# Patient Record
Sex: MALE | Race: WHITE | NOT HISPANIC OR LATINO | Employment: OTHER | URBAN - METROPOLITAN AREA
[De-identification: names, ages, dates, MRNs, and addresses within clinical notes are randomized per-mention and may not be internally consistent; named-entity substitution may affect disease eponyms.]

---

## 2017-01-25 ENCOUNTER — ALLSCRIPTS OFFICE VISIT (OUTPATIENT)
Dept: OTHER | Facility: OTHER | Age: 67
End: 2017-01-25

## 2017-01-25 DIAGNOSIS — E78.5 HYPERLIPIDEMIA: ICD-10-CM

## 2017-01-25 DIAGNOSIS — I10 ESSENTIAL (PRIMARY) HYPERTENSION: ICD-10-CM

## 2017-01-25 DIAGNOSIS — E87.6 HYPOKALEMIA: ICD-10-CM

## 2017-01-30 ENCOUNTER — ALLSCRIPTS OFFICE VISIT (OUTPATIENT)
Dept: OTHER | Facility: OTHER | Age: 67
End: 2017-01-30

## 2017-02-22 ENCOUNTER — ANESTHESIA EVENT (OUTPATIENT)
Dept: GASTROENTEROLOGY | Facility: HOSPITAL | Age: 67
End: 2017-02-22
Payer: MEDICARE

## 2017-02-23 ENCOUNTER — ANESTHESIA (OUTPATIENT)
Dept: GASTROENTEROLOGY | Facility: HOSPITAL | Age: 67
End: 2017-02-23
Payer: MEDICARE

## 2017-02-23 ENCOUNTER — HOSPITAL ENCOUNTER (OUTPATIENT)
Facility: HOSPITAL | Age: 67
Setting detail: OUTPATIENT SURGERY
Discharge: HOME | End: 2017-02-23
Attending: INTERNAL MEDICINE | Admitting: INTERNAL MEDICINE
Payer: MEDICARE

## 2017-02-23 ENCOUNTER — GENERIC CONVERSION - ENCOUNTER (OUTPATIENT)
Dept: OTHER | Facility: OTHER | Age: 67
End: 2017-02-23

## 2017-02-23 VITALS
RESPIRATION RATE: 20 BRPM | TEMPERATURE: 98.6 F | WEIGHT: 212 LBS | SYSTOLIC BLOOD PRESSURE: 110 MMHG | OXYGEN SATURATION: 95 % | HEART RATE: 69 BPM | DIASTOLIC BLOOD PRESSURE: 66 MMHG | HEIGHT: 72 IN | BODY MASS INDEX: 28.71 KG/M2

## 2017-02-23 DIAGNOSIS — R11.2 NAUSEA WITH VOMITING: ICD-10-CM

## 2017-02-23 PROCEDURE — 88305 TISSUE EXAM BY PATHOLOGIST: CPT | Performed by: INTERNAL MEDICINE

## 2017-02-23 PROCEDURE — 88312 SPECIAL STAINS GROUP 1: CPT | Performed by: INTERNAL MEDICINE

## 2017-02-23 PROCEDURE — 88342 IMHCHEM/IMCYTCHM 1ST ANTB: CPT | Performed by: INTERNAL MEDICINE

## 2017-02-23 RX ORDER — LISINOPRIL 40 MG/1
40 TABLET ORAL DAILY
COMMUNITY
End: 2018-03-30 | Stop reason: SDUPTHER

## 2017-02-23 RX ORDER — LIDOCAINE HYDROCHLORIDE 10 MG/ML
INJECTION, SOLUTION EPIDURAL; INFILTRATION; INTRACAUDAL; PERINEURAL AS NEEDED
Status: DISCONTINUED | OUTPATIENT
Start: 2017-02-23 | End: 2017-02-23 | Stop reason: SURG

## 2017-02-23 RX ORDER — AMLODIPINE BESYLATE 10 MG/1
10 TABLET ORAL DAILY
COMMUNITY
End: 2018-03-30 | Stop reason: SDUPTHER

## 2017-02-23 RX ORDER — SODIUM CHLORIDE 9 MG/ML
125 INJECTION, SOLUTION INTRAVENOUS CONTINUOUS
Status: DISCONTINUED | OUTPATIENT
Start: 2017-02-23 | End: 2017-02-23 | Stop reason: HOSPADM

## 2017-02-23 RX ORDER — PROPOFOL 10 MG/ML
INJECTION, EMULSION INTRAVENOUS CONTINUOUS PRN
Status: DISCONTINUED | OUTPATIENT
Start: 2017-02-23 | End: 2017-02-23 | Stop reason: SURG

## 2017-02-23 RX ORDER — PROPOFOL 10 MG/ML
INJECTION, EMULSION INTRAVENOUS AS NEEDED
Status: DISCONTINUED | OUTPATIENT
Start: 2017-02-23 | End: 2017-02-23 | Stop reason: SURG

## 2017-02-23 RX ORDER — PANTOPRAZOLE SODIUM 40 MG/1
40 TABLET, DELAYED RELEASE ORAL DAILY
COMMUNITY
End: 2018-03-30 | Stop reason: SDUPTHER

## 2017-02-23 RX ADMIN — TOPICAL ANESTHETIC 1 SPRAY: 200 SPRAY DENTAL; PERIODONTAL at 10:22

## 2017-02-23 RX ADMIN — PROPOFOL 80 MG: 10 INJECTION, EMULSION INTRAVENOUS at 10:23

## 2017-02-23 RX ADMIN — PROPOFOL 130 MCG/KG/MIN: 10 INJECTION, EMULSION INTRAVENOUS at 10:23

## 2017-02-23 RX ADMIN — SODIUM CHLORIDE 125 ML/HR: 0.9 INJECTION, SOLUTION INTRAVENOUS at 09:29

## 2017-02-23 RX ADMIN — LIDOCAINE HYDROCHLORIDE 50 MG: 10 INJECTION, SOLUTION EPIDURAL; INFILTRATION; INTRACAUDAL; PERINEURAL at 10:23

## 2017-02-27 ENCOUNTER — GENERIC CONVERSION - ENCOUNTER (OUTPATIENT)
Dept: OTHER | Facility: OTHER | Age: 67
End: 2017-02-27

## 2017-03-03 ENCOUNTER — GENERIC CONVERSION - ENCOUNTER (OUTPATIENT)
Dept: OTHER | Facility: OTHER | Age: 67
End: 2017-03-03

## 2017-03-13 ENCOUNTER — APPOINTMENT (OUTPATIENT)
Dept: LAB | Facility: HOSPITAL | Age: 67
End: 2017-03-13
Payer: MEDICARE

## 2017-03-13 ENCOUNTER — ALLSCRIPTS OFFICE VISIT (OUTPATIENT)
Dept: OTHER | Facility: OTHER | Age: 67
End: 2017-03-13

## 2017-03-13 DIAGNOSIS — E78.5 HYPERLIPIDEMIA: ICD-10-CM

## 2017-03-13 DIAGNOSIS — I10 ESSENTIAL (PRIMARY) HYPERTENSION: ICD-10-CM

## 2017-03-13 DIAGNOSIS — E87.6 HYPOKALEMIA: ICD-10-CM

## 2017-03-13 LAB
ALBUMIN SERPL BCP-MCNC: 3.8 G/DL (ref 3.5–5)
ALP SERPL-CCNC: 79 U/L (ref 46–116)
ALT SERPL W P-5'-P-CCNC: 28 U/L (ref 12–78)
ANION GAP SERPL CALCULATED.3IONS-SCNC: 8 MMOL/L (ref 4–13)
AST SERPL W P-5'-P-CCNC: 16 U/L (ref 5–45)
BASOPHILS # BLD AUTO: 0.04 THOUSANDS/ΜL (ref 0–0.1)
BASOPHILS NFR BLD AUTO: 1 % (ref 0–1)
BILIRUB SERPL-MCNC: 0.61 MG/DL (ref 0.2–1)
BUN SERPL-MCNC: 16 MG/DL (ref 5–25)
CALCIUM SERPL-MCNC: 9 MG/DL (ref 8.3–10.1)
CHLORIDE SERPL-SCNC: 105 MMOL/L (ref 100–108)
CHOLEST SERPL-MCNC: 182 MG/DL (ref 50–200)
CO2 SERPL-SCNC: 30 MMOL/L (ref 21–32)
CREAT SERPL-MCNC: 1.1 MG/DL (ref 0.6–1.3)
EOSINOPHIL # BLD AUTO: 0.23 THOUSAND/ΜL (ref 0–0.61)
EOSINOPHIL NFR BLD AUTO: 4 % (ref 0–6)
ERYTHROCYTE [DISTWIDTH] IN BLOOD BY AUTOMATED COUNT: 14.1 % (ref 11.6–15.1)
GFR SERPL CREATININE-BSD FRML MDRD: >60 ML/MIN/1.73SQ M
GLUCOSE SERPL-MCNC: 115 MG/DL (ref 65–140)
HCT VFR BLD AUTO: 47.3 % (ref 36.5–49.3)
HDLC SERPL-MCNC: 37 MG/DL (ref 40–60)
HGB BLD-MCNC: 15.8 G/DL (ref 12–17)
LDLC SERPL CALC-MCNC: 128 MG/DL (ref 0–100)
LYMPHOCYTES # BLD AUTO: 1.54 THOUSANDS/ΜL (ref 0.6–4.47)
LYMPHOCYTES NFR BLD AUTO: 24 % (ref 14–44)
MCH RBC QN AUTO: 29.8 PG (ref 26.8–34.3)
MCHC RBC AUTO-ENTMCNC: 33.4 G/DL (ref 31.4–37.4)
MCV RBC AUTO: 89 FL (ref 82–98)
MONOCYTES # BLD AUTO: 0.51 THOUSAND/ΜL (ref 0.17–1.22)
MONOCYTES NFR BLD AUTO: 8 % (ref 4–12)
NEUTROPHILS # BLD AUTO: 3.99 THOUSANDS/ΜL (ref 1.85–7.62)
NEUTS SEG NFR BLD AUTO: 63 % (ref 43–75)
NRBC BLD AUTO-RTO: 0 /100 WBCS
PLATELET # BLD AUTO: 165 THOUSANDS/UL (ref 149–390)
PMV BLD AUTO: 10.7 FL (ref 8.9–12.7)
POTASSIUM SERPL-SCNC: 3.7 MMOL/L (ref 3.5–5.3)
PROT SERPL-MCNC: 7.1 G/DL (ref 6.4–8.2)
RBC # BLD AUTO: 5.3 MILLION/UL (ref 3.88–5.62)
SODIUM SERPL-SCNC: 143 MMOL/L (ref 136–145)
TRIGL SERPL-MCNC: 87 MG/DL
TSH SERPL DL<=0.05 MIU/L-ACNC: 1.06 UIU/ML (ref 0.36–3.74)
WBC # BLD AUTO: 6.31 THOUSAND/UL (ref 4.31–10.16)

## 2017-03-13 PROCEDURE — 80061 LIPID PANEL: CPT

## 2017-03-13 PROCEDURE — 36415 COLL VENOUS BLD VENIPUNCTURE: CPT

## 2017-03-13 PROCEDURE — 80053 COMPREHEN METABOLIC PANEL: CPT

## 2017-03-13 PROCEDURE — 85025 COMPLETE CBC W/AUTO DIFF WBC: CPT

## 2017-03-13 PROCEDURE — 84443 ASSAY THYROID STIM HORMONE: CPT

## 2017-04-14 ENCOUNTER — ALLSCRIPTS OFFICE VISIT (OUTPATIENT)
Dept: OTHER | Facility: OTHER | Age: 67
End: 2017-04-14

## 2017-05-24 ENCOUNTER — ALLSCRIPTS OFFICE VISIT (OUTPATIENT)
Dept: OTHER | Facility: OTHER | Age: 67
End: 2017-05-24

## 2017-05-24 DIAGNOSIS — Z00.00 ENCOUNTER FOR GENERAL ADULT MEDICAL EXAMINATION WITHOUT ABNORMAL FINDINGS: ICD-10-CM

## 2017-06-27 ENCOUNTER — GENERIC CONVERSION - ENCOUNTER (OUTPATIENT)
Dept: OTHER | Facility: OTHER | Age: 67
End: 2017-06-27

## 2017-06-27 ENCOUNTER — APPOINTMENT (OUTPATIENT)
Dept: LAB | Facility: HOSPITAL | Age: 67
End: 2017-06-27
Payer: MEDICARE

## 2017-06-27 DIAGNOSIS — Z00.00 ENCOUNTER FOR GENERAL ADULT MEDICAL EXAMINATION WITHOUT ABNORMAL FINDINGS: ICD-10-CM

## 2017-06-27 LAB
ALBUMIN SERPL BCP-MCNC: 4.1 G/DL (ref 3.5–5)
ALP SERPL-CCNC: 82 U/L (ref 46–116)
ALT SERPL W P-5'-P-CCNC: 26 U/L (ref 12–78)
ANION GAP SERPL CALCULATED.3IONS-SCNC: 7 MMOL/L (ref 4–13)
AST SERPL W P-5'-P-CCNC: 18 U/L (ref 5–45)
BILIRUB SERPL-MCNC: 0.76 MG/DL (ref 0.2–1)
BUN SERPL-MCNC: 13 MG/DL (ref 5–25)
CALCIUM SERPL-MCNC: 8.9 MG/DL (ref 8.3–10.1)
CHLORIDE SERPL-SCNC: 107 MMOL/L (ref 100–108)
CO2 SERPL-SCNC: 29 MMOL/L (ref 21–32)
CREAT SERPL-MCNC: 1.2 MG/DL (ref 0.6–1.3)
EST. AVERAGE GLUCOSE BLD GHB EST-MCNC: 137 MG/DL
GFR SERPL CREATININE-BSD FRML MDRD: >60 ML/MIN/1.73SQ M
GLUCOSE P FAST SERPL-MCNC: 117 MG/DL (ref 65–99)
HBA1C MFR BLD: 6.4 % (ref 4.2–6.3)
POTASSIUM SERPL-SCNC: 3.6 MMOL/L (ref 3.5–5.3)
PROT SERPL-MCNC: 7.6 G/DL (ref 6.4–8.2)
PSA SERPL-MCNC: 1.7 NG/ML (ref 0–4)
SODIUM SERPL-SCNC: 143 MMOL/L (ref 136–145)

## 2017-06-27 PROCEDURE — 36415 COLL VENOUS BLD VENIPUNCTURE: CPT

## 2017-06-27 PROCEDURE — 80053 COMPREHEN METABOLIC PANEL: CPT

## 2017-06-27 PROCEDURE — 83036 HEMOGLOBIN GLYCOSYLATED A1C: CPT

## 2017-06-27 PROCEDURE — G0103 PSA SCREENING: HCPCS

## 2017-07-12 ENCOUNTER — ALLSCRIPTS OFFICE VISIT (OUTPATIENT)
Dept: OTHER | Facility: OTHER | Age: 67
End: 2017-07-12

## 2017-07-18 ENCOUNTER — ALLSCRIPTS OFFICE VISIT (OUTPATIENT)
Dept: OTHER | Facility: OTHER | Age: 67
End: 2017-07-18

## 2017-09-26 ENCOUNTER — ALLSCRIPTS OFFICE VISIT (OUTPATIENT)
Dept: OTHER | Facility: OTHER | Age: 67
End: 2017-09-26

## 2017-10-04 ENCOUNTER — ALLSCRIPTS OFFICE VISIT (OUTPATIENT)
Dept: OTHER | Facility: OTHER | Age: 67
End: 2017-10-04

## 2017-10-04 DIAGNOSIS — R19.8 OTHER SPECIFIED SYMPTOMS AND SIGNS INVOLVING THE DIGESTIVE SYSTEM AND ABDOMEN: ICD-10-CM

## 2017-10-05 ENCOUNTER — APPOINTMENT (OUTPATIENT)
Dept: LAB | Facility: HOSPITAL | Age: 67
End: 2017-10-05
Attending: INTERNAL MEDICINE
Payer: MEDICARE

## 2017-10-05 DIAGNOSIS — R19.8 OTHER SPECIFIED SYMPTOMS AND SIGNS INVOLVING THE DIGESTIVE SYSTEM AND ABDOMEN: ICD-10-CM

## 2017-10-05 LAB
ALBUMIN SERPL BCP-MCNC: 4.1 G/DL (ref 3.5–5)
ALP SERPL-CCNC: 83 U/L (ref 46–116)
ALT SERPL W P-5'-P-CCNC: 37 U/L (ref 12–78)
ANION GAP SERPL CALCULATED.3IONS-SCNC: 9 MMOL/L (ref 4–13)
AST SERPL W P-5'-P-CCNC: 22 U/L (ref 5–45)
BASOPHILS # BLD AUTO: 0.05 THOUSANDS/ΜL (ref 0–0.1)
BASOPHILS NFR BLD AUTO: 1 % (ref 0–1)
BILIRUB DIRECT SERPL-MCNC: 0.18 MG/DL (ref 0–0.2)
BILIRUB SERPL-MCNC: 1.05 MG/DL (ref 0.2–1)
BUN SERPL-MCNC: 12 MG/DL (ref 5–25)
CALCIUM SERPL-MCNC: 9.3 MG/DL (ref 8.3–10.1)
CHLORIDE SERPL-SCNC: 103 MMOL/L (ref 100–108)
CO2 SERPL-SCNC: 28 MMOL/L (ref 21–32)
CREAT SERPL-MCNC: 1.04 MG/DL (ref 0.6–1.3)
EOSINOPHIL # BLD AUTO: 0.17 THOUSAND/ΜL (ref 0–0.61)
EOSINOPHIL NFR BLD AUTO: 3 % (ref 0–6)
ERYTHROCYTE [DISTWIDTH] IN BLOOD BY AUTOMATED COUNT: 16.1 % (ref 11.6–15.1)
GFR SERPL CREATININE-BSD FRML MDRD: 74 ML/MIN/1.73SQ M
GLUCOSE P FAST SERPL-MCNC: 120 MG/DL (ref 65–99)
HCT VFR BLD AUTO: 44.2 % (ref 36.5–49.3)
HGB BLD-MCNC: 15.9 G/DL (ref 12–17)
LYMPHOCYTES # BLD AUTO: 1.32 THOUSANDS/ΜL (ref 0.6–4.47)
LYMPHOCYTES NFR BLD AUTO: 19 % (ref 14–44)
MCH RBC QN AUTO: 33.2 PG (ref 26.8–34.3)
MCHC RBC AUTO-ENTMCNC: 36 G/DL (ref 31.4–37.4)
MCV RBC AUTO: 92 FL (ref 82–98)
MONOCYTES # BLD AUTO: 0.46 THOUSAND/ΜL (ref 0.17–1.22)
MONOCYTES NFR BLD AUTO: 7 % (ref 4–12)
NEUTROPHILS # BLD AUTO: 4.79 THOUSANDS/ΜL (ref 1.85–7.62)
NEUTS SEG NFR BLD AUTO: 70 % (ref 43–75)
NRBC BLD AUTO-RTO: 0 /100 WBCS
PLATELET # BLD AUTO: 170 THOUSANDS/UL (ref 149–390)
PMV BLD AUTO: 10.5 FL (ref 8.9–12.7)
POTASSIUM SERPL-SCNC: 3.7 MMOL/L (ref 3.5–5.3)
PROT SERPL-MCNC: 7.8 G/DL (ref 6.4–8.2)
RBC # BLD AUTO: 4.79 MILLION/UL (ref 3.88–5.62)
SODIUM SERPL-SCNC: 140 MMOL/L (ref 136–145)
WBC # BLD AUTO: 6.79 THOUSAND/UL (ref 4.31–10.16)

## 2017-10-05 PROCEDURE — 85025 COMPLETE CBC W/AUTO DIFF WBC: CPT

## 2017-10-05 PROCEDURE — 36415 COLL VENOUS BLD VENIPUNCTURE: CPT

## 2017-10-05 PROCEDURE — 80048 BASIC METABOLIC PNL TOTAL CA: CPT

## 2017-10-05 PROCEDURE — 80076 HEPATIC FUNCTION PANEL: CPT

## 2017-10-10 ENCOUNTER — HOSPITAL ENCOUNTER (OUTPATIENT)
Dept: CT IMAGING | Facility: HOSPITAL | Age: 67
Discharge: HOME | End: 2017-10-10
Attending: INTERNAL MEDICINE
Payer: MEDICARE

## 2017-10-10 DIAGNOSIS — R19.8 OTHER SPECIFIED SYMPTOMS AND SIGNS INVOLVING THE DIGESTIVE SYSTEM AND ABDOMEN: ICD-10-CM

## 2017-10-10 PROCEDURE — 74177 CT ABD & PELVIS W/CONTRAST: CPT

## 2017-10-10 RX ADMIN — IOHEXOL 100 ML: 350 INJECTION, SOLUTION INTRAVENOUS at 18:42

## 2017-10-17 ENCOUNTER — GENERIC CONVERSION - ENCOUNTER (OUTPATIENT)
Dept: OTHER | Facility: OTHER | Age: 67
End: 2017-10-17

## 2017-10-18 ENCOUNTER — TRANSCRIBE ORDERS (OUTPATIENT)
Dept: SLEEP CENTER | Facility: CLINIC | Age: 67
End: 2017-10-18

## 2017-10-18 ENCOUNTER — HOSPITAL ENCOUNTER (OUTPATIENT)
Dept: SLEEP CENTER | Facility: CLINIC | Age: 67
Discharge: HOME | End: 2017-10-18
Payer: MEDICARE

## 2017-10-18 DIAGNOSIS — G47.33 OBSTRUCTIVE SLEEP APNEA: ICD-10-CM

## 2017-10-18 DIAGNOSIS — G47.33 OBSTRUCTIVE SLEEP APNEA SYNDROME: Primary | ICD-10-CM

## 2017-10-18 NOTE — PROGRESS NOTES
Progress Note - [facility]   Suzette Mason 78 y o  male MRN: [id number]  Unit/Bed#:  Encounter: [id number]  Dear Dr Seamus Tejada  Mr Landen Hyde is here for follow-up of his obstructive sleep apnea, a 1 year follow-up  52-year-old gentleman with past medical history significant for history of hypertension diagnosed with severe obstructive sleep apnea and has been on the CPAP for more than 2 years now compliant with his CPAP machine  His daily sleep schedule has not changed  He states he has decreased nocturnal awakenings and feels much rested when he wakes up in the morning  Past medical history list of current medications unchanged from prior visit  Today on exam  Weight 211 4 pounds  Height 6 feet 0 inches  BMI 28 7  Blood pressure 142/80  Heart rate 86  Neck 40 centimeters  Starbuck sleepiness score 3  CPAP settings 9  DME company and medical  Today on exam  ENT nares is patent, no evidence of any discharge, presence of redundant mucosa, crowded oropharyngeal airways, Mallampati score of 3  Neck short and wide supple no lymphadenopathy normal thyroid  Heart first and second heart sounds are heard no murmur or gallop is heard  Lungs clear to auscultation  Extremities no pedal edema  CNS awake alert oriented x3  Assessment  1  Obstructive sleep apnea  2  Overweight  Plan  Discussed with the patient to continue with his same CPAP settings  Also discussed regarding change of CPAP supplies every 6 months  Understands and verbalizes  Discussed with the patient to use saline nasal spray 1 spray into each nostril prior to putting on the mask  He states his humidifier is not working as well, and he wants a replacement I [facility]   Recommend weight loss  Follow-up in 1 year or p r n  earlier as needed    Thank you very much for allowing me to participate in the care of this patient  Yours sincerely  Ricki Dang

## 2017-10-27 NOTE — PROGRESS NOTES
Assessment  1  Abdominal bloating (787 3) (R14 0)   2  Increased abdominal girth (789 30) (R19 8)   3  Constipation, chronic (564 00) (K59 09)    Plan  Constipation, chronic    · MiraLax Oral Powder (Polyethylene Glycol 3350); MIX 17 GM IN 8 OUNCES OF  LIQUID AND DRINK 1 TO 2 TIMES DAILY FOR CONSTIPATION   Rx By: Meek Barahona; Dispense: 30 Days ; #:1 X 510 GM Bottle (2 Bottles); Refill: 2;For: Constipation, chronic; SWETHA = N; Verified Transmission to [address]; Last Updated By: [facility]; 10/4/2017 11:14:02 AM  Increased abdominal girth    · (1) BASIC METABOLIC PROFILE; Status:Active; Requested GRR:[id number];    Perform:[facility]; GRT:27PPG0601; Ordered; For:Increased abdominal girth; Ordered By:Harrison Turcios;   · (1) CBC/PLT/DIFF; Status:Active; Requested JESSICA:[id number];    Perform:[facility]; JYT:[id number]; Ordered; For:Increased abdominal girth; Ordered By:Harrison Turcios;   · (1) HEPATIC FUNCTION PANEL; Status:Active; Requested INJ:[id number];    Perform:[facility]; XCR:[id number]; Ordered; For:Increased abdominal girth; Ordered By:Harrison Turcios;   · * CT ABDOMEN PELVIS W CONTRAST; Status:Active; Requested VWQ:[id number];    Perform:[facility]; GIK:[id number]; Last Updated Jen Red; 10/4/2017 11:31:51 AM;Ordered; For:Increased abdominal girth; Ordered By:Wu Turcios;    Discussion/Summary  Discussion Summary:   Abdominal bloating:Likely secondary to chronic constipation  Patient and his wife will consult on importance of increased water intake and increased fiber intake  I would recommend patient to start MiraLax daily to see whether it can improve his symptoms  Low FODMAP diet instruction was given to the patient  Increased abdominal girth:CT with IV contrast to evaluate any abnormalities including occult malignancy    follow-up in 2 months     Counseling Documentation With Imm: The patient, patient's family was counseled regarding diagnostic results,-- instructions for management,-- patient and family education,-- impressions,-- risks and benefits of treatment options  Chief Complaint  Chief Complaint Free Text Note Form: Pt is here for follow up; complains of loss of appetite, constipation, bloating and hard abdomen  History of Present Illness  HPI: 44-year-old man presented for follow-up after endoscopy and colonoscopy  Patient reported he noticed increased abdominal girth in the past few months and severe bloating  He has bowel movement once a day or once every other day but he has sense of incomplete evacuation  Patient reported he feels uncomfortable  His wife reported patient does not drink enough water And he has constipation intermittently  His symptom improves after he drinks vegetable milkshake  She denies vomiting  Patient has been gaining weight  His most recent TSH was 1 0  His EGD showed duodenitis and biopsy was negative for celiac disease  His colonoscopy showed good prep with multiple small polyps removed with 2 of them being tubular adenoma  it was planned to repeat his colonoscopy in 3 years  Review of Systems  Complete-Male GI Adult:   Constitutional: No fever or chills, feels well, no tiredness, no recent weight gain or weight loss  Eyes: No complaints of eye pain, no red eyes, no discharge from eyes, no itchy eyes  ENT: no complaints of earache, no hearing loss, no nosebleeds, no nasal discharge, no sore throat, no hoarseness  Cardiovascular: No complaints of slow heart rate, no fast heart rate, no chest pain, no palpitations, no leg claudication, no lower extremity  Respiratory: No complaints of shortness of breath, no wheezing, no cough, no SOB on exertion, no orthopnea or PND  Gastrointestinal: constipation-- and-- loss of appetite, but-- as noted in HPI  Genitourinary: No complaints of dysuria, no incontinence, no hesitancy, no nocturia, no genital lesion, no testicular pain     Musculoskeletal: No complaints of arthralgia, no myalgias, no joint swelling or stiffness, no limb pain or swelling  Integumentary: No complaints of skin rash or skin lesions, no itching, no skin wound, no dry skin  Neurological: No compliants of headache, no confusion, no convulsions, no numbness or tingling, no dizziness or fainting, no limb weakness, no difficulty walking  Psychiatric: Is not suicidal, no sleep disturbances, no anxiety or depression, no change in personality, no emotional problems  Endocrine: No complaints of proptosis, no hot flashes, no muscle weakness, no erectile dysfunction, no deepening of the voice, no feelings of weakness  Hematologic/Lymphatic: No complaints of swollen glands, no swollen glands in the neck, does not bleed easily, no easy bruising  ROS Reviewed:   ROS reviewed  Active Problems  1  Abnormal CT of the abdomen (793 6) (R93 5)   2  Alternating constipation and diarrhea (787 99) (R19 8)   3  Bilateral inguinal hernia (550 92) (K40 20)   4  Bulging Disc (L1 - L2) (722 10)   5  Carotid artery stenosis (433 10) (I65 29)   6  Colitis (558 9) (K52 9)   7  Duodenitis (535 60) (K29 80)   8  Edema (782 3) (R60 9)   9  Gastritis (535 50) (K29 70)   10  Gastrointestinal complications due to surgery (997 49) (K91 89)   11  Hyperlipidemia (272 4) (E78 5)   12  Hypertension (401 9) (I10)   13  Hypokalemia (276 8) (E87 6)   14  Impacted cerumen of both ears (380 4) (H61 23)   15  Inguinal hernia (550 90) (K40 90)   16  Left ear pain (388 70) (H92 02)   17  Left knee pain (719 46) (M25 562)   18  Leg pain, bilateral (729 5) (M79 604,M79 605)   19  Lumbar pain (724 2) (M54 5)   20  Nausea and vomiting (787 01) (R11 2)   21  Olecranon bursitis of left elbow (726 33) (M70 22)   22  Skin abrasion (919 0) (T14 8XXA)   23  Sleep apnea (780 57) (G47 30)   24  Tobacco abuse (305 1) (Z72 0)   25  URTI (acute upper respiratory infection) (465 9) (J06 9)    Past Medical History  1   Bilateral inguinal hernia (550 92) (K40 20)   2  History of abdominal pain (V13 89) (Z87 898)   3  History of Recurrent bilateral inguinal hernia (550 93) (K40 21)  Active Problems And Past Medical History Reviewed: The active problems and past medical history were reviewed and updated today  Surgical History  1  History of Cholecystectomy   2  History of Complete Colonoscopy   3  History of Exploratory Laparoscopy   4  History of Hernia Repair Inguinal Bilateral  Surgical History Reviewed: The surgical history was reviewed and updated today  Family History  Mother    1  No pertinent family history    Social History   · Being A Social Drinker   · Current Every Day Smoker (305 1)    Current Meds   1  Adult Aspirin Low Strength 81 MG TBDP; Therapy: 37ATF0895 to Recorded   2  AmLODIPine Besylate 10 MG Oral Tablet; TAKE 1 TABLET DAILY AS DIRECTED; Therapy: 96CWG1530 to (Evaluate:20Nov2017)  Requested for: 64DED3118; Last   Rx:18Ouw5476 Ordered   3  Lisinopril 40 MG Oral Tablet; TAKE 1 TABLET DAILY; Therapy: 04VDX9234 to (Evaluate:20Nov2017)  Requested for: 09SKM6525; Last   Rx:33Ifo2045 Ordered   4  Pantoprazole Sodium 40 MG Oral Tablet Delayed Release; take one tablet by mouth   daily; Therapy: 12ZKD2659 to (Evaluate:11Mar2018)  Requested for: 33Rch3211; Last   Rx:14Jle3909 Ordered   5  Tylenol TABS; Therapy: (Recorded:37Cjo2439) to Recorded    Allergies  1  No Known Drug Allergies    Vitals  Vital Signs    Recorded: 57JEL1518 10:51AM   Temperature 97 6 F, Tympanic   Heart Rate 89   Systolic 865, LUE, Sitting   Diastolic 81, LUE, Sitting   Height 5 ft 9 5 in   Weight 211 lb 8 oz   BMI Calculated 30 79   BSA Calculated 2 13   O2 Saturation 98, RA     Physical Exam    Constitutional   General appearance: No acute distress, well appearing and well nourished  Eyes   Conjunctiva and lids: No swelling, erythema, or discharge  Ears, Nose, Mouth, and Throat   Oropharynx: Normal with no erythema, edema, exudate or lesions  Pulmonary   Respiratory effort: No increased work of breathing or signs of respiratory distress  Cardiovascular   Examination of extremities for edema and/or varicosities: Normal     Abdomen distended, Non tender          Future Appointments    Date/Time Provider Specialty Site   11/21/2017 03:00 PM Ishmael Yost [facility] Adult [facility] 9     Signatures   Electronically signed by : Katarzyna Nazario MD; Oct  4 2017 12:13PM EST                       (Author)

## 2017-11-21 ENCOUNTER — GENERIC CONVERSION - ENCOUNTER (OUTPATIENT)
Dept: OTHER | Facility: OTHER | Age: 67
End: 2017-11-21

## 2017-11-21 DIAGNOSIS — M79.661 PAIN OF RIGHT LOWER LEG: ICD-10-CM

## 2017-11-21 DIAGNOSIS — K76.0 FATTY (CHANGE OF) LIVER, NOT ELSEWHERE CLASSIFIED: ICD-10-CM

## 2017-11-21 DIAGNOSIS — K59.09 OTHER CONSTIPATION: ICD-10-CM

## 2017-11-21 DIAGNOSIS — R14.0 ABDOMINAL DISTENSION (GASEOUS): ICD-10-CM

## 2017-11-21 DIAGNOSIS — S83.206A TEAR OF RIGHT MENISCUS AS CURRENT INJURY: ICD-10-CM

## 2017-11-21 DIAGNOSIS — R60.0 LOCALIZED EDEMA: ICD-10-CM

## 2017-11-21 DIAGNOSIS — M25.561 PAIN IN RIGHT KNEE: ICD-10-CM

## 2017-12-13 ENCOUNTER — ALLSCRIPTS OFFICE VISIT (OUTPATIENT)
Dept: OTHER | Facility: OTHER | Age: 67
End: 2017-12-13

## 2017-12-13 ENCOUNTER — GENERIC CONVERSION - ENCOUNTER (OUTPATIENT)
Dept: OTHER | Facility: OTHER | Age: 67
End: 2017-12-13

## 2017-12-13 ENCOUNTER — HOSPITAL ENCOUNTER (OUTPATIENT)
Dept: NON INVASIVE DIAGNOSTICS | Facility: HOSPITAL | Age: 67
Discharge: HOME | End: 2017-12-13
Payer: MEDICARE

## 2017-12-13 ENCOUNTER — APPOINTMENT (OUTPATIENT)
Dept: LAB | Facility: HOSPITAL | Age: 67
End: 2017-12-13
Payer: MEDICARE

## 2017-12-13 ENCOUNTER — HOSPITAL ENCOUNTER (OUTPATIENT)
Dept: RADIOLOGY | Facility: HOSPITAL | Age: 67
Discharge: HOME | End: 2017-12-13
Payer: MEDICARE

## 2017-12-13 DIAGNOSIS — R60.0 LOCALIZED EDEMA: ICD-10-CM

## 2017-12-13 DIAGNOSIS — K59.09 OTHER CONSTIPATION: ICD-10-CM

## 2017-12-13 DIAGNOSIS — M79.661 PAIN OF RIGHT LOWER LEG: ICD-10-CM

## 2017-12-13 DIAGNOSIS — R14.0 ABDOMINAL DISTENSION (GASEOUS): ICD-10-CM

## 2017-12-13 DIAGNOSIS — M25.561 PAIN IN RIGHT KNEE: ICD-10-CM

## 2017-12-13 DIAGNOSIS — K76.0 FATTY (CHANGE OF) LIVER, NOT ELSEWHERE CLASSIFIED: ICD-10-CM

## 2017-12-13 LAB
ALBUMIN SERPL BCP-MCNC: 3.8 G/DL (ref 3.5–5)
ALP SERPL-CCNC: 77 U/L (ref 46–116)
ALT SERPL W P-5'-P-CCNC: 28 U/L (ref 12–78)
ANION GAP SERPL CALCULATED.3IONS-SCNC: 7 MMOL/L (ref 4–13)
AST SERPL W P-5'-P-CCNC: 20 U/L (ref 5–45)
BILIRUB SERPL-MCNC: 0.49 MG/DL (ref 0.2–1)
BUN SERPL-MCNC: 14 MG/DL (ref 5–25)
CALCIUM SERPL-MCNC: 9 MG/DL (ref 8.3–10.1)
CHLORIDE SERPL-SCNC: 108 MMOL/L (ref 100–108)
CO2 SERPL-SCNC: 30 MMOL/L (ref 21–32)
CREAT SERPL-MCNC: 1.02 MG/DL (ref 0.6–1.3)
GFR SERPL CREATININE-BSD FRML MDRD: 76 ML/MIN/1.73SQ M
GLUCOSE SERPL-MCNC: 104 MG/DL (ref 65–140)
POTASSIUM SERPL-SCNC: 4 MMOL/L (ref 3.5–5.3)
PROT SERPL-MCNC: 7.1 G/DL (ref 6.4–8.2)
SODIUM SERPL-SCNC: 145 MMOL/L (ref 136–145)

## 2017-12-13 PROCEDURE — 80053 COMPREHEN METABOLIC PANEL: CPT

## 2017-12-13 PROCEDURE — 93971 EXTREMITY STUDY: CPT

## 2017-12-13 PROCEDURE — 36415 COLL VENOUS BLD VENIPUNCTURE: CPT

## 2017-12-13 PROCEDURE — 73564 X-RAY EXAM KNEE 4 OR MORE: CPT

## 2017-12-14 ENCOUNTER — GENERIC CONVERSION - ENCOUNTER (OUTPATIENT)
Dept: OTHER | Facility: OTHER | Age: 67
End: 2017-12-14

## 2017-12-14 NOTE — PROGRESS NOTES
Assessment    1  Right knee pain (719 46) (M25 561)   2  Tenderness of right calf (729 5) (M79 661)   3  Edema of right lower extremity (782 3) (R60 0)    Plan  Edema of right lower extremity, Right knee pain, Tenderness of right calf    · Meloxicam 15 MG Oral Tablet; TAKE 1 TABLET DAILY WITH FOOD   · * XR KNEE 4+ VW RIGHT INJURY; Status:Active; Requested for:99Dou4026;    · VAS LOWER LIMB VENOUS DUPLEX STUDY, UNILATERAL/LIMITED; UnilateralSide:Right; Status:Hold For - Scheduling; Requested for:35Oxw4593;    · Follow-up visit in 1 week Evaluation and Treatment  Follow-up  Status: Hold For -Scheduling  Requested for: 48Bng9918    Discussion/Summary    Patient go for Stat Doppler study as well as x-ray of the right leg  Patient will try to elevate right leg in use ice 10 minutes at a time 4 times a day  patient will use meloxicam daily  Patient use Tylenol as needed  Follow-up in 1 week  Patient may need MRI due to potential meniscal tear  Chief Complaint  Pt c/o RT leg pain since Dimitry 12/10/17, which felt like a pulled muscle at that time  The patient has since noted the pain got a lot worse  He stated his leg gave out this morning while walking to his vehicle and feels like there is a lump behind RT knee  He said the skin of the lower part of RT leg feels tight and radiation of pain into thigh  Pt with visible discomfort and unable to bear much weight  and failed use of heat, ice, motrin  requested for Lisinopril, Amlodipine and Pantoprazole to [facility]  History of Present Illness  HPI: Patient is here with difficulty ambulating  Patient's right knee gave out today  Patient was trying to go up a step 1 buckling occurred  Patient with pain behind his right knee  Patient also with tightness in his calf  No redness or bruising  patient with some back pain  Patient's right leg pain has worsened over the past 3 days  No fever  Patient did use ice heat and Motrin        Review of Systems   Constitutional: as noted in HPI   ENT: no complaints of earache, no loss of hearing, no nosebleeds or nasal discharge, no sore throat or hoarseness  Cardiovascular: no complaints of slow or fast heart rate, no chest pain, no palpitations, no leg claudication or lower extremity edema-- and-- no lower extremity edema  Respiratory: no complaints of shortness of breath, no wheezing or cough, no dyspnea on exertion, no orthopnea or PND  Gastrointestinal: no complaints of abdominal pain, no constipation, no nausea or vomiting, no diarrhea or bloody stools  Genitourinary: no complaints of dysuria or incontinence, no hesitancy, no nocturia, no genital lesion, no inadequacy of penile erection  Musculoskeletal: as noted in HPI  Integumentary: no complaints of skin rash or lesion, no itching or dry skin, no skin wounds  Neurological: no complaints of headache, no confusion, no numbness or tingling, no dizziness or fainting  Active Problems  1  Abdominal bloating (787 3) (R14 0)   2  Abnormal CT of the abdomen (793 6) (R93 5)   3  Alternating constipation and diarrhea (787 99) (R19 8)   4  Bilateral inguinal hernia (550 92) (K40 20)   5  Bulging Disc (L1 - L2) (722 10)   6  Carotid artery stenosis (433 10) (I65 29)   7  Colitis (558 9) (K52 9)   8  Constipation, chronic (564 00) (K59 09)   9  Duodenitis (535 60) (K29 80)   10  Edema (782 3) (R60 9)   11  Fatty liver (571 8) (K76 0)   12  Gastritis (535 50) (K29 70)   13  Gastrointestinal complications due to surgery (997 49) (K91 89)   14  Hyperlipidemia (272 4) (E78 5)   15  Hypertension (401 9) (I10)   16  Hypokalemia (276 8) (E87 6)   17  Impacted cerumen of both ears (380 4) (H61 23)   18  Increased abdominal girth (789 30) (R19 8)   19  Inguinal hernia (550 90) (K40 90)   20  Left ear pain (388 70) (H92 02)   21  Left knee pain (719 46) (M25 562)   22  Leg pain, bilateral (729 5) (M79 604,M79 605)   23  Lumbar pain (724 2) (M54 5)   24  Nausea and vomiting (787 01) (R11 2)   25  Olecranon bursitis of left elbow (726 33) (M70 22)   26  Skin abrasion (919 0) (T14 8XXA)   27  Sleep apnea (780 57) (G47 30)   28  Tobacco abuse (305 1) (Z72 0)   29  URTI (acute upper respiratory infection) (465 9) (J06 9)    Past Medical History  1  Bilateral inguinal hernia (550 92) (K40 20)   2  History of abdominal pain (V13 89) (Z87 898)   3  History of Recurrent bilateral inguinal hernia (550 93) (K40 21)  Active Problems And Past Medical History Reviewed: The active problems and past medical history were reviewed and updated today  Family History  Mother    1  No pertinent family history    Social History   · Being A Social Drinker   · Current Every Day Smoker (305 1)    Surgical History    1  History of Cholecystectomy   2  History of Complete Colonoscopy   3  History of Exploratory Laparoscopy   4  History of Hernia Repair Inguinal Bilateral    Current Meds   1  Adult Aspirin Low Strength 81 MG TBDP; Therapy: 50OFI7483 to Recorded   2  AmLODIPine Besylate 10 MG Oral Tablet; TAKE 1 TABLET DAILY AS DIRECTED; Therapy: 85LIT2722 to (Evaluate:20Nov2017)  Requested for: 24WCJ8205; Last Rx:92Dzb1538 Ordered   3  Lisinopril 40 MG Oral Tablet; TAKE 1 TABLET DAILY; Therapy: 71HLH9732 to (Evaluate:20Nov2017)  Requested for: 25GHY1107; Last Rx:04Fde5256 Ordered   4  Pantoprazole Sodium 40 MG Oral Tablet Delayed Release; take one tablet by mouth daily; Therapy: 00SKE6935 to (Evaluate:11Mar2018)  Requested for: 30Adu3215; Last Rx:24Msc6205 Ordered   5  Tylenol TABS; Therapy: (Recorded:91Ixa9562) to Recorded    The medication list was reviewed and updated today  Allergies  1  No Known Drug Allergies    Vitals   Recorded: 69DGT6279 85:53YW   Systolic 519   Diastolic 88   Height 5 ft 9 5 in   Weight Unobtainable Yes       Physical Exam   Constitutional  General appearance: No acute distress, well appearing and well nourished  Eyes  Conjunctiva and lids: No swelling, erythema, or discharge     Pupils and irises: Equal, round and reactive to light  Pulmonary  Respiratory effort: No increased work of breathing or signs of respiratory distress  Auscultation of lungs: Clear to auscultation, equal breath sounds bilaterally, no wheezes, no rales, no rhonci  Cardiovascular  Palpation of heart: Normal PMI, no thrills  Auscultation of heart: Normal rate and rhythm, normal S1 and S2, without murmurs  Examination of extremities for edema and/or varicosities: Normal    Carotid pulses: Normal    Abdomen  Abdomen: Non-tender, no masses  Liver and spleen: No hepatomegaly or splenomegaly  Lymphatic  Palpation of lymph nodes in neck: No lymphadenopathy  Musculoskeletal  Gait and station: Abnormal    Digits and nails: Normal without clubbing or cyanosis  Inspection/palpation of joints, bones, and muscles: Abnormal  -- Swelling/edema right lower extremity greater than left  Some redness noted right lower extremity  Patient with some discomfort in the superior calf on the right along with popliteal region  Small Baker cyst noted  Patient has pain over right anterior knee medially  Positive Apley test  Negative Lachman test   Skin  Skin and subcutaneous tissue: Abnormal    Neurologic  Cranial nerves: Cranial nerves 2-12 intact  Reflexes: 2+ and symmetric  Sensation: No sensory loss     Psychiatric  Orientation to person, place and time: Normal    Mood and affect: Normal          Signatures   Electronically signed by : Libby Caraballo DO; Dec 13 2017 11:03AM EST                       (Author)

## 2017-12-20 ENCOUNTER — TRANSCRIBE ORDERS (OUTPATIENT)
Dept: ADMINISTRATIVE | Facility: HOSPITAL | Age: 67
End: 2017-12-20

## 2017-12-20 ENCOUNTER — GENERIC CONVERSION - ENCOUNTER (OUTPATIENT)
Dept: OTHER | Facility: OTHER | Age: 67
End: 2017-12-20

## 2017-12-20 DIAGNOSIS — S83.203A OTHER TEAR OF MENISCUS OF RIGHT KNEE, UNSPECIFIED MENISCUS, UNSPECIFIED WHETHER OLD OR CURRENT TEAR, INITIAL ENCOUNTER: Primary | ICD-10-CM

## 2017-12-26 ENCOUNTER — GENERIC CONVERSION - ENCOUNTER (OUTPATIENT)
Dept: OTHER | Facility: OTHER | Age: 67
End: 2017-12-26

## 2017-12-27 ENCOUNTER — GENERIC CONVERSION - ENCOUNTER (OUTPATIENT)
Dept: OTHER | Facility: OTHER | Age: 67
End: 2017-12-27

## 2017-12-27 ENCOUNTER — HOSPITAL ENCOUNTER (OUTPATIENT)
Dept: MRI IMAGING | Facility: HOSPITAL | Age: 67
Discharge: HOME | End: 2017-12-27
Payer: MEDICARE

## 2017-12-27 DIAGNOSIS — S83.203A OTHER TEAR OF MENISCUS OF RIGHT KNEE, UNSPECIFIED MENISCUS, UNSPECIFIED WHETHER OLD OR CURRENT TEAR, INITIAL ENCOUNTER: ICD-10-CM

## 2017-12-27 PROCEDURE — 73721 MRI JNT OF LWR EXTRE W/O DYE: CPT

## 2018-01-02 ENCOUNTER — GENERIC CONVERSION - ENCOUNTER (OUTPATIENT)
Dept: OTHER | Facility: OTHER | Age: 68
End: 2018-01-02

## 2018-01-10 NOTE — RESULT NOTES
Message   two small polyps removed were precancerous  no colitis on random biopsy  gastritis on biopsy  Verified Results  (1) TISSUE EXAM [id number] 10:30AM Leela Villaseñor     Test Name Result Flag Reference   LAB AP CASE REPORT (Report)     Surgical Pathology Report             Case: [id number]                   Authorizing Provider: Cathie Barrett MD       Collected:      02/23/2017 1030        Ordering Location:   [address] [place] [address]   Received:      02/23/2017 73[facility] Endoscopy                               Pathologist:      Alin Sandhu MD                                 Specimens:  A) - Duodenum, Duodenum Biopsy - mild duodenitis                            B) - Stomach, Antrum Biopsy - nodular mucosa                              C) - Esophagogastric junction, GE Junction Biopsy - esophagitis                    D) - Polyp, Colorectal, Ileocecal Valve Polyp                             E) - Large Intestine, Right/Ascending Colon, Ascending Colon Polyp                   F) - Colon, Random Colon Biopsy                                    G) - Large Intestine, Transverse Colon, Transverse Colon Polyp                     H) - Large Intestine, Sigmoid Colon, Sigmoid Colon Polyp   LAB AP FINAL DIAGNOSIS (Report)     A  Duodenum, mild duodenitis (biopsy):    - Small bowel mucosa with mild chronic, nonspecific duodenitis  - No villous atrophy, marked increase in intraepithelial lymphocytes or   crypt hyperplasia to diagnose     malabsorptive enteropathy     - No active inflammation, granulomas, organisms, dysplasia or neoplasia   identified  B  Gastric antrum, nodular mucosa (biopsy):    - Mild chronic inactive gastritis involving antral and oxyntic mucosa  - Immunostain for H  pylori (with appropriate positive control) is   negative  - No intestinal metaplasia, dysplasia or neoplasia identified      C  Gastroesophageal junction, esophagitis (biopsy):    - Mildly inflamed and reactive squamous mucosa  Fungal stains pending     - Eosinophils number less than 2 per high-power field  - No glandular mucosa present for evaluation     - No dysplasia or neoplasia identified  D  Ileocecal valve polyp (biopsy):    - Polypoid colonic mucosa with no significant pathologic abnormality      - No high-grade dysplasia or malignancy identified  E  Ascending colon polyp (biopsy):    - Polypoid colonic mucosa with focal adenomatous epithelium     - No high-grade dysplasia or malignancy identified  F  Colon (random biopsy):    - Colonic mucosa with no significant pathologic abnormalities  Mild   surface hyperplasia noted  - No active inflammation or histologic evidence of a microscopic   (lymphocytic)     or collagenous colitis noted  - No granulomas, dysplasia or neoplasia identified  G  Transverse colon polyp (biopsy):    - Tubular adenoma  - No high-grade dysplasia or malignancy identified  H  Sigmoid colon polyp (biopsy):    - Insufficient material for evaluation  Electronically signed by Nu Sinclair MD on 2/25/2017 at 10:11 AM  Preliminary result electronically signed by Nu Sinclair MD on 2/25/2017 at 8:37 AM   LAB AP NOTE      Interpretation performed at [facility], [address] [facility] [address]  LAB AP SURGICAL ADDITIONAL INFORMATION (Report)     These tests were developed and their performance characteristics   determined by [facility] or [facility]  They may not be cleared or approved by the [place]  Food and   Drug Administration  The FDA has determined that such clearance or   approval is not necessary  These tests are used for clinical purposes  They should not be regarded as investigational or for research  This   laboratory has been approved by CLIA 88, designated as a high-complexity   laboratory and is qualified to perform these tests  LAB AP GROSS DESCRIPTION (Report)     A   The specimen is received in formalin, labeled with the patient's name   and hospital number, and is designated duodenum biopsy-mild duodenitis,   are two irregularly shaped fragments of tan soft tissue measuring 0 3 and   0 2 cm in greatest dimension  Entirely submitted  One cassette  B  The specimen is received in formalin, labeled with the patient's name   and hospital number, and is designated antrum biopsy-nodular mucosa,   are three irregularly shaped fragments of tan soft tissue measuring 0 5 x   0 3 x 0 1 cm in aggregate  Entirely submitted  One cassette  C  The specimen is received in formalin, labeled with the patient's name   and hospital number, and is designated GE junction biopsy-esophagitis,   are two irregularly shaped fragments of tan soft tissue measuring 0 5 and   0 1 cm in greatest dimension  Entirely submitted  One cassette  D  The specimen is received in formalin, labeled with the patient's name   and hospital number, and is designated ileocecal valve polyp, are two   irregularly shaped fragments of tan soft tissue measuring 0 2 and 0 1 cm   in greatest dimension  Entirely submitted  One cassette  E  The specimen is received in formalin, labeled with the patient's name   and hospital number, and is designated ascending colon polyp, is a   single irregularly shaped fragment of tan soft tissue measuring 0 4 cm in   greatest dimension  Entirely submitted  One cassette  F  The specimen is received in formalin, labeled with the patient's name   and hospital number, and is designated random colon biopsy, are two   irregularly shaped fragments of tan soft tissue each measuring 0 4 cm in   greatest dimension  Entirely submitted  One cassette  G  The specimen is received in formalin, labeled with the patient's name   and hospital number, and is designated transverse colon polyp, is a   single irregularly shaped fragment of tan soft tissue measuring 0 2 cm in   greatest dimension  Entirely submitted  One cassette  H   The specimen is received in formalin, labeled with the patient's name   and hospital number, and is designated sigmoid colon polyp, are   multiple minute fragments of tan soft tissue measuring less than 0 1 cm in   greatest dimension each  Due to the size of the specimen, please note that   the specimen not survive processing  Entirely submitted  One cassette  Note: The estimated total formalin fixation time based upon information   provided by the submitting clinician and the standard processing schedule   is 17 5 hours      RLR   LAB AP CLINICAL INFORMATION Nausea with vomiting     Cold biopsy

## 2018-01-11 NOTE — RESULT NOTES
Message   duodenitis and gastritis  two small polyps removed were precancerous  recommend to repeat colonosocpy in 3 years  enter reminder  Verified Results  (1) TISSUE EXAM [id number] 10:30AM Antelmo Haas     Test Name Result Flag Reference   LAB AP CASE REPORT (Report)     Surgical Pathology Report             Case: [id number]                   Authorizing Provider: Shailesh Newell MD       Collected:      02/23/2017 1030        Ordering Location:   [address] [place] [address]   Received:      02/23/2017 [address] [facility] Endoscopy                               Pathologist:      Dahlia Hammond MD                                 Specimens:  A) - Duodenum, Duodenum Biopsy - mild duodenitis                            B) - Stomach, Antrum Biopsy - nodular mucosa                              C) - Esophagogastric junction, GE Junction Biopsy - esophagitis                    D) - Polyp, Colorectal, Ileocecal Valve Polyp                             E) - Large Intestine, Right/Ascending Colon, Ascending Colon Polyp                   F) - Colon, Random Colon Biopsy                                    G) - Large Intestine, Transverse Colon, Transverse Colon Polyp                     H) - Large Intestine, Sigmoid Colon, Sigmoid Colon Polyp   LAB AP FINAL DIAGNOSIS (Report)     A  Duodenum, mild duodenitis (biopsy):    - Small bowel mucosa with mild chronic, nonspecific duodenitis  - No villous atrophy, marked increase in intraepithelial lymphocytes or   crypt hyperplasia to diagnose     malabsorptive enteropathy     - No active inflammation, granulomas, organisms, dysplasia or neoplasia   identified  B  Gastric antrum, nodular mucosa (biopsy):    - Mild chronic inactive gastritis involving antral and oxyntic mucosa  - Immunostain for H  pylori (with appropriate positive control) is   negative  - No intestinal metaplasia, dysplasia or neoplasia identified      C  Gastroesophageal junction, esophagitis (biopsy): - Mildly inflamed and reactive squamous mucosa  Fungal stains pending     - Eosinophils number less than 2 per high-power field  - No glandular mucosa present for evaluation     - No dysplasia or neoplasia identified  D  Ileocecal valve polyp (biopsy):    - Polypoid colonic mucosa with no significant pathologic abnormality      - No high-grade dysplasia or malignancy identified  E  Ascending colon polyp (biopsy):    - Polypoid colonic mucosa with focal adenomatous epithelium     - No high-grade dysplasia or malignancy identified  F  Colon (random biopsy):    - Colonic mucosa with no significant pathologic abnormalities  Mild   surface hyperplasia noted  - No active inflammation or histologic evidence of a microscopic   (lymphocytic)     or collagenous colitis noted  - No granulomas, dysplasia or neoplasia identified  G  Transverse colon polyp (biopsy):    - Tubular adenoma  - No high-grade dysplasia or malignancy identified  H  Sigmoid colon polyp (biopsy):    - Insufficient material for evaluation  Electronically signed by Colletta Briar, MD on 2/25/2017 at 10:11 AM  Preliminary result electronically signed by Colletta Briar, MD on 2/25/2017 at 8:37 AM   LAB AP NOTE      Interpretation performed at [facility], [address] [facility] [address]  LAB AP SURGICAL ADDITIONAL INFORMATION (Report)     These tests were developed and their performance characteristics   determined by [facility] or [employer]  They may not be cleared or approved by the [place]  Food and   Drug Administration  The FDA has determined that such clearance or   approval is not necessary  These tests are used for clinical purposes  They should not be regarded as investigational or for research  This   laboratory has been approved by IA 88, designated as a high-complexity   laboratory and is qualified to perform these tests  LAB AP GROSS DESCRIPTION (Report)     A   The specimen is received in formalin, labeled with the patient's name   and hospital number, and is designated duodenum biopsy-mild duodenitis,   are two irregularly shaped fragments of tan soft tissue measuring 0 3 and   0 2 cm in greatest dimension  Entirely submitted  One cassette  B  The specimen is received in formalin, labeled with the patient's name   and hospital number, and is designated antrum biopsy-nodular mucosa,   are three irregularly shaped fragments of tan soft tissue measuring 0 5 x   0 3 x 0 1 cm in aggregate  Entirely submitted  One cassette  C  The specimen is received in formalin, labeled with the patient's name   and hospital number, and is designated GE junction biopsy-esophagitis,   are two irregularly shaped fragments of tan soft tissue measuring 0 5 and   0 1 cm in greatest dimension  Entirely submitted  One cassette  D  The specimen is received in formalin, labeled with the patient's name   and hospital number, and is designated ileocecal valve polyp, are two   irregularly shaped fragments of tan soft tissue measuring 0 2 and 0 1 cm   in greatest dimension  Entirely submitted  One cassette  E  The specimen is received in formalin, labeled with the patient's name   and hospital number, and is designated ascending colon polyp, is a   single irregularly shaped fragment of tan soft tissue measuring 0 4 cm in   greatest dimension  Entirely submitted  One cassette  F  The specimen is received in formalin, labeled with the patient's name   and hospital number, and is designated random colon biopsy, are two   irregularly shaped fragments of tan soft tissue each measuring 0 4 cm in   greatest dimension  Entirely submitted  One cassette  G  The specimen is received in formalin, labeled with the patient's name   and hospital number, and is designated transverse colon polyp, is a   single irregularly shaped fragment of tan soft tissue measuring 0 2 cm in   greatest dimension   Entirely submitted  One cassette  H  The specimen is received in formalin, labeled with the patient's name   and hospital number, and is designated sigmoid colon polyp, are   multiple minute fragments of tan soft tissue measuring less than 0 1 cm in   greatest dimension each  Due to the size of the specimen, please note that   the specimen not survive processing  Entirely submitted  One cassette  Note: The estimated total formalin fixation time based upon information   provided by the submitting clinician and the standard processing schedule   is 17 5 hours  RLR   LAB AP CLINICAL INFORMATION Nausea with vomiting     Cold biopsy   LAB AP ADDENDUM 1      Fungal stains performed on the GE junction biopsy (part C, GMS and PAS   with appropriate controls) are negative    Addendum electronically signed by Colletta Briar, MD on 2/27/2017 at 12:45 PM

## 2018-01-12 ENCOUNTER — APPOINTMENT (OUTPATIENT)
Dept: PHYSICAL THERAPY | Facility: CLINIC | Age: 68
End: 2018-01-12
Payer: MEDICARE

## 2018-01-12 VITALS
TEMPERATURE: 97.6 F | HEART RATE: 89 BPM | BODY MASS INDEX: 30.28 KG/M2 | OXYGEN SATURATION: 98 % | SYSTOLIC BLOOD PRESSURE: 154 MMHG | WEIGHT: 211.5 LBS | DIASTOLIC BLOOD PRESSURE: 81 MMHG | HEIGHT: 70 IN

## 2018-01-12 VITALS
SYSTOLIC BLOOD PRESSURE: 138 MMHG | HEIGHT: 70 IN | BODY MASS INDEX: 29.83 KG/M2 | WEIGHT: 208.38 LBS | DIASTOLIC BLOOD PRESSURE: 78 MMHG | OXYGEN SATURATION: 98 % | TEMPERATURE: 94.6 F | HEART RATE: 81 BPM

## 2018-01-12 VITALS
DIASTOLIC BLOOD PRESSURE: 80 MMHG | HEIGHT: 70 IN | BODY MASS INDEX: 29.97 KG/M2 | WEIGHT: 209.38 LBS | HEART RATE: 97 BPM | OXYGEN SATURATION: 97 % | TEMPERATURE: 95.5 F | SYSTOLIC BLOOD PRESSURE: 126 MMHG

## 2018-01-12 DIAGNOSIS — S83.206A TEAR OF RIGHT MENISCUS AS CURRENT INJURY: ICD-10-CM

## 2018-01-12 PROCEDURE — G8978 MOBILITY CURRENT STATUS: HCPCS

## 2018-01-12 PROCEDURE — G8979 MOBILITY GOAL STATUS: HCPCS

## 2018-01-12 PROCEDURE — 97161 PT EVAL LOW COMPLEX 20 MIN: CPT

## 2018-01-12 NOTE — RESULT NOTES
Verified Results  (1) PSA (SCREEN) (Dx V76 44 Screen for Prostate Cancer) 27Jun2017 07:54AM Sylvia Kehr Order Number: [id number]     Test Name Result Flag Reference   PROSTATE SPECIFIC ANTIGEN 1 7 ng/mL  0 0-4 0   American Urological Association Guidelines define biochemical recurrence of prostate cancer as a detectable or rising PSA value post-radical prostatectomy that is greater than or equal to 0 2 ng/mL with a second confirmatory level of greater than or equal to 0 2 ng/mL  (1) HEMOGLOBIN A1C [id number] 07:54AM Soila Hager    Order Number: [id number]     Test Name Result Flag Reference   HEMOGLOBIN A1C 6 4 % H 4 2-6 3   EST  AVG  GLUCOSE 137 mg/dl       (1) COMPREHENSIVE METABOLIC PANEL [id number] 73:94CP Soila Hager   TW Order Number: [id number]     Test Name Result Flag Reference   SODIUM 143 mmol/L  136-145   POTASSIUM 3 6 mmol/L  3 5-5 3   CHLORIDE 107 mmol/L  100-108   CARBON DIOXIDE 29 mmol/L  21-32   ANION GAP (CALC) 7 mmol/L  4-13   BLOOD UREA NITROGEN 13 mg/dL  5-25   CREATININE 1 20 mg/dL  0 60-1 30   Standardized to IDMS reference method   CALCIUM 8 9 mg/dL  8 3-10 1   BILI, TOTAL 0 76 mg/dL  0 20-1 00   ALK PHOSPHATAS 82 U/L     ALT (SGPT) 26 U/L  12-78   AST(SGOT) 18 U/L  5-45   ALBUMIN 4 1 g/dL  3 5-5 0   TOTAL PROTEIN 7 6 g/dL  6 4-8 2   eGFR Non-African American      >60 0 ml/min/1 73sq m   [facility] [employer] Disease Education Program recommendations are as follows:  GFR calculation is accurate only with a steady state creatinine  Chronic Kidney disease less than 60 ml/min/1 73 sq  meters  Kidney failure less than 15 ml/min/1 73 sq  meters     GLUCOSE FASTING 117 mg/dL H 65-99

## 2018-01-13 VITALS
BODY MASS INDEX: 30.35 KG/M2 | SYSTOLIC BLOOD PRESSURE: 140 MMHG | DIASTOLIC BLOOD PRESSURE: 82 MMHG | HEIGHT: 70 IN | WEIGHT: 212 LBS

## 2018-01-14 VITALS
TEMPERATURE: 95.1 F | SYSTOLIC BLOOD PRESSURE: 140 MMHG | BODY MASS INDEX: 30.42 KG/M2 | WEIGHT: 212.5 LBS | DIASTOLIC BLOOD PRESSURE: 84 MMHG | HEIGHT: 70 IN

## 2018-01-14 VITALS
HEIGHT: 70 IN | DIASTOLIC BLOOD PRESSURE: 80 MMHG | TEMPERATURE: 96.7 F | BODY MASS INDEX: 29.56 KG/M2 | WEIGHT: 206.5 LBS | SYSTOLIC BLOOD PRESSURE: 130 MMHG

## 2018-01-14 VITALS
TEMPERATURE: 97 F | DIASTOLIC BLOOD PRESSURE: 86 MMHG | HEIGHT: 70 IN | BODY MASS INDEX: 29.71 KG/M2 | WEIGHT: 207.5 LBS | SYSTOLIC BLOOD PRESSURE: 130 MMHG

## 2018-01-14 VITALS
WEIGHT: 209.25 LBS | TEMPERATURE: 95.2 F | HEIGHT: 70 IN | BODY MASS INDEX: 29.96 KG/M2 | DIASTOLIC BLOOD PRESSURE: 72 MMHG | SYSTOLIC BLOOD PRESSURE: 124 MMHG

## 2018-01-14 VITALS
SYSTOLIC BLOOD PRESSURE: 130 MMHG | HEIGHT: 70 IN | DIASTOLIC BLOOD PRESSURE: 82 MMHG | TEMPERATURE: 97.5 F | WEIGHT: 207.13 LBS | BODY MASS INDEX: 29.65 KG/M2

## 2018-01-15 ENCOUNTER — GENERIC CONVERSION - ENCOUNTER (OUTPATIENT)
Dept: FAMILY MEDICINE CLINIC | Facility: CLINIC | Age: 68
End: 2018-01-15

## 2018-01-15 ENCOUNTER — APPOINTMENT (OUTPATIENT)
Dept: PHYSICAL THERAPY | Facility: CLINIC | Age: 68
End: 2018-01-15
Payer: MEDICARE

## 2018-01-16 ENCOUNTER — APPOINTMENT (OUTPATIENT)
Dept: PHYSICAL THERAPY | Facility: CLINIC | Age: 68
End: 2018-01-16
Payer: MEDICARE

## 2018-01-16 PROCEDURE — 97140 MANUAL THERAPY 1/> REGIONS: CPT

## 2018-01-16 PROCEDURE — 97110 THERAPEUTIC EXERCISES: CPT

## 2018-01-18 ENCOUNTER — APPOINTMENT (OUTPATIENT)
Dept: PHYSICAL THERAPY | Facility: CLINIC | Age: 68
End: 2018-01-18
Payer: MEDICARE

## 2018-01-18 PROCEDURE — 97110 THERAPEUTIC EXERCISES: CPT

## 2018-01-22 VITALS
OXYGEN SATURATION: 98 % | WEIGHT: 207 LBS | HEIGHT: 70 IN | SYSTOLIC BLOOD PRESSURE: 134 MMHG | HEART RATE: 84 BPM | BODY MASS INDEX: 29.63 KG/M2 | TEMPERATURE: 95.4 F | DIASTOLIC BLOOD PRESSURE: 82 MMHG

## 2018-01-23 ENCOUNTER — APPOINTMENT (OUTPATIENT)
Dept: PHYSICAL THERAPY | Facility: CLINIC | Age: 68
End: 2018-01-23
Payer: MEDICARE

## 2018-01-23 VITALS — SYSTOLIC BLOOD PRESSURE: 150 MMHG | DIASTOLIC BLOOD PRESSURE: 88 MMHG | HEIGHT: 70 IN

## 2018-01-23 PROCEDURE — 97110 THERAPEUTIC EXERCISES: CPT

## 2018-01-23 PROCEDURE — 97112 NEUROMUSCULAR REEDUCATION: CPT

## 2018-01-23 NOTE — MISCELLANEOUS
Message  GI Reminder Recall Samia Silverio:   Date: 12/26/2017   Dear Arnulfo Dutton:     Review of our records shows you are due for the following: please call for lab results at [phone]  Please call the following office to schedule your appointment:     We look forward to hearing from you! Sincerely,     [address] [facility]      Signatures   Electronically signed by :  Mathew Bal, ; Dec 26 2017  4:27PM EST                       (Author)

## 2018-01-23 NOTE — RESULT NOTES
Verified Results  * MRI KNEE RIGHT  WO CONTRAST [id number] 10:22PM Beula Jeffries     Test Name Result Flag Reference   MRI KNEE RIGHT  WO CONTRAST (Report)     MRI RIGHT KNEE     INDICATION: [id number]: Other tear of unspecified meniscus, current injury, right knee, initial encounter  History taken directly from the electronic ordering system  Patient has right knee swelling and instability for 2 weeks  COMPARISON: Right knee plain films from 12/13/2017  TECHNIQUE:  MR sequences were obtained of the right knee including: Localizer, axial T2 fat sat, coronal T1/T2 fat sat, sagittal PD/T2 fat sat  Images were acquired on a 1 5 Inna unit  Gadolinium was not used  FINDINGS:     SUBCUTANEOUS TISSUES: There is diffuse subcutaneous edema  JOINT EFFUSION: There is a small joint effusion  BAKER'S CYST: None  MENISCI: There is a near complete tear through the medial meniscus posterior meniscal root (series 4 images 8 through 9 and series 6 images 7 through 10 ) The lateral meniscus is normal      CRUCIATE LIGAMENTS: Intact  EXTENSOR APPARATUS: Intact  COLLATERAL LIGAMENTS: Intact  ARTICULAR SURFACES: Mild patellofemoral compartment osteoarthritis with subchondral marrow edema over the trochlea  BONE MARROW: Normal      MUSCULATURE: Intact  IMPRESSION:     There is a near complete tear through the medial meniscus posterior meniscal root (series 4 images 8 through 9 and series 6 images 7 through 10 )      Mild patellofemoral compartment osteoarthritis         Workstation performed: [id number]     Signed by:   Chantal Justice MD   12/28/17       Signatures   Electronically signed by : PATRICIA Henriquez ; Dec 28 2017 12:59PM EST                       (Author)

## 2018-01-23 NOTE — RESULT NOTES
Verified Results  (1) COMPREHENSIVE METABOLIC PANEL [id number] 04:16SZ Maurice Starr Order Number: [id number]     Test Name Result Flag Reference   GLUCOSE,RANDM 104 mg/dL     If the patient is fasting, the ADA then defines impaired fasting glucose as > 100 mg/dL and diabetes as > or equal to 123 mg/dL  Specimen collection should occur prior to Sulfasalazine administration due to the potential for falsely depressed results  Specimen collection should occur prior to Sulfapyridine administration due to the potential for falsely elevated results  SODIUM 145 mmol/L  136-145   POTASSIUM 4 0 mmol/L  3 5-5 3   CHLORIDE 108 mmol/L  100-108   CARBON DIOXIDE 30 mmol/L  21-32   ANION GAP (CALC) 7 mmol/L  4-13   BLOOD UREA NITROGEN 14 mg/dL  5-25   CREATININE 1 02 mg/dL  0 60-1 30   Standardized to IDMS reference method   CALCIUM 9 0 mg/dL  8 3-10 1   BILI, TOTAL 0 49 mg/dL  0 20-1 00   ALK PHOSPHATAS 77 U/L     ALT (SGPT) 28 U/L  12-78   Specimen collection should occur prior to Sulfasalazine administration due to the potential for falsely depressed results  AST(SGOT) 20 U/L  5-45   Specimen collection should occur prior to Sulfasalazine administration due to the potential for falsely depressed results  ALBUMIN 3 8 g/dL  3 5-5 0   TOTAL PROTEIN 7 1 g/dL  6 4-8 2   eGFR 76 ml/min/1 73sq m     National Kidney Disease Education Program recommendations are as follows:  GFR calculation is accurate only with a steady state creatinine  Chronic Kidney disease less than 60 ml/min/1 73 sq  meters  Kidney failure less than 15 ml/min/1 73 sq  meters

## 2018-01-24 VITALS
DIASTOLIC BLOOD PRESSURE: 80 MMHG | SYSTOLIC BLOOD PRESSURE: 138 MMHG | HEIGHT: 70 IN | BODY MASS INDEX: 29.51 KG/M2 | WEIGHT: 206.13 LBS | TEMPERATURE: 95.3 F

## 2018-01-24 VITALS
WEIGHT: 208.5 LBS | DIASTOLIC BLOOD PRESSURE: 80 MMHG | HEIGHT: 70 IN | SYSTOLIC BLOOD PRESSURE: 140 MMHG | BODY MASS INDEX: 29.85 KG/M2

## 2018-01-25 ENCOUNTER — OFFICE VISIT (OUTPATIENT)
Dept: PHYSICAL THERAPY | Facility: CLINIC | Age: 68
End: 2018-01-25
Payer: MEDICARE

## 2018-01-25 DIAGNOSIS — S83.206A TEAR OF RIGHT MENISCUS AS CURRENT INJURY, INITIAL ENCOUNTER: Primary | ICD-10-CM

## 2018-01-25 PROCEDURE — 97010 HOT OR COLD PACKS THERAPY: CPT

## 2018-01-25 PROCEDURE — 97110 THERAPEUTIC EXERCISES: CPT

## 2018-01-25 PROCEDURE — 97150 GROUP THERAPEUTIC PROCEDURES: CPT

## 2018-01-25 NOTE — PROGRESS NOTES
Daily Note     Today's date: 2018  Patient name: Domi Beckham  : 1950  MRN: [id number]  Referring provider: Manny Oates DO  Dx:   Encounter Diagnosis   Name Primary?  Tear of right meniscus as current injury, initial encounter Yes              Subjective: Pt reports that he "feels better overall"  Objective: See treatment diary below    Precautions: None    Daily Treatment Diary     Manual              PROM R knee                              Exercise Diary  18           Bike 10 mins           Standing hip 3-way 20x ea           LAQ 3#, 30x           Tandem stance foam 30"x3           HR/TR 30x ea           Rockerboard AP/ML 30x ea           Rockerboard balance 30"x3           Standing knee flexion 20x ea                           Modalities  18            CP 10 mins                           Assessment: Tolerated there ex addition of standing knee flexion and progressions well  Patient demonstrated fatigue post treatment and could benefit from continued PT due to continued deficits  He req cuing and correction for proper form on standing hip 3-way  Plan: Continue per plan of care        Krysten Suero, PTA

## 2018-01-30 ENCOUNTER — OFFICE VISIT (OUTPATIENT)
Dept: PHYSICAL THERAPY | Facility: CLINIC | Age: 68
End: 2018-01-30
Payer: MEDICARE

## 2018-01-30 DIAGNOSIS — S83.206A TEAR OF RIGHT MENISCUS AS CURRENT INJURY, INITIAL ENCOUNTER: Primary | ICD-10-CM

## 2018-01-30 PROCEDURE — 97140 MANUAL THERAPY 1/> REGIONS: CPT

## 2018-01-30 PROCEDURE — 97110 THERAPEUTIC EXERCISES: CPT

## 2018-01-30 PROCEDURE — 97112 NEUROMUSCULAR REEDUCATION: CPT

## 2018-01-30 NOTE — PROGRESS NOTES
Daily Note     Today's date: 2018  Patient name: Geronimo Golden  : 1950  MRN: [id number]  Referring provider: Cordelia Manzanares, DO  Encounter Diagnosis   Name Primary?  Tear of right meniscus as current injury, initial encounter Yes          Subjective: Pt reports posterior R knee pain as "6/10" upon arrival and notes worsening since last visit, which he attributes to overactivity and changes in weather  He reports reduced pain post-session  Objective: See treatment diary below  Manuals 18            Hamstring TPR and stretch NC, PT  10 mins                           Exercise Diary  18          Bike 10 mins 5 mins          Standing hip 3-way 20x ea 20x ea`          LAQ 3#, 30x 3#, 5"x20          Tandem stance foam 30"x3 30"x3          HR/TR 30x ea 30x ea          Rockerboard AP/ML 30x ea 30x ea          Rockerboard balance 30"x3 ea 30"x3 ea          Standing knee flexion 20x ea 20x ea                          Modalities 18            CP 10 min                             Assessment: Tolerated treatment well  Patient would benefit from continued PT  Tight hamstrings noted with manual stretching  Plan: Progress treatment as tolerated        Nico Rdz PTA

## 2018-02-01 ENCOUNTER — OFFICE VISIT (OUTPATIENT)
Dept: PHYSICAL THERAPY | Facility: CLINIC | Age: 68
End: 2018-02-01
Payer: MEDICARE

## 2018-02-01 DIAGNOSIS — S83.206A TEAR OF RIGHT MENISCUS AS CURRENT INJURY, INITIAL ENCOUNTER: Primary | ICD-10-CM

## 2018-02-01 PROCEDURE — 97112 NEUROMUSCULAR REEDUCATION: CPT | Performed by: PHYSICAL MEDICINE & REHABILITATION

## 2018-02-01 PROCEDURE — 97140 MANUAL THERAPY 1/> REGIONS: CPT | Performed by: PHYSICAL MEDICINE & REHABILITATION

## 2018-02-01 PROCEDURE — 97110 THERAPEUTIC EXERCISES: CPT | Performed by: PHYSICAL MEDICINE & REHABILITATION

## 2018-02-01 NOTE — PROGRESS NOTES
Daily Note     Today's date: 2018  Patient name: Karmen Edwards  : 1950  MRN: [id number]  Referring provider: Ely Segal DO  Dx:   Encounter Diagnosis   Name Primary?  Tear of right meniscus as current injury, initial encounter Yes                  Subjective: Pt reports that his knee is feeling better today, he reports that the manual hamstring stretches and STM improved his condition  Objective: See treatment diary below  Manuals 18                   Hamstring TPR, STM, and stretch NC, PT  10 mins  15 min                                              Exercise Diary  18               Bike 10 mins 5 mins  10 min               Standing hip 3-way 20x ea 20x ea`                 LAQ 3#, 30x 3#, 5"x20                 Tandem stance foam 30"x3 30"x3                 HR/TR 30x ea 30x ea                 Rockerboard AP/ML 30x ea 30x ea  30x ea               Rockerboard balance 30"x3 ea 30"x3 ea  30" x 3 ea               Standing knee flexion 20x ea 20x ea                 Gastroc/soleus stretch   3x30"         Hamstring stretch  strap    5 x 20"                     Modalities 18                     CP 10 min                                                      Assessment: Tolerated treatment well  Patient exhibited good technique with therapeutic exercises and would benefit from continued PT      Plan: Continue per plan of care  and Progress treatment as tolerated

## 2018-02-06 ENCOUNTER — OFFICE VISIT (OUTPATIENT)
Dept: PHYSICAL THERAPY | Facility: CLINIC | Age: 68
End: 2018-02-06
Payer: MEDICARE

## 2018-02-06 DIAGNOSIS — S83.206A TEAR OF RIGHT MENISCUS AS CURRENT INJURY, INITIAL ENCOUNTER: Primary | ICD-10-CM

## 2018-02-06 PROCEDURE — 97140 MANUAL THERAPY 1/> REGIONS: CPT

## 2018-02-06 PROCEDURE — 97110 THERAPEUTIC EXERCISES: CPT

## 2018-02-06 NOTE — PROGRESS NOTES
Daily Note     Today's date: 2018  Patient name: Brooke Randhawa  : 1950  MRN: [id number]  Referring provider: Daron Barrett DO  Dx:   Encounter Diagnosis   Name Primary?  Tear of right meniscus as current injury, initial encounter Yes                  Subjective: Pt  Reports no new complaints  Objective: See treatment diary below    Objective: See treatment diary below  Manuals 18  2/6                 Hamstring TPR, STM, and stretch NC, PT  10 mins  15 min  10min                                            Exercise Diary  186             Bike 10 mins 5 mins  10 min   10 min             Standing hip 3-way 20x ea 20x ea`                 LAQ 3#, 30x 3#, 5"x20                 Tandem stance foam 30"x3 30"x3                 HR/TR 30x ea 30x ea    30x ea             Rockerboard AP/ML 30x ea 30x ea  30x ea  30x ea             Rockerboard balance 30"x3 ea 30"x3 ea  30" x 3 ea  30" x 3 ea             Standing knee flexion 20x ea 20x ea                 Gastroc/soleus stretch     3x30"  30" x 3 ea             Hamstring stretch  strap     5 x 20"  5 x 20"                   Modalities 18                     CP 10 min                                                    Assessment: Tolerated treatment well  Decreased tightness presented in R hamstring post MT   CP deferred by patient  Patient would benefit from continued PT      Plan: Continue per plan of care  and Progress treatment as tolerated         8 minutes not billed self directed

## 2018-02-08 ENCOUNTER — OFFICE VISIT (OUTPATIENT)
Dept: PHYSICAL THERAPY | Facility: CLINIC | Age: 68
End: 2018-02-08
Payer: MEDICARE

## 2018-02-08 DIAGNOSIS — S83.206A TEAR OF RIGHT MENISCUS AS CURRENT INJURY, INITIAL ENCOUNTER: Primary | ICD-10-CM

## 2018-02-08 PROCEDURE — 97112 NEUROMUSCULAR REEDUCATION: CPT

## 2018-02-08 PROCEDURE — 97110 THERAPEUTIC EXERCISES: CPT

## 2018-02-08 PROCEDURE — 97140 MANUAL THERAPY 1/> REGIONS: CPT

## 2018-02-08 NOTE — PROGRESS NOTES
Daily Note     Today's date: 2018  Patient name: Alana Briones  : 1950  MRN: [id number]  Referring provider: Gaviota Montes, DO  Encounter Diagnosis   Name Primary?  Tear of right meniscus as current injury, initial encounter Yes     Start Time: 0900  Stop Time: 1000  Total time in clinic (min): 60 minutes     Subjective: Pt denies pain but reports "stiffness" in R knee upon arrival     Objective: See treatment diary below    Manuals 18               HS TPR, STM & stretch NC, PT  10 mins  15 min  10 min  NC, PT 10 mins                                          Exercise Diary  18       Bike 10 mins 5 mins  10 min   10 min  10 min       Standing hip 3-way 20x ea 20x ea`      20x ea       LAQ 3#, 30x 3#, 5"x20             Mod  tandem stance foam 30"x3 30"x3      30"x3 ea       HR/TR 30x ea 30x ea    30x ea  30x ea       Rockerboard AP/ML 30x ea 30x ea  30x ea  30x ea  30x ea       Rockerboard balance 30"x3 ea 30"x3 ea  30"x3 ea 30"x3 ea  30"x3 ea       Standing knee flexion 20x ea 20x ea      20x       Gastroc/soleus stretch     3x30" 30"x3 ea  30"x3 ea       Hamstring stretch  strap     5 x 20"  5 x 20"         SLS     20"x5           Modalities 18                    CP 10 min                       Assessment: Tolerated treatment well  Patient exhibited good technique with therapeutic exercises  Req cuing for reps  Plan: Continue per plan of care       Serjio Doran PTA

## 2018-02-21 ENCOUNTER — OFFICE VISIT (OUTPATIENT)
Dept: PHYSICAL THERAPY | Facility: CLINIC | Age: 68
End: 2018-02-21
Payer: MEDICARE

## 2018-02-21 DIAGNOSIS — S83.206A TEAR OF RIGHT MENISCUS AS CURRENT INJURY, INITIAL ENCOUNTER: Primary | ICD-10-CM

## 2018-02-21 PROCEDURE — 97140 MANUAL THERAPY 1/> REGIONS: CPT

## 2018-02-21 PROCEDURE — 97112 NEUROMUSCULAR REEDUCATION: CPT

## 2018-02-21 PROCEDURE — 97110 THERAPEUTIC EXERCISES: CPT

## 2018-02-21 NOTE — PROGRESS NOTES
Daily Note     Today's date: 2018  Patient name: Susan Zambrano  : 1950  MRN: [id number]  Referring provider: Tete Stanton, DO  Encounter Diagnosis   Name Primary?  Tear of right meniscus as current injury, initial encounter Yes     Start Time: 0900  Stop Time: 1000  Total time in clinic (min): 60 minutes     Subjective: Pt reports to PT with reports of inconsistent pain, states one day is great and the next is very painful  Pt states he is also having symptoms in contralateral LE  Objective: See treatment diary below    Manuals 18             HS TPR, STM & stretch NC, PT  10 mins  15 min  10 min  NC, PT 10 mins  15'                                        Exercise Diary  18     Bike 10 mins 5 mins  10 min   10 min  10 min  10'     Standing hip 3-way 20x ea 20x ea`      20x ea  20x ea     LAQ 3#, 30x 3#, 5"x20             Mod  tandem stance foam 30"x3 30"x3      30"x3 ea  3x30"     HR/TR 30x ea 30x ea    30x ea  30x ea  30x ea       Rockerboard AP/ML 30x ea 30x ea  30x ea  30x ea  30x ea  30x ea      Rockerboard balance 30"x3 ea 30"x3 ea  30"x3 ea 30"x3 ea  30"x3 ea  3x30" ea     Standing knee flexion 20x ea 20x ea      20x  20x     Gastroc/soleus stretch     3x30" 30"x3 ea  30"x3 ea  3x30"     Hamstring stretch  strap     5 x 20"  5 x 20"         SLS     20"x5 5x20"          Modalities 18                    CP 10 min                       Assessment: Tolerated treatment well  Patient exhibited good technique with therapeutic exercises  Pt demonstrates good form and technique with current exercise program  Pt shows reduction in symptoms post manual therapy  Pt shows increased ROM and decreased Soft tissue restrictions in R HS  Pt will benefit from further skilled PT to increase strength, flexibility and function  Continue to progress as able  Plan: Continue per plan of care       Valdo Kelley

## 2018-02-22 ENCOUNTER — EVALUATION (OUTPATIENT)
Dept: PHYSICAL THERAPY | Facility: CLINIC | Age: 68
End: 2018-02-22
Payer: MEDICARE

## 2018-02-22 DIAGNOSIS — S83.206A TEAR OF RIGHT MENISCUS AS CURRENT INJURY, INITIAL ENCOUNTER: Primary | ICD-10-CM

## 2018-02-22 PROCEDURE — G8978 MOBILITY CURRENT STATUS: HCPCS | Performed by: PHYSICAL MEDICINE & REHABILITATION

## 2018-02-22 PROCEDURE — G8979 MOBILITY GOAL STATUS: HCPCS | Performed by: PHYSICAL MEDICINE & REHABILITATION

## 2018-02-22 PROCEDURE — 97140 MANUAL THERAPY 1/> REGIONS: CPT | Performed by: PHYSICAL MEDICINE & REHABILITATION

## 2018-02-22 PROCEDURE — 97110 THERAPEUTIC EXERCISES: CPT | Performed by: PHYSICAL MEDICINE & REHABILITATION

## 2018-02-22 NOTE — PROGRESS NOTES
PT Re-Evaluation     Today's date: 2018  Patient name: Argelia Holley  : 1950  MRN: [id number]  Referring provider: Michael Dunaway DO  Dx:   Encounter Diagnosis     ICD-10-CM    1  Tear of right meniscus as current injury, initial encounter S83 206A        Start Time: 1000  Stop Time: 1045  Total time in clinic (min): 45 minutes    Assessment  Impairments: abnormal gait, abnormal or restricted ROM, impaired balance, impaired physical strength, lacks appropriate home exercise program and pain with function    Assessment details: Alpesh Hickman has been attending outpatient physical therapy with Tear of right meniscus as current injury, initial encounter  (primary encounter diagnosis)   Since the last assessment, patient demonstrates decreased pain levels, improved range of motion, improved strength, and increased tolerance to activity  Pt continues to present with pain, demonstrate decreased range of motion, decreased strength, and decreased tolerance to activity  Understanding of Dx/Px/POC: good   Prognosis: good    Goals  ST  Patient will report 25% decrease in pain in 4 weeks  2  Patient will demonstrate 25% improvement in ROM in 4 weeks  3  Patient will demonstrate 1/2 grade improvement in strength in 4 weeks  LT  Patient will be able to perform IADLS without restriction or pain by discharge  2  Patient will be independent in HEP by discharge  3  Patient will be able to return to recreational/work duties without restriction or pain by discharge        Plan  Patient would benefit from: skilled PT  Planned modality interventions: biofeedback, TENS, thermotherapy: hydrocollator packs and cryotherapy  Planned therapy interventions: joint mobilization, manual therapy, neuromuscular re-education, patient education, postural training, stretching, therapeutic activities, therapeutic exercise, home exercise program, functional ROM exercises and balance  Frequency: 2x week  Duration in visits: 8  Duration in weeks: 4  Treatment plan discussed with: patient  Plan details: Pt would benefit from continued skilled physical therapy to address limitations and to achieve goals  Thank you for this referral          Subjective Evaluation    History of Present Illness  Mechanism of injury: Pt reports pain is worst in the morning with his first steps, Sx improve with movement and stretching  Pt states that he is experiencing worse pain in his L knee at this point  Quality of life: good    Pain  Current pain ratin  At best pain ratin  At worst pain rating: 3  Quality: pulling, cramping and discomfort  Relieving factors: rest  Aggravating factors: nothing  Progression: improved    Treatments  Current treatment: physical therapy  Patient Goals  Patient goals for therapy: decreased pain, increased motion and independence with ADLs/IADLs          Objective     Active Range of Motion   Left Knee   Flexion: 120 degrees   Extension: 3 degrees     Right Knee   Flexion: 120 degrees   Extension: 3 degrees     Additional Active Range of Motion Details  Pain with AROM has resolved    Strength/Myotome Testing     Left Hip   Planes of Motion   Flexion: 4  Extension: 4  Abduction: 4-    Right Hip   Planes of Motion   Flexion: 4  Extension: 4  Abduction: 4-    Left Knee   Flexion: 4  Extension: 4+    Right Knee   Flexion: 4  Extension: 4+    Tests     Left Knee   Negative anterior drawer, valgus stress test at 0 degrees and valgus stress test at 30 degrees  Right Knee   Negative anterior drawer, valgus stress test at 0 degrees and valgus stress test at 30 degrees  Additional Tests Details  SLR: 45 deg B  () R 70 deg, L 65 deg  Balance: Tandem R/L 23 sec mod sway, L/R 24 sec mod sway    Ambulation     Ambulation: Level Surfaces     Additional Level Surfaces Ambulation Details  No longer utilizing axillary crutches for ambulation     R lateral trunk lean    Observational Gait   Gait: antalgic   Decreased walking speed and stride length  Additional Observational Gait Details  Decreased push off B  Decreased hip extension B    Quality of Movement During Gait   Trunk    Trunk (Right): Positive lateral lean over stance limb  Knee    Knee (Left): Positive increased flexion during stance  Knee (Right): Positive increased flexion during stance              Flowsheet Rows    Flowsheet Row Most Recent Value   PT/OT G-Codes   Current Score  60   Projected Score  51   FOTO information reviewed  Yes   Assessment Type  Re-evaluation   G code set  Mobility: Walking & Moving Around   Mobility: Walking and Moving Around Current Status ()  CK   Mobility: Walking and Moving Around Goal Status ()  CK          Precautions: none    Daily Treatment Diary   Manuals 1/30/18 2/1/18  2/6 2/8/18 2/21/18             HS TPR, STM & stretch NC, PT  10 mins  15 min  10 min  NC, PT 10 mins  15'             Manual HS resistance prone                           Exercise Diary  1/25/18 1/30/18 2/1/18 2/6 2/8/18 2/21 2/22   Bike 10 mins 5 mins  10 min   10 min  10 min  10'  10'   Standing hip 3-way 20x ea 20x ea`      20x ea  20x ea     LAQ 3#, 30x 3#, 5"x20             Mod  tandem stance foam 30"x3 30"x3      30"x3 ea  3x30"     HR/TR 30x ea 30x ea    30x ea  30x ea  30x ea       Rockerboard AP/ML 30x ea 30x ea  30x ea  30x ea  30x ea  30x ea      Rockerboard balance 30"x3 ea 30"x3 ea  30"x3 ea 30"x3 ea  30"x3 ea  3x30" ea     Standing knee flexion 20x ea 20x ea      20x  20x     Gastroc/soleus stretch B     3x30" 30"x3 ea  30"x3 ea  3x30"  3 x 30"   Hamstring stretch  Strap B     5 x 20"  5 x 20"      3 x 30"   Seated hip flexion          Standing HS curls          Bridges c ADD, ABD          SL hip ABD          SLS         20"x5 5x20"           Modalities 1/25/18                     CP 10 min

## 2018-02-27 ENCOUNTER — OFFICE VISIT (OUTPATIENT)
Dept: PHYSICAL THERAPY | Facility: CLINIC | Age: 68
End: 2018-02-27
Payer: MEDICARE

## 2018-02-27 DIAGNOSIS — S83.206A TEAR OF RIGHT MENISCUS AS CURRENT INJURY, INITIAL ENCOUNTER: Primary | ICD-10-CM

## 2018-02-27 PROCEDURE — 97110 THERAPEUTIC EXERCISES: CPT | Performed by: PHYSICAL MEDICINE & REHABILITATION

## 2018-02-27 PROCEDURE — 97140 MANUAL THERAPY 1/> REGIONS: CPT | Performed by: PHYSICAL MEDICINE & REHABILITATION

## 2018-02-27 NOTE — PROGRESS NOTES
Daily Note     Today's date: 2018  Patient name: Vinayak Orellana  : 1950  MRN: [id number]  Referring provider: Chaitanya Wilkes DO  Dx: No diagnosis found  Subjective: pt reports 0/10 pain  Pt states that stretching significantly reduces sx  Objective: See treatment diary below  Manuals 18  218           HS TPR, STM & stretch NC, PT  10 mins  15 min  10 min  NC, PT 10 mins  15'  15'           Manual resisted HS curls prone            8'              Exercise Diary  18   Bike 10 mins 5 mins  10 min   10 min  10 min  10'  10'   Standing hip 3-way 20x ea 20x ea`      20x ea  20x ea  1# x 20   LAQ 3#, 30x 3#, 5"x20             Mod  tandem stance foam 30"x3 30"x3      30"x3 ea  3x30"     HR/TR 30x ea 30x ea    30x ea  30x ea  30x ea       Rockerboard AP/ML 30x ea 30x ea  30x ea  30x ea  30x ea  30x ea   D/C   Rockerboard balance 30"x3 ea 30"x3 ea  30"x3 ea 30"x3 ea  30"x3 ea  3x30" ea  D/C   Standing knee flexion 20x ea 20x ea      20x  20x     Gastroc/soleus stretch     3x30" 30"x3 ea  30"x3 ea  3x30"     Hamstring stretch  strap     5 x 20"  5 x 20"         Wobble board (A/P, M/L, hold steady)       20 ea, 30" hold x 3   SLS         20"x5 5x20"           Modalities 18                     CP 10 min                              Assessment: Tolerated treatment well  Patient demonstrated fatigue post treatment and would benefit from continued PT      Plan: Continue per plan of care  Progress treatment as tolerated

## 2018-03-01 ENCOUNTER — APPOINTMENT (OUTPATIENT)
Dept: PHYSICAL THERAPY | Facility: CLINIC | Age: 68
End: 2018-03-01
Payer: MEDICARE

## 2018-03-05 ENCOUNTER — OFFICE VISIT (OUTPATIENT)
Dept: PHYSICAL THERAPY | Facility: CLINIC | Age: 68
End: 2018-03-05
Payer: MEDICARE

## 2018-03-05 DIAGNOSIS — S83.206A TEAR OF RIGHT MENISCUS AS CURRENT INJURY, INITIAL ENCOUNTER: Primary | ICD-10-CM

## 2018-03-05 PROCEDURE — 97110 THERAPEUTIC EXERCISES: CPT | Performed by: PHYSICAL MEDICINE & REHABILITATION

## 2018-03-05 PROCEDURE — 97140 MANUAL THERAPY 1/> REGIONS: CPT | Performed by: PHYSICAL MEDICINE & REHABILITATION

## 2018-03-05 NOTE — PROGRESS NOTES
Daily Note     Today's date: 3/5/2018  Patient name: Johnny Bryant  : 1950  MRN: [id number]  Referring provider: Ketan Vasquez DO  Dx:   Encounter Diagnosis     ICD-10-CM    1  Tear of right meniscus as current injury, initial encounter S83 206A                   Subjective: Pt reports increased tightness in the L knee  Objective: See treatment diary below  Manuals 18  3         HS TPR, STM & stretch NC, PT  10 mins  15 min  10 min  NC, PT 10 mins  15'  15'  15'         Manual resisted HS curls prone            8'  NP            Exercise Diary  3/5 1/30/18  2/1/18  2/6  2/8/18  2/21  2/27   Bike 10 mins 5 mins  10 min   10 min  10 min  10'  10'   Standing hip 3-way 20x ea 20x ea`      20x ea  20x ea  1# x 20   LAQ NP 3#, 5"x20             Mod  tandem stance foam 30"x3 30"x3      30"x3 ea  3x30"     HR/TR 30x ea 30x ea    30x ea  30x ea  30x ea       Rockerboard AP/ML D/C 30x ea  30x ea  30x ea  30x ea  30x ea   D/C   Rockerboard balance D/C 30"x3 ea  30"x3 ea 30"x3 ea  30"x3 ea  3x30" ea  D/C   Standing knee flexion 20x ea 20x ea      20x  20x     Gastroc/soleus stretch     3x30" 30"x3 ea  30"x3 ea  3x30"     Hamstring stretch    seated, 3 x 30"   5 x 20"  5 x 20"      seated 3x30"   Wobble board (A/P, M/L, hold steady)  20 ea, 30" x 3           20 ea, 30" hold x 3   SLS  NP       20"x5 5x20"           Modalities 18                     CP 10 min                                 Assessment: Tolerated treatment well  Patient demonstrated fatigue post treatment and would benefit from continued PT      Plan: Progress treatment as tolerated

## 2018-03-06 ENCOUNTER — OFFICE VISIT (OUTPATIENT)
Dept: PHYSICAL THERAPY | Facility: CLINIC | Age: 68
End: 2018-03-06
Payer: MEDICARE

## 2018-03-06 DIAGNOSIS — S83.206A TEAR OF RIGHT MENISCUS AS CURRENT INJURY, INITIAL ENCOUNTER: Primary | ICD-10-CM

## 2018-03-06 PROCEDURE — 97110 THERAPEUTIC EXERCISES: CPT | Performed by: PHYSICAL THERAPY ASSISTANT

## 2018-03-06 PROCEDURE — 97112 NEUROMUSCULAR REEDUCATION: CPT | Performed by: PHYSICAL THERAPY ASSISTANT

## 2018-03-06 NOTE — PROGRESS NOTES
Daily Note     Today's date: 3/6/2018  Patient name: Patrick Nurse  : 1950  MRN: [id number]  Referring provider: Mark White DO  Dx:   Encounter Diagnosis     ICD-10-CM    1  Tear of right meniscus as current injury, initial encounter S83 206A                  Subjective: Pt reports minimal LE and LB pain and stiffness this morning  Objective: See treatment diary below  Objective: See treatment diary below  Manuals 18  2/6  2/8/18  2/21/18  2/26  3/5  36       HS TPR, STM & stretch NC, PT  10 mins  15 min  10 min  NC, PT 10 mins  15'  15'  15'  missed  This visit       Manual resisted HS curls prone            8'  NP            Exercise Diary  18  2/6  2/8/18  2/21  2/26 3/5 3/6     Bike 5 mins  10 min   10 min  10 min  10'  10' 10' 10'     Standing hip 3-way 20x ea`      20x ea  20x ea  1# x 20 20x ea 1#  x30     LAQ 3#, 5"x20           NP 3#  'x30     Mod  tandem stance foam 30"x3      30"x3 ea  3x30"   30"x3 30"x3     HR/TR 30x ea    30x ea  30x ea  30x ea     30x x30 ea     Rockerboard AP/ML 30x ea  30x ea  30x ea  30x ea  30x ea   D/C       Rockerboard balance 30"x3 ea  30"x3 ea 30"x3 ea  30"x3 ea  3x30" ea  D/C       Standing knee flexion 20x ea      20x  20x   20x 30x     Gastroc/soleus stretch   3x30" 30"x3 ea  30"x3 ea  3x30"         Hamstring stretch     5 x 20"  5 x 20"      seated 3x30" Seated   3x30" Seated  3x30"     Wobble board (A/P, M/L, hold steady)           20 ea, 30" hold x 3 20 ea  30"x3      SLS       20"x5 5x20"   NP            Modalities 18                     CP 10 min                                   Assessment: Tolerated treatment well  Patient demonstrated fatigue post treatment, exhibited good technique with therapeutic exercises and would benefit from continued PT      Plan: Continue per plan of care  Progress treatment as tolerated

## 2018-03-08 ENCOUNTER — OFFICE VISIT (OUTPATIENT)
Dept: PHYSICAL THERAPY | Facility: CLINIC | Age: 68
End: 2018-03-08
Payer: MEDICARE

## 2018-03-08 DIAGNOSIS — S83.206A TEAR OF RIGHT MENISCUS AS CURRENT INJURY, INITIAL ENCOUNTER: Primary | ICD-10-CM

## 2018-03-08 PROCEDURE — 97140 MANUAL THERAPY 1/> REGIONS: CPT

## 2018-03-08 PROCEDURE — 97110 THERAPEUTIC EXERCISES: CPT

## 2018-03-08 NOTE — PROGRESS NOTES
Daily Note     Today's date: 3/8/2018  Patient name: Demetrio Gray  : 1950  MRN: [id number]  Referring provider: Josette Gonzalez DO  Dx:   Encounter Diagnosis     ICD-10-CM    1  Tear of right meniscus as current injury, initial encounter S83 206A                  Subjective: Pt denies pain in his left knee this a m and reports he still has difficulty going up and down the steps due to occasional buckling  Objective: See treatment diary below  Manuals 1/30/18  2/1/18  2/6  2/8/18  2/21/18  2/26  3/5  3/6 3/8     HS TPR, STM & stretch NC, PT  10 mins  15 min  10 min  NC, PT 10 mins  15'  15'  15'  missed  This visit  15'     Manual resisted HS curls prone            8'  NP            Exercise Diary  18  218 3/5 3/6 3/8    Bike 5 mins  10 min   10 min  10 min  10'  10' 10' 10' 5'    Standing hip 3-way 20x ea`      20x ea  20x ea  1# x 20 20x ea 1#  x30 1#x30    LAQ 3#, 5"x20           NP 3#  'x30 3#x30    Mod  tandem stance foam 30"x3      30"x3 ea  3x30"   30"x3 30"x3 30"x3    HR/TR 30x ea    30x ea  30x ea  30x ea     30x x30 ea 30x    Rockerboard AP/ML 30x ea  30x ea  30x ea  30x ea  30x ea   D/C       Rockerboard balance 30"x3 ea  30"x3 ea 30"x3 ea  30"x3 ea  3x30" ea  D/C       Standing knee flexion 20x ea      20x  20x   20x 30x 30x    Gastroc/soleus stretch   3x30" 30"x3 ea  30"x3 ea  3x30"         Hamstring stretch     5 x 20"  5 x 20"      seated 3x30" Seated   3x30" Seated  3x30" [id number]    Wobble board (A/P, M/L, hold steady)           20 ea, 30" hold x 3 20 ea  30"x3  20ea  30"x3    SLS       20"x5 5x20"   NP            Modalities 1/25/18                3/8/18     CP 10 min                Declined                   Assessment: Patient tolerated his exercise program well with very minimal increased pain  Plan:  Continue therapy as per plan of care

## 2018-03-09 ENCOUNTER — TELEPHONE (OUTPATIENT)
Dept: FAMILY MEDICINE CLINIC | Facility: CLINIC | Age: 68
End: 2018-03-09

## 2018-03-13 ENCOUNTER — OFFICE VISIT (OUTPATIENT)
Dept: PHYSICAL THERAPY | Facility: CLINIC | Age: 68
End: 2018-03-13
Payer: MEDICARE

## 2018-03-13 DIAGNOSIS — S83.206A TEAR OF RIGHT MENISCUS AS CURRENT INJURY, INITIAL ENCOUNTER: Primary | ICD-10-CM

## 2018-03-13 PROCEDURE — 97112 NEUROMUSCULAR REEDUCATION: CPT

## 2018-03-13 PROCEDURE — 97140 MANUAL THERAPY 1/> REGIONS: CPT

## 2018-03-13 PROCEDURE — 97110 THERAPEUTIC EXERCISES: CPT

## 2018-03-13 NOTE — PROGRESS NOTES
Daily Note     Today's date: 3/13/2018  Patient name: Argelia Holley  : 1950  MRN: [id number]  Referring provider: Michael Dunaway DO  Dx:   Encounter Diagnosis     ICD-10-CM    1  Tear of right meniscus as current injury, initial encounter S83 206A                  Subjective: Pt reports his knee is painful a m  at a level 7/10 from the weather  Objective: See treatment diary below  Manuals 18  3/5  3/6 3/8 3/13   HS TPR, STM & stretch NC, PT  10 mins  15 min  10 min  NC, PT 10 mins  15'  15'  15'  missed  This visit  15'  10'  [place]   Manual resisted HS curls prone            8'  NP     NP      Exercise Diary  18  218 3/5 3/6 3/8 3/13   Bike 5 mins  10 min   10 min  10 min  10'  10' 10' 10' 5' 5'   Standing hip 3-way 20x ea`      20x ea  20x ea  1# x 20 20x ea 1#  x30 1#x30 1#x30   LAQ 3#, 5"x20           NP 3#  'x30 3#x30 3#x30   Mod  tandem stance foam 30"x3      30"x3 ea  3x30"   30"x3 30"x3 30"x3 30"x3   HR/TR 30x ea    30x ea  30x ea  30x ea     30x x30 ea 30x 30x   Rockerboard AP/ML 30x ea  30x ea  30x ea  30x ea  30x ea   D/C    D/C   Rockerboard balance 30"x3 ea  30"x3 ea 30"x3 ea  30"x3 ea  3x30" ea  D/C    D/C   Standing knee flexion 20x ea      20x  20x   20x 30x 30x 30x   Gastroc/soleus stretch   3x30" 30"x3 ea  30"x3 ea  3x30"      3x30"   Hamstring stretch     5 x 20"  5 x 20"      seated 3x30" Seated   3x30" Seated  3x30" [id number] 3x30   Wobble board (A/P, M/L, hold steady)           20 ea, 30" hold x 3 20 ea  30"x3  20ea  30"x3 20ea  30"x3   SLS       20"x5 5x20"   NP   5x20"         Modalities 1/25/18                3/8/18  3/13   CP 10 min                Declined Declined                  Assessment: Patient tolerated his exercise program well  Reports he feels better after the stretches are performed with a decreased pain level to a 5/10  Plan: Continue therapy as per plan of care

## 2018-03-15 ENCOUNTER — OFFICE VISIT (OUTPATIENT)
Dept: PHYSICAL THERAPY | Facility: CLINIC | Age: 68
End: 2018-03-15
Payer: MEDICARE

## 2018-03-15 DIAGNOSIS — S83.206A TEAR OF RIGHT MENISCUS AS CURRENT INJURY, INITIAL ENCOUNTER: Primary | ICD-10-CM

## 2018-03-15 PROCEDURE — 97110 THERAPEUTIC EXERCISES: CPT

## 2018-03-15 PROCEDURE — 97140 MANUAL THERAPY 1/> REGIONS: CPT

## 2018-03-15 NOTE — PROGRESS NOTES
Daily Note     Today's date: 3/15/2018  Patient name: Yulissa Gayle  : 1950  MRN: [id number]  Referring provider: Sarbjit Padilla DO  Dx:   Encounter Diagnosis     ICD-10-CM    1  Tear of right meniscus as current injury, initial encounter S83 206A                   Subjective: Patient denies pain this morning and reports his knee is feeling better  Objective: See treatment diary below       3/15  2/1/18  2/6  2/8/18  2/21/18  2/26  3/5  3/6 3/8 3/13   HS TPR, STM & stretch [place]  10 mins  15 min  10 min  NC, PT 10 mins  15'  15'  15'  missed  This visit  15'  10'  [place]   Manual resisted HS curls prone            8'  NP     NP      Exercise Diary  3/15/18  2/1/18  2/6  2/8/18  2/21  2/26 3/5 3/6 3/8 3/13   Bike 5 mins  10 min   10 min  10 min  10'  10' 10' 10' 5' 5'   Standing hip 3-way 30x ea  1#`      20x ea  20x ea  1# x 20 20x ea 1#  x30 1#x30 1#x30   LAQ 3#, 5"x30           NP 3#  'x30 3#x30 3#x30   Mod  tandem stance foam 30"x3      30"x3 ea  3x30"   30"x3 30"x3 30"x3 30"x3   HR/TR 30x ea    30x ea  30x ea  30x ea     30x x30 ea 30x 30x      30x ea  30x ea  30x ea  30x ea   D/C    D/C   Rockerboard balance D/C  30"x3 ea 30"x3 ea  30"x3 ea  3x30" ea  D/C    D/C   Standing knee flexion 30x ea      20x  20x   20x 30x 30x 30x   Gastroc/soleus stretch  30"x3 3x30" 30"x3 ea  30"x3 ea  3x30"      3x30"   Hamstring stretch   30"x3  5 x 20"  5 x 20"      seated 3x30" Seated   3x30" Seated  3x30" [id number] 3x30   Wobble board (A/P, M/L, hold steady) 20ea   30"x3         20 ea, 30" hold x 3 20 ea  30"x3  20ea  30"x3 20ea  30"x3   SLS 5x20      20"x5 5x20"   NP   5x20"         Modalities 3/15/18                3/8/18  3/13   CP Declined                Declined Declined     Assessment: Tolerated treatment well without complaints  Plan: Continue therapy as per plan of care

## 2018-03-22 ENCOUNTER — APPOINTMENT (OUTPATIENT)
Dept: PHYSICAL THERAPY | Facility: CLINIC | Age: 68
End: 2018-03-22
Payer: MEDICARE

## 2018-03-23 ENCOUNTER — OFFICE VISIT (OUTPATIENT)
Dept: PHYSICAL THERAPY | Facility: CLINIC | Age: 68
End: 2018-03-23
Payer: MEDICARE

## 2018-03-23 DIAGNOSIS — S83.206A TEAR OF RIGHT MENISCUS AS CURRENT INJURY, INITIAL ENCOUNTER: Primary | ICD-10-CM

## 2018-03-23 PROCEDURE — 97110 THERAPEUTIC EXERCISES: CPT | Performed by: PHYSICAL MEDICINE & REHABILITATION

## 2018-03-23 NOTE — PROGRESS NOTES
Daily Note     Today's date: 3/23/2018  Patient name: Domi Beckham  : 1950  MRN: [id number]  Referring provider: Manny Oates DO  Dx:   Encounter Diagnosis     ICD-10-CM    1  Tear of right meniscus as current injury, initial encounter S83 206A                   Subjective: Pt reports compliance with HEP  Due to conflict in schedule the pt is only able to be treated for 30 min  Objective: See treatment diary below            3/15 3/23  2/6  2/8/18  2/21/18  2/26  3/5  3/6 3/8 3/13   HS TPR, STM & stretch [place]  10 mins  NC  10 min  NC, PT 10 mins  15'  15'  15'  missed  This visit  15'  10'  [place]   Hip PROM  NC           Hip distraction  NC           Manual resisted HS curls prone   [facility]        8'  NP     NP      Exercise Diary  3/15/18  3/23  2/6  2/8/18  2/21  2/26 3/5 3/6 3/8 3/13   Bike 5 mins  10 min   10 min  10 min  10'  10' 10' 10' 5' 5'   Standing hip 3-way 30x ea  1#`      20x ea  20x ea  1# x 20 20x ea 1#  x30 1#x30 1#x30   LAQ 3#, 5"x30           NP 3#  'x30 3#x30 3#x30   Mod  tandem stance foam 30"x3      30"x3 ea  3x30"   30"x3 30"x3 30"x3 30"x3   HR/TR 30x ea    30x ea  30x ea  30x ea     30x x30 ea 30x 30x          30x ea  30x ea  30x ea   D/C       D/C   Rockerboard balance D/C  30"x3 ea  30"x3 ea  3x30" ea  D/C       D/C   Standing knee flexion 30x ea      20x  20x   20x 30x 30x 30x   Gastroc/soleus stretch  30"x3  30"x3 ea  30"x3 ea  3x30"         3x30"   Hamstring stretch    30"x3  "  5 x 20"      seated 3x30" Seated   3x30" Seated  3x30" [id number] 3x30   Wobble board (A/P, M/L, hold steady) 20ea   30"x3         20 ea, 30" hold x 3 20 ea  30"x3   20ea  30"x3 20ea  30"x3   SLS 5x20      20"x5 5x20"   NP     5x20"         Modalities 3/15/18                3/8/18  3/13   CP Declined                Declined Declined            Assessment: Tolerated treatment well  Patient reports improvement post tx, he feels that he is able to walk better  Plan: Progress treatment as tolerated

## 2018-03-27 ENCOUNTER — APPOINTMENT (OUTPATIENT)
Dept: PHYSICAL THERAPY | Facility: CLINIC | Age: 68
End: 2018-03-27
Payer: MEDICARE

## 2018-03-29 ENCOUNTER — EVALUATION (OUTPATIENT)
Dept: PHYSICAL THERAPY | Facility: CLINIC | Age: 68
End: 2018-03-29
Payer: MEDICARE

## 2018-03-29 DIAGNOSIS — S83.206A TEAR OF RIGHT MENISCUS AS CURRENT INJURY, INITIAL ENCOUNTER: Primary | ICD-10-CM

## 2018-03-29 PROCEDURE — 97110 THERAPEUTIC EXERCISES: CPT | Performed by: PHYSICAL MEDICINE & REHABILITATION

## 2018-03-29 PROCEDURE — G8978 MOBILITY CURRENT STATUS: HCPCS | Performed by: PHYSICAL MEDICINE & REHABILITATION

## 2018-03-29 PROCEDURE — 97140 MANUAL THERAPY 1/> REGIONS: CPT | Performed by: PHYSICAL MEDICINE & REHABILITATION

## 2018-03-29 PROCEDURE — G8979 MOBILITY GOAL STATUS: HCPCS | Performed by: PHYSICAL MEDICINE & REHABILITATION

## 2018-03-30 ENCOUNTER — OFFICE VISIT (OUTPATIENT)
Dept: FAMILY MEDICINE CLINIC | Facility: CLINIC | Age: 68
End: 2018-03-30
Payer: MEDICARE

## 2018-03-30 VITALS
SYSTOLIC BLOOD PRESSURE: 160 MMHG | WEIGHT: 206 LBS | HEIGHT: 70 IN | BODY MASS INDEX: 29.49 KG/M2 | DIASTOLIC BLOOD PRESSURE: 110 MMHG

## 2018-03-30 DIAGNOSIS — M25.562 ACUTE PAIN OF LEFT KNEE: ICD-10-CM

## 2018-03-30 DIAGNOSIS — E78.2 MIXED HYPERLIPIDEMIA: ICD-10-CM

## 2018-03-30 DIAGNOSIS — K29.00 ACUTE SUPERFICIAL GASTRITIS WITHOUT HEMORRHAGE: ICD-10-CM

## 2018-03-30 DIAGNOSIS — I10 ESSENTIAL HYPERTENSION: Primary | ICD-10-CM

## 2018-03-30 DIAGNOSIS — M23.200 OLD PERIPHERAL TEAR OF LATERAL MENISCUS OF RIGHT KNEE: ICD-10-CM

## 2018-03-30 PROBLEM — S83.206A RIGHT KNEE MENISCAL TEAR: Status: ACTIVE | Noted: 2017-12-20

## 2018-03-30 PROCEDURE — 99214 OFFICE O/P EST MOD 30 MIN: CPT | Performed by: FAMILY MEDICINE

## 2018-03-30 RX ORDER — AMLODIPINE BESYLATE 10 MG/1
10 TABLET ORAL DAILY
Qty: 90 TABLET | Refills: 1 | Status: SHIPPED | OUTPATIENT
Start: 2018-03-30 | End: 2018-09-04 | Stop reason: SDUPTHER

## 2018-03-30 RX ORDER — LISINOPRIL 40 MG/1
40 TABLET ORAL DAILY
Qty: 90 TABLET | Refills: 1 | Status: SHIPPED | OUTPATIENT
Start: 2018-03-30 | End: 2018-09-04 | Stop reason: SDUPTHER

## 2018-03-30 RX ORDER — PANTOPRAZOLE SODIUM 40 MG/1
40 TABLET, DELAYED RELEASE ORAL DAILY
Qty: 90 TABLET | Refills: 1 | Status: SHIPPED | OUTPATIENT
Start: 2018-03-30 | End: 2018-09-04 | Stop reason: SDUPTHER

## 2018-03-30 NOTE — PROGRESS NOTES
Assessment/Plan:    No problem-specific Assessment & Plan notes found for this encounter  Diagnoses and all orders for this visit:    Essential hypertension  -     amLODIPine (NORVASC) 10 mg tablet; Take 1 tablet (10 mg total) by mouth daily  -     lisinopril (ZESTRIL) 40 mg tablet; Take 1 tablet (40 mg total) by mouth daily    Acute superficial gastritis without hemorrhage  -     pantoprazole (PROTONIX) 40 mg tablet; Take 1 tablet (40 mg total) by mouth daily    Mixed hyperlipidemia    Old peripheral tear of lateral meniscus of right knee    Acute pain of left knee          Subjective:      Patient ID: Sophie Tyson is a 80 y o  male  Patient follow-up on hypertension hyperlipidemia GERD and right knee meniscal tear  Patient did go to physical therapy  Patient has seen some improvement with injections  Patient without significant chest pain  Patient does get some GERD symptoms 1 time per month  Urination defecation normal   All review systems negative  Patient ran out medications roughly 3 days ago  The following portions of the patient's history were reviewed and updated as appropriate: allergies, current medications, past family history, past medical history, past social history, past surgical history and problem list     Review of Systems   Constitutional: Negative  HENT: Negative  Eyes: Negative  Respiratory: Negative  Cardiovascular: Negative  Gastrointestinal: Negative  Endocrine: Negative  Genitourinary: Negative  Musculoskeletal: Positive for arthralgias and gait problem  Skin: Negative  Allergic/Immunologic: Negative  Hematological: Negative  Psychiatric/Behavioral: Negative  Objective:      BP (!) 160/110 (BP Location: Left arm, Patient Position: Sitting, Cuff Size: Standard)   Ht 5' 9 5" (1 765 m)   Wt 93 4 kg (206 lb)   BMI 29 98 kg/m²          Physical Exam   Constitutional: He is oriented to person, place, and time   He appears well-developed and well-nourished  No distress  HENT:   Head: Normocephalic  Right Ear: External ear normal    Left Ear: External ear normal    Mouth/Throat: Oropharynx is clear and moist  No oropharyngeal exudate  Eyes: EOM are normal  Pupils are equal, round, and reactive to light  Right eye exhibits no discharge  Left eye exhibits no discharge  No scleral icterus  Neck: Normal range of motion  Neck supple  No thyromegaly present  Cardiovascular: Normal rate, regular rhythm, normal heart sounds and intact distal pulses  Exam reveals no gallop and no friction rub  No murmur heard  Pulmonary/Chest: Effort normal and breath sounds normal  No respiratory distress  He has no wheezes  He has no rales  He exhibits no tenderness  Abdominal: Soft  Bowel sounds are normal  He exhibits no distension  There is no tenderness  There is no rebound and no guarding  Musculoskeletal: Normal range of motion  He exhibits tenderness  He exhibits no edema  Lymphadenopathy:     He has no cervical adenopathy  Neurological: He is oriented to person, place, and time  No cranial nerve deficit  He exhibits normal muscle tone  Coordination normal    Skin: Skin is warm and dry  No rash noted  He is not diaphoretic  No erythema  No pallor  Psychiatric: He has a normal mood and affect  His behavior is normal  Judgment and thought content normal    Nursing note and vitals reviewed

## 2018-04-03 ENCOUNTER — OFFICE VISIT (OUTPATIENT)
Dept: PHYSICAL THERAPY | Facility: CLINIC | Age: 68
End: 2018-04-03
Payer: MEDICARE

## 2018-04-03 DIAGNOSIS — S83.206A TEAR OF RIGHT MENISCUS AS CURRENT INJURY, INITIAL ENCOUNTER: Primary | ICD-10-CM

## 2018-04-03 PROCEDURE — 97110 THERAPEUTIC EXERCISES: CPT | Performed by: PHYSICAL THERAPY ASSISTANT

## 2018-04-03 PROCEDURE — 97112 NEUROMUSCULAR REEDUCATION: CPT | Performed by: PHYSICAL THERAPY ASSISTANT

## 2018-04-03 PROCEDURE — 97140 MANUAL THERAPY 1/> REGIONS: CPT | Performed by: PHYSICAL THERAPY ASSISTANT

## 2018-04-03 NOTE — PROGRESS NOTES
Daily Note     Today's date: 4/3/2018  Patient name: Raymond Marsh  : 1950  MRN: [id number]  Referring provider: Elida Patel DO  Dx:   Encounter Diagnosis     ICD-10-CM    1  Tear of right meniscus as current injury, initial encounter S83 206A                    Subjective: Pt reports varying levels of pain  States his knee is aching more today due to the weather  Objective: See treatment diary below  Daily Treatment Diary   Emphasis on quad/hip strength,   Manuals 3/29 4/3               HS TPR, STM & stretch NC                Manual HS resistance prone     RK                      Exercise Diary  3/29 4/3           Bike 5  mins 10'           Standing hip 3-way (add weights) NV 3x10           LAQ (add weights) 3#, 30x 3# x30           Mod  tandem stance foam  DC            HR/TR HEP            Rockerboard AP/ML DC            Rockerboard balance DC            Standing knee flexion HEP            Gastroc/soleus stretch B  HEP            Hamstring stretch  Strap B  MAN, HEP manual           Seated hip flexion  30x           Standing HS curls  30x           Bridges c ADD, ABD  30x  otb           SL hip ABD 10 ea 20x           Tandem on foam  30"x3           sidestepping  otb  15'x4           Clamshells  30x           SLS                    Modalities 3/29 4/3                   CP 10 min  NP                       Assessment: Tolerated treatment well  Patient demonstrated fatigue post treatment, exhibited good technique with therapeutic exercises and would benefit from continued PT      Plan: Continue per plan of care  Progress treatment as tolerated

## 2018-04-05 ENCOUNTER — OFFICE VISIT (OUTPATIENT)
Dept: PHYSICAL THERAPY | Facility: CLINIC | Age: 68
End: 2018-04-05
Payer: MEDICARE

## 2018-04-05 DIAGNOSIS — S83.206A TEAR OF RIGHT MENISCUS AS CURRENT INJURY, INITIAL ENCOUNTER: Primary | ICD-10-CM

## 2018-04-05 PROCEDURE — 97140 MANUAL THERAPY 1/> REGIONS: CPT | Performed by: PHYSICAL MEDICINE & REHABILITATION

## 2018-04-05 NOTE — PROGRESS NOTES
Daily Note     Today's date: 2018  Patient name: Laura Leyva  : 1950  MRN: [id number]  Referring provider: Reji Graves DO  Dx:   Encounter Diagnosis     ICD-10-CM    1  Tear of right meniscus as current injury, initial encounter S83 206A        Start Time: 1000  Stop Time: 1030  Total time in clinic (min): 30 minutes    Subjective: Pt reports 6/10 pain in the R knee, and states that his pain was worse yesterday  Objective: See treatment diary below  Emphasis on quad/hip strength,   Manuals 3/29 4/3  4/5                 HS TPR, STM & stretch NC    NC                 Patellar mobs   NC          Tibiofemoral A/P   NC                       Manual HS resistance prone     RK                      Exercise Diary  3/29 4/3  4/5                 Bike 5  mins 10'  5'                 Standing hip 3-way (add weights) NV 3x10                   LAQ (add weights) 3#, 30x 3# x30                   Mod  tandem stance foam  DC                     HR/TR HEP                     Rockerboard AP/ML DC                     Rockerboard balance DC                     Standing knee flexion HEP                     Gastroc/soleus stretch B  HEP                     Hamstring stretch  Strap B  MAN, HEP manual                   Seated hip flexion   30x                   Standing HS curls   30x                   Bridges c ADD, ABD   30x  otb                   SL hip ABD 10 ea 20x                   Tandem on foam   30"x3                   sidestepping   otb  15'x4                   Clamshells   30x                   SLS                             Modalities 3/29 4/3                   CP 10 min  NP                            Assessment: Tolerated treatment well  Patient reports decrease in pain and improved gait post manual tx  Plan: Progress treatment as tolerated

## 2018-04-10 ENCOUNTER — APPOINTMENT (OUTPATIENT)
Dept: PHYSICAL THERAPY | Facility: CLINIC | Age: 68
End: 2018-04-10
Payer: MEDICARE

## 2018-04-12 ENCOUNTER — OFFICE VISIT (OUTPATIENT)
Dept: PHYSICAL THERAPY | Facility: CLINIC | Age: 68
End: 2018-04-12
Payer: MEDICARE

## 2018-04-12 DIAGNOSIS — S83.206A TEAR OF RIGHT MENISCUS AS CURRENT INJURY, INITIAL ENCOUNTER: Primary | ICD-10-CM

## 2018-04-12 PROCEDURE — 97112 NEUROMUSCULAR REEDUCATION: CPT

## 2018-04-12 PROCEDURE — 97110 THERAPEUTIC EXERCISES: CPT

## 2018-04-12 NOTE — PROGRESS NOTES
Daily Note     Today's date: 2018  Patient name: Zachariah Robles  : 1950  MRN: [id number]  Referring provider: Sol Galvan DO  Dx:   Encounter Diagnosis     ICD-10-CM    1  Tear of right meniscus as current injury, initial encounter S83 206A                   Subjective: Patient c/o posterior knee pain at a 7/10 level  He indicates that the stretching exercises are much more helpful than the other exercises  Objective: See treatment diary below    Manuals 3/29 4/3  4/5  4/12               HS TPR, STM & stretch NC    NC                 Patellar mobs   NC          Tibiofemoral A/P   NC                       Manual HS resistance prone     RK    KY                  Exercise Diary  3/29 4/3  4/5 4/12                Bike 5  mins 10'  5' 5'                Standing hip 3-way (add weights) NV 3x10   2#  3x 10               LAQ (add weights) 3#, 30x 3# x30                   Mod  tandem stance foam  DC     D/C                HR/TR HEP     HEP                Rockerboard AP/ML DC     D/C                Rockerboard balance DC     D/C                Standing knee flexion HEP     HEP                Gastroc/soleus stretch B  HEP     HEP                Hamstring stretch  Strap B  MAN, HEP manual   Manual               Seated hip flexion   30x   NP               Standing HS curls   30x   30x               Bridges c ADD, ABD   30x  otb   30x  OTB                SL hip ABD 10 ea 20x   20x                Tandem on foam   30"x3   30"x3                sidestepping   otb  15'x4   OTB  15x4                Clamshells   30x   30x                SLS                             Modalities 3/29 4/3  410                 CP 10 min  NP                              Assessment: Tolerated treatment well  Focused on the stretching exercises today  Plan: Continue therapy as needed per plan of care

## 2018-04-17 ENCOUNTER — OFFICE VISIT (OUTPATIENT)
Dept: PHYSICAL THERAPY | Facility: CLINIC | Age: 68
End: 2018-04-17
Payer: MEDICARE

## 2018-04-17 DIAGNOSIS — S83.206A TEAR OF RIGHT MENISCUS AS CURRENT INJURY, INITIAL ENCOUNTER: Primary | ICD-10-CM

## 2018-04-17 PROCEDURE — 97150 GROUP THERAPEUTIC PROCEDURES: CPT | Performed by: PHYSICAL MEDICINE & REHABILITATION

## 2018-04-17 PROCEDURE — 97110 THERAPEUTIC EXERCISES: CPT

## 2018-04-17 NOTE — PROGRESS NOTES
Daily Note     Today's date: 2018  Patient name: Kyle Gonzalez  : 1950  MRN: [id number]  Referring provider: Nadege Nelson DO  Dx:   Encounter Diagnosis     ICD-10-CM    1  Tear of right meniscus as current injury, initial encounter S83 206A                   Subjective: Patient c/o posterior knee pain rating it a 5/10  Pt denies increased pain post PT session  Objective: See treatment diary below    Manuals 3/29 4/3  4/5  4/12  4/17             HS TPR, STM & stretch NC    NC    PK             Patellar mobs   NC  np        Tibiofemoral A/P   NC  np                     Manual HS resistance prone     RK    KY  np                Exercise Diary  3/29 4/3  4/5 4/12  4/17              Bike 5  mins 10'  5' 5'   5'             Standing hip 3-way (add weights) NV 3x10   2#  3x 10 2#  3x 10              LAQ (add weights) 3#, 30x 3# x30      3# x30             Hamstring stretch  Strap B  MAN, HEP manual   Manual Manual              Seated hip flexion   30x   NP  np             Standing HS curls   30x   30x 30x              Bridges c ADD, ABD   30x  otb   30x  OTB  30x  OTB               SL hip ABD 10 ea 20x   20x  20x               Tandem on foam   30"x3   30"x3  30"x3               sidestepping   otb  15'x4   OTB  15x4  OTB  3 laps             Clamshells   30x   30x  30x              SLS                             Modalities 3/29 4/3  4/10    4/17             CP 10 min  NP      deferred                        Assessment: Pt demonstrates good tolerance to TE with no complaints of increased pain  Pt require cuing for hamstring stretch secondary to technique and to decrease intensity  Plan: Continue therapy as needed per plan of care

## 2018-04-19 ENCOUNTER — APPOINTMENT (OUTPATIENT)
Dept: PHYSICAL THERAPY | Facility: CLINIC | Age: 68
End: 2018-04-19
Payer: MEDICARE

## 2018-04-20 ENCOUNTER — OFFICE VISIT (OUTPATIENT)
Dept: PHYSICAL THERAPY | Facility: CLINIC | Age: 68
End: 2018-04-20
Payer: MEDICARE

## 2018-04-20 DIAGNOSIS — S83.206A TEAR OF RIGHT MENISCUS AS CURRENT INJURY, INITIAL ENCOUNTER: Primary | ICD-10-CM

## 2018-04-20 PROCEDURE — 97140 MANUAL THERAPY 1/> REGIONS: CPT | Performed by: PHYSICAL MEDICINE & REHABILITATION

## 2018-04-20 PROCEDURE — 97110 THERAPEUTIC EXERCISES: CPT | Performed by: PHYSICAL MEDICINE & REHABILITATION

## 2018-04-20 NOTE — PROGRESS NOTES
Daily Note     Today's date: 2018  Patient name: Gianna Torres  : 1950  MRN: [id number]  Referring provider: Donell Gowers, DO  Dx:   Encounter Diagnosis     ICD-10-CM    1  Tear of right meniscus as current injury, initial encounter S83 206A        Start Time: 0800  Stop Time: 0830  Total time in clinic (min): 30 minutes    Subjective: Pt reports that he is feeling well today, he has been compliant with HEP and asks to only have manuals performed today  Objective: See treatment diary below  Precautions: none  Manuals 3/29 4/3  4/5  4/12  4/17  4/19           HS TPR, STM & stretch NC    NC    PK  NC           Patellar mobs     NC   np             Tibiofemoral A/P     NC   np [facility]                                   Manual HS resistance prone     RK    [place]  np               Exercise Diary  3/29 4/3  4/5 4/12  4/17              Bike 5  mins 10'  5' 5'   5'             Standing hip 3-way (add weights) NV 3x10   2#  3x 10 2#  3x 10              LAQ (add weights) 3#, 30x 3# x30      3# x30             Hamstring stretch  Strap B  MAN, HEP manual   Manual Manual              Seated hip flexion   30x   NP  np             Standing HS curls   30x   30x 30x              Bridges c ADD, ABD   30x  otb   30x  OTB  30x  OTB               SL hip ABD 10 ea 20x   20x  20x               Tandem on foam   30"x3   30"x3  30"x3               sidestepping   otb  15'x4   OTB  15x4  OTB  3 laps             Clamshells   30x   30x  30x              SLS                             Modalities 3/29 4/3  4/10    4/17             CP 10 min  NP      deferred                            Assessment: Tolerated treatment well  Patient reports decreased tightness and improved gait post manual tx  Plan: Potential discharge next visit  Progress treatment as tolerated

## 2018-04-24 ENCOUNTER — OFFICE VISIT (OUTPATIENT)
Dept: PHYSICAL THERAPY | Facility: CLINIC | Age: 68
End: 2018-04-24
Payer: MEDICARE

## 2018-04-24 DIAGNOSIS — S83.206A TEAR OF RIGHT MENISCUS AS CURRENT INJURY, INITIAL ENCOUNTER: Primary | ICD-10-CM

## 2018-04-24 PROCEDURE — 97140 MANUAL THERAPY 1/> REGIONS: CPT

## 2018-04-24 NOTE — PROGRESS NOTES
Daily Note     Today's date: 2018  Patient name: Lashon Jackson  : 1950  MRN: [id number]  Referring provider: Eloy Edwards DO  Dx:   Encounter Diagnosis     ICD-10-CM    1  Tear of right meniscus as current injury, initial encounter S83 206A                   Subjective: Pt reports that he is feeling well today, he has had less pain since it has been warmer  He states that he did all of his exercises this morning so only needs the stretching, but does warm up on the bike  Objective: See treatment diary below  Precautions: none  Manuals 3/29 4/3  4/5  4/12  4/17  4/19  4/24         HS TPR, STM & stretch NC    NC    PK  NC  TH         Patellar mobs     NC   np             Tibiofemoral A/P     NC   np [facility]                                   Manual HS resistance prone     RK    [place]  np               Exercise Diary  3/29 4/3  4/5 4/12  4/17   4/24           Bike 5  mins 10'  5' 5'   5'  5'           Standing hip 3-way (add weights) NV 3x10   2#  3x 10 2#  3x 10              LAQ (add weights) 3#, 30x 3# x30      3# x30             Hamstring stretch  Strap B  MAN, HEP manual   Manual Manual              Seated hip flexion   30x   NP  np             Standing HS curls   30x   30x 30x              Bridges c ADD, ABD   30x  otb   30x  OTB  30x  OTB               SL hip ABD 10 ea 20x   20x  20x               Tandem on foam   30"x3   30"x3  30"x3               sidestepping   otb  15'x4   OTB  15x4  OTB  3 laps             Clamshells   30x   30x  30x              SLS                             Modalities 3/29 4/3  4/10    4/17             CP 10 min  NP      deferred                            Assessment: Tolerated treatment well  Patient reports decreased tightness and improved gait post manual tx  Plan: Potential discharge next visit  Progress treatment as tolerated

## 2018-04-26 ENCOUNTER — EVALUATION (OUTPATIENT)
Dept: PHYSICAL THERAPY | Facility: CLINIC | Age: 68
End: 2018-04-26
Payer: MEDICARE

## 2018-04-26 DIAGNOSIS — S83.206A TEAR OF RIGHT MENISCUS AS CURRENT INJURY, INITIAL ENCOUNTER: Primary | ICD-10-CM

## 2018-04-26 PROCEDURE — G8981 BODY POS CURRENT STATUS: HCPCS | Performed by: PHYSICAL MEDICINE & REHABILITATION

## 2018-04-26 PROCEDURE — 97110 THERAPEUTIC EXERCISES: CPT | Performed by: PHYSICAL MEDICINE & REHABILITATION

## 2018-04-26 PROCEDURE — 97140 MANUAL THERAPY 1/> REGIONS: CPT | Performed by: PHYSICAL MEDICINE & REHABILITATION

## 2018-04-26 PROCEDURE — G8983 BODY POS D/C STATUS: HCPCS | Performed by: PHYSICAL MEDICINE & REHABILITATION

## 2018-04-26 PROCEDURE — G8982 BODY POS GOAL STATUS: HCPCS | Performed by: PHYSICAL MEDICINE & REHABILITATION

## 2018-04-26 NOTE — PROGRESS NOTES
PT Re-Evaluation  and PT Discharge    Today's date: 2018  Patient name: Jarocho Jacobs  : 1950  MRN: [id number]  Referring provider: Wesley Bae DO  Dx:   Encounter Diagnosis     ICD-10-CM    1  Tear of right meniscus as current injury, initial encounter S83 206A        Start Time: 1000  Stop Time: 1030  Total time in clinic (min): 30 minutes    Assessment  Impairments: abnormal gait, abnormal or restricted ROM, impaired balance, impaired physical strength, lacks appropriate home exercise program and pain with function    Assessment details: Terrelldhara Sandhu has been attending outpatient physical therapy with Tear of right meniscus, and myofascial dysfunction   Since the last assessment, patient demonstrates decreased pain levels, improved range of motion, improved strength, and increased tolerance to activity  Pt continues to present with intermittent minor pain, demonstrate decreased range of motion,  and decreased tolerance to activity  Understanding of Dx/Px/POC: good   Prognosis: good    Goals  ST  Patient will report 25% decrease in pain in 4 weeks  MET  2  Patient will demonstrate 25% improvement in ROM in 4 weeks  MET  3  Patient will demonstrate 1/2 grade improvement in strength in 4 weeks  MET    LT  Patient will be able to perform IADLS without restriction or pain by discharge  MET  2  Patient will be independent in HEP by discharge  MET  3  Patient will be able to return to recreational/work duties without restriction or pain by discharge  Partially met      Plan  Planned therapy interventions: home exercise program  Treatment plan discussed with: patient  Plan details: DC to HEP        Subjective Evaluation    History of Present Illness  Mechanism of injury: Pt reports pain is worst in the morning with his first steps, Sx improve with movement and stretching   Pt states that he is experiencing worse pain in his L knee at this point    (3/29/18) Pt reports that he has been feeling better, however today is a bad day due to weather  He states that stiffness is his chief complaint and that it comes and goes  Walking, navigating stairs, and duties around the house have become easier  He states that overall he noticed a 70% improvement since beginning PT   The pt would like to continue therapy and states that he feels that he can continue to improve  (18) Pt reports that has noticed an improvement in his ability to perform daily activities  He states that he has not experienced pain in a few weeks  Quality of life: good    Pain  Current pain ratin  At best pain ratin  At worst pain ratin  Quality: pulling, cramping and discomfort  Relieving factors: rest  Aggravating factors: nothing  Progression: improved    Treatments  Current treatment: physical therapy  Patient Goals  Patient goals for therapy: decreased pain, increased motion and independence with ADLs/IADLs          Objective     Neurological Testing     Additional Neurological Details  Tandem balance: R/L 22 sec , L/R 18 sec    Active Range of Motion   Left Knee   Flexion: 120 degrees   Extension: 3 degrees     Right Knee   Flexion: 120 degrees   Extension: 3 degrees     Additional Active Range of Motion Details  Pain with AROM has resolved    Strength/Myotome Testing     Left Hip   Planes of Motion   Flexion: 5  Extension: 5  Abduction: 5    Right Hip   Planes of Motion   Flexion: 5  Extension: 5  Abduction: 5    Left Knee   Flexion: 4  Extension: 4+  Quadriceps contraction: fair    Right Knee   Flexion: 4+  Extension: 5  Quadriceps contraction: good    Additional Strength Details  Continued weakness on the L LE    Tests     Left Knee   Negative anterior drawer, valgus stress test at 0 degrees and valgus stress test at 30 degrees  Right Knee   Negative anterior drawer, valgus stress test at 0 degrees and valgus stress test at 30 degrees       Additional Tests Details  SLR: 45 deg B  () R 70 deg, L 65 deg (3/29) R 75, L 70 deg (4/26/18) 75 deg B  Balance: Tandem R/L 23 sec mod sway, L/R 24 sec mod sway (4/26/18) 30 sec ea,     Ambulation     Ambulation: Level Surfaces     Additional Level Surfaces Ambulation Details  No longer utilizing axillary crutches for ambulation  R lateral trunk lean    Observational Gait   Gait: antalgic   Decreased walking speed and stride length  Additional Observational Gait Details  Decreased push off B  Decreased hip extension B    Quality of Movement During Gait   Trunk    Trunk (Right): Positive lateral lean over stance limb  Knee    Knee (Left): Positive increased flexion during stance  Knee (Right): Positive increased flexion during stance              Flowsheet Rows      Most Recent Value   PT/OT G-Codes   Current Score  39   Projected Score  51   FOTO information reviewed  Yes   Assessment Type  Discharge   G code set  Changing & Maintaining Body Position   Changing and Maintaining Body Position Current Status ()  CL   Changing and Maintaining Body Position Goal Status ()  CK   Changing and Maintaining Body Position Discharge Status ()  CK          Precautions: none    Daily Treatment Diary   Emphasis on quad/hip strength,   Manuals 1/30/18  2/1/18  2/6  2/8/18  2/21/18  3/29           HS TPR, STM & stretch NC, PT  10 mins  15 min  10 min  NC, PT 10 mins  15' [facility]           Manual HS resistance prone                           Exercise Diary  3/29 1/30/18  2/1/18  2/6  2/8/18  2/21  2/22   Bike 5  mins 5 mins  10 min   10 min  10 min  10'  10'   Standing hip 3-way (add weights) NV 20x ea`      20x ea  20x ea     LAQ (add weights) 3#, 30x 3#, 5"x20             Mod  tandem stance foam  DC 30"x3      30"x3 ea  3x30"     HR/TR HEP 30x ea    30x ea  30x ea  30x ea       Rockerboard AP/ML DC 30x ea  30x ea  30x ea  30x ea  30x ea      Rockerboard balance DC 30"x3 ea  30"x3 ea 30"x3 ea  30"x3 ea  3x30" ea     Standing knee flexion HEP 20x ea      20x  20x     Gastroc/soleus stretch B  HEP   3x30" 30"x3 ea  30"x3 ea  3x30"  3 x 30"   Hamstring stretch  Strap B  MAN, HEP   5 x 20"  5 x 20"      3 x 30"   Seated hip flexion          Standing HS curls          Bridges c ADD, ABD          SL hip ABD 10 ea         Tandem on foam                              SLS         20"x5 5x20"           Modalities 1/25/18                     CP 10 min

## 2018-09-04 ENCOUNTER — OFFICE VISIT (OUTPATIENT)
Dept: FAMILY MEDICINE CLINIC | Facility: CLINIC | Age: 68
End: 2018-09-04
Payer: MEDICARE

## 2018-09-04 VITALS
BODY MASS INDEX: 28.76 KG/M2 | SYSTOLIC BLOOD PRESSURE: 148 MMHG | HEIGHT: 71 IN | DIASTOLIC BLOOD PRESSURE: 86 MMHG | WEIGHT: 205.4 LBS

## 2018-09-04 DIAGNOSIS — M23.200 OTHER OLD TEAR OF LATERAL MENISCUS OF RIGHT KNEE: Primary | ICD-10-CM

## 2018-09-04 DIAGNOSIS — K29.00 ACUTE SUPERFICIAL GASTRITIS WITHOUT HEMORRHAGE: ICD-10-CM

## 2018-09-04 DIAGNOSIS — I10 ESSENTIAL HYPERTENSION: ICD-10-CM

## 2018-09-04 PROCEDURE — 99214 OFFICE O/P EST MOD 30 MIN: CPT | Performed by: FAMILY MEDICINE

## 2018-09-04 RX ORDER — LISINOPRIL 40 MG/1
40 TABLET ORAL DAILY
Qty: 90 TABLET | Refills: 2 | Status: SHIPPED | OUTPATIENT
Start: 2018-09-04 | End: 2019-07-09 | Stop reason: SDUPTHER

## 2018-09-04 RX ORDER — PANTOPRAZOLE SODIUM 40 MG/1
40 TABLET, DELAYED RELEASE ORAL DAILY
Qty: 90 TABLET | Refills: 2 | Status: SHIPPED | OUTPATIENT
Start: 2018-09-04 | End: 2019-07-09 | Stop reason: SDUPTHER

## 2018-09-04 RX ORDER — AMLODIPINE BESYLATE 10 MG/1
10 TABLET ORAL DAILY
Qty: 90 TABLET | Refills: 2 | Status: SHIPPED | OUTPATIENT
Start: 2018-09-04 | End: 2019-07-09 | Stop reason: SDUPTHER

## 2018-09-04 NOTE — PROGRESS NOTES
Assessment/Plan:   patient wishes to observe knee pain at the present time  To consider injections or seeing orthopedic surgeons  Medications refilled for hypertension as well as GERD  Follow-up in 6 months or as needed       Diagnoses and all orders for this visit:    Other old tear of lateral meniscus of right knee    Essential hypertension  -     amLODIPine (NORVASC) 10 mg tablet; Take 1 tablet (10 mg total) by mouth daily  -     lisinopril (ZESTRIL) 40 mg tablet; Take 1 tablet (40 mg total) by mouth daily    Acute superficial gastritis without hemorrhage  -     pantoprazole (PROTONIX) 40 mg tablet; Take 1 tablet (40 mg total) by mouth daily          Subjective:      Patient ID: Nathalie Melo is a 80 y o  male  Patient is here for follow-up on hypertension, GERD, bilateral knee pain  Patient's pain is intermittent  Patient has had steroid shots  In the past  No chest pain in a patient concerned about possible pauses with cardiac rhythm  No syncope  No recent trauma to the knees  Normal urination  Bowel movements okay  No other arthralgias      Knee Pain      Medication Refill   Associated symptoms include arthralgias  The following portions of the patient's history were reviewed and updated as appropriate: allergies, current medications, past family history, past medical history, past social history, past surgical history and problem list     Review of Systems   Constitutional: Negative  HENT: Negative  Eyes: Negative  Respiratory: Negative  Cardiovascular: Negative  Gastrointestinal: Negative  Endocrine: Negative  Genitourinary: Negative  Musculoskeletal: Positive for arthralgias  Skin: Negative  Allergic/Immunologic: Negative  Neurological: Negative  Hematological: Negative  Psychiatric/Behavioral: Negative            Objective:      /86 (BP Location: Right arm, Patient Position: Sitting, Cuff Size: Adult)   Ht 5' 10 5" (1 791 m)   Wt 93 2 kg (205 lb 6 4 oz)   BMI 29 06 kg/m²          Physical Exam   Constitutional: He is oriented to person, place, and time  He appears well-developed and well-nourished  No distress  HENT:   Head: Normocephalic  Right Ear: External ear normal    Left Ear: External ear normal    Mouth/Throat: Oropharynx is clear and moist  No oropharyngeal exudate  Eyes: EOM are normal  Pupils are equal, round, and reactive to light  Right eye exhibits no discharge  Left eye exhibits no discharge  No scleral icterus  Neck: Normal range of motion  Neck supple  No thyromegaly present  Cardiovascular: Normal rate, regular rhythm, normal heart sounds and intact distal pulses  Exam reveals no gallop and no friction rub  No murmur heard  Pulmonary/Chest: Effort normal and breath sounds normal  No respiratory distress  He has no wheezes  He has no rales  He exhibits no tenderness  Abdominal: Soft  Bowel sounds are normal  He exhibits no distension  There is no tenderness  There is no rebound and no guarding  Musculoskeletal: Normal range of motion  He exhibits no edema or tenderness  Lymphadenopathy:     He has no cervical adenopathy  Neurological: He is oriented to person, place, and time  No cranial nerve deficit  He exhibits normal muscle tone  Coordination normal    Skin: Skin is warm and dry  No rash noted  He is not diaphoretic  No erythema  No pallor  Psychiatric: He has a normal mood and affect  His behavior is normal  Judgment and thought content normal    Nursing note and vitals reviewed  Circumcision, per parent request

## 2018-10-03 ENCOUNTER — OFFICE VISIT (OUTPATIENT)
Dept: SLEEP CENTER | Facility: CLINIC | Age: 68
End: 2018-10-03
Payer: MEDICARE

## 2018-10-03 VITALS
OXYGEN SATURATION: 96 % | HEIGHT: 71 IN | WEIGHT: 205 LBS | HEART RATE: 86 BPM | BODY MASS INDEX: 28.7 KG/M2 | DIASTOLIC BLOOD PRESSURE: 76 MMHG | SYSTOLIC BLOOD PRESSURE: 142 MMHG

## 2018-10-03 DIAGNOSIS — Z72.0 TOBACCO ABUSE: ICD-10-CM

## 2018-10-03 DIAGNOSIS — G47.33 OBSTRUCTIVE SLEEP APNEA SYNDROME: Primary | ICD-10-CM

## 2018-10-03 DIAGNOSIS — E66.3 OVERWEIGHT (BMI 25.0-29.9): ICD-10-CM

## 2018-10-03 PROCEDURE — 99214 OFFICE O/P EST MOD 30 MIN: CPT | Performed by: INTERNAL MEDICINE

## 2018-10-03 NOTE — PROGRESS NOTES
Assessment/Plan:   Diagnoses and all orders for this visit:    Obstructive sleep apnea syndrome  -     Sleep F/U  - established patient  -     PAP DME Resupply/Reorder    Overweight (BMI 25 0-29  9)    Tobacco abuse        Patient with moderate obstructive sleep apnea with an AHI of 16 REM AHI of 49, in a prior diagnostic sleep study in 2015 currently on 8 cm of water doing well  He has decreased nocturnal awakenings and feels well rested when he wakes up in the morning  Reviewed CPAP download compliance for the past 30 days with 100% compliance, no evidence of any mask leak, acceptable residual AHI of 1 1  Cleaning of the CPAP supplies and change of the filters supplies every 6 months discussed with the patient understands and verbalizes  He states he states he wants an CPAP supply order, will send written to [facility]   Smoking cessation discussed with the patient, various treatment options for smoking cessation with Wellbutrin, nicotine replacement, Chantix discussed with the patient  He wants to try the nicotine replacement  Does not want the patch once to use the Nicorette gum  Also in review of systems he did state that he has shortness of breath and dyspnea on exertion with occasional wheezing today his lungs are clear, but I did discuss with him to follow up with his primary care doctor further evaluation for the shortness of breath and for evaluation for COPD understands and verbalizes  Recommend weight loss  I will see him back in 1 year or p r n  Earlier as needed  Return in about 1 year (around 10/3/2019)  All questions are answered to the patient's satisfaction and understanding  He verbalizes understanding  He is encouraged to call with any further questions or concerns  Portions of the record may have been created with voice recognition software  Occasional wrong word or "sound a like" substitutions may have occurred due to the inherent limitations of voice recognition software  Read the chart carefully and recognize, using context, where substitutions have occurred  Electronically Signed by Cecile Contreras MD    ______________________________________________________________________    Chief Complaint:   Chief Complaint   Patient presents with    Follow-up       Patient ID: Evan Lyman is a 80 y o  y o  male has a past medical history of Abnormal CAT scan; Bilateral inguinal hernia (1990); Colitis; Hypertension; Nausea & vomiting; and Sleep apnea  10/3/2018  Patient presents today for follow-up visit  Mr Janie Elizabeth is here for follow-up of his obstructive sleep apnea, a 1 year follow-up  52-year-old gentleman with past medical history significant for history of hypertension diagnosed with severe obstructive sleep apnea and has been on the CPAP for more than 2 years now compliant with his CPAP machine  His daily sleep schedule has not changed  He states he has decreased nocturnal awakenings and feels much rested when he wakes up in the morning  He states for the past 2 weeks has been trying to cut down on his smoking, he is currently smoking about 12-15 cigarettes a day, and last week when he cut down to less than 6 cigarettes a day had anxiety, and so he went back up to 12 cigarettes a day he states  Review of Systems    Genitourinary none   Cardiology none   Gastrointestinal none   Neurology muscle weakness   Constitutional none   Integumentary none   Psychiatry none   Musculoskeletal muscle aches and back pain   Pulmonary shortness of breath with activity and wheezing   ENT none   Endocrine none   Hematological none     Smoking history: He reports that he has been smoking  He has been smoking about 0 50 packs per day   He has never used smokeless tobacco     The following portions of the patient's history were reviewed and updated as appropriate: allergies, current medications, past family history, past medical history, past social history, past surgical history and problem list     Immunization History   Administered Date(s) Administered    IPV 01/06/2005    Influenza Split High Dose Preservative Free IM 10/21/2016    Influenza TIV (IM) 10/13/2003    Td (adult), adsorbed 01/06/2005, 09/26/2017     Current Outpatient Prescriptions   Medication Sig Dispense Refill    amLODIPine (NORVASC) 10 mg tablet Take 1 tablet (10 mg total) by mouth daily 90 tablet 2    aspirin 81 MG tablet Take 81 mg by mouth daily      lisinopril (ZESTRIL) 40 mg tablet Take 1 tablet (40 mg total) by mouth daily 90 tablet 2    pantoprazole (PROTONIX) 40 mg tablet Take 1 tablet (40 mg total) by mouth daily 90 tablet 2     No current facility-administered medications for this visit  Allergies: Patient has no known allergies  Objective:  Vitals:    10/03/18 1100   BP: 142/76   Pulse: 86   SpO2: 96%   Weight: 93 kg (205 lb)   Height: 5' 10 5" (1 791 m)   Oxygen Therapy  SpO2: 96 %    Wt Readings from Last 3 Encounters:   10/03/18 93 kg (205 lb)   09/04/18 93 2 kg (205 lb 6 4 oz)   03/30/18 93 4 kg (206 lb)     Body mass index is 29 kg/m²  Physical Exam   Constitutional: He is oriented to person, place, and time  He appears well-developed and well-nourished  HENT:   Head: Normocephalic and atraumatic  Crowded oropharyngeal airways, Mallampati score 3   Eyes: Pupils are equal, round, and reactive to light  EOM are normal    Neck: Normal range of motion  Neck supple  Short and wide neck   Cardiovascular: Normal rate, regular rhythm and normal heart sounds  Pulmonary/Chest: Effort normal and breath sounds normal    Musculoskeletal: Normal range of motion  Neurological: He is alert and oriented to person, place, and time  Skin: Skin is warm and dry  Psychiatric: He has a normal mood and affect   His behavior is normal        :     ESS: Total score: 5

## 2018-11-07 DIAGNOSIS — G47.33 OSA (OBSTRUCTIVE SLEEP APNEA): Primary | ICD-10-CM

## 2019-01-04 ENCOUNTER — OFFICE VISIT (OUTPATIENT)
Dept: FAMILY MEDICINE CLINIC | Facility: CLINIC | Age: 69
End: 2019-01-04
Payer: MEDICARE

## 2019-01-04 VITALS
SYSTOLIC BLOOD PRESSURE: 142 MMHG | HEIGHT: 71 IN | WEIGHT: 205 LBS | TEMPERATURE: 97.6 F | DIASTOLIC BLOOD PRESSURE: 74 MMHG | BODY MASS INDEX: 28.7 KG/M2

## 2019-01-04 DIAGNOSIS — Z11.59 NEED FOR HEPATITIS C SCREENING TEST: ICD-10-CM

## 2019-01-04 DIAGNOSIS — K29.00 ACUTE SUPERFICIAL GASTRITIS WITHOUT HEMORRHAGE: ICD-10-CM

## 2019-01-04 DIAGNOSIS — I10 ESSENTIAL HYPERTENSION: Primary | ICD-10-CM

## 2019-01-04 DIAGNOSIS — Z23 ENCOUNTER FOR VACCINATION: ICD-10-CM

## 2019-01-04 PROCEDURE — 99213 OFFICE O/P EST LOW 20 MIN: CPT | Performed by: FAMILY MEDICINE

## 2019-01-04 NOTE — PROGRESS NOTES
Assessment/Plan:     Diagnoses and all orders for this visit:    Essential hypertension  -     CBC and differential; Future  -     Comprehensive metabolic panel; Future  -     Lipid panel; Future  -     TSH, 3rd generation with Free T4 reflex; Future  -     Microalbumin / creatinine urine ratio  -     PSA, Total Screen; Future    Encounter for vaccination  -     Flu Vaccine High Dose Split Preservative Free IM    Need for hepatitis C screening test  -     Hepatitis C antibody; Future    Acute superficial gastritis without hemorrhage          Subjective:      Patient ID: Vicente Santamaria is a 80 y o  male  Patient follow-up on hypertension  The patient occasional chest pain which seems to be related to gas  Patient able to exert himself by going up and down stairs without any chest pain  No vomiting noted  No diaphoresis  Breathing stable overall     No fever  No significant edema noted  Patient is taking Protonix          The following portions of the patient's history were reviewed and updated as appropriate: allergies, current medications, past family history, past medical history, past social history, past surgical history and problem list     Review of Systems      Objective:      /74 (BP Location: Right arm, Patient Position: Sitting)   Temp 97 6 °F (36 4 °C)   Ht 5' 10 5" (1 791 m)   Wt 93 kg (205 lb)   BMI 29 00 kg/m²          Physical Exam

## 2019-02-06 ENCOUNTER — APPOINTMENT (OUTPATIENT)
Dept: LAB | Facility: HOSPITAL | Age: 69
End: 2019-02-06
Payer: MEDICARE

## 2019-02-06 DIAGNOSIS — Z11.59 NEED FOR HEPATITIS C SCREENING TEST: ICD-10-CM

## 2019-02-06 DIAGNOSIS — I10 ESSENTIAL HYPERTENSION: ICD-10-CM

## 2019-02-06 LAB
ALBUMIN SERPL BCP-MCNC: 3.7 G/DL (ref 3.5–5)
ALP SERPL-CCNC: 81 U/L (ref 46–116)
ALT SERPL W P-5'-P-CCNC: 28 U/L (ref 12–78)
ANION GAP SERPL CALCULATED.3IONS-SCNC: 9 MMOL/L (ref 4–13)
AST SERPL W P-5'-P-CCNC: 18 U/L (ref 5–45)
BASOPHILS # BLD AUTO: 0.07 THOUSANDS/ΜL (ref 0–0.1)
BASOPHILS NFR BLD AUTO: 1 % (ref 0–1)
BILIRUB SERPL-MCNC: 0.56 MG/DL (ref 0.2–1)
BUN SERPL-MCNC: 15 MG/DL (ref 5–25)
CALCIUM SERPL-MCNC: 9 MG/DL (ref 8.3–10.1)
CHLORIDE SERPL-SCNC: 105 MMOL/L (ref 100–108)
CHOLEST SERPL-MCNC: 206 MG/DL (ref 50–200)
CO2 SERPL-SCNC: 29 MMOL/L (ref 21–32)
CREAT SERPL-MCNC: 1.01 MG/DL (ref 0.6–1.3)
CREAT UR-MCNC: 150 MG/DL
EOSINOPHIL # BLD AUTO: 0.34 THOUSAND/ΜL (ref 0–0.61)
EOSINOPHIL NFR BLD AUTO: 6 % (ref 0–6)
ERYTHROCYTE [DISTWIDTH] IN BLOOD BY AUTOMATED COUNT: 13.2 % (ref 11.6–15.1)
GFR SERPL CREATININE-BSD FRML MDRD: 76 ML/MIN/1.73SQ M
GLUCOSE P FAST SERPL-MCNC: 137 MG/DL (ref 65–99)
HCT VFR BLD AUTO: 49.3 % (ref 36.5–49.3)
HCV AB SER QL: NORMAL
HDLC SERPL-MCNC: 35 MG/DL (ref 40–60)
HGB BLD-MCNC: 15.4 G/DL (ref 12–17)
IMM GRANULOCYTES # BLD AUTO: 0.02 THOUSAND/UL (ref 0–0.2)
IMM GRANULOCYTES NFR BLD AUTO: 0 % (ref 0–2)
LDLC SERPL CALC-MCNC: 149 MG/DL (ref 0–100)
LYMPHOCYTES # BLD AUTO: 1.37 THOUSANDS/ΜL (ref 0.6–4.47)
LYMPHOCYTES NFR BLD AUTO: 24 % (ref 14–44)
MCH RBC QN AUTO: 28.2 PG (ref 26.8–34.3)
MCHC RBC AUTO-ENTMCNC: 31.2 G/DL (ref 31.4–37.4)
MCV RBC AUTO: 90 FL (ref 82–98)
MICROALBUMIN UR-MCNC: 127 MG/L (ref 0–20)
MICROALBUMIN/CREAT 24H UR: 85 MG/G CREATININE (ref 0–30)
MONOCYTES # BLD AUTO: 0.37 THOUSAND/ΜL (ref 0.17–1.22)
MONOCYTES NFR BLD AUTO: 7 % (ref 4–12)
NEUTROPHILS # BLD AUTO: 3.52 THOUSANDS/ΜL (ref 1.85–7.62)
NEUTS SEG NFR BLD AUTO: 62 % (ref 43–75)
NONHDLC SERPL-MCNC: 171 MG/DL
NRBC BLD AUTO-RTO: 0 /100 WBCS
PLATELET # BLD AUTO: 199 THOUSANDS/UL (ref 149–390)
PMV BLD AUTO: 10.1 FL (ref 8.9–12.7)
POTASSIUM SERPL-SCNC: 4.3 MMOL/L (ref 3.5–5.3)
PROT SERPL-MCNC: 7.2 G/DL (ref 6.4–8.2)
PSA SERPL-MCNC: 2 NG/ML (ref 0–4)
RBC # BLD AUTO: 5.46 MILLION/UL (ref 3.88–5.62)
SODIUM SERPL-SCNC: 143 MMOL/L (ref 136–145)
TRIGL SERPL-MCNC: 111 MG/DL
TSH SERPL DL<=0.05 MIU/L-ACNC: 1.21 UIU/ML (ref 0.36–3.74)
WBC # BLD AUTO: 5.69 THOUSAND/UL (ref 4.31–10.16)

## 2019-02-06 PROCEDURE — 86803 HEPATITIS C AB TEST: CPT

## 2019-02-06 PROCEDURE — 82570 ASSAY OF URINE CREATININE: CPT | Performed by: FAMILY MEDICINE

## 2019-02-06 PROCEDURE — 36415 COLL VENOUS BLD VENIPUNCTURE: CPT

## 2019-02-06 PROCEDURE — 80053 COMPREHEN METABOLIC PANEL: CPT

## 2019-02-06 PROCEDURE — G0103 PSA SCREENING: HCPCS

## 2019-02-06 PROCEDURE — 84443 ASSAY THYROID STIM HORMONE: CPT

## 2019-02-06 PROCEDURE — 82043 UR ALBUMIN QUANTITATIVE: CPT | Performed by: FAMILY MEDICINE

## 2019-02-06 PROCEDURE — 85025 COMPLETE CBC W/AUTO DIFF WBC: CPT

## 2019-02-06 PROCEDURE — 80061 LIPID PANEL: CPT

## 2019-02-07 DIAGNOSIS — R73.09 ABNORMAL BLOOD SUGAR: Primary | ICD-10-CM

## 2019-02-07 NOTE — PROGRESS NOTES
Spoke to patients wife and told her  I ordered the A1C for him to get done in 2 weeks  She states he will go in 4 weeks

## 2019-06-11 ENCOUNTER — OFFICE VISIT (OUTPATIENT)
Dept: FAMILY MEDICINE CLINIC | Facility: CLINIC | Age: 69
End: 2019-06-11
Payer: MEDICARE

## 2019-06-11 VITALS
DIASTOLIC BLOOD PRESSURE: 98 MMHG | HEIGHT: 70 IN | BODY MASS INDEX: 29.35 KG/M2 | WEIGHT: 205 LBS | SYSTOLIC BLOOD PRESSURE: 168 MMHG | TEMPERATURE: 97.6 F

## 2019-06-11 DIAGNOSIS — M23.200 OLD PERIPHERAL TEAR OF LATERAL MENISCUS OF RIGHT KNEE: Primary | ICD-10-CM

## 2019-06-11 DIAGNOSIS — Z23 ENCOUNTER FOR IMMUNIZATION: ICD-10-CM

## 2019-06-11 DIAGNOSIS — Z00.00 MEDICARE ANNUAL WELLNESS VISIT, SUBSEQUENT: ICD-10-CM

## 2019-06-11 PROCEDURE — 20610 DRAIN/INJ JOINT/BURSA W/O US: CPT | Performed by: FAMILY MEDICINE

## 2019-06-11 PROCEDURE — 99213 OFFICE O/P EST LOW 20 MIN: CPT | Performed by: FAMILY MEDICINE

## 2019-06-11 PROCEDURE — G0439 PPPS, SUBSEQ VISIT: HCPCS | Performed by: FAMILY MEDICINE

## 2019-06-11 PROCEDURE — 90670 PCV13 VACCINE IM: CPT

## 2019-06-11 PROCEDURE — G0009 ADMIN PNEUMOCOCCAL VACCINE: HCPCS

## 2019-07-09 ENCOUNTER — OFFICE VISIT (OUTPATIENT)
Dept: FAMILY MEDICINE CLINIC | Facility: CLINIC | Age: 69
End: 2019-07-09
Payer: MEDICARE

## 2019-07-09 VITALS
TEMPERATURE: 97.8 F | WEIGHT: 207 LBS | HEIGHT: 71 IN | BODY MASS INDEX: 28.98 KG/M2 | SYSTOLIC BLOOD PRESSURE: 124 MMHG | DIASTOLIC BLOOD PRESSURE: 80 MMHG

## 2019-07-09 DIAGNOSIS — M23.206: ICD-10-CM

## 2019-07-09 DIAGNOSIS — I10 ESSENTIAL HYPERTENSION: Primary | ICD-10-CM

## 2019-07-09 DIAGNOSIS — E78.2 MIXED HYPERLIPIDEMIA: ICD-10-CM

## 2019-07-09 DIAGNOSIS — R73.01 IMPAIRED FASTING GLUCOSE: ICD-10-CM

## 2019-07-09 DIAGNOSIS — K29.00 ACUTE SUPERFICIAL GASTRITIS WITHOUT HEMORRHAGE: ICD-10-CM

## 2019-07-09 PROCEDURE — 99214 OFFICE O/P EST MOD 30 MIN: CPT | Performed by: FAMILY MEDICINE

## 2019-07-09 RX ORDER — PANTOPRAZOLE SODIUM 40 MG/1
40 TABLET, DELAYED RELEASE ORAL DAILY
Qty: 90 TABLET | Refills: 1 | Status: SHIPPED | OUTPATIENT
Start: 2019-07-09 | End: 2019-12-23 | Stop reason: HOSPADM

## 2019-07-09 RX ORDER — LISINOPRIL 40 MG/1
40 TABLET ORAL DAILY
Qty: 90 TABLET | Refills: 1 | Status: ON HOLD | OUTPATIENT
Start: 2019-07-09 | End: 2019-12-21

## 2019-07-09 RX ORDER — AMLODIPINE BESYLATE 10 MG/1
10 TABLET ORAL DAILY
Qty: 90 TABLET | Refills: 1 | Status: SHIPPED | OUTPATIENT
Start: 2019-07-09 | End: 2020-01-03

## 2019-07-09 NOTE — PROGRESS NOTES
Assessment/Plan:  The patient go for laboratory studies for hypertension hyperlipidemia gastritis and impaired fasting glucose  Patient use a the offloading knee brace further right  Refills given  Follow-up in 6 months     Diagnoses and all orders for this visit:    Mixed hyperlipidemia  -     CBC and differential; Future  -     Comprehensive metabolic panel; Future  -     Hemoglobin A1C; Future  -     Lipid panel; Future  -     TSH, 3rd generation with Free T4 reflex; Future  -     Microalbumin / creatinine urine ratio    Essential hypertension  -     amLODIPine (NORVASC) 10 mg tablet; Take 1 tablet (10 mg total) by mouth daily  -     lisinopril (ZESTRIL) 40 mg tablet; Take 1 tablet (40 mg total) by mouth daily  -     CBC and differential; Future  -     Comprehensive metabolic panel; Future  -     Hemoglobin A1C; Future  -     Lipid panel; Future  -     TSH, 3rd generation with Free T4 reflex; Future  -     Microalbumin / creatinine urine ratio    Acute superficial gastritis without hemorrhage  -     pantoprazole (PROTONIX) 40 mg tablet; Take 1 tablet (40 mg total) by mouth daily    Impaired fasting glucose  -     CBC and differential; Future  -     Comprehensive metabolic panel; Future  -     Hemoglobin A1C; Future  -     Lipid panel; Future  -     TSH, 3rd generation with Free T4 reflex; Future  -     Microalbumin / creatinine urine ratio          Subjective:      Patient ID: Nancy Garvey is a 77 y o  male  Patient follow-up on hypertension as well as gastritis  Bowel movements have been better overall  Gastritis/GERD symptoms improved overall with medication  No chest pain  Patient does get some swelling in his feet intermittently  No shortness of breath  Patient also with history of impaired fasting glucose  Patient drinks a lot of water but not overly thirsty  Patient with some urinary frequency  The patient with ongoing right knee pain  Status post injection in June        The following portions of the patient's history were reviewed and updated as appropriate: allergies, current medications, past family history, past medical history, past social history, past surgical history and problem list     Review of Systems   Constitutional: Negative  HENT: Negative  Eyes: Negative  Respiratory: Negative  Cardiovascular: Negative  Gastrointestinal: Negative  Endocrine: Negative  Genitourinary: Negative  Musculoskeletal: Positive for arthralgias  Skin: Negative  Allergic/Immunologic: Negative  Neurological: Negative  Hematological: Negative  Psychiatric/Behavioral: Negative            Objective:      /80 (BP Location: Right arm, Patient Position: Sitting, Cuff Size: Adult)   Temp 97 8 °F (36 6 °C) (Tympanic)   Ht 5' 10 5" (1 791 m)   Wt 93 9 kg (207 lb)   BMI 29 28 kg/m²          Physical Exam

## 2019-09-26 ENCOUNTER — TELEPHONE (OUTPATIENT)
Dept: SLEEP CENTER | Facility: CLINIC | Age: 69
End: 2019-09-26

## 2019-10-02 ENCOUNTER — OFFICE VISIT (OUTPATIENT)
Dept: SLEEP CENTER | Facility: CLINIC | Age: 69
End: 2019-10-02
Payer: MEDICARE

## 2019-10-02 VITALS
BODY MASS INDEX: 29.26 KG/M2 | DIASTOLIC BLOOD PRESSURE: 76 MMHG | WEIGHT: 209 LBS | SYSTOLIC BLOOD PRESSURE: 124 MMHG | HEART RATE: 72 BPM | HEIGHT: 71 IN

## 2019-10-02 DIAGNOSIS — E66.3 OVERWEIGHT (BMI 25.0-29.9): ICD-10-CM

## 2019-10-02 DIAGNOSIS — G47.33 OSA (OBSTRUCTIVE SLEEP APNEA): Primary | ICD-10-CM

## 2019-10-02 DIAGNOSIS — Z72.0 TOBACCO ABUSE: ICD-10-CM

## 2019-10-02 PROCEDURE — 99214 OFFICE O/P EST MOD 30 MIN: CPT | Performed by: INTERNAL MEDICINE

## 2019-10-02 NOTE — PROGRESS NOTES
Assessment/Plan:   Diagnoses and all orders for this visit:    BRAVO (obstructive sleep apnea)    Overweight (BMI 25 0-29  9)    Tobacco abuse      moderate obstructive sleep apnea with an AHI of 16 REM AHI of 49 diagnostic study done in June of 2015 currently on 9 cm of water doing well  Cleaning of the supplies and change of CPAP supplies discussed understands and verbalizes  CPAP download compliance with 100% compliance with a average residual AHI of 1 2  Recommend weight loss  Smoking cessation currently on a nicotine patch 14 mg patch, trying to cut down and trying to quit smoking  Follow-up in 1 year or p r n  Earlier as needed  Return in about 1 year (around 10/2/2020)  All questions are answered to the patient's satisfaction and understanding  He verbalizes understanding  He is encouraged to call with any further questions or concerns  Portions of the record may have been created with voice recognition software  Occasional wrong word or "sound a like" substitutions may have occurred due to the inherent limitations of voice recognition software  Read the chart carefully and recognize, using context, where substitutions have occurred  Electronically Signed by America Louis MD    ______________________________________________________________________    Chief Complaint:   Chief Complaint   Patient presents with    Follow-up       Patient ID: Etienne Zavaleta is a 77 y o  y o  male has a past medical history of Abnormal CAT scan, Bilateral inguinal hernia (1990), Colitis, Hypertension, Nausea & vomiting, and Sleep apnea  10/2/2019  Patient presents today for follow-up visit  Mr Ankita Pacheco is here for follow-up of his obstructive sleep apnea, a 1 year follow-up  62-year-old gentleman with past medical history significant for history of hypertension diagnosed with severe obstructive sleep apnea and has been on the CPAP for more than 4 years now compliant with his CPAP machine    His daily sleep schedule has not changed  He states he has decreased nocturnal awakenings and feels much rested when he wakes up in the morning  Also cutting down his smoking, currently on a patch he states  Review of Systems   Constitutional: Negative  HENT: Negative  Eyes: Negative  Respiratory: Negative  Cardiovascular: Negative  Gastrointestinal: Negative  Endocrine: Negative  Genitourinary: Negative  Musculoskeletal: Negative  Allergic/Immunologic: Negative  Neurological: Negative  Hematological: Negative  Psychiatric/Behavioral: Negative  Smoking history: He reports that he has been smoking  He has been smoking about 0 25 packs per day  He uses smokeless tobacco     The following portions of the patient's history were reviewed and updated as appropriate: allergies, current medications, past family history, past medical history, past social history, past surgical history and problem list     Immunization History   Administered Date(s) Administered    IPV 01/06/2005    Influenza Split High Dose Preservative Free IM 10/21/2016    Influenza TIV (IM) 10/13/2003    Pneumococcal Conjugate 13-Valent 06/11/2019    Td (adult), adsorbed 01/06/2005, 09/26/2017     Current Outpatient Medications   Medication Sig Dispense Refill    amLODIPine (NORVASC) 10 mg tablet Take 1 tablet (10 mg total) by mouth daily 90 tablet 1    aspirin 81 MG tablet Take 81 mg by mouth every other day       lisinopril (ZESTRIL) 40 mg tablet Take 1 tablet (40 mg total) by mouth daily 90 tablet 1    pantoprazole (PROTONIX) 40 mg tablet Take 1 tablet (40 mg total) by mouth daily 90 tablet 1     No current facility-administered medications for this visit  Allergies: Patient has no known allergies  Objective:  Vitals:    10/02/19 1100   BP: 124/76   Pulse: 72   Weight: 94 8 kg (209 lb)   Height: 5' 10 5" (1 791 m)          Wt Readings from Last 3 Encounters:   10/02/19 94 8 kg (209 lb)   07/09/19 93 9 kg (207 lb) 06/11/19 93 kg (205 lb)     Body mass index is 29 56 kg/m²  Physical Exam   Constitutional: He is oriented to person, place, and time  He appears well-developed and well-nourished  HENT:   Head: Normocephalic and atraumatic  Crowded oropharyngeal airways, Mallampati score 3   Eyes: Pupils are equal, round, and reactive to light  Conjunctivae and EOM are normal    Neck: Normal range of motion  Neck supple  No JVD present  No thyromegaly present  Short and wide neck   Cardiovascular: Normal rate, regular rhythm and normal heart sounds  Exam reveals no gallop and no friction rub  No murmur heard  Pulmonary/Chest: Effort normal and breath sounds normal  No respiratory distress  He has no wheezes  He has no rales  He exhibits no tenderness  Musculoskeletal: Normal range of motion  He exhibits no edema, tenderness or deformity  Lymphadenopathy:     He has no cervical adenopathy  Neurological: He is alert and oriented to person, place, and time  Skin: Skin is warm and dry  Psychiatric: He has a normal mood and affect  His behavior is normal    Nursing note and vitals reviewed             ESS: Total score: 6

## 2019-12-21 ENCOUNTER — HOSPITAL ENCOUNTER (INPATIENT)
Facility: HOSPITAL | Age: 69
LOS: 2 days | Discharge: HOME | DRG: 916 | End: 2019-12-23
Attending: EMERGENCY MEDICINE | Admitting: INTERNAL MEDICINE
Payer: MEDICARE

## 2019-12-21 DIAGNOSIS — T78.3XXA ANGIOEDEMA, INITIAL ENCOUNTER: Primary | ICD-10-CM

## 2019-12-21 DIAGNOSIS — I10 ESSENTIAL HYPERTENSION: ICD-10-CM

## 2019-12-21 DIAGNOSIS — T78.3XXA ANGIOEDEMA: ICD-10-CM

## 2019-12-21 PROBLEM — K21.9 GERD (GASTROESOPHAGEAL REFLUX DISEASE): Status: ACTIVE | Noted: 2019-12-21

## 2019-12-21 LAB
ALBUMIN SERPL BCP-MCNC: 4.1 G/DL (ref 3.5–5)
ALP SERPL-CCNC: 88 U/L (ref 46–116)
ALT SERPL W P-5'-P-CCNC: 30 U/L (ref 12–78)
ANION GAP SERPL CALCULATED.3IONS-SCNC: 9 MMOL/L (ref 4–13)
AST SERPL W P-5'-P-CCNC: 14 U/L (ref 5–45)
BASOPHILS # BLD AUTO: 0.06 THOUSANDS/ΜL (ref 0–0.1)
BASOPHILS NFR BLD AUTO: 1 % (ref 0–1)
BILIRUB SERPL-MCNC: 0.58 MG/DL (ref 0.2–1)
BUN SERPL-MCNC: 15 MG/DL (ref 5–25)
CALCIUM SERPL-MCNC: 9.4 MG/DL (ref 8.3–10.1)
CHLORIDE SERPL-SCNC: 105 MMOL/L (ref 100–108)
CO2 SERPL-SCNC: 28 MMOL/L (ref 21–32)
CREAT SERPL-MCNC: 0.96 MG/DL (ref 0.6–1.3)
EOSINOPHIL # BLD AUTO: 0.29 THOUSAND/ΜL (ref 0–0.61)
EOSINOPHIL NFR BLD AUTO: 5 % (ref 0–6)
ERYTHROCYTE [DISTWIDTH] IN BLOOD BY AUTOMATED COUNT: 13.5 % (ref 11.6–15.1)
GFR SERPL CREATININE-BSD FRML MDRD: 81 ML/MIN/1.73SQ M
GLUCOSE SERPL-MCNC: 139 MG/DL (ref 65–140)
HCT VFR BLD AUTO: 53 % (ref 36.5–49.3)
HGB BLD-MCNC: 17.1 G/DL (ref 12–17)
IMM GRANULOCYTES # BLD AUTO: 0.02 THOUSAND/UL (ref 0–0.2)
IMM GRANULOCYTES NFR BLD AUTO: 0 % (ref 0–2)
LYMPHOCYTES # BLD AUTO: 1.4 THOUSANDS/ΜL (ref 0.6–4.47)
LYMPHOCYTES NFR BLD AUTO: 22 % (ref 14–44)
MCH RBC QN AUTO: 28.6 PG (ref 26.8–34.3)
MCHC RBC AUTO-ENTMCNC: 32.3 G/DL (ref 31.4–37.4)
MCV RBC AUTO: 89 FL (ref 82–98)
MONOCYTES # BLD AUTO: 0.44 THOUSAND/ΜL (ref 0.17–1.22)
MONOCYTES NFR BLD AUTO: 7 % (ref 4–12)
NEUTROPHILS # BLD AUTO: 4.24 THOUSANDS/ΜL (ref 1.85–7.62)
NEUTS SEG NFR BLD AUTO: 65 % (ref 43–75)
NRBC BLD AUTO-RTO: 0 /100 WBCS
PLATELET # BLD AUTO: 208 THOUSANDS/UL (ref 149–390)
PMV BLD AUTO: 9.9 FL (ref 8.9–12.7)
POTASSIUM SERPL-SCNC: 3.5 MMOL/L (ref 3.5–5.3)
PROT SERPL-MCNC: 7.9 G/DL (ref 6.4–8.2)
RBC # BLD AUTO: 5.98 MILLION/UL (ref 3.88–5.62)
SODIUM SERPL-SCNC: 142 MMOL/L (ref 136–145)
WBC # BLD AUTO: 6.45 THOUSAND/UL (ref 4.31–10.16)

## 2019-12-21 PROCEDURE — 85025 COMPLETE CBC W/AUTO DIFF WBC: CPT | Performed by: EMERGENCY MEDICINE

## 2019-12-21 PROCEDURE — 99291 CRITICAL CARE FIRST HOUR: CPT | Performed by: EMERGENCY MEDICINE

## 2019-12-21 PROCEDURE — 99291 CRITICAL CARE FIRST HOUR: CPT | Performed by: INTERNAL MEDICINE

## 2019-12-21 PROCEDURE — 96374 THER/PROPH/DIAG INJ IV PUSH: CPT

## 2019-12-21 PROCEDURE — 96375 TX/PRO/DX INJ NEW DRUG ADDON: CPT

## 2019-12-21 PROCEDURE — 36415 COLL VENOUS BLD VENIPUNCTURE: CPT | Performed by: EMERGENCY MEDICINE

## 2019-12-21 PROCEDURE — 1124F ACP DISCUSS-NO DSCNMKR DOCD: CPT | Performed by: EMERGENCY MEDICINE

## 2019-12-21 PROCEDURE — 99285 EMERGENCY DEPT VISIT HI MDM: CPT

## 2019-12-21 PROCEDURE — 96372 THER/PROPH/DIAG INJ SC/IM: CPT

## 2019-12-21 PROCEDURE — 93005 ELECTROCARDIOGRAM TRACING: CPT

## 2019-12-21 PROCEDURE — 80053 COMPREHEN METABOLIC PANEL: CPT | Performed by: EMERGENCY MEDICINE

## 2019-12-21 RX ORDER — DIPHENHYDRAMINE HYDROCHLORIDE 50 MG/ML
25 INJECTION INTRAMUSCULAR; INTRAVENOUS EVERY 6 HOURS
Status: DISCONTINUED | OUTPATIENT
Start: 2019-12-21 | End: 2019-12-22

## 2019-12-21 RX ORDER — AMLODIPINE BESYLATE 10 MG/1
10 TABLET ORAL DAILY
Status: DISCONTINUED | OUTPATIENT
Start: 2019-12-21 | End: 2019-12-23 | Stop reason: HOSPADM

## 2019-12-21 RX ORDER — HYDRALAZINE HYDROCHLORIDE 20 MG/ML
10 INJECTION INTRAMUSCULAR; INTRAVENOUS EVERY 6 HOURS PRN
Status: DISCONTINUED | OUTPATIENT
Start: 2019-12-21 | End: 2019-12-23 | Stop reason: HOSPADM

## 2019-12-21 RX ORDER — PANTOPRAZOLE SODIUM 40 MG/1
40 TABLET, DELAYED RELEASE ORAL DAILY
Status: DISCONTINUED | OUTPATIENT
Start: 2019-12-21 | End: 2019-12-21

## 2019-12-21 RX ORDER — METHYLPREDNISOLONE SODIUM SUCCINATE 40 MG/ML
40 INJECTION, POWDER, LYOPHILIZED, FOR SOLUTION INTRAMUSCULAR; INTRAVENOUS EVERY 8 HOURS SCHEDULED
Status: DISCONTINUED | OUTPATIENT
Start: 2019-12-21 | End: 2019-12-22

## 2019-12-21 RX ORDER — SODIUM CHLORIDE, SODIUM LACTATE, POTASSIUM CHLORIDE, CALCIUM CHLORIDE 600; 310; 30; 20 MG/100ML; MG/100ML; MG/100ML; MG/100ML
100 INJECTION, SOLUTION INTRAVENOUS CONTINUOUS
Status: DISCONTINUED | OUTPATIENT
Start: 2019-12-21 | End: 2019-12-22

## 2019-12-21 RX ORDER — DIPHENHYDRAMINE HYDROCHLORIDE 50 MG/ML
25 INJECTION INTRAMUSCULAR; INTRAVENOUS ONCE
Status: COMPLETED | OUTPATIENT
Start: 2019-12-21 | End: 2019-12-21

## 2019-12-21 RX ORDER — METHYLPREDNISOLONE SODIUM SUCCINATE 125 MG/2ML
125 INJECTION, POWDER, LYOPHILIZED, FOR SOLUTION INTRAMUSCULAR; INTRAVENOUS ONCE
Status: COMPLETED | OUTPATIENT
Start: 2019-12-21 | End: 2019-12-21

## 2019-12-21 RX ORDER — ASPIRIN 81 MG/1
81 TABLET ORAL EVERY OTHER DAY
Status: DISCONTINUED | OUTPATIENT
Start: 2019-12-21 | End: 2019-12-23 | Stop reason: HOSPADM

## 2019-12-21 RX ORDER — EPINEPHRINE 1 MG/ML
0.3 INJECTION, SOLUTION, CONCENTRATE INTRAVENOUS ONCE
Status: COMPLETED | OUTPATIENT
Start: 2019-12-21 | End: 2019-12-21

## 2019-12-21 RX ADMIN — SODIUM CHLORIDE, SODIUM LACTATE, POTASSIUM CHLORIDE, AND CALCIUM CHLORIDE 100 ML/HR: .6; .31; .03; .02 INJECTION, SOLUTION INTRAVENOUS at 22:10

## 2019-12-21 RX ADMIN — METHYLPREDNISOLONE SODIUM SUCCINATE 40 MG: 40 INJECTION, POWDER, FOR SOLUTION INTRAMUSCULAR; INTRAVENOUS at 16:00

## 2019-12-21 RX ADMIN — SODIUM CHLORIDE 1000 ML: 0.9 INJECTION, SOLUTION INTRAVENOUS at 08:54

## 2019-12-21 RX ADMIN — EPINEPHRINE 0.3 MG: 1 INJECTION, SOLUTION, CONCENTRATE INTRAVENOUS at 08:53

## 2019-12-21 RX ADMIN — SODIUM CHLORIDE, SODIUM LACTATE, POTASSIUM CHLORIDE, AND CALCIUM CHLORIDE 100 ML/HR: .6; .31; .03; .02 INJECTION, SOLUTION INTRAVENOUS at 12:15

## 2019-12-21 RX ADMIN — FAMOTIDINE 20 MG: 10 INJECTION, SOLUTION INTRAVENOUS at 08:55

## 2019-12-21 RX ADMIN — ENOXAPARIN SODIUM 40 MG: 40 INJECTION SUBCUTANEOUS at 12:14

## 2019-12-21 RX ADMIN — METHYLPREDNISOLONE SODIUM SUCCINATE 125 MG: 125 INJECTION, POWDER, FOR SOLUTION INTRAMUSCULAR; INTRAVENOUS at 08:54

## 2019-12-21 RX ADMIN — DIPHENHYDRAMINE HYDROCHLORIDE 25 MG: 50 INJECTION, SOLUTION INTRAMUSCULAR; INTRAVENOUS at 08:54

## 2019-12-21 RX ADMIN — DIPHENHYDRAMINE HYDROCHLORIDE 25 MG: 50 INJECTION, SOLUTION INTRAMUSCULAR; INTRAVENOUS at 14:53

## 2019-12-21 RX ADMIN — FAMOTIDINE 20 MG: 10 INJECTION, SOLUTION INTRAVENOUS at 14:53

## 2019-12-21 RX ADMIN — DIPHENHYDRAMINE HYDROCHLORIDE 25 MG: 50 INJECTION, SOLUTION INTRAMUSCULAR; INTRAVENOUS at 20:20

## 2019-12-21 NOTE — ASSESSMENT & PLAN NOTE
· Continue home Norvasc  · Home Lisinopril stopped for angioedema  · Will start PRN hydralazine for SBP>160

## 2019-12-21 NOTE — ASSESSMENT & PLAN NOTE
· Stop Lisinopril  · Continue to monitor airway and respiratory status closely  · Continue solumedrol, pepcid, and benadryl

## 2019-12-21 NOTE — ED NOTES
Attempt to call report to ICU  Per receiving nurse, Jory Shone, not available to take report at this time  Awaiting callback to give report       Liliya Bryan RN  12/21/19 [id number]

## 2019-12-21 NOTE — H&P
H&P- Álvaro Ortega 1950, 77 y o  male MRN: [id number]    Unit/Bed#: ICU 04 Encounter: [id number]    Primary Care Provider: Chandrakant Stark DO   Date and time admitted to hospital: 12/21/2019  8:43 AM        * Angioedema, Likely ACE inhibitor induced  Assessment & Plan  · Stop Lisinopril  · Continue to monitor airway and respiratory status closely  · Continue solumedrol, pepcid, and benadryl       Hypertension  Assessment & Plan  · Continue home Norvasc  · Home Lisinopril stopped for angioedema  · Will start PRN hydralazine for SBP>160    Tobacco abuse  Assessment & Plan  · Encourage cessation   · NRT offered, patient refused at this time    Sleep apnea  Assessment & Plan  · Will restart home CPAP once edema improves    GERD (gastroesophageal reflux disease)  Assessment & Plan  · On Protonix at home, will hold and continue Pepcid        -------------------------------------------------------------------------------------------------------------  Chief Complaint: Angioedema    History of Present Illness   HX and PE limited by: None  Álvaro Ortega is a 77 y o  male who presents with tongue swelling that started this morning  He has a history of HTN,, GERD, and BRAVO on home CPAP  According to the patient, he stopped using nicotine patch yesterday and started smoking  Initially he thought this was contributing to his edema  Of note he has been on an ACEi for years without difficulty  In the ED he was noted to have significant angioedema of the lips and tongue, and he received pepcid, solumedrol, and benadryl  He is alert, oriented, and able to manage his airway at this time  He is admitted to step down for further management and treatment       History obtained from chart review and the patient   -------------------------------------------------------------------------------------------------------------  Dispo: Transfer to Stepdown Level 1     Code Status: Level 1 - Full Code  --------------------------------------------------------------------------------------------------------------  Review of Systems   Constitutional: Negative  HENT: Positive for facial swelling  Eyes: Negative  Respiratory: Negative  Cardiovascular: Negative  Gastrointestinal: Negative  Endocrine: Negative  Genitourinary: Negative  Musculoskeletal: Negative  Skin: Negative  Allergic/Immunologic: Negative  Neurological: Negative  Hematological: Negative  Psychiatric/Behavioral: Negative  A 12-point, complete review of systems was reviewed and negative except as stated above     Physical Exam   Constitutional: He is oriented to person, place, and time  He appears well-developed and well-nourished  No distress  HENT:   Head: Normocephalic  Edema of the tongue and lips noted  Eyes: Pupils are equal, round, and reactive to light  Neck: Normal range of motion  Neck supple  Cardiovascular: Normal rate, regular rhythm and intact distal pulses  Exam reveals no gallop and no friction rub  No murmur heard  Pulmonary/Chest: Effort normal and breath sounds normal  No stridor  No respiratory distress  He has no wheezes  Abdominal: Soft  Bowel sounds are normal  He exhibits no distension  There is no tenderness  Musculoskeletal: Normal range of motion  He exhibits no edema or tenderness  Neurological: He is alert and oriented to person, place, and time  He has normal strength  No sensory deficit  Skin: Skin is warm and dry  Capillary refill takes less than 2 seconds     Psychiatric: He has a normal mood and affect      --------------------------------------------------------------------------------------------------------------  Historical Information   Past Medical History:   Diagnosis Date    Abnormal CAT scan     Bilateral inguinal hernia 1990    recurrent    Colitis     Hypertension     Nausea & vomiting     Sleep apnea     uses cpap     Past Surgical History:   Procedure Laterality Date    CHOLECYSTECTOMY      COLONOSCOPY      EXPLORATORY LAPAROTOMY  08/16/2014    done for repair of peritoneal "RENT"?    HEMORRHOID SURGERY      INGUINAL HERNIA REPAIR Bilateral 08/14/2014    IL ESOPHAGOGASTRODUODENOSCOPY TRANSORAL DIAGNOSTIC N/A 2/23/2017    Procedure: EGD AND COLONOSCOPY;  Surgeon: Nicole Jones MD;  Location: [facility]; Service: Gastroenterology     Social History   Social History     Substance and Sexual Activity   Alcohol Use Yes    Comment: social     Social History     Substance and Sexual Activity   Drug Use No     Social History     Tobacco Use   Smoking Status Current Some Day Smoker    Packs/day: 0 25   Smokeless Tobacco Current User       Family History:   Family History   Problem Relation Age of Onset    No Known Problems Mother      Family history reviewed with patient  Vitals:   Vitals:    12/21/19 0921 12/21/19 0930 12/21/19 1036 12/21/19 1205   BP:  156/79 157/72 152/84   BP Location:  Left arm Left arm    Pulse:  (!) 108 97 94   Resp:  16 17 13   Temp:       TempSrc:       SpO2:  100% 100% 100%   Weight: 91 7 kg (202 lb 2 6 oz)        Temp  Min: 98 °F (36 7 °C)  Max: 98 °F (36 7 °C)  IBW: -88 kg     Body mass index is 28 6 kg/m²        Medications:  Current Facility-Administered Medications   Medication Dose Route Frequency    amLODIPine (NORVASC) tablet 10 mg  10 mg Oral Daily    aspirin (ECOTRIN LOW STRENGTH) EC tablet 81 mg  81 mg Oral Every Other Day    diphenhydrAMINE (BENADRYL) injection 25 mg  25 mg Intravenous Q6H    enoxaparin (LOVENOX) subcutaneous injection 40 mg  40 mg Subcutaneous Daily    famotidine (PEPCID) injection 20 mg  20 mg Intravenous Q24H JEN    hydrALAZINE (APRESOLINE) injection 10 mg  10 mg Intravenous Q6H PRN    lactated ringers infusion  100 mL/hr Intravenous Continuous    methylPREDNISolone sodium succinate (Solu-MEDROL) injection 40 mg  40 mg Intravenous [facility]     Home medications:  Prior to Admission Medications   Prescriptions Last Dose Informant Patient Reported? Taking? amLODIPine (NORVASC) 10 mg tablet 12/21/2019 at 0530  No Yes   Sig: Take 1 tablet (10 mg total) by mouth daily   aspirin 81 MG tablet Not Taking at Unknown time Self Yes No   Sig: Take 81 mg by mouth every other day    pantoprazole (PROTONIX) 40 mg tablet 12/21/2019 at 0530  No Yes   Sig: Take 1 tablet (40 mg total) by mouth daily      Facility-Administered Medications: None     Allergies: Allergies   Allergen Reactions    Ace Inhibitors Angioedema         Laboratory and Diagnostics:  Results from last 7 days   Lab Units 12/21/19  0857   WBC Thousand/uL 6 45   HEMOGLOBIN g/dL 17 1*   HEMATOCRIT % 53 0*   PLATELETS Thousands/uL 208   NEUTROS PCT % 65   MONOS PCT % 7     Results from last 7 days   Lab Units 12/21/19  0857   SODIUM mmol/L 142   POTASSIUM mmol/L 3 5   CHLORIDE mmol/L 105   CO2 mmol/L 28   ANION GAP mmol/L 9   BUN mg/dL 15   CREATININE mg/dL 0 96   CALCIUM mg/dL 9 4   GLUCOSE RANDOM mg/dL 139   ALT U/L 30   AST U/L 14   ALK PHOS U/L 88   ALBUMIN g/dL 4 1   TOTAL BILIRUBIN mg/dL 0 58                       ABG:    VBG:          Micro:          EKG: NSR rate 90 with PVC  Imaging: No imaging to review    ------------------------------------------------------------------------------------------------------------  Advance Directive and Living Will:      Power of :    POLST:    ------------------------------------------------------------------------------------------------------------  Anticipated Length of Stay is > 2 midnights    Counseling / Coordination of [address] [place], [place] [address]MELVIN        Portions of the record may have been created with voice recognition software  Occasional wrong word or "sound a like" substitutions may have occurred due to the inherent limitations of voice recognition software    Read the chart carefully and recognize, using context, where substitutions have occurred

## 2019-12-21 NOTE — ED PROVIDER NOTES
History  Chief Complaint   Patient presents with    Allergic Reaction - Major     pt having tongue swelling that started at x7;30 this morning; pt had a nicotine patch on this morning, pt removed patch and then started smoking; pt having trouble swallowing  pt takes lisinprol     Patient presents to the ER with a swollen tongue since 07:30  He had a nicotine patch on then took it off and started smoking and he thought this was an allergic reaction to that  He has no new medications, did not try any new foods or drinks, no new known allergens  Patient has been taking lisinopril for years and never had a problem with it  He has no rash, no wheezing  Prior to Admission Medications   Prescriptions Last Dose Informant Patient Reported? Taking? amLODIPine (NORVASC) 10 mg tablet 12/21/2019 at 0530  No Yes   Sig: Take 1 tablet (10 mg total) by mouth daily   aspirin 81 MG tablet Not Taking at Unknown time Self Yes No   Sig: Take 81 mg by mouth every other day    lisinopril (ZESTRIL) 40 mg tablet 12/21/2019 at 0530  No Yes   Sig: Take 1 tablet (40 mg total) by mouth daily   pantoprazole (PROTONIX) 40 mg tablet 12/21/2019 at 0530  No Yes   Sig: Take 1 tablet (40 mg total) by mouth daily      Facility-Administered Medications: None       Past Medical History:   Diagnosis Date    Abnormal CAT scan     Bilateral inguinal hernia 1990    recurrent    Colitis     Hypertension     Nausea & vomiting     Sleep apnea     uses cpap       Past Surgical History:   Procedure Laterality Date    CHOLECYSTECTOMY      COLONOSCOPY      EXPLORATORY LAPAROTOMY  08/16/2014    done for repair of peritoneal "RENT"?    HEMORRHOID SURGERY      INGUINAL HERNIA REPAIR Bilateral 08/14/2014    ND ESOPHAGOGASTRODUODENOSCOPY TRANSORAL DIAGNOSTIC N/A 2/23/2017    Procedure: EGD AND COLONOSCOPY;  Surgeon: Bhumi Ye MD;  Location: [facility];   Service: Gastroenterology       Family History   Problem Relation Age of Onset    No Known Problems Mother      I have reviewed and agree with the history as documented  Social History     Tobacco Use    Smoking status: Current Some Day Smoker     Packs/day: 0 25    Smokeless tobacco: Current User   Substance Use Topics    Alcohol use: Yes     Comment: social    Drug use: No        Review of Systems   Constitutional: Negative for appetite change, fatigue and fever  HENT: Positive for facial swelling  Negative for rhinorrhea and sore throat  Respiratory: Negative for cough, shortness of breath and wheezing  Cardiovascular: Negative for chest pain and leg swelling  Gastrointestinal: Negative for abdominal pain, diarrhea and vomiting  Genitourinary: Negative for dysuria and flank pain  Musculoskeletal: Negative for back pain and neck pain  Skin: Negative for rash  Neurological: Negative for syncope and headaches  Psychiatric/Behavioral:        Mood normal       Physical Exam  Physical Exam   Constitutional: He is oriented to person, place, and time  He appears well-developed and well-nourished  HENT:   Head: Normocephalic and atraumatic  Tongue swollen, constantine  Right side   Neck: Normal range of motion  Neck supple  Cardiovascular: Normal rate and regular rhythm  Pulmonary/Chest: Effort normal and breath sounds normal  No respiratory distress  Abdominal: Soft  There is no tenderness  Musculoskeletal: Normal range of motion  Neurological: He is alert and oriented to person, place, and time  Skin: Skin is warm and dry  No rash   Nursing note and vitals reviewed        Vital Signs  ED Triage Vitals [12/21/19 0847]   Temperature Pulse Respirations Blood Pressure SpO2   98 °F (36 7 °C) 94 18 (!) 183/100 98 %      Temp Source Heart Rate Source Patient Position - Orthostatic VS BP Location FiO2 (%)   Temporal Monitor Lying Right arm --      Pain Score       No Pain           Vitals:    12/21/19 0847 12/21/19 0930 12/21/19 1036 12/21/19 1205   BP: (!) 183/100 156/79 157/72 152/84   Pulse: 94 (!) 108 97 94   Patient Position - Orthostatic VS: Lying Sitting Sitting          Visual Acuity      ED Medications  Medications   aspirin (ECOTRIN LOW STRENGTH) EC tablet 81 mg (81 mg Oral Not Given 12/21/19 0926)   amLODIPine (NORVASC) tablet 10 mg (10 mg Oral Not Given 12/21/19 0925)   pantoprazole (PROTONIX) EC tablet 40 mg (40 mg Oral Not Given 12/21/19 0925)   hydrALAZINE (APRESOLINE) injection 10 mg (has no administration in time range)   lactated ringers infusion (100 mL/hr Intravenous New Bag 12/21/19 1215)   enoxaparin (LOVENOX) subcutaneous injection 40 mg (40 mg Subcutaneous Given 12/21/19 1214)   diphenhydrAMINE (BENADRYL) injection 25 mg (25 mg Intravenous Given 12/21/19 0854)   methylPREDNISolone sodium succinate (Solu-MEDROL) injection 125 mg (125 mg Intravenous Given 12/21/19 0854)   EPINEPHrine PF (ADRENALIN) 1 mg/mL injection 0 3 mg (0 3 mg Subcutaneous Given 12/21/19 0853)   famotidine (PEPCID) injection 20 mg (20 mg Intravenous Given 12/21/19 0855)   sodium chloride 0 9 % bolus 1,000 mL (1,000 mL Intravenous New Bag 12/21/19 0854)       Diagnostic Studies  Results Reviewed     Procedure Component Value Units Date/Time    Comprehensive metabolic panel [[id number]] Collected:  12/21/19 0857    Lab Status:  Final result Specimen:  Blood from Arm, Right Updated:  12/21/19 0928     Sodium 142 mmol/L      Potassium 3 5 mmol/L      Chloride 105 mmol/L      CO2 28 mmol/L      ANION GAP 9 mmol/L      BUN 15 mg/dL      Creatinine 0 96 mg/dL      Glucose 139 mg/dL      Calcium 9 4 mg/dL      AST 14 U/L      ALT 30 U/L      Alkaline Phosphatase 88 U/L      Total Protein 7 9 g/dL      Albumin 4 1 g/dL      Total Bilirubin 0 58 mg/dL      eGFR 81 ml/min/1 73sq m     Narrative:       Danay guidelines for Chronic Kidney Disease (CKD):     Stage 1 with normal or high GFR (GFR > 90 mL/min/1 73 square meters)    Stage 2 Mild CKD (GFR = 60-89 mL/min/1 73 square meters)    Stage 3A Moderate CKD (GFR = 45-59 mL/min/1 73 square meters)    Stage 3B Moderate CKD (GFR = 30-44 mL/min/1 73 square meters)    Stage 4 Severe CKD (GFR = 15-29 mL/min/1 73 square meters)    Stage 5 End Stage CKD (GFR <15 mL/min/1 73 square meters)  Note: GFR calculation is accurate only with a steady state creatinine    Platelet count [[id number]]     Lab Status:  No result Specimen:  Blood     CBC and differential [[id number]]  (Abnormal) Collected:  12/21/19 0857    Lab Status:  Final result Specimen:  Blood from Arm, Right Updated:  12/21/19 0904     WBC 6 45 Thousand/uL      RBC 5 98 Million/uL      Hemoglobin 17 1 g/dL      Hematocrit 53 0 %      MCV 89 fL      MCH 28 6 pg      MCHC 32 3 g/dL      RDW 13 5 %      MPV 9 9 fL      Platelets 780 Thousands/uL      nRBC 0 /100 WBCs      Neutrophils Relative 65 %      Immat GRANS % 0 %      Lymphocytes Relative 22 %      Monocytes Relative 7 %      Eosinophils Relative 5 %      Basophils Relative 1 %      Neutrophils Absolute 4 24 Thousands/µL      Immature Grans Absolute 0 02 Thousand/uL      Lymphocytes Absolute 1 40 Thousands/µL      Monocytes Absolute 0 44 Thousand/µL      Eosinophils Absolute 0 29 Thousand/µL      Basophils Absolute 0 06 Thousands/µL                  No orders to display              Procedures  ECG 12 Lead Documentation Only  Date/Time: 12/21/2019 8:54 AM  Performed by: Kenny Marcum MD  Authorized by: Kenny Marcum MD     Rate:     ECG rate:  98    ECG rate assessment: normal    Rhythm:     Rhythm: sinus rhythm    Comments:      No st elevation or depression, some artifact is present    CriticalCare Time  Performed by: Kenny Marcum MD  Authorized by: Kenny Marcum MD     Critical care provider statement:     Critical care time (minutes):  60    Critical care was necessary to treat or prevent imminent or life-threatening deterioration of the following conditions:  Shock and respiratory failure    Critical care was time spent personally by me on the following activities:  Obtaining history from patient or surrogate, development of treatment plan with patient or surrogate, discussions with consultants, evaluation of patient's response to treatment, examination of patient, interpretation of cardiac output measurements, re-evaluation of patient's condition and ordering and performing treatments and interventions             ED Course                               MDM  Number of Diagnoses or Management Options  Angioedema, initial encounter:      Amount and/or Complexity of Data Reviewed  Clinical lab tests: ordered and reviewed    Risk of Complications, Morbidity, and/or Mortality  Presenting problems: moderate  General comments: Patient still has swollen tongue but breathing without difficulty, pulse ox 100%, patient states he feels better after the meds IV  He was admitted to step-down/ ICU          Disposition  Final diagnoses:   Angioedema, initial encounter     Time reflects when diagnosis was documented in both MDM as applicable and the Disposition within this note     Time User Action Codes Description Comment    12/21/2019  9:06 AM [place], [address]  3XXA] Angioedema, initial encounter       ED Disposition     ED Disposition Condition Date/Time Comment    Admit Stable Sat Dec 21, 2019  9:06 AM Case was discussed with ED, NP for ICU, and the patient's admission status was agreed to be  stepdown level 1        Follow-up Information    None         Current Discharge Medication List      CONTINUE these medications which have NOT CHANGED    Details   amLODIPine (NORVASC) 10 mg tablet Take 1 tablet (10 mg total) by mouth daily  Qty: 90 tablet, Refills: 1    Associated Diagnoses: Essential hypertension      lisinopril (ZESTRIL) 40 mg tablet Take 1 tablet (40 mg total) by mouth daily  Qty: 90 tablet, Refills: 1    Associated Diagnoses: Essential hypertension pantoprazole (PROTONIX) 40 mg tablet Take 1 tablet (40 mg total) by mouth daily  Qty: 90 tablet, Refills: 1    Associated Diagnoses: Acute superficial gastritis without hemorrhage      aspirin 81 MG tablet Take 81 mg by mouth every other day            No discharge procedures on file      ED Provider  Electronically Signed by           Dilia Maldonado MD  12/21/19 1406

## 2019-12-22 PROBLEM — S83.206A RIGHT KNEE MENISCAL TEAR: Status: RESOLVED | Noted: 2017-12-20 | Resolved: 2019-12-22

## 2019-12-22 PROBLEM — R73.01 IMPAIRED FASTING GLUCOSE: Status: RESOLVED | Noted: 2019-07-09 | Resolved: 2019-12-22

## 2019-12-22 LAB
ATRIAL RATE: 100 BPM
QRS AXIS: 20 DEGREES
QRSD INTERVAL: 92 MS
QT INTERVAL: 338 MS
QTC INTERVAL: 431 MS
T WAVE AXIS: 102 DEGREES
VENTRICULAR RATE: 98 BPM

## 2019-12-22 PROCEDURE — NC001 PR NO CHARGE: Performed by: INTERNAL MEDICINE

## 2019-12-22 PROCEDURE — 93010 ELECTROCARDIOGRAM REPORT: CPT | Performed by: INTERNAL MEDICINE

## 2019-12-22 PROCEDURE — 99232 SBSQ HOSP IP/OBS MODERATE 35: CPT | Performed by: INTERNAL MEDICINE

## 2019-12-22 RX ORDER — LORATADINE 10 MG/1
10 TABLET ORAL DAILY
Status: DISCONTINUED | OUTPATIENT
Start: 2019-12-22 | End: 2019-12-23 | Stop reason: HOSPADM

## 2019-12-22 RX ORDER — FAMOTIDINE 20 MG/1
20 TABLET, FILM COATED ORAL 2 TIMES DAILY
Status: DISCONTINUED | OUTPATIENT
Start: 2019-12-22 | End: 2019-12-23 | Stop reason: HOSPADM

## 2019-12-22 RX ORDER — PREDNISONE 20 MG/1
40 TABLET ORAL DAILY
Status: DISCONTINUED | OUTPATIENT
Start: 2019-12-22 | End: 2019-12-23 | Stop reason: HOSPADM

## 2019-12-22 RX ADMIN — METHYLPREDNISOLONE SODIUM SUCCINATE 40 MG: 40 INJECTION, POWDER, FOR SOLUTION INTRAMUSCULAR; INTRAVENOUS at 00:13

## 2019-12-22 RX ADMIN — ENOXAPARIN SODIUM 40 MG: 40 INJECTION SUBCUTANEOUS at 08:32

## 2019-12-22 RX ADMIN — METHYLPREDNISOLONE SODIUM SUCCINATE 40 MG: 40 INJECTION, POWDER, FOR SOLUTION INTRAMUSCULAR; INTRAVENOUS at 08:32

## 2019-12-22 RX ADMIN — LORATADINE 10 MG: 10 TABLET ORAL at 17:20

## 2019-12-22 RX ADMIN — FAMOTIDINE 20 MG: 10 INJECTION, SOLUTION INTRAVENOUS at 08:34

## 2019-12-22 RX ADMIN — AMLODIPINE BESYLATE 10 MG: 10 TABLET ORAL at 08:31

## 2019-12-22 RX ADMIN — DIPHENHYDRAMINE HYDROCHLORIDE 25 MG: 50 INJECTION, SOLUTION INTRAMUSCULAR; INTRAVENOUS at 08:34

## 2019-12-22 RX ADMIN — FAMOTIDINE 20 MG: 20 TABLET, FILM COATED ORAL at 17:20

## 2019-12-22 RX ADMIN — DIPHENHYDRAMINE HYDROCHLORIDE 25 MG: 50 INJECTION, SOLUTION INTRAMUSCULAR; INTRAVENOUS at 02:09

## 2019-12-22 NOTE — ASSESSMENT & PLAN NOTE
· Improved  Continue to monitor airway and respiratory status closely    · Small residual swelling subglottic  · D/C solumedrol, start Prednisone 40mg daily for 5 days  · Pepcid and Zyrtec total 14 days  · CT scan as outpatient in 1-2 weeks to r/o SVC syndrome

## 2019-12-22 NOTE — PROGRESS NOTES
Progress Note - Sushma Dudley 1950, 77 y o  male MRN: [id number]    Unit/Bed#: ICU 04 Encounter: [id number]    Primary Care Provider: Gisela Acevedo DO   Date and time admitted to hospital: 12/21/2019  8:43 AM        * Angioedema, Likely ACE inhibitor induced  Assessment & Plan  · Improved  Continue to monitor airway and respiratory status closely  · Continue solumedrol for 3 more days and benadryl/solumedrol for total of 14 days  · Stop solumedrol indefinitely  Hypertension  Assessment & Plan  · Continue home Norvasc  Continue PRN hydralazine for SBP>160    Tobacco abuse  Assessment & Plan  · Encourage cessation   · Pt declined nicotine patch  Sleep apnea  Assessment & Plan  · Nocturnal CPAP with home settings  GERD (gastroesophageal reflux disease)  Assessment & Plan  · On Protonix at home, will hold and continue Pepcid      HPI/24hr events:   Afebrile  No acute events overnight  Medications:    Current Facility-Administered Medications:  amLODIPine 10 mg Oral Daily Euna Quarry, CRNP    aspirin 81 mg Oral Every Other Day Euna Quarry, CRNP    diphenhydrAMINE 25 mg Intravenous Q6H Virginia Walbert, CRNP    enoxaparin 40 mg Subcutaneous Daily Euna Quarry, CRNP    famotidine 20 mg Intravenous Q24H [address] Virginia Walbert, CRNP    hydrALAZINE 10 mg Intravenous Q6H PRN Euna Quarry, CRNP    lactated ringers 100 mL/hr Intravenous Continuous Euna Quarry, CRNP Last Rate: 100 mL/hr (12/21/19 2210)   methylPREDNISolone sodium succinate 40 mg Intravenous Q8H [address] Virginia Walbert, CRNP          lactated ringers 100 mL/hr Last Rate: 100 mL/hr (12/21/19 2210)         Physical exam:  Vitals: Body mass index is 29 kg/m²  Blood pressure 126/66, pulse 78, temperature 98 5 °F (36 9 °C), temperature source Temporal, resp  rate 17, weight 93 kg (205 lb 0 4 oz), SpO2 96 %  ,  Temp  Min: 97 9 °F (36 6 °C)  Max: 98 5 °F (36 9 °C)  IBW: -88 kg    SpO2: 96 %  SpO2 Activity: At Rest  O2 Device: Nasal cannula      Intake/Output Summary (Last 24 hours) at 12/22/2019 0618  Last data filed at 12/22/2019 0400  Gross per 24 hour   Intake 1575 ml   Output    Net 1575 ml       Invasive/non-invasive ventilation settings:   Respiratory    Lab Data (Last 4 hours)    None         O2/Vent Data (Last 4 hours)    None              Invasive Devices     Peripheral Intravenous Line            Peripheral IV 12/21/19 Right Antecubital less than 1 day                  Physical Exam:  Gen:  Sleeping but easily arousable, appropriate, no acute distress  HEENT:  Atraumatic, normocephalic, extraocular movements intact, pupils 3 mm equal and reactive, mild swelling to the tongue, oropharynx without erythema or exudate  Neck:  Supple, trachea midline, no JVD, no lymphadenopathy  Chest:  Clear to auscultation bilaterally, no wheeze  Cor:  Single S1/S2, no murmurs, rubs, gallops, regular rate and rhythm  Abd:  Soft, nontender, nondistended, bowel sounds normoactive  Ext:  No edema, no clubbing or cyanosis  Neuro:  Oriented x3, cranial nerves 2-12 grossly intact, no focal deficits  Skin:  Warm, dry      Diagnostic Data:  Lab: I have personally reviewed pertinent lab results  CBC:   Results from last 7 days   Lab Units 12/21/19  0857   WBC Thousand/uL 6 45   HEMOGLOBIN g/dL 17 1*   HEMATOCRIT % 53 0*   PLATELETS Thousands/uL 208       CMP:   Results from last 7 days   Lab Units 12/21/19  0857   SODIUM mmol/L 142   POTASSIUM mmol/L 3 5   CHLORIDE mmol/L 105   CO2 mmol/L 28   BUN mg/dL 15   CREATININE mg/dL 0 96   CALCIUM mg/dL 9 4   ALK PHOS U/L 88   ALT U/L 30   AST U/L 14     PT/INR:   No results found for: PT, INR,   Magnesium:     Phosphorous:       Microbiology:        Imaging:  No new imaging    Cardiac lab/EKG/telemetry/ECHO:   Sinus rhythm    VTE Prophylaxis: Lovenox, SCDs    Code Status: Level 1 - Full Code    Glynn Rank Spatzer, CRNP    Portions of the record may have been created with voice recognition software   Occasional wrong word or "sound a like" substitutions may have occurred due to the inherent limitations of voice recognition software  Read the chart carefully and recognize, using context, where substitutions have occurred

## 2019-12-22 NOTE — PLAN OF CARE
Problem: SAFETY ADULT  Goal: Patient will remain free of falls  Description  INTERVENTIONS:  - Assess patient frequently for physical needs  -  Identify cognitive and physical deficits and behaviors that affect risk of falls    -  [place] fall precautions as indicated by assessment   - Educate patient/family on patient safety including physical limitations  - Instruct patient to call for assistance with activity based on assessment  - Modify environment to reduce risk of injury  - Consider OT/PT consult to assist with strengthening/mobility  Outcome: Progressing  Goal: Maintain or return to baseline ADL function  Description  INTERVENTIONS:  -  Assess patient's ability to carry out ADLs; assess patient's baseline for ADL function and identify physical deficits which impact ability to perform ADLs (bathing, care of mouth/teeth, toileting, grooming, dressing, etc )  - Assess/evaluate cause of self-care deficits   - Assess range of motion  - Assess patient's mobility; develop plan if impaired  - Assess patient's need for assistive devices and provide as appropriate  - Encourage maximum independence but intervene and supervise when necessary  - Involve family in performance of ADLs  - Assess for home care needs following discharge   - Consider OT consult to assist with ADL evaluation and planning for discharge  - Provide patient education as appropriate  Outcome: Progressing  Goal: Maintain or return mobility status to optimal level  Description  INTERVENTIONS:  - Assess patient's baseline mobility status (ambulation, transfers, stairs, etc )    - Identify cognitive and physical deficits and behaviors that affect mobility  - Identify mobility aids required to assist with transfers and/or ambulation (gait belt, sit-to-stand, lift, walker, cane, etc )  - [place] fall precautions as indicated by assessment  - Record patient progress and toleration of activity level on Mobility SBAR; progress patient to next Phase/Stage  - Instruct patient to call for assistance with activity based on assessment  - Consider rehabilitation consult to assist with strengthening/weightbearing, etc   Outcome: Progressing     Problem: RESPIRATORY - ADULT  Goal: Achieves optimal ventilation and oxygenation  Description  INTERVENTIONS:  - Assess for changes in respiratory status  - Assess for changes in mentation and behavior  - Position to facilitate oxygenation and minimize respiratory effort  - Oxygen administered by appropriate delivery if ordered  - Initiate smoking cessation education as indicated  - Encourage broncho-pulmonary hygiene including cough, deep breathe, Incentive Spirometry  - Assess the need for suctioning and aspirate as needed  - Assess and instruct to report SOB or any respiratory difficulty  - Respiratory Therapy support as indicated  Outcome: Progressing

## 2019-12-22 NOTE — PROGRESS NOTES
Pt transferred to [id number] [id number], pt stable at that time  Report given to receiving nurse  Belongings sent with pt

## 2019-12-22 NOTE — ASSESSMENT & PLAN NOTE
· Continue home Norvasc  · Continue PRN hydralazine for SBP>160  · Needs outpatient apt with PCP for new antihypertensive regimen with new ACE related allergy

## 2019-12-22 NOTE — PROGRESS NOTES
Transfer Note - Álvaro Ortega 1950, 77 y o  male MRN: [id number]    Unit/Bed#: E5 -01 Encounter: [id number]    Primary Care Provider: Chandrakant Stark DO   Date and time admitted to hospital: 12/21/2019  8:43 AM        * Angioedema, Likely ACE inhibitor induced  Assessment & Plan  · Improved  Continue to monitor airway and respiratory status closely  · Small residual swelling subglottic  · D/C solumedrol, start Prednisone 40mg daily for 5 days  · Pepcid and Zyrtec total 14 days  · CT scan as outpatient in 1-2 weeks to r/o SVC syndrome      Hypertension  Assessment & Plan  · Continue home Norvasc  · Continue PRN hydralazine for SBP>160  · Needs outpatient apt with PCP for new antihypertensive regimen with new ACE related allergy    Tobacco abuse  Assessment & Plan  · Encourage cessation   · Pt declined nicotine patch  Sleep apnea  Assessment & Plan  · Nocturnal CPAP with home settings  GERD (gastroesophageal reflux disease)  Assessment & Plan  · On Protonix at home, will hold and continue Pepcid        Code Status: Level 1 - Full Code  POA:    POLST:      Reason for ICU admission:   Angioedema - airway watch    Active problems:   Principal Problem:    Angioedema, Likely ACE inhibitor induced  Active Problems:    Hypertension    Tobacco abuse    Sleep apnea    GERD (gastroesophageal reflux disease)  Resolved Problems:    * No resolved hospital problems  *      Consultants:   none    History of Present Illness:   Per Jamey Pimentel on 12/21  "Álvaro Ortega is a 77 y o  male who presents with tongue swelling that started this morning  He has a history of HTN,, GERD, and BRAVO on home CPAP  According to the patient, he stopped using nicotine patch yesterday and started smoking  Initially he thought this was contributing to his edema  Of note he has been on an ACEi for years without difficulty   In the ED he was noted to have significant angioedema of the lips and tongue, and he received pepcid, solumedrol, and benadryl  He is alert, oriented, and able to manage his airway at this time  He is admitted to step down for further management and treatment "    Summary of clinical course:   Pt's swelling continued to improve  No signs of airway compromise, pt was maintained on 2L NC overnight and transitioned to RA this morning  IV medications changed to po, will continue to monitor airway as small amount of subglottic swelling persists  Recent or scheduled procedures:   none    Outstanding/pending diagnostics:   none    Cultures:   none       Mobilization Plan: Activity as tolerated    Nutrition Plan:   Cardiovascular diet    VTE Pharmacologic Prophylaxis: Enoxaparin (Lovenox)  VTE Mechanical Prophylaxis: sequential compression device    Discharge Plan:   Patient should be ready for discharge to home on 12/23 after concern for subglottic swelling and airway compromise in resolved  Initial Physical Therapy Recommendations: n/a  Initial Occupational Therapy Recommendations: n/a  Initial /Plan: n/a    Home medications that are not reordered and reason why:   Lisinopril - Angioedema      Spoke with Ji  regarding transfer  Please call [phone] with any questions or concerns  Portions of the record may have been created with voice recognition software  Occasional wrong word or "sound a like" substitutions may have occurred due to the inherent limitations of voice recognition software  Read the chart carefully and recognize, using context, where substitutions have occurred

## 2019-12-22 NOTE — ASSESSMENT & PLAN NOTE
· Improved  Continue to monitor airway and respiratory status closely  · Continue solumedrol for 3 more days and benadryl/solumedrol for total of 14 days  · Stop solumedrol indefinitely

## 2019-12-23 VITALS
DIASTOLIC BLOOD PRESSURE: 93 MMHG | HEART RATE: 78 BPM | TEMPERATURE: 97.9 F | OXYGEN SATURATION: 97 % | RESPIRATION RATE: 16 BRPM | BODY MASS INDEX: 29 KG/M2 | WEIGHT: 205.03 LBS | SYSTOLIC BLOOD PRESSURE: 160 MMHG

## 2019-12-23 LAB
ANION GAP SERPL CALCULATED.3IONS-SCNC: 9 MMOL/L (ref 4–13)
BUN SERPL-MCNC: 18 MG/DL (ref 5–25)
CALCIUM SERPL-MCNC: 8.5 MG/DL (ref 8.3–10.1)
CHLORIDE SERPL-SCNC: 109 MMOL/L (ref 100–108)
CO2 SERPL-SCNC: 27 MMOL/L (ref 21–32)
CREAT SERPL-MCNC: 0.94 MG/DL (ref 0.6–1.3)
ERYTHROCYTE [DISTWIDTH] IN BLOOD BY AUTOMATED COUNT: 13.4 % (ref 11.6–15.1)
GFR SERPL CREATININE-BSD FRML MDRD: 83 ML/MIN/1.73SQ M
GLUCOSE SERPL-MCNC: 134 MG/DL (ref 65–140)
HCT VFR BLD AUTO: 46.2 % (ref 36.5–49.3)
HGB BLD-MCNC: 14.8 G/DL (ref 12–17)
MCH RBC QN AUTO: 28.4 PG (ref 26.8–34.3)
MCHC RBC AUTO-ENTMCNC: 32 G/DL (ref 31.4–37.4)
MCV RBC AUTO: 89 FL (ref 82–98)
PLATELET # BLD AUTO: 200 THOUSANDS/UL (ref 149–390)
PMV BLD AUTO: 10.2 FL (ref 8.9–12.7)
POTASSIUM SERPL-SCNC: 3.3 MMOL/L (ref 3.5–5.3)
RBC # BLD AUTO: 5.21 MILLION/UL (ref 3.88–5.62)
SODIUM SERPL-SCNC: 145 MMOL/L (ref 136–145)
WBC # BLD AUTO: 12.27 THOUSAND/UL (ref 4.31–10.16)

## 2019-12-23 PROCEDURE — 85027 COMPLETE CBC AUTOMATED: CPT | Performed by: NURSE PRACTITIONER

## 2019-12-23 PROCEDURE — 80048 BASIC METABOLIC PNL TOTAL CA: CPT | Performed by: NURSE PRACTITIONER

## 2019-12-23 PROCEDURE — 99239 HOSP IP/OBS DSCHRG MGMT >30: CPT | Performed by: INTERNAL MEDICINE

## 2019-12-23 RX ORDER — FAMOTIDINE 20 MG/1
20 TABLET, FILM COATED ORAL 2 TIMES DAILY
Qty: 28 TABLET | Refills: 0 | Status: SHIPPED | OUTPATIENT
Start: 2019-12-23 | End: 2020-01-21

## 2019-12-23 RX ORDER — CETIRIZINE HYDROCHLORIDE 10 MG/1
10 TABLET, CHEWABLE ORAL DAILY
Qty: 14 TABLET | Refills: 0 | Status: SHIPPED | OUTPATIENT
Start: 2019-12-23 | End: 2020-01-21

## 2019-12-23 RX ORDER — DOXAZOSIN 2 MG/1
2 TABLET ORAL
Qty: 30 TABLET | Refills: 0 | Status: SHIPPED | OUTPATIENT
Start: 2019-12-23 | End: 2020-01-17 | Stop reason: SDUPTHER

## 2019-12-23 RX ORDER — PREDNISONE 20 MG/1
40 TABLET ORAL DAILY
Qty: 8 TABLET | Refills: 0 | Status: SHIPPED | OUTPATIENT
Start: 2019-12-24 | End: 2019-12-28

## 2019-12-23 RX ADMIN — AMLODIPINE BESYLATE 10 MG: 10 TABLET ORAL at 08:56

## 2019-12-23 RX ADMIN — ENOXAPARIN SODIUM 40 MG: 40 INJECTION SUBCUTANEOUS at 08:55

## 2019-12-23 RX ADMIN — LORATADINE 10 MG: 10 TABLET ORAL at 08:56

## 2019-12-23 RX ADMIN — ASPIRIN 81 MG: 81 TABLET, COATED ORAL at 08:56

## 2019-12-23 RX ADMIN — PREDNISONE 40 MG: 20 TABLET ORAL at 08:56

## 2019-12-23 RX ADMIN — FAMOTIDINE 20 MG: 20 TABLET, FILM COATED ORAL at 08:56

## 2019-12-23 NOTE — DISCHARGE INSTR - AVS FIRST PAGE
Mr  Rick Fischer have been diagnosed with angioedema from your blood pressure medication  You will be discharged on the following medications    · Prednisone 40mg daily for 5 days  · Pepcid and Zyrtec total 14 days  · CT scan as outpatient in 1-2 weeks to r/o SVC syndrome      You will be started on a medication called Cardura for blood pressure control - please follow up with her primary care provider for additional refills    Thank you for choosing [facility] for your healthcare needs  If I can be of any assistance in the future, please feel to contact me      Dr Maurice Becerra, DO

## 2019-12-23 NOTE — DISCHARGE INSTRUCTIONS
Doxazosin (By mouth)   Doxazosin (xue-PL-of-sin)  Treats problems with urination caused by benign prostatic hyperplasia (enlarged prostate)  Also treats high blood pressure  Brand Name(s): Cardura, Cardura XL   There may be other brand names for this medicine  When This Medicine Should Not Be Used: This medicine is not right for everyone  Do not use it if you had an allergic reaction to doxazosin or to similar medicines  How to Use This Medicine:   Tablet, Long Acting Tablet  · Take your medicine as directed  Your dose may need to be changed several times to find what works best for you  · The extended-release tablet should be taken each day with breakfast  The regular-release tablet can be taken in the morning or in the evening  · Swallow the extended-release tablet whole  Do not crush, break, or chew it  · If you take the extended-release tablet, part of the tablet may pass into your stools  This is normal and is nothing to worry about  · Read and follow the patient instructions that come with this medicine  Talk to your doctor or pharmacist if you have any questions  · Missed dose: Take a dose as soon as you remember  If it is almost time for your next dose, wait until then and take a regular dose  Do not take extra medicine to make up for a missed dose  · Store the medicine in a closed container at room temperature, away from heat, moisture, and direct light  Drugs and Foods to Avoid:   Ask your doctor or pharmacist before using any other medicine, including over-the-counter medicines, vitamins, and herbal products  · Some medicines can affect how doxazosin works   Tell your doctor if you are using any of the following:  ¨ Atazanavir, clarithromycin, indinavir, itraconazole, ketoconazole, nefazodone, nelfinavir, ritonavir, saquinavir, telithromycin, or voriconazole  ¨ Blood pressure medicines  ¨ Medicine to treat erectile dysfunction  Warnings While Using This Medicine:   · Tell your doctor if you are pregnant or breastfeeding, or if you have liver disease, heart disease, prostate cancer, or stomach or bowel problems (such as a blockage)  Tell your doctor if you plan to have cataract surgery  · This medicine may cause the following problems:  ¨ New or worsening angina (chest pain)  ¨ Priapism (painful, prolonged erection)  · This medicine could lower your blood pressure too much, especially when you first use it or if you are dehydrated  This can make you dizzy or lightheaded  Do not drive or do anything else that could be dangerous until you know how this medicine affects you  Stand or sit up slowly if you feel lightheaded or dizzy  · Your doctor will check your progress and the effects of this medicine at regular visits  Keep all appointments  · Keep all medicine out of the reach of children  Never share your medicine with anyone  Possible Side Effects While Using This Medicine:   Call your doctor right away if you notice any of these side effects:  · Allergic reaction: Itching or hives, swelling in your face or hands, swelling or tingling in your mouth or throat, chest tightness, trouble breathing  · Fast, slow, pounding, or uneven heartbeat  · Lightheadedness, dizziness, or fainting  · New or worsening chest pain  · Painful erection or an erection that lasts longer than usual  If you notice these less serious side effects, talk with your doctor:   · Tiredness or weakness  If you notice other side effects that you think are caused by this medicine, tell your doctor  Call your doctor for medical advice about side effects  You may report side effects to FDA at 7-919-FDA-3536  © 2017 [employer]  Information is for End User's use only and may not be sold, redistributed or otherwise used for commercial purposes  The above information is an  only  It is not intended as medical advice for individual conditions or treatments   Talk to your doctor, nurse or pharmacist before following any medical regimen to see if it is safe and effective for you  Angioedema   WHAT YOU NEED TO KNOW:   Angioedema is sudden swelling caused by fluid that collects in deep layers of the skin  Swelling occurs most often on the face, lips, tongue, or throat, but it can happen anywhere on the body  Your risk for angioedema increases if you have food or insect allergies or a family history of angioedema  Emotional stress, autoimmune disorders, and medicines, such as ACE inhibitors, also increase your risk  Your symptoms may be mild, or you may develop anaphylaxis  Anaphylaxis is a sudden, life-threatening reaction that needs immediate treatment  DISCHARGE INSTRUCTIONS:   Call 911 for signs or symptoms of anaphylaxis,  such as trouble breathing, swelling in your mouth or throat, or wheezing  You may also have itching, a rash, hives, or feel like you are going to faint  Return to the emergency department if:   · You have sudden behavior changes or irritability  · You are dizzy and your heart is beating faster than usual   Contact your healthcare provider if:   · Your swelling does not improve, even after you take your medicines  · You have questions or concerns about your condition or care  Medicines:   · Antihistamines  decrease mild symptoms such as itching or a rash  · Epinephrine  is used to treat severe allergic reactions such as anaphylaxis  · Steroids: This medicine may be given to decrease redness, pain, and swelling  · Take your medicine as directed  Contact your healthcare provider if you think your medicine is not helping or if you have side effects  Tell him of her if you are allergic to any medicine  Keep a list of the medicines, vitamins, and herbs you take  Include the amounts, and when and why you take them  Bring the list or the pill bottles to follow-up visits  Carry your medicine list with you in case of an emergency    Steps to take for signs or symptoms of anaphylaxis: · Immediately  give 1 shot of epinephrine only into the outer thigh muscle  · Leave the shot in place  as directed  Your healthcare provider may recommend you leave it in place for up to 10 seconds before you remove it  This helps make sure all of the epinephrine is delivered  · Call 911 and go to the emergency department,  even if the shot improved symptoms  Do not drive yourself  Bring the used epinephrine shot with you  Safety precautions to take if you are at risk for anaphylaxis:   · Keep 2 shots of epinephrine with you at all times  You may need a second shot, because epinephrine only works for about 20 minutes and symptoms may return  Your healthcare provider can show you and family members how to give the shot  Check the expiration date every month and replace it before it expires  · Create an action plan  Your healthcare provider can help you create a written plan that explains the allergy and an emergency plan to treat a reaction  The plan explains when to give a second epinephrine shot if symptoms return or do not improve after the first  Give copies of the action plan and emergency instructions to family members, work and school staff, and  providers  Show them how to give a shot of epinephrine  · Be careful when you exercise  If you have had exercise-induced anaphylaxis, do not exercise right after you eat  Stop exercising right away if you start to develop any signs or symptoms of anaphylaxis  You may first feel tired, warm, or have itchy skin  Hives, swelling, and severe breathing problems may develop if you continue to exercise  · Carry medical alert identification  Wear medical alert jewelry or carry a card that explains the allergy  Ask your healthcare provider where to get these items  · Keep a symptom diary  Include information about how often your symptoms occur, how long they last, and if they are mild or severe   Also keep information on what you ate, what happened, or which medicines you took before the swelling started  · Avoid triggers  Triggers include foods, medicines, and other things that you know cause symptoms  You may need to see a specialist, such as an allergist or dietitian, to learn what to avoid  Follow up with your healthcare provider as directed:  Write down your questions so you remember to ask them during your visits  © 2017 [employer] Information is for End User's use only and may not be sold, redistributed or otherwise used for commercial purposes  All illustrations and images included in CareNotes® are the copyrighted property of [employer]  or [employer]  The above information is an  only  It is not intended as medical advice for individual conditions or treatments  Talk to your doctor, nurse or pharmacist before following any medical regimen to see if it is safe and effective for you

## 2019-12-23 NOTE — ASSESSMENT & PLAN NOTE
Smoking cessation encouraged - per Pulmonary will need outpatient CT for evaluation of superior vena cava syndrome

## 2019-12-23 NOTE — DISCHARGE SUMMARY
Discharge- Margarito Aguilera 1950, 77 y o  male MRN: [id number]    Unit/Bed#: E5 -01 Encounter: [id number]    Primary Care Provider: Natacha Johnston DO   Date and time admitted to hospital: 12/21/2019  8:43 AM        GERD (gastroesophageal reflux disease)  Assessment & Plan  Continue anti-reflux medication at discharge    Hypertension  Assessment & Plan  Currently on amlodipine, will additionally planned for discharge on Cardura    Sleep apnea  Assessment & Plan  Monitor the outpatient setting, pulmonary appointment scheduled    Tobacco abuse  Assessment & Plan  Smoking cessation encouraged - per Pulmonary will need outpatient CT for evaluation of superior vena cava syndrome    * Angioedema, Likely ACE inhibitor induced  Assessment & Plan  Resolved,    Avoid ACE-inhibitor indefinite    Will be discharged on prednisone, Zyrtec and Pepcid        Admitting Provider:  Kelvin oTrre MD  Discharge Provider:  Man Sanchez DO  Admission Date: 12/21/2019       Discharge Date: 12/23/19   LOS: 2  Primary Care Physician at Discharge: Natacha Johnston, [address] [place] [address] Avenue:  Margarito Aguilera is a 77 y o  male who presented with tongue swelling this was felt to be angioedema secondary to ACE-inhibitor induction  The patient was started on IV Solu-Medrol and subsequently transition of prednisone of 40 mg to complete a 5 day course  He additionally was placed on Pepcid and Zyrtec and will complete a 14 day course of this medication as well  The patient was evaluated in consultation with the Pulmonary service they recommended a CT scan as an outpatient in 1-2 weeks to rule out superior vena cava syndrome      The patient's blood pressure regimen was adjusted as his ACE-inhibitor was discontinued indefinitely, he was started on Cardura and this can medication can be adjusted in the outpatient setting    At the time of discharge the patient's oral airway was improved, he had no respiratory difficulty was clinically stable for discharge  He will continue on his nocturnal CPAP with home settings at discharge and follow up with Pulmonary in 1-2 weeks    Thank you for choosing [facility] for your healthcare needs  If I can be of any assistance in the future, please feel to contact me  DISCHARGE DIAGNOSES  GERD (gastroesophageal reflux disease)  Assessment & Plan  Continue anti-reflux medication at discharge    Hypertension  Assessment & Plan  Currently on amlodipine, will additionally planned for discharge on Cardura    Sleep apnea  Assessment & Plan  Monitor the outpatient setting, pulmonary appointment scheduled    Tobacco abuse  Assessment & Plan  Smoking cessation encouraged - per Pulmonary will need outpatient CT for evaluation of superior vena cava syndrome    * Angioedema, Likely ACE inhibitor induced  Assessment & Plan  Resolved,    Avoid ACE-inhibitor indefinite    Will be discharged on prednisone, Zyrtec and Pepcid      CONSULTING PROVIDERS   None    PROCEDURES PERFORMED  * No surgery found *    RADIOLOGY RESULTS  No results found      LABS  Results from last 7 days   Lab Units 12/23/19  0519 12/21/19  0857   WBC Thousand/uL 12 27* 6 45   HEMOGLOBIN g/dL 14 8 17 1*   HEMATOCRIT % 46 2 53 0*   MCV fL 89 89   PLATELETS Thousands/uL 200 208     Results from last 7 days   Lab Units 12/23/19  0519 12/21/19  0857   SODIUM mmol/L 145 142   POTASSIUM mmol/L 3 3* 3 5   CHLORIDE mmol/L 109* 105   CO2 mmol/L 27 28   BUN mg/dL 18 15   CREATININE mg/dL 0 94 0 96   CALCIUM mg/dL 8 5 9 4   ALBUMIN g/dL  --  4 1   TOTAL BILIRUBIN mg/dL  --  0 58   ALK PHOS U/L  --  88   ALT U/L  --  30   AST U/L  --  14   EGFR ml/min/1 73sq m 83 81   GLUCOSE RANDOM mg/dL 134 139                                        Cultures:         Invalid input(s): URIBILINOGEN              PHYSICAL EXAM:  Vitals:   Blood Pressure: 160/93 (12/23/19 0856)  Pulse: 78 (12/23/19 0856)  Temperature: 97 9 °F (36 6 °C) (12/22/19 2300)  Temp Source: Tympanic (12/23/19 0856)  Respirations: 16 (12/23/19 0856)  Weight - Scale: 93 kg (205 lb 0 4 oz) (12/22/19 0547)  SpO2: 97 % (12/23/19 0856)      General: well appearing, no acute distress  HEENT: atraumatic, PERRLA, moist mucosa, normal pharynx, normal tonsils and adenoids, normal tongue, no fluid in sinuses  Neck: Trachea midline, no carotid bruit, no masses  Respiratory: normal chest wall expansion, CTA B, no r/r/w, no rubs  Cardiovascular: RRR, no m/r/g, Normal S1 and S2  Abdomen: Soft, non-tender, non-distended, normal bowel sounds in all quadrants, no hepatosplenomegaly, no tympany  Rectal: deferred  Musculoskeletal: normal ROM in upper and lower extremities  Integumentary: warm, dry, and pink, with no rash, purpura, or petechia  Heme/Lymph: no lymphadenopathy, no bruises  Neurological: Cranial Nerves II-XII grossly intact, no tics, normal sensation to pressure and light touch  Psychiatric: cooperative with normal mood, affect, and cognition      Discharge Disposition: Home/Self Care      Test Results Pending at Discharge:           Medications   · Summary of Medication Adjustments made as a result of this hospitalization:  Cardura 2 mg, discontinued ACE-inhibitor  · Medication Dosing Tapers - Please refer to Discharge Medication List for details on any medication dosing tapers (if applicable to patient)  · Discharge Medication List: See after visit summary for reconciled discharge medications  Diet restrictions: Activity restrictions: No strenuous activity  Discharge Condition: good    Outpatient Follow-Up and Discharge Instructions  See after visit summary section titled Discharge Instructions for information provided to patient and family  Code Status: Prior  Discharge Statement   I spent 35 minutes discharging the patient  This time was spent on the day of discharge  Greater than 50% of total time was spent with the patient and / or family counseling and / or coordination of care      ** Please Note: This note has been constructed using a voice recognition system   **

## 2019-12-24 ENCOUNTER — TRANSITIONAL CARE MANAGEMENT (OUTPATIENT)
Dept: FAMILY MEDICINE CLINIC | Facility: CLINIC | Age: 69
End: 2019-12-24

## 2019-12-31 ENCOUNTER — OFFICE VISIT (OUTPATIENT)
Dept: FAMILY MEDICINE CLINIC | Facility: CLINIC | Age: 69
End: 2019-12-31
Payer: MEDICARE

## 2019-12-31 VITALS
TEMPERATURE: 97.6 F | BODY MASS INDEX: 35.68 KG/M2 | HEIGHT: 64 IN | WEIGHT: 209 LBS | SYSTOLIC BLOOD PRESSURE: 150 MMHG | DIASTOLIC BLOOD PRESSURE: 86 MMHG

## 2019-12-31 DIAGNOSIS — R06.00 DYSPNEA, UNSPECIFIED TYPE: ICD-10-CM

## 2019-12-31 DIAGNOSIS — K21.00 GASTROESOPHAGEAL REFLUX DISEASE WITH ESOPHAGITIS: ICD-10-CM

## 2019-12-31 DIAGNOSIS — T78.3XXD ANGIOEDEMA, SUBSEQUENT ENCOUNTER: Primary | ICD-10-CM

## 2019-12-31 DIAGNOSIS — E78.2 MIXED HYPERLIPIDEMIA: ICD-10-CM

## 2019-12-31 DIAGNOSIS — Z72.0 TOBACCO ABUSE: ICD-10-CM

## 2019-12-31 DIAGNOSIS — I10 ESSENTIAL HYPERTENSION: ICD-10-CM

## 2019-12-31 DIAGNOSIS — G47.30 SLEEP APNEA, UNSPECIFIED TYPE: ICD-10-CM

## 2019-12-31 PROCEDURE — 99495 TRANSJ CARE MGMT MOD F2F 14D: CPT | Performed by: FAMILY MEDICINE

## 2019-12-31 RX ORDER — DOXAZOSIN 2 MG/1
2 TABLET ORAL
Qty: 90 TABLET | Refills: 1 | Status: SHIPPED | OUTPATIENT
Start: 2019-12-31 | End: 2020-01-17 | Stop reason: SDUPTHER

## 2019-12-31 RX ORDER — FAMOTIDINE 20 MG/1
20 TABLET, FILM COATED ORAL 2 TIMES DAILY
Qty: 90 TABLET | Refills: 1 | Status: SHIPPED | OUTPATIENT
Start: 2019-12-31 | End: 2020-01-17 | Stop reason: ALTCHOICE

## 2019-12-31 NOTE — PROGRESS NOTES
Assessment/Plan: the   Angioedema has resolved  Patient will follow up with pulmonology the per routine  The patient go for CT scan of the chest   The the patient have laboratory studies done  All other chronic conditions relatively stable  To consider other medication other than Pepcid if needed  Diagnoses and all orders for this visit:    Angioedema, subsequent encounter  -     CBC and differential; Future  -     Comprehensive metabolic panel; Future  -     Hemoglobin A1C; Future  -     Lipid panel; Future  -     Microalbumin / creatinine urine ratio  -     TSH, 3rd generation with Free T4 reflex; Future    Tobacco abuse    Mixed hyperlipidemia    Sleep apnea, unspecified type  -     CBC and differential; Future  -     Comprehensive metabolic panel; Future  -     Hemoglobin A1C; Future  -     Lipid panel; Future  -     Microalbumin / creatinine urine ratio  -     TSH, 3rd generation with Free T4 reflex; Future    Gastroesophageal reflux disease with esophagitis  -     CBC and differential; Future  -     Comprehensive metabolic panel; Future  -     Hemoglobin A1C; Future  -     Lipid panel; Future  -     Microalbumin / creatinine urine ratio  -     TSH, 3rd generation with Free T4 reflex; Future    Essential hypertension  -     CBC and differential; Future  -     Comprehensive metabolic panel; Future  -     Hemoglobin A1C; Future  -     Lipid panel; Future  -     Microalbumin / creatinine urine ratio  -     TSH, 3rd generation with Free T4 reflex; Future    Dyspnea, unspecified type  -     CBC and differential; Future  -     Comprehensive metabolic panel; Future  -     Hemoglobin A1C; Future  -     Lipid panel; Future  -     Microalbumin / creatinine urine ratio  -     TSH, 3rd generation with Free T4 reflex; Future  -     CT chest w contrast; Future            Subjective:        Patient ID: Amanda Clemens is a 77 y o  male        Patient is here status post hospitalization from December 21st 23rd for angioedema due to lisinopril  Patient was placed on IV Solu-Medrol as switched to prednisone  Patient was also placed on  Pepcid along with Zyrtec  Patient is completing Zyrtec course  Patient did complete steroids  Patient with increased amount of GERD symptoms on Pepcid  Patient does have some difficulty swallowing on the right side of his throat  The no vomiting  No fever noted  Patient with some increased urinary frequency with being on Cardura  Bowels are unchanged  No wheezing noted  Other diagnosis include GERD hypertension sleep apnea and tobacco abuse  Patient is trying to quit smoking  Patient did have chest x-ray which showed mild emphysema as per wife  Patient is to go for CT scan of the chest as an outpatient  The following portions of the patient's history were reviewed and updated as appropriate: allergies, current medications, past family history, past medical history, past social history, past surgical history and problem list       Review of Systems   Constitutional: Negative  HENT: Positive for sore throat  Eyes: Negative  Respiratory: Negative  Cardiovascular: Negative  Gastrointestinal: Negative  Endocrine: Negative  Genitourinary: Negative  Musculoskeletal: Negative  Skin: Negative  Allergic/Immunologic: Negative  Neurological: Negative  Hematological: Negative  Psychiatric/Behavioral: Negative  Objective: Tobacco Cessation Counseling: Tobacco cessation counseling was provided  The patient is sincerely urged to quit consumption of tobacco  He is not ready to quit tobacco            /86 (BP Location: Right arm, Patient Position: Sitting, Cuff Size: Large)   Temp 97 6 °F (36 4 °C) (Tympanic)   Ht 5' 4" (1 626 m)   Wt 94 8 kg (209 lb)   BMI 35 87 kg/m²          Physical Exam   Constitutional: He appears well-developed and well-nourished  No distress  HENT:   Head: Normocephalic     Right Ear: External ear normal    Left Ear: External ear normal    Mouth/Throat: Oropharynx is clear and moist  No oropharyngeal exudate  Eyes: Pupils are equal, round, and reactive to light  EOM are normal  Right eye exhibits no discharge  Left eye exhibits no discharge  No scleral icterus  Neck: Normal range of motion  Neck supple  No thyromegaly present  Cardiovascular: Normal rate, regular rhythm, normal heart sounds and intact distal pulses  Exam reveals no gallop and no friction rub  No murmur heard  Pulmonary/Chest: Effort normal and breath sounds normal  No respiratory distress  He has no wheezes  He has no rales  He exhibits no tenderness  Abdominal: Soft  Bowel sounds are normal  He exhibits no distension  There is no tenderness  There is no rebound and no guarding  Musculoskeletal: Normal range of motion  He exhibits no edema or tenderness  Lymphadenopathy:     He has no cervical adenopathy  Neurological: He is alert  No cranial nerve deficit  He exhibits normal muscle tone  Coordination normal    Skin: Skin is warm and dry  No rash noted  He is not diaphoretic  No erythema  No pallor  Psychiatric: He has a normal mood and affect  His behavior is normal  Judgment and thought content normal    Nursing note and vitals reviewed

## 2020-01-03 DIAGNOSIS — K29.00 ACUTE SUPERFICIAL GASTRITIS WITHOUT HEMORRHAGE: ICD-10-CM

## 2020-01-03 DIAGNOSIS — I10 ESSENTIAL HYPERTENSION: ICD-10-CM

## 2020-01-03 RX ORDER — AMLODIPINE BESYLATE 10 MG/1
TABLET ORAL
Qty: 90 TABLET | Refills: 0 | Status: SHIPPED | OUTPATIENT
Start: 2020-01-03 | End: 2020-03-16 | Stop reason: SDUPTHER

## 2020-01-03 RX ORDER — PANTOPRAZOLE SODIUM 40 MG/1
TABLET, DELAYED RELEASE ORAL
Qty: 90 TABLET | Refills: 0 | Status: SHIPPED | OUTPATIENT
Start: 2020-01-03 | End: 2020-03-16 | Stop reason: SDUPTHER

## 2020-01-15 ENCOUNTER — HOSPITAL ENCOUNTER (OUTPATIENT)
Dept: CT IMAGING | Facility: HOSPITAL | Age: 70
Discharge: HOME | End: 2020-01-15
Payer: MEDICARE

## 2020-01-15 DIAGNOSIS — R06.00 DYSPNEA, UNSPECIFIED TYPE: ICD-10-CM

## 2020-01-15 PROCEDURE — 71260 CT THORAX DX C+: CPT

## 2020-01-15 RX ADMIN — IOHEXOL 85 ML: 350 INJECTION, SOLUTION INTRAVENOUS at 20:20

## 2020-01-17 ENCOUNTER — OFFICE VISIT (OUTPATIENT)
Dept: FAMILY MEDICINE CLINIC | Facility: CLINIC | Age: 70
End: 2020-01-17
Payer: MEDICARE

## 2020-01-17 VITALS
HEIGHT: 64 IN | DIASTOLIC BLOOD PRESSURE: 80 MMHG | WEIGHT: 205 LBS | BODY MASS INDEX: 35 KG/M2 | TEMPERATURE: 97.6 F | SYSTOLIC BLOOD PRESSURE: 138 MMHG

## 2020-01-17 DIAGNOSIS — R05.9 COUGH: ICD-10-CM

## 2020-01-17 DIAGNOSIS — I71.2 THORACIC AORTIC ANEURYSM WITHOUT RUPTURE (HCC): Primary | ICD-10-CM

## 2020-01-17 DIAGNOSIS — K21.00 GASTROESOPHAGEAL REFLUX DISEASE WITH ESOPHAGITIS: Primary | ICD-10-CM

## 2020-01-17 DIAGNOSIS — I10 ESSENTIAL HYPERTENSION: ICD-10-CM

## 2020-01-17 PROCEDURE — 99214 OFFICE O/P EST MOD 30 MIN: CPT | Performed by: FAMILY MEDICINE

## 2020-01-17 RX ORDER — DOXAZOSIN 2 MG/1
2 TABLET ORAL
Qty: 90 TABLET | Refills: 1 | Status: SHIPPED | OUTPATIENT
Start: 2020-01-17 | End: 2020-03-16 | Stop reason: SDUPTHER

## 2020-01-17 RX ORDER — PROMETHAZINE HYDROCHLORIDE AND CODEINE PHOSPHATE 6.25; 1 MG/5ML; MG/5ML
5 SYRUP ORAL EVERY 4 HOURS PRN
Qty: 120 ML | Refills: 1 | Status: SHIPPED | OUTPATIENT
Start: 2020-01-17 | End: 2020-10-13

## 2020-01-17 NOTE — PROGRESS NOTES
Assessment/Plan:Patient will be going to thoracic surgeon for ascending aneurysm of the heart  Patient will use Phenergan with codeine for cough  Patient will continue with   Cardura for hypertension  Patient will add Pepcid at night for GERD symptoms if coughing persist        Diagnoses and all orders for this visit:    Gastroesophageal reflux disease with esophagitis    Cough  -     promethazine-codeine (PHENERGAN WITH CODEINE) 6 25-10 mg/5 mL syrup; Take 5 mL by mouth every 4 (four) hours as needed for cough    Essential hypertension  -     doxazosin (CARDURA) 2 mg tablet; Take 1 tablet (2 mg total) by mouth daily at bedtime            Subjective:        Patient ID: Álvaro Ortega is a 77 y o  male  Patient is here with cough x5 days  No sputum production  No sore throat rhinorrhea or fevers  This is worse at night  The following portions of the patient's history were reviewed and updated as appropriate: allergies, current medications, past family history, past medical history, past social history, past surgical history and problem list       Review of Systems   Constitutional: Negative  HENT: Negative  Eyes: Negative  Respiratory: Positive for cough  Cardiovascular: Negative  Gastrointestinal: Negative  Endocrine: Negative  Genitourinary: Negative  Musculoskeletal: Negative  Skin: Negative  Allergic/Immunologic: Negative  Neurological: Negative  Hematological: Negative  Psychiatric/Behavioral: Negative  Objective:      BMI Counseling: Body mass index is 35 19 kg/m²  The BMI is above normal  Nutrition recommendations include decreasing portion sizes  Exercise recommendations include moderate physical activity 150 minutes/week  /80   Temp 97 6 °F (36 4 °C)   Ht 5' 4" (1 626 m)   Wt 93 kg (205 lb)   BMI 35 19 kg/m²          Physical Exam   Constitutional: He appears well-developed and well-nourished  No distress     HENT: Head: Normocephalic  Right Ear: External ear normal    Left Ear: External ear normal    Mouth/Throat: Oropharynx is clear and moist  No oropharyngeal exudate  Eyes: Pupils are equal, round, and reactive to light  EOM are normal  Right eye exhibits no discharge  Left eye exhibits no discharge  No scleral icterus  Neck: Normal range of motion  Neck supple  No thyromegaly present  Cardiovascular: Normal rate, regular rhythm, normal heart sounds and intact distal pulses  Exam reveals no gallop and no friction rub  No murmur heard  Pulmonary/Chest: Effort normal and breath sounds normal  No respiratory distress  He has no wheezes  He has no rales  He exhibits no tenderness  Musculoskeletal: Normal range of motion  He exhibits no edema or tenderness  Lymphadenopathy:     He has no cervical adenopathy  Neurological: He is alert  No cranial nerve deficit  He exhibits normal muscle tone  Coordination normal    Skin: Skin is warm and dry  No rash noted  He is not diaphoretic  No erythema  No pallor  Psychiatric: He has a normal mood and affect  His behavior is normal  Judgment and thought content normal    Nursing note and vitals reviewed

## 2020-01-21 ENCOUNTER — OFFICE VISIT (OUTPATIENT)
Dept: CARDIAC SURGERY | Facility: CLINIC | Age: 70
End: 2020-01-21
Payer: MEDICARE

## 2020-01-21 VITALS
SYSTOLIC BLOOD PRESSURE: 134 MMHG | HEART RATE: 85 BPM | WEIGHT: 207 LBS | HEIGHT: 64 IN | TEMPERATURE: 97.6 F | RESPIRATION RATE: 14 BRPM | DIASTOLIC BLOOD PRESSURE: 72 MMHG | OXYGEN SATURATION: 99 % | BODY MASS INDEX: 35.34 KG/M2

## 2020-01-21 DIAGNOSIS — I71.2 ASCENDING AORTIC ANEURYSM (HCC): Primary | ICD-10-CM

## 2020-01-21 DIAGNOSIS — I71.2 THORACIC AORTIC ANEURYSM WITHOUT RUPTURE (HCC): ICD-10-CM

## 2020-01-21 PROBLEM — I71.21 ASCENDING AORTIC ANEURYSM: Status: ACTIVE | Noted: 2020-01-21

## 2020-01-21 PROCEDURE — 99204 OFFICE O/P NEW MOD 45 MIN: CPT | Performed by: NURSE PRACTITIONER

## 2020-01-21 RX ORDER — DIPHENHYDRAMINE HCL 25 MG
25 CAPSULE ORAL AS NEEDED
COMMUNITY
End: 2022-03-09

## 2020-01-21 NOTE — PROGRESS NOTES
Aortic Clinic  Marli Kim YMVUIB 77 y o  male MRN: [id number]    Physician Requesting Consult: Minesh Rausch DO    Reason for Consult / Principal Problem: Ascending aortic aneurysm    History of Present Illness: Kaley Orantes is a 77y o  year old male who presents today for initial visit in our aortic clinic for an ascending aortic aneurysm  Patient was seen in the ED in December with tongue swelling thought to be angioedema from ACE Inhibitor therapy  Patient was evaluated by pulmonary medicine who recommended CT scan of the chest to role out vena cava syndrome  CT scan was performed 1/15/20 and demonstrated an incidental finding of a 41 mm ascending aortic aneurysm  Previous lung cancer screening CT scan performed 2/2014 demonstartes similar findings of 40 mm ascending aortic aneurysmal dilatation  Upon interview today, patient states he is feeling well  He has a h/o HTN, currently controlled with Amlodipine 10 mg daily  He quit smoking December 2018  He has a h/o IFG and lipid disorder but currently on no medications  He has follow up lab work and PCP appt scheduled  He deenis chest pain, SOB, MANNING, lightheadedness, palpitations, syncope or extremity pain, weakness or numbness  He deensi any significant FH for aneurysms or SCD       Past Medical History:  Past Medical History:   Diagnosis Date    Abnormal CAT scan     Bilateral inguinal hernia 1990    recurrent    Colitis     Gastritis 12/11/2012    Hypertension     Impaired fasting glucose 7/9/2019    Nausea & vomiting     Right knee meniscal tear 12/20/2017    Sleep apnea     uses cpap         Past Surgical History:   Past Surgical History:   Procedure Laterality Date    CHOLECYSTECTOMY      COLONOSCOPY  2017    ABOUT 2-3 YEARS AGO    EXPLORATORY LAPAROTOMY  08/16/2014    done for repair of peritoneal "RENT"?    HEMORRHOID SURGERY      INGUINAL HERNIA REPAIR Bilateral 08/14/2014    OR ESOPHAGOGASTRODUODENOSCOPY TRANSORAL DIAGNOSTIC N/A 2017    Procedure: EGD AND COLONOSCOPY;  Surgeon: Esau Nieto MD;  Location: [facility]; Service: Gastroenterology         Family History:  Family History   Problem Relation Age of Onset    Breast cancer Mother     Heart disease Father     Heart disease Brother     Heart disease Family          Social History:    Social History     Substance and Sexual Activity   Alcohol Use Yes    Comment: social     Social History     Substance and Sexual Activity   Drug Use No     Social History     Tobacco Use   Smoking Status Former Smoker    Packs/day: 1 00    Years: 40 00    Pack years: 40 00    Types: Cigarettes    Last attempt to quit: 2019    Years since quittin 1   Smokeless Tobacco Never Used         Home Medications:   Prior to Admission medications    Medication Sig Start Date End Date Taking? Authorizing Provider   amLODIPine (NORVASC) 10 mg tablet TAKE 1 TABLET BY MOUTH DAILY 1/3/20  Yes Samella Sessions, DO   diphenhydrAMINE (BENADRYL) 25 mg capsule Take 25 mg by mouth as needed for itching   Yes Historical Provider, MD   doxazosin (CARDURA) 2 mg tablet Take 1 tablet (2 mg total) by mouth daily at bedtime 20  Yes Samella Sessions, DO   pantoprazole (PROTONIX) 40 mg tablet TAKE 1 TABLET BY MOUTH DAILY 1/3/20  Yes Samella Sessions, DO   promethazine-codeine (PHENERGAN WITH CODEINE) 6 25-10 mg/5 mL syrup Take 5 mL by mouth every 4 (four) hours as needed for cough 20  Yes Samella Sessions, DO   aspirin 81 MG tablet Take 81 mg by mouth every other day   20  Historical Provider, MD   cetirizine (ZyrTEC) 10 MG chewable tablet Chew 1 tablet (10 mg total) daily for 14 days 19  Job Saldana,    famotidine (PEPCID) 20 mg tablet Take 1 tablet (20 mg total) by mouth 2 (two) times a day for 14 days 19  Sarah Argueta,        Allergies:   Allergies   Allergen Reactions    Ace Inhibitors Angioedema       Review of Systems:   Review of Systems - History obtained from chart review and the patient  General ROS: negative  Psychological ROS: negative  Ophthalmic ROS: negative  ENT ROS: negative  Hematological and Lymphatic ROS: negative for - bleeding problems, blood clots, bruising or jaundice  Respiratory ROS: no cough, shortness of breath, or wheezing  Cardiovascular ROS: no chest pain or dyspnea on exertion  Gastrointestinal ROS: no abdominal pain, change in bowel habits, or black or bloody stools  Genito-Urinary ROS: no dysuria, trouble voiding, or hematuria  Musculoskeletal ROS: negative  Neurological ROS: no TIA or stroke symptoms  Dermatological ROS: negative    Vital Signs:   Vitals:    01/21/20 1400 01/21/20 1426   BP: 130/78 134/72   BP Location: Left arm Right arm   Cuff Size: Adult Adult   Pulse: 85    Resp: 14    Temp: 97 6 °F (36 4 °C)    TempSrc: Oral    SpO2: 99%    Weight: 93 9 kg (207 lb)    Height: 5' 4" (1 626 m)        Physical Exam:  General: Alert, oriented, well developed, no acute distress  HEENT/NECK:  PERRLA  No jugular venous distention  Cardiac:Regular rate and rhythm, No murmurs rubs or gallops  Carotid arteries: 2+ pulses, no bruits  Pulmonary:  Breath sounds clear bilaterally  Abdomen:  Non-tender, Non-distended  Positive bowel sounds  Upper extremities: 2+ radial pulses; brisk capillary refill  Lower extremities: Extremities warm/dry  PT/DP pulses 2+ bilaterally  No edema B/L  Neuro: Alert and oriented X 3  Sensation is grossly intact  No focal deficits  Musculoskeletal: MAEE, stable gait  Skin: Warm/Dry, without rashes or lesions      Lab Results:     Lab Results   Component Value Date    HGBA1C 6 4 (H) 06/27/2017       Imaging Studies:     CT Chest:   41mm ascending aortic aneurysm      I have personally reviewed pertinent films in PACS    Assessment:  Patient Active Problem List    Diagnosis Date Noted    Ascending aortic aneurysm ([facility] [facility] ) 01/21/2020    Cough 01/17/2020    Angioedema, Likely ACE inhibitor induced 12/21/2019    GERD (gastroesophageal reflux disease) 12/21/2019    Tobacco abuse 03/09/2016    Sleep apnea 05/20/2015    Hypertension 12/11/2012    Hyperlipidemia 12/11/2012       Plan:    CT imaging performed prior to this visit demonstrates the ascending aorta measuring 41 mm in size at its greatest diameter  This is stable and unchanged in comparison to CT scan performed 2/2014  This size does not meet surgical criteria and represents low risk for dissection or rupture  These findings were confirmed and shared with the patient today  Patient is asymptomatic, normotensive and has no family history  Follow-up monitoring is the treatment plan  Arrangements have been made for future surveillance to be completed with CT chest, without contrast in 1 year  Tamie Dolan was comfortable with our recommendations, and his questions were answered to his satisfaction  Thank you for allowing us to participate in the care of this patient  Aortic Aneurysm Instructions were provided to the patient as follows:    1  No lifting more than 50 pounds  Regular aerobic exercise permitted and recommended  2  Maintain a controlled blood pressure with a goal of less than 140/80  3  Follow up in Aortic Clinic as recommended with radiology follow up as instructed  4  Report to the ER or call 911 immediately with the following signs / symptoms: sudden onset of back pain, chest pain or shortness of breath  5  Remain tobacco free  The patient recently had a screening colonoscopy in 2/2017  Therefore GI referral is not indicated at this time       SIGNATURE: MELVIN Meza  DATE: January 21, 2020  TIME: 3:01 PM

## 2020-03-16 DIAGNOSIS — I10 ESSENTIAL HYPERTENSION: ICD-10-CM

## 2020-03-16 DIAGNOSIS — K29.00 ACUTE SUPERFICIAL GASTRITIS WITHOUT HEMORRHAGE: ICD-10-CM

## 2020-03-16 RX ORDER — AMLODIPINE BESYLATE 10 MG/1
10 TABLET ORAL DAILY
Qty: 90 TABLET | Refills: 0 | Status: SHIPPED | OUTPATIENT
Start: 2020-03-16 | End: 2020-06-15

## 2020-03-16 RX ORDER — DOXAZOSIN 2 MG/1
2 TABLET ORAL
Qty: 90 TABLET | Refills: 1 | Status: SHIPPED | OUTPATIENT
Start: 2020-03-16 | End: 2020-09-11

## 2020-03-16 RX ORDER — PANTOPRAZOLE SODIUM 40 MG/1
40 TABLET, DELAYED RELEASE ORAL DAILY
Qty: 90 TABLET | Refills: 0 | Status: SHIPPED | OUTPATIENT
Start: 2020-03-16 | End: 2020-06-15

## 2020-06-15 DIAGNOSIS — I10 ESSENTIAL HYPERTENSION: ICD-10-CM

## 2020-06-15 DIAGNOSIS — K29.00 ACUTE SUPERFICIAL GASTRITIS WITHOUT HEMORRHAGE: ICD-10-CM

## 2020-06-15 RX ORDER — PANTOPRAZOLE SODIUM 40 MG/1
TABLET, DELAYED RELEASE ORAL
Qty: 90 TABLET | Refills: 0 | Status: SHIPPED | OUTPATIENT
Start: 2020-06-15 | End: 2020-10-05

## 2020-06-15 RX ORDER — AMLODIPINE BESYLATE 10 MG/1
TABLET ORAL
Qty: 90 TABLET | Refills: 0 | Status: SHIPPED | OUTPATIENT
Start: 2020-06-15 | End: 2020-10-05

## 2020-07-31 ENCOUNTER — TELEPHONE (OUTPATIENT)
Dept: FAMILY MEDICINE CLINIC | Facility: CLINIC | Age: 70
End: 2020-07-31

## 2020-07-31 NOTE — TELEPHONE ENCOUNTER
----- Message from Genaro Damico MA sent at 7/24/2020 10:40 AM EDT -----  All of these patients need AWV appt and they do not have upcomming appt  Please make one phone call only, and if unable to reach patient please send msg back to jessica

## 2020-08-03 ENCOUNTER — OFFICE VISIT (OUTPATIENT)
Dept: SLEEP CENTER | Facility: CLINIC | Age: 70
End: 2020-08-03
Payer: MEDICARE

## 2020-08-03 VITALS
BODY MASS INDEX: 36.54 KG/M2 | HEIGHT: 64 IN | HEART RATE: 86 BPM | DIASTOLIC BLOOD PRESSURE: 80 MMHG | SYSTOLIC BLOOD PRESSURE: 140 MMHG | WEIGHT: 214 LBS

## 2020-08-03 DIAGNOSIS — G47.33 OBSTRUCTIVE SLEEP APNEA TREATED WITH CONTINUOUS POSITIVE AIRWAY PRESSURE (CPAP): Primary | ICD-10-CM

## 2020-08-03 DIAGNOSIS — Z99.89 OBSTRUCTIVE SLEEP APNEA TREATED WITH CONTINUOUS POSITIVE AIRWAY PRESSURE (CPAP): Primary | ICD-10-CM

## 2020-08-03 PROCEDURE — 1160F RVW MEDS BY RX/DR IN RCRD: CPT | Performed by: NURSE PRACTITIONER

## 2020-08-03 PROCEDURE — 3008F BODY MASS INDEX DOCD: CPT | Performed by: NURSE PRACTITIONER

## 2020-08-03 PROCEDURE — 3077F SYST BP >= 140 MM HG: CPT | Performed by: NURSE PRACTITIONER

## 2020-08-03 PROCEDURE — 99214 OFFICE O/P EST MOD 30 MIN: CPT | Performed by: NURSE PRACTITIONER

## 2020-08-03 PROCEDURE — 1036F TOBACCO NON-USER: CPT | Performed by: NURSE PRACTITIONER

## 2020-08-03 PROCEDURE — 3079F DIAST BP 80-89 MM HG: CPT | Performed by: NURSE PRACTITIONER

## 2020-08-03 PROCEDURE — 4040F PNEUMOC VAC/ADMIN/RCVD: CPT | Performed by: NURSE PRACTITIONER

## 2020-08-03 NOTE — PATIENT INSTRUCTIONS
1   Schedule an appointment for set up of new CPAP equipment  2  Begin using your CPAP equipment every night  3  Begin cleaning your CPAP daily after use and once weekly with 1 part white vinegar and 10 parts water  4  Contact your medical equipment distributor regarding any questions concerning your CPAP equipment  5  Contact the Sleep [address] [place] [address] with any other questions, prior to next visit, if needed  6  Schedule compliance follow-up visit in 2-3 months      Nursing Support:  When: Monday through Friday 7A-5PM except holidays  Where: Our direct line is [phone]  If you are having a true emergency please call 911  In the event that the line is busy or it is after hours please leave a voice message and we will return your call  Please speak clearly, leaving your full name, birth date, best number to reach you and the reason for your call  Medication refills: We will need the name of the medication, the dosage, the ordering provider, whether you get a 30 or 90 day refill, and the pharmacy name and address  Medications will be ordered by the provider only  Nurses cannot call in prescriptions  Please allow 7 days for medication refills  Physician requested updates: If your provider requested that you call with an update after starting medication, please be ready to provide us the medication and dosage, what time you take your medication, the time you attempt to fall asleep, time you fall asleep, when you wake up, and what time you get out of bed  Sleep Study Results: We will contact you with sleep study results and/or next steps after the physician has reviewed your testing

## 2020-08-03 NOTE — PROGRESS NOTES
Progress Note - Sim MAY 72 y o  male   PMB:[birth year], MRN: [id number]  8/3/2020          Follow Up Evaluation / Problem: Moderate Obstructive Sleep Apnea  Obesity      Thank you for the opportunity of participating in the evaluation and care of this patient in the Sleep Clinic at [facility]  HPI: Radha Do is a 72y o  year old male  The patient presents for follow up of obstructive sleep apnea  He was diagnosed with moderate obstructive sleep apnea in May 2015, with an AHI of 16 3, that worsened to 49 1 during REM sleep  Study was noted to have been scored for hypopneas at 4%  He subsequently completed a CPAP titration study and was set up with CPAP equipment at 9cm of water pressure  He reports that he has continued to use his CPAP equipment since set up, any time he is sleeping  He reports that his full face mask is comfortable and the pressure is comfortable as well  He has noticed that his machine is not using as much water lately and he believes that it turns off at times during the night  He would like to have his equipment replaced  His comorbid conditions include HTN, GERD and new finding of ascending aortic aneurysm      Review of Systems      Genitourinary need to urinate more than twice a night and difficulty with erection   Cardiology none   Gastrointestinal frequent heartburn/acid reflux   Neurology none   Constitutional none   Integumentary none   Psychiatry none   Musculoskeletal none   Pulmonary none   ENT none   Endocrine none   Hematological none         Current Outpatient Medications:     amLODIPine (NORVASC) 10 mg tablet, Take 1 tablet by mouth daily, Disp: 90 tablet, Rfl: 0    diphenhydrAMINE (BENADRYL) 25 mg capsule, Take 25 mg by mouth as needed for itching, Disp: , Rfl:     doxazosin (CARDURA) 2 mg tablet, Take 1 tablet (2 mg total) by mouth daily at bedtime, Disp: 90 tablet, Rfl: 1    pantoprazole (PROTONIX) 40 mg tablet, Take 1 tablet by mouth once daily, Disp: 90 tablet, Rfl: 0    promethazine-codeine (PHENERGAN WITH CODEINE) 6 25-10 mg/5 mL syrup, Take 5 mL by mouth every 4 (four) hours as needed for cough, Disp: 120 mL, Rfl: 1    [place] Sleepiness Scale  Sitting and reading: Slight chance of dozing  Watching TV: Slight chance of dozing  Sitting, inactive in a public place (e g  a theatre or a meeting): Slight chance of dozing  As a passenger in a car for an hour without a break: Would never doze  Lying down to rest in the afternoon when circumstances permit: Slight chance of dozing  Sitting and talking to someone: Slight chance of dozing  Sitting quietly after a lunch without alcohol: Slight chance of dozing  In a car, while stopped for a few minutes in traffic: Would never doze  Total score: 6              Vitals:    08/03/20 1200   BP: 140/80   Pulse: 86   Weight: 97 1 kg (214 lb)   Height: 5' 4"       Body mass index is 36 73 kg/m²  EPWORTH SLEEPINESS SCORE  Total score: 6      Past History Since Last Sleep Center Visit:   Since his last visit, he was hospitalized due to angioedema related to use of an ace inhibitor  He was also noted to have an ascending aortic aneurysm  He continues to use his CPAP equipment and would like to have it replaced, due to possible mechanical issues  The patient reports that he cleans the equipment appropriately and changes supplies on a regular basis  The review of systems and following portions of the patient's history were reviewed and updated as appropriate: allergies, current medications, past family history, past medical history, past social history, past surgical history, and problem list         OBJECTIVE    PAP Pressure: Nasal CPAP set to deliver 9 cm of water pressure  Type of mask used: full face  DME Provider:  [employer]    Physical Exam:     General Appearance:   Alert, cooperative, no distress, appears stated age, obese     Head:   Normocephalic, without obvious abnormality, atraumatic     Eyes:   PERRL, conjunctiva/corneas clear, EOM's intact          Nose:  Nares normal, septum midline, no drainage or sinus tenderness           Throat:  Lips, teeth and gums normal; tongue normal size and  shape and midline mucosa moist with low-lying soft palatal tissue noted, uvula barely visualized, tonsils not visualized, Mallampati class 4       Neck:  Supple, symmetrical, trachea midline, no adenopathy; Thyroid: No enlargement, tenderness or nodules; no carotid bruit or JVD     Lungs:      Clear to auscultation bilaterally, respirations unlabored     Heart:   Regular rate and rhythm, S1 and S2 normal, no murmur, rub or gallop       Extremities:  Extremities normal, atraumatic, no cyanosis or edema       Skin:  Skin color, texture, turgor normal, no rashes or lesions       Neurologic:  No focal deficits noted       ASSESSMENT / PLAN    1  Obstructive sleep apnea treated with continuous positive airway pressure (CPAP)  Cpap New DME           Counseling / Coordination of Care  Total clinic time spent today 25 minutes  Greater than 50% of total time was spent with the patient and / or family counseling and / or coordination of care  A description of the counseling / coordination of care:     Impressions, Diagnostic results, Prognosis, Instructions for management, Risks and benefits of treatment, Patient and family education, Risk factor reductions and Importance of compliance with treatment    Today I reviewed the patient's compliance data  he has been able to use the equipment 100% of all days recorded  Average usage was 4 or more hours 100% of all days recorded  The estimated AHI is 1 2 abnormal breathing events per hour  The patient feels they benefit from the use of PAP equipment and would like to continue PAP therapy  Response to treatment has been very good    New CPAP equipment has been ordered due to him feeling like it is turning off by itself  New CPAP equipment has been ordered  He will schedule an appointment for set up of new CPAP equipment  He will follow up 31-91 days later to review compliance and effectiveness of treatment  The following instructions have been given to the patient today:    Patient Instructions   1  Schedule an appointment for set up of new CPAP equipment  2  Begin using your CPAP equipment every night  3  Begin cleaning your CPAP daily after use and once weekly with 1 part white vinegar and 10 parts water  4  Contact your medical equipment distributor regarding any questions concerning your CPAP equipment  5  Contact the [address] with any other questions, prior to next visit, if needed  6  Schedule compliance follow-up visit in 2-3 months      Nursing Support:  When: Monday through Friday 7A-5PM except holidays  Where: Our direct line is [phone]  If you are having a true emergency please call 911  In the event that the line is busy or it is after hours please leave a voice message and we will return your call  Please speak clearly, leaving your full name, birth date, best number to reach you and the reason for your call  Medication refills: We will need the name of the medication, the dosage, the ordering provider, whether you get a 30 or 90 day refill, and the pharmacy name and address  Medications will be ordered by the provider only  Nurses cannot call in prescriptions  Please allow 7 days for medication refills  Physician requested updates: If your provider requested that you call with an update after starting medication, please be ready to provide us the medication and dosage, what time you take your medication, the time you attempt to fall asleep, time you fall asleep, when you wake up, and what time you get out of bed  Sleep Study Results:  We will contact you with sleep study results and/or next steps after the physician has reviewed your testing        Megan Osman, [address] [facility]

## 2020-08-11 ENCOUNTER — TELEPHONE (OUTPATIENT)
Dept: SLEEP CENTER | Facility: CLINIC | Age: 70
End: 2020-08-11

## 2020-09-11 ENCOUNTER — OFFICE VISIT (OUTPATIENT)
Dept: FAMILY MEDICINE CLINIC | Facility: CLINIC | Age: 70
End: 2020-09-11
Payer: MEDICARE

## 2020-09-11 VITALS
WEIGHT: 215.2 LBS | RESPIRATION RATE: 16 BRPM | TEMPERATURE: 97.2 F | HEART RATE: 80 BPM | DIASTOLIC BLOOD PRESSURE: 92 MMHG | HEIGHT: 64 IN | SYSTOLIC BLOOD PRESSURE: 142 MMHG | BODY MASS INDEX: 36.74 KG/M2

## 2020-09-11 DIAGNOSIS — E78.2 MIXED HYPERLIPIDEMIA: ICD-10-CM

## 2020-09-11 DIAGNOSIS — Z00.00 MEDICARE ANNUAL WELLNESS VISIT, SUBSEQUENT: ICD-10-CM

## 2020-09-11 DIAGNOSIS — Z23 ENCOUNTER FOR IMMUNIZATION: ICD-10-CM

## 2020-09-11 DIAGNOSIS — I71.2 ASCENDING AORTIC ANEURYSM (HCC): ICD-10-CM

## 2020-09-11 DIAGNOSIS — E08.43 DIABETES MELLITUS DUE TO UNDERLYING CONDITION WITH DIABETIC AUTONOMIC NEUROPATHY, WITHOUT LONG-TERM CURRENT USE OF INSULIN (HCC): ICD-10-CM

## 2020-09-11 DIAGNOSIS — J44.9 CHRONIC OBSTRUCTIVE PULMONARY DISEASE, UNSPECIFIED COPD TYPE (HCC): ICD-10-CM

## 2020-09-11 DIAGNOSIS — K21.00 GASTROESOPHAGEAL REFLUX DISEASE WITH ESOPHAGITIS: ICD-10-CM

## 2020-09-11 DIAGNOSIS — I10 ESSENTIAL HYPERTENSION: Primary | ICD-10-CM

## 2020-09-11 PROBLEM — R73.9 HYPERGLYCEMIA: Status: ACTIVE | Noted: 2020-09-11

## 2020-09-11 PROCEDURE — G0439 PPPS, SUBSEQ VISIT: HCPCS | Performed by: FAMILY MEDICINE

## 2020-09-11 PROCEDURE — 90732 PPSV23 VACC 2 YRS+ SUBQ/IM: CPT

## 2020-09-11 PROCEDURE — G0009 ADMIN PNEUMOCOCCAL VACCINE: HCPCS

## 2020-09-11 PROCEDURE — 99214 OFFICE O/P EST MOD 30 MIN: CPT | Performed by: FAMILY MEDICINE

## 2020-09-11 RX ORDER — METOPROLOL SUCCINATE 25 MG/1
25 TABLET, EXTENDED RELEASE ORAL DAILY
Qty: 30 TABLET | Refills: 5 | Status: SHIPPED | OUTPATIENT
Start: 2020-09-11 | End: 2020-10-13 | Stop reason: SDUPTHER

## 2020-09-11 NOTE — PATIENT INSTRUCTIONS

## 2020-09-11 NOTE — PROGRESS NOTES
Assessment/Plan: patient will stop card are as this may be the etiology for his dizziness  Patient will continue with other medications  Patient go for urinalysis as well as laboratory studies  Patient have pneumococcal 23 at this time  Patient with hyperglycemia  Possible diabetes  Patient start metoprolol as directed  Follow-up in 1 month  Patient try to stay hydrated well  Diagnoses and all orders for this visit:    Essential hypertension  -     Comprehensive metabolic panel; Future  -     Hemoglobin A1C; Future  -     CBC and differential; Future  -     Lipid panel; Future  -     TSH, 3rd generation with Free T4 reflex; Future  -     Microalbumin / creatinine urine ratio  -     Urinalysis with microscopic    Medicare annual wellness visit, subsequent    Encounter for immunization  -     PNEUMOCOCCAL POLYSACCHARIDE VACCINE 23-VALENT =>3YO SQ IM (Pneumovax)    Gastroesophageal reflux disease with esophagitis  -     Comprehensive metabolic panel; Future  -     Hemoglobin A1C; Future  -     CBC and differential; Future  -     Lipid panel; Future  -     TSH, 3rd generation with Free T4 reflex; Future  -     Microalbumin / creatinine urine ratio  -     Urinalysis with microscopic    Ascending aortic aneurysm (HCC)  -     Comprehensive metabolic panel; Future  -     Hemoglobin A1C; Future  -     CBC and differential; Future  -     Lipid panel; Future  -     TSH, 3rd generation with Free T4 reflex; Future  -     Microalbumin / creatinine urine ratio  -     Urinalysis with microscopic    Mixed hyperlipidemia  -     Comprehensive metabolic panel; Future  -     Hemoglobin A1C; Future  -     CBC and differential; Future  -     Lipid panel; Future  -     TSH, 3rd generation with Free T4 reflex; Future  -     Microalbumin / creatinine urine ratio  -     Urinalysis with microscopic    Chronic obstructive pulmonary disease, unspecified [facility] )  -     Comprehensive metabolic panel;  Future  -     Hemoglobin A1C; Future  -     CBC and differential; Future  -     Lipid panel; Future  -     TSH, 3rd generation with Free T4 reflex; Future  -     Microalbumin / creatinine urine ratio  -     Urinalysis with microscopic    Diabetes mellitus due to underlying condition with diabetic autonomic neuropathy, without long-term current use of insulin (HCC)   -     Hemoglobin A1C; Future            Subjective:        Patient ID: Rogelio Clay is a 72 y o  male  Patient is here with dizziness over the past week  Patient notices lightheadedness especially when trying to stand up  Patient with increased anxiety due to friend passed away recently also  No substernal chest pain shortness of breath diaphoresis nausea vomiting associated with this  No vertigo  Patient notices the dizziness when standing up from crouched position  Blood sugars have been running high  No syncope or near syncope  No sinus related issues  No problems urinating or defecating  The no diplopia or dysphagia or dysarthria  No weakness or numbness of the upper extremities lower extremities  The following portions of the patient's history were reviewed and updated as appropriate: allergies, current medications, past family history, past medical history, past social history, past surgical history and problem list       Review of Systems   Constitutional: Negative  HENT: Negative  Eyes: Negative  Respiratory: Negative  Cardiovascular: Negative  Gastrointestinal: Negative  Endocrine: Negative  Genitourinary: Negative  Musculoskeletal: Negative  Skin: Negative  Allergic/Immunologic: Negative  Neurological: Positive for dizziness  Hematological: Negative  Psychiatric/Behavioral: Negative  Objective:      BMI Counseling: Body mass index is 36 94 kg/m²  The BMI is above normal  Nutrition recommendations include decreasing portion sizes   Exercise recommendations include moderate physical activity 150 minutes/week  Depression Screening and Follow-up Plan: Patient's depression screening was positive with a PHQ-2 score of 1  Clincally patient does not have depression  No treatment is required  /92 (BP Location: Right arm, Patient Position: Sitting, Cuff Size: Standard)   Pulse 80   Temp (!) 97 2 °F (36 2 °C) (Probe)   Resp 16   Ht 5' 4" (1 626 m)   Wt 97 6 kg (215 lb 3 2 oz)   BMI 36 94 kg/m²          Physical Exam  Vitals signs and nursing note reviewed  Constitutional:       General: He is not in acute distress  Appearance: Normal appearance  He is well-developed  He is not diaphoretic  HENT:      Head: Normocephalic and atraumatic  Right Ear: Tympanic membrane, ear canal and external ear normal       Left Ear: Tympanic membrane, ear canal and external ear normal       Nose: Nose normal  No congestion  Mouth/Throat:      Pharynx: No oropharyngeal exudate  Eyes:      General: No scleral icterus  Right eye: No discharge  Left eye: No discharge  Pupils: Pupils are equal, round, and reactive to light  Neck:      Musculoskeletal: Normal range of motion and neck supple  Thyroid: No thyromegaly  Cardiovascular:      Rate and Rhythm: Normal rate and regular rhythm  Heart sounds: Normal heart sounds  No murmur  No friction rub  No gallop  Pulmonary:      Effort: Pulmonary effort is normal  No respiratory distress  Breath sounds: Normal breath sounds  No wheezing or rales  Chest:      Chest wall: No tenderness  Abdominal:      General: Bowel sounds are normal  There is no distension  Palpations: Abdomen is soft  Tenderness: There is no abdominal tenderness  There is no guarding or rebound  Musculoskeletal: Normal range of motion  General: No tenderness or deformity  Right lower leg: No edema  Left lower leg: No edema  Lymphadenopathy:      Cervical: No cervical adenopathy     Skin:     General: Skin is warm and dry  Capillary Refill: Capillary refill takes 2 to 3 seconds  Coloration: Skin is not pale  Findings: No erythema, lesion or rash  Neurological:      General: No focal deficit present  Mental Status: He is alert and oriented to person, place, and time  Mental status is at baseline  Cranial Nerves: No cranial nerve deficit  Motor: No abnormal muscle tone  Coordination: Coordination normal    Psychiatric:         Behavior: Behavior normal          Thought Content:  Thought content normal          Judgment: Judgment normal

## 2020-09-11 NOTE — PROGRESS NOTES
Assessment and Plan:     Problem List Items Addressed This Visit     None           Preventive health issues were discussed with patient, and age appropriate screening tests were ordered as noted in patient's After Visit Summary  Personalized health advice and appropriate referrals for health education or preventive services given if needed, as noted in patient's After Visit Summary  History of Present Illness:     Patient presents for Medicare Annual Wellness visit    Patient Care Team:  Garfield Beaver DO as PCP - General  Marda Dubin, MD Kaye Lambing, PA-C Marda Dubin, MD as Endoscopist     Problem List:     Patient Active Problem List   Diagnosis    Tobacco abuse    Obstructive sleep apnea treated with continuous positive airway pressure (CPAP)    Hypertension    Hyperlipidemia    Angioedema, Likely ACE inhibitor induced    GERD (gastroesophageal reflux disease)    Cough    Ascending aortic aneurysm [facility])      Past Medical and Surgical History:     Past Medical History:   Diagnosis Date    Abnormal CAT scan     Bilateral inguinal hernia 1990    recurrent    Colitis     Gastritis 12/11/2012    Hypertension     Impaired fasting glucose 7/9/2019    Nausea & vomiting     Right knee meniscal tear 12/20/2017    Sleep apnea     uses cpap     Past Surgical History:   Procedure Laterality Date    CHOLECYSTECTOMY      COLONOSCOPY  2017    ABOUT 2-3 YEARS AGO    EXPLORATORY LAPAROTOMY  08/16/2014    done for repair of peritoneal "RENT"?    HEMORRHOID SURGERY      INGUINAL HERNIA REPAIR Bilateral 08/14/2014    OR ESOPHAGOGASTRODUODENOSCOPY TRANSORAL DIAGNOSTIC N/A 2/23/2017    Procedure: EGD AND COLONOSCOPY;  Surgeon: Marda Dubin, MD;  Location: [facility];   Service: Gastroenterology      Family History:     Family History   Problem Relation Age of Onset    Breast cancer Mother     Heart disease Father     Heart disease Brother     Heart disease Family       Social History:        Social History Socioeconomic History    Marital status: /Civil Union     Spouse name: None    Number of children: None    Years of education: None    Highest education level: None   Occupational History    None   Social Needs    Financial resource strain: None    Food insecurity     Worry: None     Inability: None    Transportation needs     Medical: None     Non-medical: None   Tobacco Use    Smoking status: Former Smoker     Packs/day: 1 00     Years: 40 00     Pack years: 40 00     Types: Cigarettes     Last attempt to quit: 2019     Years since quittin 7    Smokeless tobacco: Former User   Substance and Sexual Activity    Alcohol use: Yes     Comment: social    Drug use: No    Sexual activity: Not Currently     Partners: Female   Lifestyle    Physical activity     Days per week: None     Minutes per session: None    Stress: None   Relationships    Social connections     Talks on phone: None     Gets together: None     Attends [employer] service: None     Active member of club or organization: None     Attends meetings of clubs or organizations: None     Relationship status: None    Intimate partner violence     Fear of current or ex partner: None     Emotionally abused: None     Physically abused: None     Forced sexual activity: None   Other Topics Concern    None   Social History Narrative    None      Medications and Allergies:     Current Outpatient Medications   Medication Sig Dispense Refill    amLODIPine (NORVASC) 10 mg tablet Take 1 tablet by mouth daily 90 tablet 0    diphenhydrAMINE (BENADRYL) 25 mg capsule Take 25 mg by mouth as needed for itching      doxazosin (CARDURA) 2 mg tablet Take 1 tablet (2 mg total) by mouth daily at bedtime 90 tablet 1    pantoprazole (PROTONIX) 40 mg tablet Take 1 tablet by mouth once daily 90 tablet 0    promethazine-codeine (PHENERGAN WITH CODEINE) 6 25-10 mg/5 mL syrup Take 5 mL by mouth every 4 (four) hours as needed for cough (Patient not taking: Reported on 9/11/2020) 120 mL 1     No current facility-administered medications for this visit  Allergies   Allergen Reactions    Ace Inhibitors Angioedema      Immunizations:     Immunization History   Administered Date(s) Administered    IPV 01/06/2005    Influenza Split High Dose Preservative Free IM 10/21/2016    Influenza TIV (IM) 10/13/2003    Pneumococcal Conjugate 13-Valent 06/11/2019    Td (adult), adsorbed 01/06/2005, 09/26/2017      Health Maintenance:         Topic Date Due    Hepatitis C Screening  Completed         Topic Date Due    DTaP,Tdap,and Td Vaccines (1 - Tdap) 01/23/1972    Pneumococcal Vaccine: 65+ Years (2 of 2 - PPSV23) 06/11/2020    Influenza Vaccine  07/01/2020      Medicare Health Risk Assessment:     /92 (BP Location: Right arm, Patient Position: Sitting, Cuff Size: Standard)   Pulse 80   Temp (!) 97 2 °F (36 2 °C) (Probe)   Resp 16   Ht 5' 4" (1 626 m)   Wt 97 6 kg (215 lb 3 2 oz)   BMI 36 94 kg/m²      Romero Gibson is here for his Subsequent Wellness visit  Last Medicare Wellness visit information reviewed, patient interviewed and updates made to the record today  Health Risk Assessment:   Patient rates overall health as good  Patient feels that their physical health rating is same  Eyesight was rated as same  Hearing was rated as same  Patient feels that their emotional and mental health rating is same  Pain experienced in the last 7 days has been none  Patient states that he has experienced no weight loss or gain in last 6 months  Depression Screening:   PHQ-2 Score: 1      Fall Risk Screening: In the past year, patient has experienced: no history of falling in past year      Home Safety:  Patient does not have trouble with stairs inside or outside of their home  Patient has working smoke alarms and has working carbon monoxide detector  Home safety hazards include: none  Nutrition:   Current diet is Regular       Medications:   Patient is not currently taking any over-the-counter supplements  Patient is able to manage medications  Activities of Daily Living (ADLs)/Instrumental Activities of Daily Living (IADLs):   Walk and transfer into and out of bed and chair?: Yes  Dress and groom yourself?: Yes    Bathe or shower yourself?: Yes    Feed yourself? Yes  Do your laundry/housekeeping?: Yes  Manage your money, pay your bills and track your expenses?: Yes  Make your own meals?: Yes    Do your own shopping?: Yes    Previous Hospitalizations:   Any hospitalizations or ED visits within the last 12 months?: Yes    How many hospitalizations have you had in the last year?: 1-2    Advance Care Planning:   Living will: No    Durable POA for healthcare: No    Advanced directive: No      Cognitive Screening:   Provider or family/friend/caregiver concerned regarding cognition?: No    PREVENTIVE SCREENINGS      Cardiovascular Screening:    General: Screening Not Indicated, History Lipid Disorder, Risks and Benefits Discussed and Screening Current      Diabetes Screening:     General: Screening Current and Risks and Benefits Discussed      Colorectal Cancer Screening:     General: Screening Current      Prostate Cancer Screening:    General: Risks and Benefits Discussed and Screening Current      Osteoporosis Screening:    General: Risks and Benefits Discussed and Screening Not Indicated      Abdominal Aortic Aneurysm (AAA) Screening:    Risk factors include: age between 73-69 yo and tobacco use        General: Risks and Benefits Discussed      Lung Cancer Screening:     General: Risks and Benefits Discussed      Hepatitis C Screening:    General: Screening Current and Risks and Benefits Discussed    Other Counseling Topics:   Calcium and vitamin D intake and regular weightbearing exercise         Avelina Parikh DO

## 2020-10-05 ENCOUNTER — APPOINTMENT (OUTPATIENT)
Dept: LAB | Facility: HOSPITAL | Age: 70
End: 2020-10-05
Payer: MEDICARE

## 2020-10-05 DIAGNOSIS — J44.9 CHRONIC OBSTRUCTIVE PULMONARY DISEASE, UNSPECIFIED COPD TYPE (HCC): ICD-10-CM

## 2020-10-05 DIAGNOSIS — I71.2 ASCENDING AORTIC ANEURYSM (HCC): ICD-10-CM

## 2020-10-05 DIAGNOSIS — I10 ESSENTIAL HYPERTENSION: ICD-10-CM

## 2020-10-05 DIAGNOSIS — E78.2 MIXED HYPERLIPIDEMIA: ICD-10-CM

## 2020-10-05 DIAGNOSIS — K29.00 ACUTE SUPERFICIAL GASTRITIS WITHOUT HEMORRHAGE: ICD-10-CM

## 2020-10-05 DIAGNOSIS — E08.43 DIABETES MELLITUS DUE TO UNDERLYING CONDITION WITH DIABETIC AUTONOMIC NEUROPATHY, WITHOUT LONG-TERM CURRENT USE OF INSULIN (HCC): ICD-10-CM

## 2020-10-05 DIAGNOSIS — K21.00 GASTROESOPHAGEAL REFLUX DISEASE WITH ESOPHAGITIS: ICD-10-CM

## 2020-10-05 LAB
ALBUMIN SERPL BCP-MCNC: 4 G/DL (ref 3.5–5)
ALP SERPL-CCNC: 74 U/L (ref 46–116)
ALT SERPL W P-5'-P-CCNC: 40 U/L (ref 12–78)
ANION GAP SERPL CALCULATED.3IONS-SCNC: 7 MMOL/L (ref 4–13)
AST SERPL W P-5'-P-CCNC: 23 U/L (ref 5–45)
BACTERIA UR QL AUTO: NORMAL /HPF
BASOPHILS # BLD AUTO: 0.05 THOUSANDS/ΜL (ref 0–0.1)
BASOPHILS NFR BLD AUTO: 1 % (ref 0–1)
BILIRUB SERPL-MCNC: 0.92 MG/DL (ref 0.2–1)
BILIRUB UR QL STRIP: NEGATIVE
BUN SERPL-MCNC: 18 MG/DL (ref 5–25)
CALCIUM SERPL-MCNC: 9 MG/DL (ref 8.3–10.1)
CHLORIDE SERPL-SCNC: 104 MMOL/L (ref 100–108)
CHOLEST SERPL-MCNC: 197 MG/DL (ref 50–200)
CLARITY UR: CLEAR
CO2 SERPL-SCNC: 30 MMOL/L (ref 21–32)
COLOR UR: YELLOW
CREAT SERPL-MCNC: 1.29 MG/DL (ref 0.6–1.3)
CREAT UR-MCNC: 30.6 MG/DL
EOSINOPHIL # BLD AUTO: 0.26 THOUSAND/ΜL (ref 0–0.61)
EOSINOPHIL NFR BLD AUTO: 4 % (ref 0–6)
ERYTHROCYTE [DISTWIDTH] IN BLOOD BY AUTOMATED COUNT: 13.6 % (ref 11.6–15.1)
EST. AVERAGE GLUCOSE BLD GHB EST-MCNC: 134 MG/DL
GFR SERPL CREATININE-BSD FRML MDRD: 56 ML/MIN/1.73SQ M
GLUCOSE P FAST SERPL-MCNC: 143 MG/DL (ref 65–99)
GLUCOSE UR STRIP-MCNC: NEGATIVE MG/DL
HBA1C MFR BLD: 6.3 %
HCT VFR BLD AUTO: 51.2 % (ref 36.5–49.3)
HDLC SERPL-MCNC: 32 MG/DL
HGB BLD-MCNC: 16.4 G/DL (ref 12–17)
HGB UR QL STRIP.AUTO: NEGATIVE
IMM GRANULOCYTES # BLD AUTO: 0.01 THOUSAND/UL (ref 0–0.2)
IMM GRANULOCYTES NFR BLD AUTO: 0 % (ref 0–2)
KETONES UR STRIP-MCNC: NEGATIVE MG/DL
LDLC SERPL CALC-MCNC: 140 MG/DL (ref 0–100)
LEUKOCYTE ESTERASE UR QL STRIP: NEGATIVE
LYMPHOCYTES # BLD AUTO: 1.43 THOUSANDS/ΜL (ref 0.6–4.47)
LYMPHOCYTES NFR BLD AUTO: 23 % (ref 14–44)
MCH RBC QN AUTO: 28.6 PG (ref 26.8–34.3)
MCHC RBC AUTO-ENTMCNC: 32 G/DL (ref 31.4–37.4)
MCV RBC AUTO: 89 FL (ref 82–98)
MICROALBUMIN UR-MCNC: 71.7 MG/L (ref 0–20)
MICROALBUMIN/CREAT 24H UR: 234 MG/G CREATININE (ref 0–30)
MONOCYTES # BLD AUTO: 0.47 THOUSAND/ΜL (ref 0.17–1.22)
MONOCYTES NFR BLD AUTO: 7 % (ref 4–12)
NEUTROPHILS # BLD AUTO: 4.1 THOUSANDS/ΜL (ref 1.85–7.62)
NEUTS SEG NFR BLD AUTO: 65 % (ref 43–75)
NITRITE UR QL STRIP: NEGATIVE
NON-SQ EPI CELLS URNS QL MICRO: NORMAL /HPF
NONHDLC SERPL-MCNC: 165 MG/DL
NRBC BLD AUTO-RTO: 0 /100 WBCS
PH UR STRIP.AUTO: 8 [PH]
PLATELET # BLD AUTO: 181 THOUSANDS/UL (ref 149–390)
PMV BLD AUTO: 10.1 FL (ref 8.9–12.7)
POTASSIUM SERPL-SCNC: 3.7 MMOL/L (ref 3.5–5.3)
PROT SERPL-MCNC: 7.7 G/DL (ref 6.4–8.2)
PROT UR STRIP-MCNC: NEGATIVE MG/DL
RBC # BLD AUTO: 5.74 MILLION/UL (ref 3.88–5.62)
RBC #/AREA URNS AUTO: NORMAL /HPF
SODIUM SERPL-SCNC: 141 MMOL/L (ref 136–145)
SP GR UR STRIP.AUTO: 1.02 (ref 1–1.03)
TRIGL SERPL-MCNC: 126 MG/DL
TSH SERPL DL<=0.05 MIU/L-ACNC: 0.99 UIU/ML (ref 0.36–3.74)
UROBILINOGEN UR QL STRIP.AUTO: 0.2 E.U./DL
WBC # BLD AUTO: 6.32 THOUSAND/UL (ref 4.31–10.16)
WBC #/AREA URNS AUTO: NORMAL /HPF

## 2020-10-05 PROCEDURE — 82043 UR ALBUMIN QUANTITATIVE: CPT | Performed by: FAMILY MEDICINE

## 2020-10-05 PROCEDURE — 36415 COLL VENOUS BLD VENIPUNCTURE: CPT

## 2020-10-05 PROCEDURE — 82570 ASSAY OF URINE CREATININE: CPT | Performed by: FAMILY MEDICINE

## 2020-10-05 PROCEDURE — 84443 ASSAY THYROID STIM HORMONE: CPT

## 2020-10-05 PROCEDURE — 85025 COMPLETE CBC W/AUTO DIFF WBC: CPT

## 2020-10-05 PROCEDURE — 81001 URINALYSIS AUTO W/SCOPE: CPT | Performed by: FAMILY MEDICINE

## 2020-10-05 PROCEDURE — 80053 COMPREHEN METABOLIC PANEL: CPT

## 2020-10-05 PROCEDURE — 80061 LIPID PANEL: CPT

## 2020-10-05 PROCEDURE — 83036 HEMOGLOBIN GLYCOSYLATED A1C: CPT

## 2020-10-05 RX ORDER — AMLODIPINE BESYLATE 10 MG/1
TABLET ORAL
Qty: 90 TABLET | Refills: 0 | Status: SHIPPED | OUTPATIENT
Start: 2020-10-05 | End: 2020-10-05 | Stop reason: SDUPTHER

## 2020-10-05 RX ORDER — PANTOPRAZOLE SODIUM 40 MG/1
TABLET, DELAYED RELEASE ORAL
Qty: 90 TABLET | Refills: 0 | Status: SHIPPED | OUTPATIENT
Start: 2020-10-05 | End: 2020-10-05 | Stop reason: SDUPTHER

## 2020-10-06 RX ORDER — AMLODIPINE BESYLATE 10 MG/1
10 TABLET ORAL DAILY
Qty: 30 TABLET | Refills: 0 | Status: SHIPPED | OUTPATIENT
Start: 2020-10-06 | End: 2021-02-05 | Stop reason: SDUPTHER

## 2020-10-06 RX ORDER — PANTOPRAZOLE SODIUM 40 MG/1
40 TABLET, DELAYED RELEASE ORAL DAILY
Qty: 30 TABLET | Refills: 0 | Status: SHIPPED | OUTPATIENT
Start: 2020-10-06 | End: 2021-02-05 | Stop reason: SDUPTHER

## 2020-10-12 ENCOUNTER — OFFICE VISIT (OUTPATIENT)
Dept: SLEEP CENTER | Facility: CLINIC | Age: 70
End: 2020-10-12
Payer: MEDICARE

## 2020-10-12 VITALS
OXYGEN SATURATION: 99 % | HEIGHT: 72 IN | TEMPERATURE: 97.4 F | SYSTOLIC BLOOD PRESSURE: 140 MMHG | WEIGHT: 213 LBS | DIASTOLIC BLOOD PRESSURE: 80 MMHG | BODY MASS INDEX: 28.85 KG/M2 | HEART RATE: 73 BPM

## 2020-10-12 DIAGNOSIS — G47.33 OBSTRUCTIVE SLEEP APNEA TREATED WITH CONTINUOUS POSITIVE AIRWAY PRESSURE (CPAP): Primary | ICD-10-CM

## 2020-10-12 DIAGNOSIS — Z99.89 OBSTRUCTIVE SLEEP APNEA TREATED WITH CONTINUOUS POSITIVE AIRWAY PRESSURE (CPAP): Primary | ICD-10-CM

## 2020-10-12 PROCEDURE — 99214 OFFICE O/P EST MOD 30 MIN: CPT | Performed by: NURSE PRACTITIONER

## 2020-10-13 ENCOUNTER — TELEPHONE (OUTPATIENT)
Dept: SLEEP CENTER | Facility: CLINIC | Age: 70
End: 2020-10-13

## 2020-10-13 ENCOUNTER — OFFICE VISIT (OUTPATIENT)
Dept: FAMILY MEDICINE CLINIC | Facility: CLINIC | Age: 70
End: 2020-10-13
Payer: MEDICARE

## 2020-10-13 VITALS
SYSTOLIC BLOOD PRESSURE: 128 MMHG | BODY MASS INDEX: 28.44 KG/M2 | DIASTOLIC BLOOD PRESSURE: 70 MMHG | TEMPERATURE: 92.9 F | OXYGEN SATURATION: 100 % | WEIGHT: 210 LBS | HEIGHT: 72 IN

## 2020-10-13 DIAGNOSIS — R73.03 PRE-DIABETES: ICD-10-CM

## 2020-10-13 DIAGNOSIS — I10 ESSENTIAL HYPERTENSION: Primary | ICD-10-CM

## 2020-10-13 DIAGNOSIS — Z23 ENCOUNTER FOR IMMUNIZATION: ICD-10-CM

## 2020-10-13 PROCEDURE — 90686 IIV4 VACC NO PRSV 0.5 ML IM: CPT

## 2020-10-13 PROCEDURE — 99213 OFFICE O/P EST LOW 20 MIN: CPT | Performed by: FAMILY MEDICINE

## 2020-10-13 PROCEDURE — G0008 ADMIN INFLUENZA VIRUS VAC: HCPCS

## 2020-10-13 RX ORDER — METOPROLOL SUCCINATE 25 MG/1
25 TABLET, EXTENDED RELEASE ORAL DAILY
Qty: 90 TABLET | Refills: 2 | Status: SHIPPED | OUTPATIENT
Start: 2020-10-13 | End: 2021-02-09 | Stop reason: SDUPTHER

## 2020-10-14 ENCOUNTER — TELEPHONE (OUTPATIENT)
Dept: SLEEP CENTER | Facility: CLINIC | Age: 70
End: 2020-10-14

## 2020-12-23 ENCOUNTER — HOSPITAL ENCOUNTER (OUTPATIENT)
Dept: CT IMAGING | Facility: HOSPITAL | Age: 70
Discharge: HOME | End: 2020-12-23
Payer: MEDICARE

## 2020-12-23 DIAGNOSIS — I71.2 ASCENDING AORTIC ANEURYSM (HCC): ICD-10-CM

## 2020-12-23 PROCEDURE — 71250 CT THORAX DX C-: CPT

## 2020-12-29 ENCOUNTER — OFFICE VISIT (OUTPATIENT)
Dept: CARDIAC SURGERY | Facility: CLINIC | Age: 70
End: 2020-12-29
Payer: MEDICARE

## 2020-12-29 VITALS
WEIGHT: 214.4 LBS | HEART RATE: 69 BPM | OXYGEN SATURATION: 98 % | DIASTOLIC BLOOD PRESSURE: 80 MMHG | BODY MASS INDEX: 29.04 KG/M2 | HEIGHT: 72 IN | SYSTOLIC BLOOD PRESSURE: 144 MMHG | TEMPERATURE: 97.5 F

## 2020-12-29 DIAGNOSIS — I71.2 ASCENDING AORTIC ANEURYSM (HCC): Primary | ICD-10-CM

## 2020-12-29 PROCEDURE — 99214 OFFICE O/P EST MOD 30 MIN: CPT | Performed by: NURSE PRACTITIONER

## 2021-02-05 DIAGNOSIS — K29.00 ACUTE SUPERFICIAL GASTRITIS WITHOUT HEMORRHAGE: ICD-10-CM

## 2021-02-05 DIAGNOSIS — I10 ESSENTIAL HYPERTENSION: ICD-10-CM

## 2021-02-05 RX ORDER — AMLODIPINE BESYLATE 10 MG/1
10 TABLET ORAL DAILY
Qty: 30 TABLET | Refills: 0 | Status: SHIPPED | OUTPATIENT
Start: 2021-02-05 | End: 2021-02-09 | Stop reason: SDUPTHER

## 2021-02-05 RX ORDER — PANTOPRAZOLE SODIUM 40 MG/1
40 TABLET, DELAYED RELEASE ORAL DAILY
Qty: 30 TABLET | Refills: 0 | Status: SHIPPED | OUTPATIENT
Start: 2021-02-05 | End: 2021-02-09 | Stop reason: SDUPTHER

## 2021-02-09 ENCOUNTER — OFFICE VISIT (OUTPATIENT)
Dept: FAMILY MEDICINE CLINIC | Facility: CLINIC | Age: 71
End: 2021-02-09
Payer: MEDICARE

## 2021-02-09 VITALS
SYSTOLIC BLOOD PRESSURE: 154 MMHG | WEIGHT: 219 LBS | DIASTOLIC BLOOD PRESSURE: 82 MMHG | BODY MASS INDEX: 29.66 KG/M2 | HEIGHT: 72 IN | TEMPERATURE: 97.2 F

## 2021-02-09 DIAGNOSIS — I10 ESSENTIAL HYPERTENSION: ICD-10-CM

## 2021-02-09 DIAGNOSIS — E78.1 PURE HYPERTRIGLYCERIDEMIA: ICD-10-CM

## 2021-02-09 DIAGNOSIS — K29.00 ACUTE SUPERFICIAL GASTRITIS WITHOUT HEMORRHAGE: ICD-10-CM

## 2021-02-09 DIAGNOSIS — R73.03 PRE-DIABETES: ICD-10-CM

## 2021-02-09 DIAGNOSIS — E08.43 DIABETES MELLITUS DUE TO UNDERLYING CONDITION WITH DIABETIC AUTONOMIC NEUROPATHY, WITHOUT LONG-TERM CURRENT USE OF INSULIN (HCC): ICD-10-CM

## 2021-02-09 DIAGNOSIS — Z99.89 OBSTRUCTIVE SLEEP APNEA TREATED WITH CONTINUOUS POSITIVE AIRWAY PRESSURE (CPAP): ICD-10-CM

## 2021-02-09 DIAGNOSIS — I71.2 ASCENDING AORTIC ANEURYSM (HCC): ICD-10-CM

## 2021-02-09 DIAGNOSIS — K21.00 GASTROESOPHAGEAL REFLUX DISEASE WITH ESOPHAGITIS WITHOUT HEMORRHAGE: Primary | ICD-10-CM

## 2021-02-09 DIAGNOSIS — G47.33 OBSTRUCTIVE SLEEP APNEA TREATED WITH CONTINUOUS POSITIVE AIRWAY PRESSURE (CPAP): ICD-10-CM

## 2021-02-09 DIAGNOSIS — E04.1 THYROID NODULE: ICD-10-CM

## 2021-02-09 DIAGNOSIS — J44.9 CHRONIC OBSTRUCTIVE PULMONARY DISEASE, UNSPECIFIED COPD TYPE (HCC): ICD-10-CM

## 2021-02-09 PROCEDURE — 99214 OFFICE O/P EST MOD 30 MIN: CPT | Performed by: FAMILY MEDICINE

## 2021-02-09 RX ORDER — METOPROLOL SUCCINATE 25 MG/1
25 TABLET, EXTENDED RELEASE ORAL DAILY
Qty: 90 TABLET | Refills: 2 | Status: SHIPPED | OUTPATIENT
Start: 2021-02-09 | End: 2021-11-24

## 2021-02-09 RX ORDER — AMLODIPINE BESYLATE 10 MG/1
10 TABLET ORAL DAILY
Qty: 90 TABLET | Refills: 1 | Status: SHIPPED | OUTPATIENT
Start: 2021-02-09 | End: 2021-08-30 | Stop reason: SDUPTHER

## 2021-02-09 RX ORDER — HYDROCHLOROTHIAZIDE 12.5 MG/1
12.5 TABLET ORAL DAILY
Qty: 90 TABLET | Refills: 1 | Status: SHIPPED | OUTPATIENT
Start: 2021-02-09 | End: 2021-12-02 | Stop reason: SDUPTHER

## 2021-02-09 RX ORDER — PANTOPRAZOLE SODIUM 40 MG/1
40 TABLET, DELAYED RELEASE ORAL DAILY
Qty: 90 TABLET | Refills: 1 | Status: SHIPPED | OUTPATIENT
Start: 2021-02-09 | End: 2021-08-30 | Stop reason: SDUPTHER

## 2021-02-09 NOTE — PROGRESS NOTES
Assessment/Plan:  Labs reviewed  Patient did have 1st COVID vaccine on the 26th of January  Patient have labs prior to next visit  Continue with current regimen  Will add hydrochlorothiazide 12 5 mg daily  Patient will follow-up in 4 months  Will have labs prior to next visit  Continue with dietary modifications regarding hyperlipidemic state and prediabetic state  Continue with CPAP for sleep apnea  Patient go for ultrasound of the thyroid for thyroid nodule  Diagnoses and all orders for this visit:    Gastroesophageal reflux disease with esophagitis without hemorrhage  -     CBC and differential; Future  -     Comprehensive metabolic panel; Future  -     Lipid panel; Future  -     TSH, 3rd generation with Free T4 reflex; Future  -     Microalbumin / creatinine urine ratio    Essential hypertension  -     amLODIPine (NORVASC) 10 mg tablet; Take 1 tablet (10 mg total) by mouth daily  -     metoprolol succinate (Toprol XL) 25 mg 24 hr tablet; Take 1 tablet (25 mg total) by mouth daily  -     hydrochlorothiazide (HYDRODIURIL) 12 5 mg tablet; Take 1 tablet (12 5 mg total) by mouth daily  -     CBC and differential; Future  -     Comprehensive metabolic panel; Future  -     Lipid panel; Future  -     TSH, 3rd generation with Free T4 reflex; Future  -     Microalbumin / creatinine urine ratio    Acute superficial gastritis without hemorrhage  -     pantoprazole (PROTONIX) 40 mg tablet; Take 1 tablet (40 mg total) by mouth daily    Obstructive sleep apnea treated with continuous positive airway pressure (CPAP)    Pure hypertriglyceridemia  -     CBC and differential; Future  -     Comprehensive metabolic panel; Future  -     Lipid panel; Future  -     TSH, 3rd generation with Free T4 reflex; Future  -     Microalbumin / creatinine urine ratio    Pre-diabetes  -     CBC and differential; Future  -     Comprehensive metabolic panel; Future  -     Lipid panel;  Future  -     TSH, 3rd generation with Free T4 reflex; Future  -     Microalbumin / creatinine urine ratio    Chronic obstructive pulmonary disease, unspecified COPD type ([facility] [address] )    Diabetes mellitus due to underlying condition with diabetic autonomic neuropathy, without long-term current use of insulin (HCC)    Ascending aortic aneurysm (HCC)  -     hydrochlorothiazide (HYDRODIURIL) 12 5 mg tablet; Take 1 tablet (12 5 mg total) by mouth daily    Thyroid nodule  -     US thyroid; Future            Subjective:        Patient ID: Kathy Tafoya is a 80 y o  male  Patient follow-up on GERD hypertension as well as prediabetic state the and hyperlipidemia  Patient next GERD symptoms are stable at present time  The no chest pain shortness of breath or problems urinating or defecating or headache or blurred vision  No edema  Patient having some right hip pain intermittently  The following portions of the patient's history were reviewed and updated as appropriate: allergies, current medications, past family history, past medical history, past social history, past surgical history and problem list       Review of Systems   Constitutional: Negative  HENT: Negative  Eyes: Negative  Respiratory: Negative  Cardiovascular: Negative  Gastrointestinal: Negative  Endocrine: Negative  Genitourinary: Negative  Musculoskeletal: Positive for arthralgias  Skin: Negative  Allergic/Immunologic: Negative  Neurological: Negative  Hematological: Negative  Psychiatric/Behavioral: Negative  Objective:      BMI Counseling: Body mass index is 29 7 kg/m²  The BMI is above normal  Nutrition recommendations include decreasing portion sizes  Exercise recommendations include moderate physical activity 150 minutes/week                 /82 (BP Location: Right arm, Patient Position: Sitting, Cuff Size: Standard)   Temp (!) 97 2 °F (36 2 °C) (Tympanic)   Ht 6' (1 829 m)   Wt 99 3 kg (219 lb)   BMI 29 70 kg/m²          Physical Exam  Vitals signs and nursing note reviewed  Constitutional:       General: He is not in acute distress  Appearance: Normal appearance  He is not ill-appearing, toxic-appearing or diaphoretic  HENT:      Head: Normocephalic and atraumatic  Right Ear: Tympanic membrane, ear canal and external ear normal  There is no impacted cerumen  Left Ear: Tympanic membrane, ear canal and external ear normal  There is no impacted cerumen  Nose: Nose normal  No congestion or rhinorrhea  Mouth/Throat:      Mouth: Mucous membranes are moist       Pharynx: No oropharyngeal exudate or posterior oropharyngeal erythema  Eyes:      General: No scleral icterus  Right eye: No discharge  Left eye: No discharge  Extraocular Movements: Extraocular movements intact  Conjunctiva/sclera: Conjunctivae normal       Pupils: Pupils are equal, round, and reactive to light  Neck:      Musculoskeletal: Normal range of motion and neck supple  No neck rigidity or muscular tenderness  Vascular: No carotid bruit  Cardiovascular:      Rate and Rhythm: Normal rate and regular rhythm  Pulses: Normal pulses  Heart sounds: Normal heart sounds  No murmur  No friction rub  No gallop  Pulmonary:      Effort: Pulmonary effort is normal  No respiratory distress  Breath sounds: Normal breath sounds  No stridor  No wheezing, rhonchi or rales  Chest:      Chest wall: No tenderness  Abdominal:      General: Abdomen is flat  Bowel sounds are normal  There is no distension  Palpations: Abdomen is soft  Tenderness: There is no abdominal tenderness  There is no guarding or rebound  Musculoskeletal: Normal range of motion  General: No swelling, tenderness, deformity or signs of injury  Right lower leg: No edema  Left lower leg: No edema  Lymphadenopathy:      Cervical: No cervical adenopathy  Skin:     General: Skin is warm and dry        Capillary Refill: Capillary refill takes less than 2 seconds  Coloration: Skin is not jaundiced  Findings: No bruising, erythema, lesion or rash  Neurological:      General: No focal deficit present  Mental Status: He is alert and oriented to person, place, and time  Cranial Nerves: No cranial nerve deficit  Sensory: No sensory deficit  Motor: No weakness  Coordination: Coordination normal       Gait: Gait normal    Psychiatric:         Mood and Affect: Mood normal          Behavior: Behavior normal          Thought Content:  Thought content normal          Judgment: Judgment normal

## 2021-02-25 ENCOUNTER — HOSPITAL ENCOUNTER (OUTPATIENT)
Dept: ULTRASOUND IMAGING | Facility: HOSPITAL | Age: 71
Discharge: HOME | End: 2021-02-25
Payer: MEDICARE

## 2021-02-25 ENCOUNTER — HOSPITAL ENCOUNTER (OUTPATIENT)
Dept: NON INVASIVE DIAGNOSTICS | Facility: HOSPITAL | Age: 71
Discharge: HOME | End: 2021-02-25
Payer: MEDICARE

## 2021-02-25 DIAGNOSIS — I71.2 ASCENDING AORTIC ANEURYSM (HCC): ICD-10-CM

## 2021-02-25 DIAGNOSIS — E04.1 THYROID NODULE: ICD-10-CM

## 2021-02-25 PROCEDURE — 93306 TTE W/DOPPLER COMPLETE: CPT

## 2021-02-25 PROCEDURE — 76536 US EXAM OF HEAD AND NECK: CPT

## 2021-02-25 PROCEDURE — 93306 TTE W/DOPPLER COMPLETE: CPT | Performed by: INTERNAL MEDICINE

## 2021-02-26 ENCOUNTER — TELEPHONE (OUTPATIENT)
Dept: CARDIAC SURGERY | Facility: CLINIC | Age: 71
End: 2021-02-26

## 2021-03-03 ENCOUNTER — TELEPHONE (OUTPATIENT)
Dept: FAMILY MEDICINE CLINIC | Facility: CLINIC | Age: 71
End: 2021-03-03

## 2021-03-05 ENCOUNTER — TRANSCRIBE ORDERS (OUTPATIENT)
Dept: ADMINISTRATIVE | Facility: HOSPITAL | Age: 71
End: 2021-03-05

## 2021-03-05 DIAGNOSIS — E04.2 MULTIPLE THYROID NODULES: Primary | ICD-10-CM

## 2021-03-05 DIAGNOSIS — E04.1 THYROID NODULE: Primary | ICD-10-CM

## 2021-03-05 NOTE — NURSING NOTE
Call placed to patient to discuss upcoming appointment at [place] [address] radiology department and complete consultation with patient, per wife patient is not home at the present time  Informed wife I will call back next week to discuss the procedure and answer any questions  Wife was appreciative

## 2021-03-08 NOTE — NURSING NOTE
Call placed to patient to discuss upcoming appointment at [place] [address] radiology department and complete consultation with patient  Patient is having an US guided thyroid biopsy  Reviewed patient  allergies , no current anticoagulant medication present , also discussed the pre and post procedure expectations  Reminded patient of location and time expected for procedure, Patient expressed understanding by verbalizing and repeating instructions

## 2021-03-15 ENCOUNTER — HOSPITAL ENCOUNTER (OUTPATIENT)
Dept: RADIOLOGY | Facility: HOSPITAL | Age: 71
Discharge: HOME | End: 2021-03-15
Admitting: RADIOLOGY
Payer: MEDICARE

## 2021-03-15 DIAGNOSIS — E04.1 THYROID NODULE: ICD-10-CM

## 2021-03-15 PROCEDURE — 10006 FNA BX W/US GDN EA ADDL: CPT

## 2021-03-15 PROCEDURE — 88172 CYTP DX EVAL FNA 1ST EA SITE: CPT | Performed by: PATHOLOGY

## 2021-03-15 PROCEDURE — 10005 FNA BX W/US GDN 1ST LES: CPT

## 2021-03-15 PROCEDURE — 88173 CYTOPATH EVAL FNA REPORT: CPT | Performed by: PATHOLOGY

## 2021-03-15 RX ORDER — LIDOCAINE HYDROCHLORIDE 10 MG/ML
3 INJECTION, SOLUTION EPIDURAL; INFILTRATION; INTRACAUDAL; PERINEURAL ONCE
Status: COMPLETED | OUTPATIENT
Start: 2021-03-15 | End: 2021-03-15

## 2021-03-15 RX ADMIN — LIDOCAINE HYDROCHLORIDE 4 ML: 10 INJECTION, SOLUTION EPIDURAL; INFILTRATION; INTRACAUDAL; PERINEURAL at 11:32

## 2021-04-03 ENCOUNTER — HOSPITAL ENCOUNTER (EMERGENCY)
Facility: HOSPITAL | Age: 71
Discharge: HOME | End: 2021-04-03
Attending: EMERGENCY MEDICINE | Admitting: EMERGENCY MEDICINE
Payer: MEDICARE

## 2021-04-03 VITALS
OXYGEN SATURATION: 96 % | BODY MASS INDEX: 29.36 KG/M2 | RESPIRATION RATE: 18 BRPM | SYSTOLIC BLOOD PRESSURE: 190 MMHG | HEART RATE: 91 BPM | TEMPERATURE: 98.3 F | DIASTOLIC BLOOD PRESSURE: 91 MMHG | WEIGHT: 216.49 LBS

## 2021-04-03 DIAGNOSIS — S71.152A DOG BITE OF LEFT THIGH, INITIAL ENCOUNTER: Primary | ICD-10-CM
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
## 2021-04-03 PROCEDURE — 90715 TDAP VACCINE 7 YRS/> IM: CPT | Performed by: PHYSICIAN ASSISTANT

## 2021-04-03 PROCEDURE — 99284 EMERGENCY DEPT VISIT MOD MDM: CPT | Performed by: PHYSICIAN ASSISTANT

## 2021-04-03 PROCEDURE — 90471 IMMUNIZATION ADMIN: CPT

## 2021-04-03 PROCEDURE — 99283 EMERGENCY DEPT VISIT LOW MDM: CPT

## 2021-04-03 RX ORDER — AMOXICILLIN AND CLAVULANATE POTASSIUM 875; 125 MG/1; MG/1
1 TABLET, FILM COATED ORAL ONCE
Status: COMPLETED | OUTPATIENT
Start: 2021-04-03 | End: 2021-04-03

## 2021-04-03 RX ORDER — AMOXICILLIN AND CLAVULANATE POTASSIUM 875; 125 MG/1; MG/1
1 TABLET, FILM COATED ORAL EVERY 12 HOURS
Qty: 10 TABLET | Refills: 0 | Status: SHIPPED | OUTPATIENT
Start: 2021-04-03 | End: 2021-04-08

## 2021-04-03 RX ORDER — AMOXICILLIN AND CLAVULANATE POTASSIUM 875; 125 MG/1; MG/1
1 TABLET, FILM COATED ORAL EVERY 12 HOURS
Qty: 10 TABLET | Refills: 0 | Status: SHIPPED | OUTPATIENT
Start: 2021-04-03 | End: 2021-04-03 | Stop reason: SDUPTHER

## 2021-04-03 RX ADMIN — AMOXICILLIN AND CLAVULANATE POTASSIUM 1 TABLET: 875; 125 TABLET, FILM COATED ORAL at 22:51

## 2021-04-03 RX ADMIN — TETANUS TOXOID, REDUCED DIPHTHERIA TOXOID AND ACELLULAR PERTUSSIS VACCINE, ADSORBED 0.5 ML: 5; 2.5; 8; 8; 2.5 SUSPENSION INTRAMUSCULAR at 22:51

## 2021-04-04 NOTE — ED PROVIDER NOTES
History  Chief Complaint   Patient presents with    Dog Bite     Pt reports dog bite to left thigh  Friends dog, UTD on shots  Melanie Lovell is a 81 yo M presenting for evaluation after dog bite to left thigh sustained just prior to arrival  Reports a friend's dog bit him and he did have a small amount of bleeding initially but resolved prior to arrival  Reports cleaning the wound with peroxide and applying neosporin prior to arrival  Patient reports the dog is UTD on vaccinations including rabies vaccine and has been otherwise behaving normally since that time  Patient unsure of most recent tetanus vaccination  History provided by:  Patient   used: No    Dog Bite  Contact animal:  Dog  Location:  Leg  Leg injury location:  L upper leg  Incident location:  Another residence  Provoked: unprovoked    Animal in possession: yes    Tetanus status:  Unknown  Associated symptoms: no fever, no numbness, no rash and no swelling        Prior to Admission Medications   Prescriptions Last Dose Informant Patient Reported? Taking?    amLODIPine (NORVASC) 10 mg tablet   No No   Sig: Take 1 tablet (10 mg total) by mouth daily   diphenhydrAMINE (BENADRYL) 25 mg capsule  Spouse/Significant Other Yes No   Sig: Take 25 mg by mouth as needed for itching   hydrochlorothiazide (HYDRODIURIL) 12 5 mg tablet   No No   Sig: Take 1 tablet (12 5 mg total) by mouth daily   metoprolol succinate (Toprol XL) 25 mg 24 hr tablet   No No   Sig: Take 1 tablet (25 mg total) by mouth daily   pantoprazole (PROTONIX) 40 mg tablet   No No   Sig: Take 1 tablet (40 mg total) by mouth daily      Facility-Administered Medications: None       Past Medical History:   Diagnosis Date    Abnormal CAT scan     Bilateral inguinal hernia 1990    recurrent    Colitis     Gastritis 12/11/2012    Hypertension     Impaired fasting glucose 7/9/2019    Nausea & vomiting     Right knee meniscal tear 12/20/2017    Sleep apnea     uses cpap Past Surgical History:   Procedure Laterality Date    CHOLECYSTECTOMY      COLONOSCOPY  2017    ABOUT 2-3 YEARS AGO    EXPLORATORY LAPAROTOMY  2014    done for repair of peritoneal "RENT"?    HEMORRHOID SURGERY      INGUINAL HERNIA REPAIR Bilateral 2014    NH ESOPHAGOGASTRODUODENOSCOPY TRANSORAL DIAGNOSTIC N/A 2017    Procedure: EGD AND COLONOSCOPY;  Surgeon: Nayana Carranza MD;  Location: [facility]; Service: Gastroenterology    US GUIDED THYROID BIOPSY  3/15/2021       Family History   Problem Relation Age of Onset    Breast cancer Mother     Heart disease Father     Heart disease Brother     Heart disease Family      I have reviewed and agree with the history as documented  E-Cigarette/Vaping    E-Cigarette Use Never User      E-Cigarette/Vaping Substances    Nicotine No     THC No     CBD No     Flavoring No     Other No     Unknown No      Social History     Tobacco Use    Smoking status: Former Smoker     Packs/day: 0 25     Years: 40 00     Pack years: 10 00     Types: Cigarettes     Quit date: 2019     Years since quittin 3    Smokeless tobacco: Never Used   Substance Use Topics    Alcohol use: Not Currently     Comment: social    Drug use: No       Review of Systems   Constitutional: Negative for chills and fever  HENT: Negative for congestion, rhinorrhea and sore throat  Eyes: Negative for pain and visual disturbance  Respiratory: Negative for cough, shortness of breath and wheezing  Cardiovascular: Negative for chest pain and palpitations  Gastrointestinal: Negative for abdominal pain, nausea and vomiting  Genitourinary: Negative for dysuria, frequency and urgency  Musculoskeletal: Negative for back pain, neck pain and neck stiffness  Skin: Positive for wound  Negative for rash  Neurological: Negative for dizziness, weakness, light-headedness and numbness         Physical Exam  Physical Exam  Constitutional:       General: He is not in acute distress  Appearance: He is well-developed  He is not diaphoretic  HENT:      Head: Normocephalic and atraumatic  Right Ear: External ear normal       Left Ear: External ear normal    Eyes:      Conjunctiva/sclera: Conjunctivae normal       Pupils: Pupils are equal, round, and reactive to light  Neck:      Musculoskeletal: Normal range of motion and neck supple  Cardiovascular:      Rate and Rhythm: Normal rate and regular rhythm  Heart sounds: Normal heart sounds  No murmur  No friction rub  No gallop  Pulmonary:      Effort: Pulmonary effort is normal  No respiratory distress  Breath sounds: Normal breath sounds  No wheezing  Abdominal:      General: There is no distension  Palpations: Abdomen is soft  Tenderness: There is no abdominal tenderness  Lymphadenopathy:      Cervical: No cervical adenopathy  Skin:     General: Skin is warm and dry  Capillary Refill: Capillary refill takes less than 2 seconds  Findings: No erythema or rash  Neurological:      Mental Status: He is alert and oriented to person, place, and time  Motor: No abnormal muscle tone  Coordination: Coordination normal    Psychiatric:         Behavior: Behavior normal          Thought Content:  Thought content normal          Judgment: Judgment normal          Vital Signs  ED Triage Vitals [04/03/21 2221]   Temperature Pulse Respirations Blood Pressure SpO2   98 3 °F (36 8 °C) 91 18 (!) 190/91 96 %      Temp Source Heart Rate Source Patient Position - Orthostatic VS BP Location FiO2 (%)   Oral Monitor Sitting Right arm --      Pain Score       --           Vitals:    04/03/21 2221   BP: (!) 190/91   Pulse: 91   Patient Position - Orthostatic VS: Sitting         Visual Acuity      ED Medications  Medications   tetanus-diphtheria-acellular pertussis (BOOSTRIX) IM injection 0 5 mL (0 5 mL Intramuscular Given 4/3/21 2251)   amoxicillin-clavulanate (AUGMENTIN) 875-125 mg per tablet 1 tablet (1 tablet Oral Given 4/3/21 0631)       Diagnostic Studies  Results Reviewed     None                 No orders to display              Procedures  Procedures         ED Course                                           MDM  Number of Diagnoses or Management Options  Dog bite of left thigh, initial encounter:   Diagnosis management comments: Dog bite to left thigh sustained by a friend's dog  Animal is UTD on vaccinations per patient including rabies vaccination and has not had contact with outside animals  Wound cleansed using dermal wound cleanser here, irrigated and dressed with nonstick gauze/gauze roll  Tetanus vaccination updated today  Does not require rabies series at this time as animal fully vaccinated without contact/exposure outside house  At home wound care instructions discussed  Will provide augmentin for wound prophylaxis  Patient Progress  Patient progress: stable      Disposition  Final diagnoses:   Dog bite of left thigh, initial encounter     Time reflects when diagnosis was documented in both MDM as applicable and the Disposition within this note     Time User Action Codes Description Comment    4/3/2021 11:14 PM Garland Cords Add [[id number],  W54  0XXA] Dog bite of left lower leg, initial encounter     4/3/2021 11:14 PM Ronald Beltre [[id number],  N27  0XXA] Dog bite of left lower leg, initial encounter     4/3/2021 11:14 PM Tilford Cords Add Akbar Doing  0XXA] Dog bite of left thigh, initial encounter       ED Disposition     ED Disposition Condition Date/Time Comment    Discharge Stable Sat Apr 3, 2021 11:14 PM Reg March discharge to home/self care              Follow-up Information     Follow up With Specialties Details Why Contact Info Additional Information    Dahlia Parra, DO Family Medicine  As needed [address] [facility] Emergency Medicine  If symptoms worsen [facility] [id number]  [address] [facility] [place], [address]  , [place], [place], [address]          Discharge Medication List as of 4/3/2021 11:16 PM      START taking these medications    Details   amoxicillin-clavulanate (AUGMENTIN) 875-125 mg per tablet Take 1 tablet by mouth every 12 (twelve) hours for 5 days, Starting Sat 4/3/2021, Until Thu 4/8/2021, Normal         CONTINUE these medications which have NOT CHANGED    Details   amLODIPine (NORVASC) 10 mg tablet Take 1 tablet (10 mg total) by mouth daily, Starting Tue 2/9/2021, Normal      diphenhydrAMINE (BENADRYL) 25 mg capsule Take 25 mg by mouth as needed for itching, Historical Med      hydrochlorothiazide (HYDRODIURIL) 12 5 mg tablet Take 1 tablet (12 5 mg total) by mouth daily, Starting Tue 2/9/2021, Normal      metoprolol succinate (Toprol XL) 25 mg 24 hr tablet Take 1 tablet (25 mg total) by mouth daily, Starting Tue 2/9/2021, Normal      pantoprazole (PROTONIX) 40 mg tablet Take 1 tablet (40 mg total) by mouth daily, Starting Tue 2/9/2021, Normal           No discharge procedures on file      PDMP Review       Value Time User    PDMP Reviewed  Yes 1/17/2020  3:47 PM Zach Delatorre DO          ED Provider  Electronically Signed by           Fantasma Newton PA-C  04/04/21 9310

## 2021-04-04 NOTE — DISCHARGE INSTRUCTIONS
Please refer to the attached information for strict return instructions  If symptoms worsen or new symptoms develop please return to the ER  Use prescribed antibiotic for full course  Wash wound daily under water, keep covered until skin is healed  Return if you have any redness, swelling, increased pain to area, or new/worsening symptoms of concern

## 2021-05-21 ENCOUNTER — OFFICE VISIT (OUTPATIENT)
Dept: FAMILY MEDICINE CLINIC | Facility: CLINIC | Age: 71
End: 2021-05-21
Payer: MEDICARE

## 2021-05-21 VITALS
DIASTOLIC BLOOD PRESSURE: 92 MMHG | WEIGHT: 215 LBS | HEIGHT: 72 IN | BODY MASS INDEX: 29.12 KG/M2 | TEMPERATURE: 96.5 F | SYSTOLIC BLOOD PRESSURE: 160 MMHG

## 2021-05-21 DIAGNOSIS — E08.43 DIABETES MELLITUS DUE TO UNDERLYING CONDITION WITH DIABETIC AUTONOMIC NEUROPATHY, WITHOUT LONG-TERM CURRENT USE OF INSULIN (HCC): Primary | ICD-10-CM

## 2021-05-21 DIAGNOSIS — M25.531 RIGHT WRIST PAIN: ICD-10-CM

## 2021-05-21 LAB — SL AMB POCT HEMOGLOBIN AIC: 7.5 (ref ?–6.5)

## 2021-05-21 PROCEDURE — 83036 HEMOGLOBIN GLYCOSYLATED A1C: CPT | Performed by: FAMILY MEDICINE

## 2021-05-21 PROCEDURE — 99213 OFFICE O/P EST LOW 20 MIN: CPT | Performed by: FAMILY MEDICINE

## 2021-05-21 RX ORDER — PREDNISONE 10 MG/1
TABLET ORAL
Qty: 45 TABLET | Refills: 0 | Status: SHIPPED | OUTPATIENT
Start: 2021-05-21 | End: 2021-07-07

## 2021-05-21 NOTE — PROGRESS NOTES
Patient's shoes and socks removed  Right Foot/Ankle   Right Foot Inspection  Skin Exam: skin normal and skin intact no dry skin, no warmth, no callus, no erythema, no maceration, no abnormal color, no pre-ulcer, no ulcer and no callus                          Toe Exam: ROM and strength within normal limits  Sensory       Monofilament testing: intact  Vascular  Capillary refills: < 3 seconds  The right DP pulse is 2+  The right PT pulse is 2+  Left Foot/Ankle  Left Foot Inspection  Skin Exam: skin normal and skin intactno dry skin, no warmth, no erythema, no maceration, normal color, no pre-ulcer, no ulcer and no callus                         Toe Exam: ROM and strength within normal limits                   Sensory       Monofilament: intact  Vascular  Capillary refills: < 3 seconds  The left DP pulse is 2+  The left PT pulse is 2+  Assign Risk Category:  No deformity present; Loss of protective sensation;  No weak pulses       Risk: 1

## 2021-05-21 NOTE — PROGRESS NOTES
Assessment/Plan:  Patient will be placed on prednisone taper for presumed gout  Patient will go for x-ray of the right wrist as well laboratory studies  Patient A1c is 7 5 today  Discussed dietary modifications and patient will follow-up in 3 weeks  Pamphlet given regarding diabetes  Patient go to diabetes  org       Diagnoses and all orders for this visit:    Diabetes mellitus due to underlying condition with diabetic autonomic neuropathy, without long-term current use of insulin (HCC)  -     POCT hemoglobin A1c  -     XR wrist 3+ vw right; Future  -     Comprehensive metabolic panel; Future  -     CBC and differential; Future    Right wrist pain  -     XR wrist 3+ vw right; Future  -     Comprehensive metabolic panel; Future  -     CBC and differential; Future  -     Uric acid; Future  -     predniSONE 10 mg tablet; 5 pills daily for 3 days, 4 for 3 days, 3 for 3 days, 2 for 3 days, 1 for 3 days            Subjective:        Patient ID: Demetrio Gray is a 80 y o  male  Patient is here with right wrist pain and swelling over the past week or so  No direct trauma noted  No chest pain shortness of breath associated with this  Some pain does radiate to the forearm  No repetitive tasks done  Patient did use Motrin without any significant improvement  The following portions of the patient's history were reviewed and updated as appropriate: allergies, current medications, past family history, past medical history, past social history, past surgical history and problem list       Review of Systems   Constitutional: Negative  HENT: Negative  Eyes: Negative  Respiratory: Negative  Cardiovascular: Negative  Gastrointestinal: Negative  Endocrine: Negative  Genitourinary: Negative  Musculoskeletal: Positive for arthralgias  Skin: Negative  Allergic/Immunologic: Negative  Neurological: Negative  Hematological: Negative  Psychiatric/Behavioral: Negative  Objective:      BMI Counseling: Body mass index is 29 16 kg/m²  The BMI is above normal  Nutrition recommendations include decreasing portion sizes  Exercise recommendations include moderate physical activity 150 minutes/week  Depression Screening and Follow-up Plan: Clincally patient does not have depression  No treatment is required  /92 (BP Location: Right arm, Patient Position: Sitting, Cuff Size: Standard)   Temp (!) 96 5 °F (35 8 °C)   Ht 6' (1 829 m)   Wt 97 5 kg (215 lb)   BMI 29 16 kg/m²          Physical Exam  Constitutional:       Appearance: Normal appearance  HENT:      Head: Normocephalic and atraumatic  Cardiovascular:      Rate and Rhythm: Normal rate and regular rhythm  Pulses: Normal pulses  Heart sounds: Normal heart sounds  Pulmonary:      Effort: Pulmonary effort is normal       Breath sounds: Normal breath sounds  Musculoskeletal:         General: Swelling and tenderness present  Comments:   Right wrist swelling and pain with range of motion  Patient does have swelling over the lateral aspect  Neurological:      Mental Status: He is alert

## 2021-06-22 ENCOUNTER — APPOINTMENT (OUTPATIENT)
Dept: LAB | Facility: HOSPITAL | Age: 71
End: 2021-06-22
Payer: MEDICARE

## 2021-06-22 ENCOUNTER — HOSPITAL ENCOUNTER (OUTPATIENT)
Dept: RADIOLOGY | Facility: HOSPITAL | Age: 71
Discharge: HOME | End: 2021-06-22
Payer: MEDICARE

## 2021-06-22 DIAGNOSIS — E08.43 DIABETES MELLITUS DUE TO UNDERLYING CONDITION WITH DIABETIC AUTONOMIC NEUROPATHY, WITHOUT LONG-TERM CURRENT USE OF INSULIN (HCC): ICD-10-CM

## 2021-06-22 DIAGNOSIS — M25.531 RIGHT WRIST PAIN: ICD-10-CM

## 2021-06-22 LAB
ALBUMIN SERPL BCP-MCNC: 3.4 G/DL (ref 3.5–5)
ALP SERPL-CCNC: 93 U/L (ref 46–116)
ALT SERPL W P-5'-P-CCNC: 33 U/L (ref 12–78)
ANION GAP SERPL CALCULATED.3IONS-SCNC: 11 MMOL/L (ref 4–13)
AST SERPL W P-5'-P-CCNC: 17 U/L (ref 5–45)
BASOPHILS # BLD AUTO: 0.04 THOUSANDS/ΜL (ref 0–0.1)
BASOPHILS NFR BLD AUTO: 1 % (ref 0–1)
BILIRUB SERPL-MCNC: 0.64 MG/DL (ref 0.2–1)
BUN SERPL-MCNC: 14 MG/DL (ref 5–25)
CALCIUM ALBUM COR SERPL-MCNC: 10 MG/DL (ref 8.3–10.1)
CALCIUM SERPL-MCNC: 9.5 MG/DL (ref 8.3–10.1)
CHLORIDE SERPL-SCNC: 102 MMOL/L (ref 100–108)
CO2 SERPL-SCNC: 28 MMOL/L (ref 21–32)
CREAT SERPL-MCNC: 1.13 MG/DL (ref 0.6–1.3)
EOSINOPHIL # BLD AUTO: 0.21 THOUSAND/ΜL (ref 0–0.61)
EOSINOPHIL NFR BLD AUTO: 3 % (ref 0–6)
ERYTHROCYTE [DISTWIDTH] IN BLOOD BY AUTOMATED COUNT: 13.1 % (ref 11.6–15.1)
GFR SERPL CREATININE-BSD FRML MDRD: 65 ML/MIN/1.73SQ M
GLUCOSE P FAST SERPL-MCNC: 188 MG/DL (ref 65–99)
HCT VFR BLD AUTO: 47.2 % (ref 36.5–49.3)
HGB BLD-MCNC: 15.1 G/DL (ref 12–17)
IMM GRANULOCYTES # BLD AUTO: 0.04 THOUSAND/UL (ref 0–0.2)
IMM GRANULOCYTES NFR BLD AUTO: 1 % (ref 0–2)
LYMPHOCYTES # BLD AUTO: 1.25 THOUSANDS/ΜL (ref 0.6–4.47)
LYMPHOCYTES NFR BLD AUTO: 20 % (ref 14–44)
MCH RBC QN AUTO: 27.6 PG (ref 26.8–34.3)
MCHC RBC AUTO-ENTMCNC: 32 G/DL (ref 31.4–37.4)
MCV RBC AUTO: 86 FL (ref 82–98)
MONOCYTES # BLD AUTO: 0.49 THOUSAND/ΜL (ref 0.17–1.22)
MONOCYTES NFR BLD AUTO: 8 % (ref 4–12)
NEUTROPHILS # BLD AUTO: 4.22 THOUSANDS/ΜL (ref 1.85–7.62)
NEUTS SEG NFR BLD AUTO: 67 % (ref 43–75)
NRBC BLD AUTO-RTO: 0 /100 WBCS
PLATELET # BLD AUTO: 287 THOUSANDS/UL (ref 149–390)
PMV BLD AUTO: 9.4 FL (ref 8.9–12.7)
POTASSIUM SERPL-SCNC: 3.3 MMOL/L (ref 3.5–5.3)
PROT SERPL-MCNC: 7.1 G/DL (ref 6.4–8.2)
RBC # BLD AUTO: 5.47 MILLION/UL (ref 3.88–5.62)
SODIUM SERPL-SCNC: 141 MMOL/L (ref 136–145)
URATE SERPL-MCNC: 8 MG/DL (ref 4.2–8)
WBC # BLD AUTO: 6.25 THOUSAND/UL (ref 4.31–10.16)

## 2021-06-22 PROCEDURE — 36415 COLL VENOUS BLD VENIPUNCTURE: CPT

## 2021-06-22 PROCEDURE — 73110 X-RAY EXAM OF WRIST: CPT

## 2021-06-22 PROCEDURE — 80053 COMPREHEN METABOLIC PANEL: CPT

## 2021-06-22 PROCEDURE — 85025 COMPLETE CBC W/AUTO DIFF WBC: CPT

## 2021-06-22 PROCEDURE — 84550 ASSAY OF BLOOD/URIC ACID: CPT

## 2021-06-30 ENCOUNTER — TELEPHONE (OUTPATIENT)
Dept: FAMILY MEDICINE CLINIC | Facility: CLINIC | Age: 71
End: 2021-06-30

## 2021-06-30 NOTE — TELEPHONE ENCOUNTER
----- Message from Marge Henley DO sent at 6/30/2021  7:50 AM EDT -----  Call patient    Right wrist x-ray normal

## 2021-07-07 ENCOUNTER — OFFICE VISIT (OUTPATIENT)
Dept: FAMILY MEDICINE CLINIC | Facility: CLINIC | Age: 71
End: 2021-07-07
Payer: MEDICARE

## 2021-07-07 VITALS
DIASTOLIC BLOOD PRESSURE: 80 MMHG | HEIGHT: 72 IN | SYSTOLIC BLOOD PRESSURE: 144 MMHG | WEIGHT: 207.4 LBS | BODY MASS INDEX: 28.09 KG/M2

## 2021-07-07 DIAGNOSIS — E08.43 DIABETES MELLITUS DUE TO UNDERLYING CONDITION WITH DIABETIC AUTONOMIC NEUROPATHY, WITHOUT LONG-TERM CURRENT USE OF INSULIN (HCC): Primary | ICD-10-CM

## 2021-07-07 DIAGNOSIS — I10 ESSENTIAL HYPERTENSION: ICD-10-CM

## 2021-07-07 DIAGNOSIS — M25.531 RIGHT WRIST PAIN: ICD-10-CM

## 2021-07-07 DIAGNOSIS — E79.0 HYPERURICEMIA: ICD-10-CM

## 2021-07-07 DIAGNOSIS — E87.6 HYPOKALEMIA: ICD-10-CM

## 2021-07-07 DIAGNOSIS — E78.2 MIXED HYPERLIPIDEMIA: ICD-10-CM

## 2021-07-07 LAB
CREAT UR-MCNC: 208 MG/DL
MICROALBUMIN UR-MCNC: 44.7 MG/L (ref 0–20)
MICROALBUMIN/CREAT 24H UR: 21 MG/G CREATININE (ref 0–30)

## 2021-07-07 PROCEDURE — 82570 ASSAY OF URINE CREATININE: CPT | Performed by: FAMILY MEDICINE

## 2021-07-07 PROCEDURE — 82043 UR ALBUMIN QUANTITATIVE: CPT | Performed by: FAMILY MEDICINE

## 2021-07-07 PROCEDURE — 99214 OFFICE O/P EST MOD 30 MIN: CPT | Performed by: FAMILY MEDICINE

## 2021-07-07 RX ORDER — POTASSIUM CHLORIDE 750 MG/1
10 TABLET, EXTENDED RELEASE ORAL 2 TIMES DAILY
Qty: 90 TABLET | Refills: 1 | Status: SHIPPED | OUTPATIENT
Start: 2021-07-07 | End: 2021-12-02 | Stop reason: SDUPTHER

## 2021-07-07 NOTE — PROGRESS NOTES
Assessment/Plan:  Guidance given overall  Patient feeling fairly well  Continue with current regimen of medications  Will add potassium chloride 10 mEq daily  Patient have labs prior to next visit  Mother meds will be refilled when needed  Patient will follow-up in 4 months       Diagnoses and all orders for this visit:    Diabetes mellitus due to underlying condition with diabetic autonomic neuropathy, without long-term current use of insulin ([facility] [address] )  -     Hemoglobin A1C; Future  -     Comprehensive metabolic panel; Future  -     Lipid panel; Future  -     TSH, 3rd generation with Free T4 reflex; Future  -     CBC and differential; Future  -     Magnesium; Future    Hypokalemia  -     Hemoglobin A1C; Future  -     Comprehensive metabolic panel; Future  -     Lipid panel; Future  -     TSH, 3rd generation with Free T4 reflex; Future  -     CBC and differential; Future  -     Magnesium; Future  -     potassium chloride (K-DUR,KLOR-CON) 10 mEq tablet; Take 1 tablet (10 mEq total) by mouth 2 (two) times a day    Essential hypertension    Mixed hyperlipidemia    Right wrist pain  -     Elastic Bandages & Supports (Wrist/Thumb Splint/Right Large) MISC; Use daily    Hyperuricemia  -     Uric acid; Future            Subjective:        Patient ID: Francesco Pride is a 80 y o  male  Patient follow-up on diabetes well as hypokalemia hypertension  Patient feeling better overall  Vision is improved to some degree  No edema lower extremities  No problems with urination or defecation  No significant chest pain or shortness of breath  The following portions of the patient's history were reviewed and updated as appropriate: allergies, current medications, past family history, past medical history, past social history, past surgical history and problem list       Review of Systems   Constitutional: Negative  HENT: Negative  Eyes: Negative  Respiratory: Negative  Cardiovascular: Negative  Gastrointestinal: Negative  Endocrine: Negative  Genitourinary: Negative  Musculoskeletal: Positive for arthralgias  Skin: Negative  Allergic/Immunologic: Negative  Neurological: Negative  Hematological: Negative  Psychiatric/Behavioral: Negative  Objective:      BMI Counseling: Body mass index is 28 13 kg/m²  The BMI is above normal  Nutrition recommendations include decreasing portion sizes  Exercise recommendations include moderate physical activity 150 minutes/week  Depression Screening and Follow-up Plan: Clincally patient does not have depression  No treatment is required  /80 (BP Location: Right arm, Patient Position: Sitting, Cuff Size: Standard)   Ht 6' (1 829 m)   Wt 94 1 kg (207 lb 6 4 oz)   BMI 28 13 kg/m²          Physical Exam  Vitals and nursing note reviewed  Constitutional:       General: He is not in acute distress  Appearance: Normal appearance  He is not ill-appearing, toxic-appearing or diaphoretic  HENT:      Head: Normocephalic and atraumatic  Right Ear: Tympanic membrane, ear canal and external ear normal  There is no impacted cerumen  Left Ear: Tympanic membrane, ear canal and external ear normal  There is no impacted cerumen  Nose: Nose normal  No congestion or rhinorrhea  Mouth/Throat:      Mouth: Mucous membranes are moist       Pharynx: No oropharyngeal exudate or posterior oropharyngeal erythema  Eyes:      General: No scleral icterus  Right eye: No discharge  Left eye: No discharge  Extraocular Movements: Extraocular movements intact  Conjunctiva/sclera: Conjunctivae normal       Pupils: Pupils are equal, round, and reactive to light  Neck:      Vascular: No carotid bruit  Cardiovascular:      Rate and Rhythm: Normal rate and regular rhythm  Pulses: Normal pulses  Heart sounds: Normal heart sounds  No murmur heard  No friction rub  No gallop  Pulmonary:      Effort: Pulmonary effort is normal  No respiratory distress  Breath sounds: Normal breath sounds  No stridor  No wheezing, rhonchi or rales  Chest:      Chest wall: No tenderness  Abdominal:      General: Abdomen is flat  Bowel sounds are normal  There is no distension  Palpations: Abdomen is soft  Tenderness: There is no abdominal tenderness  There is no guarding or rebound  Musculoskeletal:         General: Tenderness present  No swelling, deformity or signs of injury  Normal range of motion  Cervical back: Normal range of motion and neck supple  No rigidity  No muscular tenderness  Right lower leg: No edema  Left lower leg: No edema  Comments: Right thumb tendinitis   Lymphadenopathy:      Cervical: No cervical adenopathy  Skin:     General: Skin is warm and dry  Capillary Refill: Capillary refill takes less than 2 seconds  Coloration: Skin is not jaundiced  Findings: No bruising, erythema, lesion or rash  Neurological:      General: No focal deficit present  Mental Status: He is alert and oriented to person, place, and time  Cranial Nerves: No cranial nerve deficit  Sensory: No sensory deficit  Motor: No weakness  Coordination: Coordination normal       Gait: Gait normal    Psychiatric:         Mood and Affect: Mood normal          Behavior: Behavior normal          Thought Content:  Thought content normal          Judgment: Judgment normal

## 2021-08-30 DIAGNOSIS — I10 ESSENTIAL HYPERTENSION: ICD-10-CM

## 2021-08-30 DIAGNOSIS — K29.00 ACUTE SUPERFICIAL GASTRITIS WITHOUT HEMORRHAGE: ICD-10-CM

## 2021-08-30 RX ORDER — AMLODIPINE BESYLATE 10 MG/1
10 TABLET ORAL DAILY
Qty: 90 TABLET | Refills: 1 | Status: SHIPPED | OUTPATIENT
Start: 2021-08-30 | End: 2022-03-09 | Stop reason: SDUPTHER

## 2021-08-30 RX ORDER — PANTOPRAZOLE SODIUM 40 MG/1
40 TABLET, DELAYED RELEASE ORAL DAILY
Qty: 90 TABLET | Refills: 1 | Status: SHIPPED | OUTPATIENT
Start: 2021-08-30 | End: 2022-03-09 | Stop reason: SDUPTHER

## 2021-09-01 LAB
LEFT EYE DIABETIC RETINOPATHY: NORMAL
RIGHT EYE DIABETIC RETINOPATHY: NORMAL
SEVERITY (EYE EXAM): NORMAL

## 2021-10-12 ENCOUNTER — OFFICE VISIT (OUTPATIENT)
Dept: SLEEP CENTER | Facility: CLINIC | Age: 71
End: 2021-10-12
Payer: MEDICARE

## 2021-10-12 VITALS
HEIGHT: 73 IN | WEIGHT: 205.8 LBS | SYSTOLIC BLOOD PRESSURE: 140 MMHG | BODY MASS INDEX: 27.28 KG/M2 | DIASTOLIC BLOOD PRESSURE: 60 MMHG

## 2021-10-12 DIAGNOSIS — Z99.89 OBSTRUCTIVE SLEEP APNEA TREATED WITH CONTINUOUS POSITIVE AIRWAY PRESSURE (CPAP): Primary | ICD-10-CM

## 2021-10-12 DIAGNOSIS — G47.33 OBSTRUCTIVE SLEEP APNEA TREATED WITH CONTINUOUS POSITIVE AIRWAY PRESSURE (CPAP): Primary | ICD-10-CM

## 2021-10-12 PROCEDURE — 99214 OFFICE O/P EST MOD 30 MIN: CPT | Performed by: NURSE PRACTITIONER

## 2021-10-13 ENCOUNTER — TELEPHONE (OUTPATIENT)
Dept: SLEEP CENTER | Facility: CLINIC | Age: 71
End: 2021-10-13

## 2021-12-01 ENCOUNTER — APPOINTMENT (OUTPATIENT)
Dept: LAB | Facility: HOSPITAL | Age: 71
End: 2021-12-01
Payer: MEDICARE

## 2021-12-01 DIAGNOSIS — E79.0 HYPERURICEMIA: ICD-10-CM

## 2021-12-01 DIAGNOSIS — R73.03 PRE-DIABETES: ICD-10-CM

## 2021-12-01 DIAGNOSIS — E87.6 HYPOKALEMIA: ICD-10-CM

## 2021-12-01 DIAGNOSIS — I10 ESSENTIAL HYPERTENSION: ICD-10-CM

## 2021-12-01 DIAGNOSIS — E78.1 PURE HYPERTRIGLYCERIDEMIA: ICD-10-CM

## 2021-12-01 DIAGNOSIS — E08.43 DIABETES MELLITUS DUE TO UNDERLYING CONDITION WITH DIABETIC AUTONOMIC NEUROPATHY, WITHOUT LONG-TERM CURRENT USE OF INSULIN (HCC): ICD-10-CM

## 2021-12-01 DIAGNOSIS — K21.00 GASTROESOPHAGEAL REFLUX DISEASE WITH ESOPHAGITIS WITHOUT HEMORRHAGE: ICD-10-CM

## 2021-12-01 LAB
ALBUMIN SERPL BCP-MCNC: 3.7 G/DL (ref 3.5–5)
ALP SERPL-CCNC: 71 U/L (ref 46–116)
ALT SERPL W P-5'-P-CCNC: 36 U/L (ref 12–78)
ANION GAP SERPL CALCULATED.3IONS-SCNC: 8 MMOL/L (ref 4–13)
AST SERPL W P-5'-P-CCNC: 18 U/L (ref 5–45)
BASOPHILS # BLD AUTO: 0.06 THOUSANDS/ΜL (ref 0–0.1)
BASOPHILS NFR BLD AUTO: 1 % (ref 0–1)
BILIRUB SERPL-MCNC: 0.83 MG/DL (ref 0.2–1)
BUN SERPL-MCNC: 14 MG/DL (ref 5–25)
CALCIUM SERPL-MCNC: 9.1 MG/DL (ref 8.3–10.1)
CHLORIDE SERPL-SCNC: 104 MMOL/L (ref 100–108)
CHOLEST SERPL-MCNC: 208 MG/DL
CO2 SERPL-SCNC: 30 MMOL/L (ref 21–32)
CREAT SERPL-MCNC: 1.24 MG/DL (ref 0.6–1.3)
EOSINOPHIL # BLD AUTO: 0.21 THOUSAND/ΜL (ref 0–0.61)
EOSINOPHIL NFR BLD AUTO: 3 % (ref 0–6)
ERYTHROCYTE [DISTWIDTH] IN BLOOD BY AUTOMATED COUNT: 13.6 % (ref 11.6–15.1)
EST. AVERAGE GLUCOSE BLD GHB EST-MCNC: 160 MG/DL
GFR SERPL CREATININE-BSD FRML MDRD: 59 ML/MIN/1.73SQ M
GLUCOSE P FAST SERPL-MCNC: 160 MG/DL (ref 65–99)
HBA1C MFR BLD: 7.2 %
HCT VFR BLD AUTO: 48.5 % (ref 36.5–49.3)
HDLC SERPL-MCNC: 29 MG/DL
HGB BLD-MCNC: 15.7 G/DL (ref 12–17)
IMM GRANULOCYTES # BLD AUTO: 0.01 THOUSAND/UL (ref 0–0.2)
IMM GRANULOCYTES NFR BLD AUTO: 0 % (ref 0–2)
LDLC SERPL CALC-MCNC: 151 MG/DL (ref 0–100)
LYMPHOCYTES # BLD AUTO: 1.71 THOUSANDS/ΜL (ref 0.6–4.47)
LYMPHOCYTES NFR BLD AUTO: 27 % (ref 14–44)
MAGNESIUM SERPL-MCNC: 2.4 MG/DL (ref 1.6–2.6)
MCH RBC QN AUTO: 28 PG (ref 26.8–34.3)
MCHC RBC AUTO-ENTMCNC: 32.4 G/DL (ref 31.4–37.4)
MCV RBC AUTO: 87 FL (ref 82–98)
MONOCYTES # BLD AUTO: 0.47 THOUSAND/ΜL (ref 0.17–1.22)
MONOCYTES NFR BLD AUTO: 7 % (ref 4–12)
NEUTROPHILS # BLD AUTO: 4 THOUSANDS/ΜL (ref 1.85–7.62)
NEUTS SEG NFR BLD AUTO: 62 % (ref 43–75)
NONHDLC SERPL-MCNC: 179 MG/DL
NRBC BLD AUTO-RTO: 0 /100 WBCS
PLATELET # BLD AUTO: 181 THOUSANDS/UL (ref 149–390)
PMV BLD AUTO: 10.6 FL (ref 8.9–12.7)
POTASSIUM SERPL-SCNC: 3.4 MMOL/L (ref 3.5–5.3)
PROT SERPL-MCNC: 7 G/DL (ref 6.4–8.2)
RBC # BLD AUTO: 5.61 MILLION/UL (ref 3.88–5.62)
SODIUM SERPL-SCNC: 142 MMOL/L (ref 136–145)
TRIGL SERPL-MCNC: 138 MG/DL
TSH SERPL DL<=0.05 MIU/L-ACNC: 1.37 UIU/ML (ref 0.36–3.74)
URATE SERPL-MCNC: 8 MG/DL (ref 4.2–8)
WBC # BLD AUTO: 6.46 THOUSAND/UL (ref 4.31–10.16)

## 2021-12-01 PROCEDURE — 83735 ASSAY OF MAGNESIUM: CPT

## 2021-12-01 PROCEDURE — 84443 ASSAY THYROID STIM HORMONE: CPT

## 2021-12-01 PROCEDURE — 36415 COLL VENOUS BLD VENIPUNCTURE: CPT

## 2021-12-01 PROCEDURE — 83036 HEMOGLOBIN GLYCOSYLATED A1C: CPT

## 2021-12-01 PROCEDURE — 85025 COMPLETE CBC W/AUTO DIFF WBC: CPT

## 2021-12-01 PROCEDURE — 84550 ASSAY OF BLOOD/URIC ACID: CPT

## 2021-12-01 PROCEDURE — 80061 LIPID PANEL: CPT

## 2021-12-01 PROCEDURE — 80053 COMPREHEN METABOLIC PANEL: CPT

## 2021-12-02 ENCOUNTER — OFFICE VISIT (OUTPATIENT)
Dept: FAMILY MEDICINE CLINIC | Facility: CLINIC | Age: 71
End: 2021-12-02
Payer: MEDICARE

## 2021-12-02 VITALS
RESPIRATION RATE: 20 BRPM | OXYGEN SATURATION: 95 % | TEMPERATURE: 97.5 F | HEART RATE: 74 BPM | DIASTOLIC BLOOD PRESSURE: 88 MMHG | WEIGHT: 206.4 LBS | HEIGHT: 73 IN | SYSTOLIC BLOOD PRESSURE: 140 MMHG | BODY MASS INDEX: 27.35 KG/M2

## 2021-12-02 DIAGNOSIS — G47.33 OBSTRUCTIVE SLEEP APNEA TREATED WITH CONTINUOUS POSITIVE AIRWAY PRESSURE (CPAP): ICD-10-CM

## 2021-12-02 DIAGNOSIS — I10 ESSENTIAL HYPERTENSION: ICD-10-CM

## 2021-12-02 DIAGNOSIS — Z12.11 SCREENING FOR COLON CANCER: ICD-10-CM

## 2021-12-02 DIAGNOSIS — Z23 NEED FOR IMMUNIZATION AGAINST INFLUENZA: ICD-10-CM

## 2021-12-02 DIAGNOSIS — E87.6 HYPOKALEMIA: ICD-10-CM

## 2021-12-02 DIAGNOSIS — E78.2 MIXED HYPERLIPIDEMIA: ICD-10-CM

## 2021-12-02 DIAGNOSIS — I10 PRIMARY HYPERTENSION: ICD-10-CM

## 2021-12-02 DIAGNOSIS — E08.43 DIABETES MELLITUS DUE TO UNDERLYING CONDITION WITH DIABETIC AUTONOMIC NEUROPATHY, WITHOUT LONG-TERM CURRENT USE OF INSULIN (HCC): Primary | ICD-10-CM

## 2021-12-02 DIAGNOSIS — I71.2 ASCENDING AORTIC ANEURYSM (HCC): ICD-10-CM

## 2021-12-02 DIAGNOSIS — Z99.89 OBSTRUCTIVE SLEEP APNEA TREATED WITH CONTINUOUS POSITIVE AIRWAY PRESSURE (CPAP): ICD-10-CM

## 2021-12-02 DIAGNOSIS — N18.30 STAGE 3 CHRONIC KIDNEY DISEASE, UNSPECIFIED WHETHER STAGE 3A OR 3B CKD (HCC): ICD-10-CM

## 2021-12-02 DIAGNOSIS — J01.00 ACUTE NON-RECURRENT MAXILLARY SINUSITIS: ICD-10-CM

## 2021-12-02 PROCEDURE — G0008 ADMIN INFLUENZA VIRUS VAC: HCPCS

## 2021-12-02 PROCEDURE — 90662 IIV NO PRSV INCREASED AG IM: CPT

## 2021-12-02 PROCEDURE — 99214 OFFICE O/P EST MOD 30 MIN: CPT | Performed by: FAMILY MEDICINE

## 2021-12-02 RX ORDER — ROSUVASTATIN CALCIUM 5 MG/1
5 TABLET, COATED ORAL DAILY
Qty: 90 TABLET | Refills: 3 | Status: SHIPPED | OUTPATIENT
Start: 2021-12-02 | End: 2022-06-07 | Stop reason: SDUPTHER

## 2021-12-02 RX ORDER — CEFDINIR 300 MG/1
300 CAPSULE ORAL EVERY 12 HOURS SCHEDULED
Qty: 20 CAPSULE | Refills: 0 | Status: SHIPPED | OUTPATIENT
Start: 2021-12-02 | End: 2021-12-12

## 2021-12-02 RX ORDER — POTASSIUM CHLORIDE 750 MG/1
10 TABLET, EXTENDED RELEASE ORAL DAILY
Qty: 90 TABLET | Refills: 1
Start: 2021-12-02 | End: 2022-06-02

## 2021-12-02 RX ORDER — HYDROCHLOROTHIAZIDE 12.5 MG/1
12.5 TABLET ORAL DAILY
Qty: 90 TABLET | Refills: 1 | Status: SHIPPED | OUTPATIENT
Start: 2021-12-02 | End: 2022-04-28 | Stop reason: SDUPTHER

## 2021-12-06 ENCOUNTER — TELEPHONE (OUTPATIENT)
Dept: ADMINISTRATIVE | Facility: OTHER | Age: 71
End: 2021-12-06

## 2021-12-06 NOTE — LETTER
Diabetic Eye Exam Form    Date Requested: 22  Patient: Edward Wright  Patient : 1950   Referring Provider: Duyen Vallecillo DO    Dilated Retinal Exam, Optomap-Iris Exam, or Fundus Photography Done         Yes ([place] one above)         No     Date of Diabetic Eye Exam ______________________________    Left Eye      Exam did show retinopathy    Exam did not show retinopathy         Mild       Moderate       None       Proliferative       Severe     Right Eye     Exam did show retinopathy    Exam did not show retinopathy         Mild       Moderate       None       Proliferative       Severe     Comments DOS: 2021    Practice Providing Exam ______________________________________________    Exam Performed By (print name) _______________________________________      Provider Signature ___________________________________________________    These reports are needed for  compliance  Please fax this completed form and a copy of the Diabetic Eye Exam report to our office located at [place] [address] as soon as possible via [phone] attention Romayne Pesa: Phone [phone]    We thank you for your assistance in treating our mutual patient

## 2021-12-06 NOTE — TELEPHONE ENCOUNTER
----- Message from Wes Devine MA sent at 12/2/2021 11:20 AM EST -----  12/02/21 11:24 AM    Hello, our patient Juan Antonio Elder has had eye doctor appt done in sept ] completed/performed   Please assist in updating the patient chart by contacting Anneledgar Ely @ [phone] The date of service is 9/2021    Thank you,  Wes Devine MA  [facility]

## 2021-12-06 NOTE — LETTER
Diabetic Eye Exam Form    Date Requested: 21  Patient: Yulissa Gayle  Patient : 1950   Referring Provider: Gopal Shelby,     Dilated Retinal Exam, Optomap-Iris Exam, or Fundus Photography Done         Yes ([place] one above)         No     Date of Diabetic Eye Exam ______________________________  Left Eye      Exam did show retinopathy    Exam did not show retinopathy         Mild       Moderate       None       Proliferative       Severe     Right Eye     Exam did show retinopathy    Exam did not show retinopathy         Mild       Moderate       None       Proliferative       Severe     Comments 2021    Practice Providing Exam ______________________________________________    Exam Performed By (print name) _______________________________________      Provider Signature ___________________________________________________    These reports are needed for  compliance  Please fax this completed form and a copy of the Diabetic Eye Exam report to our office located at [place] [address] as soon as possible via [phone] satnam Fleming Dose: Phone [phone]    We thank you for your assistance in treating our mutual patient

## 2021-12-14 NOTE — TELEPHONE ENCOUNTER
Upon review of the In Basket request and the patient's chart, initial outreach has been made via fax, please see Contacts section for details       Thank you  Jose Carlos Johns

## 2021-12-17 ENCOUNTER — HOSPITAL ENCOUNTER (OUTPATIENT)
Dept: CT IMAGING | Facility: HOSPITAL | Age: 71
Discharge: HOME | End: 2021-12-17
Payer: MEDICARE

## 2021-12-17 DIAGNOSIS — I71.2 ASCENDING AORTIC ANEURYSM (HCC): ICD-10-CM

## 2021-12-17 PROCEDURE — 71250 CT THORAX DX C-: CPT

## 2021-12-17 PROCEDURE — G1004 CDSM NDSC: HCPCS

## 2021-12-21 ENCOUNTER — OFFICE VISIT (OUTPATIENT)
Dept: CARDIAC SURGERY | Facility: CLINIC | Age: 71
End: 2021-12-21
Payer: MEDICARE

## 2021-12-21 VITALS
RESPIRATION RATE: 18 BRPM | WEIGHT: 205.5 LBS | OXYGEN SATURATION: 99 % | SYSTOLIC BLOOD PRESSURE: 145 MMHG | HEIGHT: 73 IN | HEART RATE: 75 BPM | TEMPERATURE: 95.7 F | BODY MASS INDEX: 27.23 KG/M2 | DIASTOLIC BLOOD PRESSURE: 79 MMHG

## 2021-12-21 DIAGNOSIS — I71.2 ASCENDING AORTIC ANEURYSM (HCC): Primary | ICD-10-CM

## 2021-12-21 PROCEDURE — 99214 OFFICE O/P EST MOD 30 MIN: CPT | Performed by: NURSE PRACTITIONER

## 2022-01-04 NOTE — TELEPHONE ENCOUNTER
As a follow-up, a second attempt has been made for outreach via fax, please see Contacts section for details      Thank you  Alejandro Valverde

## 2022-01-17 NOTE — TELEPHONE ENCOUNTER
Upon review of the In Basket request we were able to locate, review, and update the patient chart as requested for Diabetic Eye Exam     Any additional questions or concerns should be emailed to the Practice Liaisons via [email or web address] email, please do not reply via In Basket      Thank you  Talay Hidalgo

## 2022-02-15 ENCOUNTER — APPOINTMENT (EMERGENCY)
Dept: CT IMAGING | Facility: HOSPITAL | Age: 72
End: 2022-02-15
Payer: COMMERCIAL

## 2022-02-15 ENCOUNTER — HOSPITAL ENCOUNTER (EMERGENCY)
Facility: HOSPITAL | Age: 72
Discharge: HOME | End: 2022-02-15
Attending: EMERGENCY MEDICINE
Payer: COMMERCIAL

## 2022-02-15 VITALS
WEIGHT: 205.25 LBS | BODY MASS INDEX: 27.08 KG/M2 | OXYGEN SATURATION: 98 % | SYSTOLIC BLOOD PRESSURE: 150 MMHG | DIASTOLIC BLOOD PRESSURE: 75 MMHG | HEART RATE: 77 BPM | TEMPERATURE: 97.4 F | RESPIRATION RATE: 16 BRPM

## 2022-02-15 DIAGNOSIS — S06.9X0A MILD TRAUMATIC BRAIN INJURY, WITHOUT LOSS OF CONSCIOUSNESS, INITIAL ENCOUNTER (HCC): Primary | ICD-10-CM

## 2022-02-15 PROCEDURE — G1004 CDSM NDSC: HCPCS

## 2022-02-15 PROCEDURE — 70450 CT HEAD/BRAIN W/O DYE: CPT

## 2022-02-15 PROCEDURE — 99282 EMERGENCY DEPT VISIT SF MDM: CPT | Performed by: EMERGENCY MEDICINE

## 2022-02-15 PROCEDURE — 99283 EMERGENCY DEPT VISIT LOW MDM: CPT

## 2022-02-15 PROCEDURE — 72125 CT NECK SPINE W/O DYE: CPT

## 2022-02-16 NOTE — ED PROVIDER NOTES
History  Chief Complaint   Patient presents with    Head Injury     Pt reports walked into an air conditioner yesterday - no blood thinners takes asa couple times a week - pt reports not feeling like himself - pt reports neck pain forehead pain and back of head pain denies LOC pt reports body feels weak      Pt is a 71year old male with a PMH of HTN presenting with head injury  Pt states yesterday he was walking outside when he accidentally struck his forehead [facility]  He denies LOC  States that since, he has been having worsening global headache, worse in the occiput which radiates into his neck  Mild nausea but denies lightheadedness, dizziness, weakness, numbness, chest pain, SOB, vomiting  Takes ASA twice a week  History provided by:  Patient   used: No    Headache  Pain location:  Generalized  Quality:  Dull  Radiates to:  Does not radiate  Severity currently:  10/10  Severity at highest:  10/10  Onset quality:  Gradual  Duration:  1 day  Timing:  Constant  Progression:  Worsening  Chronicity:  New  Similar to prior headaches: no    Relieved by:  Nothing  Worsened by:  Nothing  Ineffective treatments:  NSAIDs  Associated symptoms: nausea and neck pain    Associated symptoms: no abdominal pain, no diarrhea, no dizziness, no numbness, no vomiting and no weakness    Risk factors: no anger, no family hx of SAH and does not have insomnia        Prior to Admission Medications   Prescriptions Last Dose Informant Patient Reported? Taking?    Elastic Bandages & Supports (Wrist/Thumb Splint/Right Large) MISC Not Taking at Unknown time Spouse/Significant Other No No   Sig: Use daily   Patient not taking: Reported on 2/15/2022    amLODIPine (NORVASC) 10 mg tablet 2/15/2022 at Unknown time Spouse/Significant Other No Yes   Sig: Take 1 tablet (10 mg total) by mouth daily   diphenhydrAMINE (BENADRYL) 25 mg capsule  Spouse/Significant Other Yes No   Sig: Take 25 mg by mouth as needed for itching   hydrochlorothiazide (HYDRODIURIL) 12 5 mg tablet 2/15/2022 at Unknown time Spouse/Significant Other No Yes   Sig: Take 1 tablet (12 5 mg total) by mouth daily   metoprolol succinate (TOPROL-XL) 25 mg 24 hr tablet 2/15/2022 at Unknown time Spouse/Significant Other No Yes   Sig: Take 1 tablet by mouth once daily   pantoprazole (PROTONIX) 40 mg tablet 2/15/2022 at Unknown time Spouse/Significant Other No Yes   Sig: Take 1 tablet (40 mg total) by mouth daily   potassium chloride (K-DUR,KLOR-CON) 10 mEq tablet 2/15/2022 at Unknown time Spouse/Significant Other No Yes   Sig: Take 1 tablet (10 mEq total) by mouth daily   rosuvastatin (CRESTOR) 5 mg tablet 2/15/2022 at Unknown time Spouse/Significant Other No Yes   Sig: Take 1 tablet (5 mg total) by mouth daily      Facility-Administered Medications: None       Past Medical History:   Diagnosis Date    Abnormal CAT scan     Bilateral inguinal hernia 1990    recurrent    Colitis     Gastritis 12/11/2012    Hypertension     Impaired fasting glucose 7/9/2019    Nausea & vomiting     Right knee meniscal tear 12/20/2017    Sleep apnea     uses cpap       Past Surgical History:   Procedure Laterality Date    CHOLECYSTECTOMY      COLONOSCOPY  2017    ABOUT 2-3 YEARS AGO    EXPLORATORY LAPAROTOMY  08/16/2014    done for repair of peritoneal "RENT"?    HEMORRHOID SURGERY      INGUINAL HERNIA REPAIR Bilateral 08/14/2014    [place] ESOPHAGOGASTRODUODENOSCOPY TRANSORAL DIAGNOSTIC N/A 2/23/2017    Procedure: EGD AND COLONOSCOPY;  Surgeon: Delano Cabrera MD;  Location: [facility]; Service: Gastroenterology    US GUIDED THYROID BIOPSY  3/15/2021       Family History   Problem Relation Age of Onset    Breast cancer Mother     Heart disease Father     Heart disease Brother     Heart disease Family      I have reviewed and agree with the history as documented      E-Cigarette/Vaping    E-Cigarette Use Never User      E-Cigarette/Vaping Substances    Nicotine No     THC No     CBD No     Flavoring No     Other No     Unknown No      Social History     Tobacco Use    Smoking status: Former Smoker     Packs/day: 0 25     Years: 40 00     Pack years: 10 00     Types: Cigarettes     Quit date: 2019     Years since quittin 2    Smokeless tobacco: Never Used   Vaping Use    Vaping Use: Never used   Substance Use Topics    Alcohol use: Yes     Comment: social    Drug use: No       Review of Systems   Constitutional: Negative  HENT: Negative  Respiratory: Negative  Cardiovascular: Negative  Gastrointestinal: Positive for nausea  Negative for abdominal pain, constipation, diarrhea and vomiting  Genitourinary: Negative  Musculoskeletal: Positive for neck pain  Neurological: Positive for headaches  Negative for dizziness, syncope, facial asymmetry, speech difficulty, weakness, light-headedness and numbness  All other systems reviewed and are negative  Physical Exam  Physical Exam  Constitutional:       General: He is not in acute distress  Appearance: He is well-developed  He is not diaphoretic  HENT:      Head: Normocephalic and atraumatic  Right Ear: External ear normal       Left Ear: External ear normal       Nose: Nose normal    Eyes:      General: No scleral icterus  Right eye: No discharge  Left eye: No discharge  Extraocular Movements: Extraocular movements intact  Conjunctiva/sclera: Conjunctivae normal       Pupils: Pupils are equal, round, and reactive to light  Cardiovascular:      Rate and Rhythm: Normal rate and regular rhythm  Heart sounds: Normal heart sounds  Pulmonary:      Effort: Pulmonary effort is normal       Breath sounds: Normal breath sounds  Abdominal:      General: Abdomen is flat  Bowel sounds are normal  There is no distension  Palpations: Abdomen is soft  Tenderness: There is no abdominal tenderness  Musculoskeletal:         General: Normal range of motion  Cervical back: Normal range of motion and neck supple  Pain with movement, spinous process tenderness and muscular tenderness present  Normal range of motion  Skin:     General: Skin is warm and dry  Neurological:      General: No focal deficit present  Mental Status: He is alert and oriented to person, place, and time  Mental status is at baseline  GCS: GCS eye subscore is 4  GCS verbal subscore is 5  GCS motor subscore is 6  Cranial Nerves: Cranial nerves are intact  Sensory: Sensation is intact  No sensory deficit  Motor: Motor function is intact  Coordination: Coordination is intact  Psychiatric:         Mood and Affect: Mood normal          Behavior: Behavior normal          Vital Signs  ED Triage Vitals   Temperature Pulse Respirations Blood Pressure SpO2   02/15/22 2204 02/15/22 2204 02/15/22 2204 02/15/22 2204 02/15/22 2204   (!) 97 4 °F (36 3 °C) 81 14 (!) 200/95 99 %      Temp Source Heart Rate Source Patient Position - Orthostatic VS BP Location FiO2 (%)   02/15/22 2204 02/15/22 2204 02/15/22 2204 02/15/22 2204 --   Oral Monitor Sitting Right arm       Pain Score       02/15/22 2335       10 - Worst Possible Pain           Vitals:    02/15/22 2204 02/15/22 2335   BP: (!) 200/95 150/75   Pulse: 81 77   Patient Position - Orthostatic VS: Sitting Lying         Visual Acuity      ED Medications  Medications - No data to display    Diagnostic Studies  Results Reviewed     None                 CT head without contrast   Final Result by Darrel Carrera DO (02/15 2327)      No acute intracranial abnormality  Workstation performed: [id number]         CT cervical spine without contrast   Final Result by Darrel Carrera DO (02/15 2333)      No cervical spine fracture or traumatic malalignment                     Workstation performed: [id number]                    Procedures  Procedures         ED Course                                             MDM  Number of Diagnoses or Management Options  Mild traumatic brain injury, without loss of consciousness, initial encounter [facility]): new and requires workup     Amount and/or Complexity of Data Reviewed  Tests in the radiology section of CPT®: ordered and reviewed  Independent visualization of images, tracings, or specimens: yes    Risk of Complications, Morbidity, and/or Mortality  Presenting problems: moderate  Management options: moderate    Patient Progress  Patient progress: stable      Disposition  Final diagnoses:   Mild traumatic brain injury, without loss of consciousness, initial encounter [facility])     Time reflects when diagnosis was documented in both MDM as applicable and the Disposition within this note     Time User Action Codes Description Comment    2/15/2022 11:44 PM Curtis Neff B Add [S09 90XA] Injury of head, initial encounter     2/15/2022 11:44 PM Curtis Neff B Remove [S09 90XA] Injury of head, initial encounter     2/15/2022 11:44 PM Curtis Neff B Add [S06 9X0A] Mild traumatic brain injury, without loss of consciousness, initial encounter [facility])       ED Disposition     ED Disposition Condition Date/Time Comment    Discharge Good e Feb 15, 2022 11:44 PM Reg Mondragon Joaquim discharge to home/self care              Follow-up Information     Follow up With Specialties Details Why Contact Hilda Matthew DO Family Medicine Schedule an appointment as soon as possible for a visit today  [address] [facility]  [phone]            Discharge Medication List as of 2/15/2022 11:45 PM      CONTINUE these medications which have NOT CHANGED    Details   amLODIPine (NORVASC) 10 mg tablet Take 1 tablet (10 mg total) by mouth daily, Starting Mon 8/30/2021, Normal      hydrochlorothiazide (HYDRODIURIL) 12 5 mg tablet Take 1 tablet (12 5 mg total) by mouth daily, Starting Thu 12/2/2021, Normal      metoprolol succinate (TOPROL-XL) 25 mg 24 hr tablet Take 1 tablet by mouth once daily, Normal pantoprazole (PROTONIX) 40 mg tablet Take 1 tablet (40 mg total) by mouth daily, Starting Mon 8/30/2021, Normal      potassium chloride (K-DUR,KLOR-CON) 10 mEq tablet Take 1 tablet (10 mEq total) by mouth daily, Starting Thu 12/2/2021, No Print      rosuvastatin (CRESTOR) 5 mg tablet Take 1 tablet (5 mg total) by mouth daily, Starting Thu 12/2/2021, Normal      diphenhydrAMINE (BENADRYL) 25 mg capsule Take 25 mg by mouth as needed for itching, Historical Med      Elastic Bandages & Supports (Wrist/Thumb Splint/Right Large) MISC Use daily, Starting Wed 7/7/2021, Print                 PDMP Review       Value Time User    PDMP Reviewed  Yes 1/17/2020  3:47 PM [place] Phlegm, DO          ED Provider  Electronically Signed by           Ofelia Tee PA-C  02/16/22 [id number]

## 2022-02-16 NOTE — DISCHARGE INSTRUCTIONS
Concussion   WHAT YOU NEED TO KNOW:   A concussion is a mild brain injury  It is usually caused by a bump or blow to the head from a fall, a motor vehicle crash, or a sports injury  Sometimes being shaken forcefully may cause a concussion  DISCHARGE INSTRUCTIONS:   Have someone call 911 for any of the following:   · Someone tries to wake you and cannot do so  · You have a seizure, increasing confusion, or a change in personality  · Your speech becomes slurred, or you have new vision problems  Return to the emergency department if:   · You have sudden and new vision problems  · You have a severe headache that does not go away  · You have arm or leg weakness, numbness, or new problems with coordination  · You have blood or clear fluid coming out of the ears or nose  Contact your healthcare provider if:   · You have nausea or are vomiting  · You feel more sleepy than usual     · Your symptoms get worse  · Your symptoms last longer than 6 weeks after the injury  · You have questions or concerns about your condition or care  Medicines: You may need any of the following:  · Acetaminophen  decreases pain and fever  It is available without a doctor's order  Ask how much to take and how often to take it  Follow directions  Read the labels of all other medicines you are using to see if they also contain acetaminophen, or ask your doctor or pharmacist  Acetaminophen can cause liver damage if not taken correctly  Do not use more than 4 grams (4,000 milligrams) total of acetaminophen in one day  · NSAIDs  help decrease swelling and pain or fever  This medicine is available with or without a doctor's order  NSAIDs can cause stomach bleeding or kidney problems in certain people  If you take blood thinner medicine, always ask your healthcare provider if NSAIDs are safe for you  Always read the medicine label and follow directions  · Take your medicine as directed    Contact your healthcare provider if you think your medicine is not helping or if you have side effects  Tell him or her if you are allergic to any medicine  Keep a list of the medicines, vitamins, and herbs you take  Include the amounts, and when and why you take them  Bring the list or the pill bottles to follow-up visits  Carry your medicine list with you in case of an emergency  Self-care:  Concussion symptoms usually go away within about 10 days, but they may last longer  The following may be recommended to manage your symptoms:  · Rest from physical and mental activities as directed  Mental activities are those that require thinking, concentration, and attention  You will need to rest until your symptoms are gone  Rest will allow you to recover from your concussion  Ask your healthcare provider when you can return to work and other daily activities  · Have someone stay with you for the first 24 hours after your injury  Your healthcare provider should be contacted if your symptoms get worse, or you develop new symptoms  · Do not participate in sports and physical activities until your healthcare provider says it is okay  They could make your symptoms worse or lead to another concussion  Your healthcare provider will tell you when it is okay for you to return to sports or physical activities  Ask for more information about sports concussions  Prevent another concussion:   · Wear protective sports equipment that fits properly  Helmets help decrease your risk for a serious brain injury  Talk to your healthcare provider about ways you can decrease your risk for a concussion if you play sports  · Wear your seatbelt every time you travel  This helps to decrease your risk for a head injury if you are in a car accident  Follow up with your doctor as directed:  Write down your questions so you remember to ask them during your visits     © Copyright [employer] 2021 Information is for End User's use only and may not be sold, redistributed or otherwise used for commercial purposes  All illustrations and images included in CareNotes® are the copyrighted property of A D A M , Inc  or Abdiaziz Osullivan  The above information is an  only  It is not intended as medical advice for individual conditions or treatments  Talk to your doctor, nurse or pharmacist before following any medical regimen to see if it is safe and effective for you

## 2022-02-17 ENCOUNTER — VBI (OUTPATIENT)
Dept: FAMILY MEDICINE CLINIC | Facility: CLINIC | Age: 72
End: 2022-02-17

## 2022-02-21 ENCOUNTER — TELEPHONE (OUTPATIENT)
Dept: GASTROENTEROLOGY | Facility: CLINIC | Age: 72
End: 2022-02-21

## 2022-02-21 NOTE — TELEPHONE ENCOUNTER
RECALL    Scheduled date of colonoscopy (as of today):06 02 22  Physician performing colonoscopy:DR SULLIVAN  Location of colonoscopy:[facility]  Bowel prep reviewed with patient:DULCOLAX/MIRALAX  Instructions reviewed with patient by:LUIS ALBERTO VERBALLY/MAILED  Clearances: N/A

## 2022-03-08 ENCOUNTER — APPOINTMENT (OUTPATIENT)
Dept: LAB | Facility: HOSPITAL | Age: 72
End: 2022-03-08
Payer: COMMERCIAL

## 2022-03-08 DIAGNOSIS — E08.43 DIABETES MELLITUS DUE TO UNDERLYING CONDITION WITH DIABETIC AUTONOMIC NEUROPATHY, WITHOUT LONG-TERM CURRENT USE OF INSULIN (HCC): ICD-10-CM

## 2022-03-08 DIAGNOSIS — I10 PRIMARY HYPERTENSION: ICD-10-CM

## 2022-03-08 DIAGNOSIS — E78.2 MIXED HYPERLIPIDEMIA: ICD-10-CM

## 2022-03-08 LAB
ALBUMIN SERPL BCP-MCNC: 4 G/DL (ref 3.5–5)
ALP SERPL-CCNC: 82 U/L (ref 46–116)
ALT SERPL W P-5'-P-CCNC: 24 U/L (ref 12–78)
ANION GAP SERPL CALCULATED.3IONS-SCNC: 8 MMOL/L (ref 4–13)
AST SERPL W P-5'-P-CCNC: 14 U/L (ref 5–45)
BILIRUB SERPL-MCNC: 0.86 MG/DL (ref 0.2–1)
BUN SERPL-MCNC: 18 MG/DL (ref 5–25)
CALCIUM SERPL-MCNC: 9.8 MG/DL (ref 8.3–10.1)
CHLORIDE SERPL-SCNC: 104 MMOL/L (ref 100–108)
CHOLEST SERPL-MCNC: 131 MG/DL
CO2 SERPL-SCNC: 32 MMOL/L (ref 21–32)
CREAT SERPL-MCNC: 1.17 MG/DL (ref 0.6–1.3)
EST. AVERAGE GLUCOSE BLD GHB EST-MCNC: 174 MG/DL
GFR SERPL CREATININE-BSD FRML MDRD: 62 ML/MIN/1.73SQ M
GLUCOSE P FAST SERPL-MCNC: 174 MG/DL (ref 65–99)
HBA1C MFR BLD: 7.7 %
HDLC SERPL-MCNC: 29 MG/DL
LDLC SERPL CALC-MCNC: 80 MG/DL (ref 0–100)
NONHDLC SERPL-MCNC: 102 MG/DL
POTASSIUM SERPL-SCNC: 3.7 MMOL/L (ref 3.5–5.3)
PROT SERPL-MCNC: 7.3 G/DL (ref 6.4–8.2)
SODIUM SERPL-SCNC: 144 MMOL/L (ref 136–145)
TRIGL SERPL-MCNC: 108 MG/DL

## 2022-03-08 PROCEDURE — 83036 HEMOGLOBIN GLYCOSYLATED A1C: CPT

## 2022-03-08 PROCEDURE — 36415 COLL VENOUS BLD VENIPUNCTURE: CPT

## 2022-03-08 PROCEDURE — 80061 LIPID PANEL: CPT

## 2022-03-08 PROCEDURE — 80053 COMPREHEN METABOLIC PANEL: CPT

## 2022-03-09 ENCOUNTER — OFFICE VISIT (OUTPATIENT)
Dept: FAMILY MEDICINE CLINIC | Facility: CLINIC | Age: 72
End: 2022-03-09
Payer: COMMERCIAL

## 2022-03-09 VITALS
HEART RATE: 81 BPM | WEIGHT: 205 LBS | SYSTOLIC BLOOD PRESSURE: 160 MMHG | OXYGEN SATURATION: 99 % | TEMPERATURE: 97.4 F | DIASTOLIC BLOOD PRESSURE: 84 MMHG | BODY MASS INDEX: 27.17 KG/M2 | HEIGHT: 73 IN

## 2022-03-09 DIAGNOSIS — Z00.00 MEDICARE ANNUAL WELLNESS VISIT, SUBSEQUENT: ICD-10-CM

## 2022-03-09 DIAGNOSIS — I10 PRIMARY HYPERTENSION: ICD-10-CM

## 2022-03-09 DIAGNOSIS — K29.00 ACUTE SUPERFICIAL GASTRITIS WITHOUT HEMORRHAGE: ICD-10-CM

## 2022-03-09 DIAGNOSIS — Z12.5 SCREENING FOR PROSTATE CANCER: ICD-10-CM

## 2022-03-09 DIAGNOSIS — I10 ESSENTIAL HYPERTENSION: ICD-10-CM

## 2022-03-09 DIAGNOSIS — E78.2 MIXED HYPERLIPIDEMIA: ICD-10-CM

## 2022-03-09 DIAGNOSIS — J44.9 CHRONIC OBSTRUCTIVE PULMONARY DISEASE, UNSPECIFIED COPD TYPE (HCC): ICD-10-CM

## 2022-03-09 DIAGNOSIS — E08.43 DIABETES MELLITUS DUE TO UNDERLYING CONDITION WITH DIABETIC AUTONOMIC NEUROPATHY, WITHOUT LONG-TERM CURRENT USE OF INSULIN (HCC): Primary | ICD-10-CM

## 2022-03-09 DIAGNOSIS — I71.2 ASCENDING AORTIC ANEURYSM (HCC): ICD-10-CM

## 2022-03-09 DIAGNOSIS — N18.30 STAGE 3 CHRONIC KIDNEY DISEASE, UNSPECIFIED WHETHER STAGE 3A OR 3B CKD (HCC): ICD-10-CM

## 2022-03-09 PROCEDURE — 1160F RVW MEDS BY RX/DR IN RCRD: CPT | Performed by: FAMILY MEDICINE

## 2022-03-09 PROCEDURE — 1036F TOBACCO NON-USER: CPT | Performed by: FAMILY MEDICINE

## 2022-03-09 PROCEDURE — 1170F FXNL STATUS ASSESSED: CPT | Performed by: FAMILY MEDICINE

## 2022-03-09 PROCEDURE — 99214 OFFICE O/P EST MOD 30 MIN: CPT | Performed by: FAMILY MEDICINE

## 2022-03-09 PROCEDURE — 3079F DIAST BP 80-89 MM HG: CPT | Performed by: FAMILY MEDICINE

## 2022-03-09 PROCEDURE — 3008F BODY MASS INDEX DOCD: CPT | Performed by: FAMILY MEDICINE

## 2022-03-09 PROCEDURE — 1125F AMNT PAIN NOTED PAIN PRSNT: CPT | Performed by: FAMILY MEDICINE

## 2022-03-09 PROCEDURE — 3288F FALL RISK ASSESSMENT DOCD: CPT | Performed by: FAMILY MEDICINE

## 2022-03-09 PROCEDURE — 3077F SYST BP >= 140 MM HG: CPT | Performed by: FAMILY MEDICINE

## 2022-03-09 PROCEDURE — 3725F SCREEN DEPRESSION PERFORMED: CPT | Performed by: FAMILY MEDICINE

## 2022-03-09 RX ORDER — PANTOPRAZOLE SODIUM 40 MG/1
40 TABLET, DELAYED RELEASE ORAL DAILY
Qty: 90 TABLET | Refills: 1 | Status: SHIPPED | OUTPATIENT
Start: 2022-03-09

## 2022-03-09 RX ORDER — AMLODIPINE BESYLATE 10 MG/1
10 TABLET ORAL DAILY
Qty: 90 TABLET | Refills: 1 | Status: SHIPPED | OUTPATIENT
Start: 2022-03-09

## 2022-03-09 NOTE — PROGRESS NOTES
Assessment/Plan:  LDL 80  GFR 62  A1c 7 7  Uric acid 8  Magnesium 2 4 TSH 1 3  Patient will start metformin for diabetes as A1c 7 7  Patient will have blood pressure rechecked at follow-up  Low-salt diet recommended  Patient will continue with current regimen for hyperlipidemia which is stable at this time  Continue with current regimen for gastritis  Refills given at this time  Patient follow-up 3 months  Patient have A1c in office at follow-up  Diagnoses and all orders for this visit:    Diabetes mellitus due to underlying condition with diabetic autonomic neuropathy, without long-term current use of insulin (HCC)  -     metFORMIN (GLUCOPHAGE) 500 mg tablet; Take 1 tablet (500 mg total) by mouth daily with breakfast    Essential hypertension  -     amLODIPine (NORVASC) 10 mg tablet; Take 1 tablet (10 mg total) by mouth daily    Acute superficial gastritis without hemorrhage  -     pantoprazole (PROTONIX) 40 mg tablet; Take 1 tablet (40 mg total) by mouth daily    Mixed hyperlipidemia    Stage 3 chronic kidney disease, unspecified whether stage 3a or 3b CKD ([place] [address] )    Primary hypertension    Medicare annual wellness visit, subsequent    Screening for prostate cancer  -     PSA, Total Screen; Future    Chronic obstructive pulmonary disease, unspecified COPD type ([place] [address] )    Ascending aortic aneurysm (HCC)            Subjective:        Patient ID: Veronika Ochoa is a 71 y o  male  Patient follow-up on diabetes, chronic kidney disease, hyperlipidemia  Patient is feeling tired  Patient feels this is more is mood then physical issues  No chest pain shortness breath or abdominal pain or problems urinating or defecating  Patient did see ophthalmologist   Patient is using drops for his eyes          The following portions of the patient's history were reviewed and updated as appropriate: allergies, current medications, past family history, past medical history, past social history, past surgical history and problem list       Review of Systems   Constitutional: Positive for fatigue  HENT: Negative  Eyes: Negative  Respiratory: Negative  Cardiovascular: Negative  Gastrointestinal: Negative  Endocrine: Negative  Genitourinary: Negative  Musculoskeletal: Negative  Skin: Negative  Allergic/Immunologic: Negative  Neurological: Negative  Hematological: Negative  Psychiatric/Behavioral: Negative              Objective:               /84 (BP Location: Right arm, Patient Position: Sitting, Cuff Size: Standard)   Pulse 81   Temp (!) 97 4 °F (36 3 °C) (Tympanic)   Ht 6' 1" (1 854 m)   Wt 93 kg (205 lb)   SpO2 99%   BMI 27 05 kg/m²          Physical Exam

## 2022-03-09 NOTE — PATIENT INSTRUCTIONS

## 2022-03-09 NOTE — PROGRESS NOTES
Assessment and Plan:     Problem List Items Addressed This Visit     None      Visit Diagnoses     Essential hypertension        Relevant Medications    amLODIPine (NORVASC) 10 mg tablet    Acute superficial gastritis without hemorrhage        Relevant Medications    pantoprazole (PROTONIX) 40 mg tablet           Preventive health issues were discussed with patient, and age appropriate screening tests were ordered as noted in patient's After Visit Summary  Personalized health advice and appropriate referrals for health education or preventive services given if needed, as noted in patient's After Visit Summary  History of Present Illness:     [facility]       Patient Care Team:  Laurel Henderson DO as PCP - General  Vernie Glaze, MD Miguel Siemens, PA-C Vernie Glaze, MD as Endoscopist     Review of Systems:     Review of Systems   Problem List:     Patient Active Problem List   Diagnosis    Tobacco abuse    Obstructive sleep apnea treated with continuous positive airway pressure (CPAP)    Hypertension    Hyperlipidemia    Angioedema, Likely ACE inhibitor induced    GERD (gastroesophageal reflux disease)    Cough    Ascending aortic aneurysm (HCC)    Hyperglycemia    Chronic obstructive pulmonary disease ([facility] )    Pre-diabetes    Diabetes mellitus due to underlying condition with diabetic autonomic neuropathy, without long-term current use of insulin (HCC)    Thyroid nodule    Right wrist pain    Hypokalemia    Stage 3 chronic kidney disease, unspecified whether stage 3a or 3b CKD ([address] )    Acute non-recurrent maxillary sinusitis      Past Medical and Surgical History:     Past Medical History:   Diagnosis Date    Abnormal CAT scan     Bilateral inguinal hernia 1990    recurrent    Colitis     Gastritis 12/11/2012    Hypertension     Impaired fasting glucose 7/9/2019    Nausea & vomiting     Right knee meniscal tear 12/20/2017    Sleep apnea     uses cpap Past Surgical History:   Procedure Laterality Date    CHOLECYSTECTOMY      COLONOSCOPY  2017    ABOUT 2-3 YEARS AGO    EXPLORATORY LAPAROTOMY  2014    done for repair of peritoneal "RENT"?    HEMORRHOID SURGERY      INGUINAL HERNIA REPAIR Bilateral 2014    OR ESOPHAGOGASTRODUODENOSCOPY TRANSORAL DIAGNOSTIC N/A 2017    Procedure: EGD AND COLONOSCOPY;  Surgeon: Carissa Burgess MD;  Location:  GI LAB;   Service: Gastroenterology    US GUIDED THYROID BIOPSY  3/15/2021      Family History:     Family History   Problem Relation Age of Onset    Breast cancer Mother     Heart disease Father     Heart disease Brother     Heart disease Family       Social History:     Social History     Socioeconomic History    Marital status: /Civil Union     Spouse name: None    Number of children: None    Years of education: None    Highest education level: None   Occupational History    None   Tobacco Use    Smoking status: Former Smoker     Packs/day: 0 25     Years: 40 00     Pack years: 10 00     Types: Cigarettes     Quit date: 2019     Years since quittin 2    Smokeless tobacco: Never Used   Vaping Use    Vaping Use: Never used   Substance and Sexual Activity    Alcohol use: Yes     Comment: social    Drug use: No    Sexual activity: Not Currently     Partners: Female   Other Topics Concern    None   Social History Narrative    None     Social Determinants of Health     Financial Resource Strain: Not on file   Food Insecurity: Not on file   Transportation Needs: Not on file   Physical Activity: Not on file   Stress: Not on file   Social Connections: Not on file   Intimate Partner Violence: Not on file   Housing Stability: Not on file      Medications and Allergies:     Current Outpatient Medications   Medication Sig Dispense Refill    amLODIPine (NORVASC) 10 mg tablet Take 1 tablet (10 mg total) by mouth daily 90 tablet 1    hydrochlorothiazide (HYDRODIURIL) 12 5 mg tablet Take 1 tablet (12 5 mg total) by mouth daily 90 tablet 1    metoprolol succinate (TOPROL-XL) 25 mg 24 hr tablet Take 1 tablet by mouth once daily 90 tablet 0    pantoprazole (PROTONIX) 40 mg tablet Take 1 tablet (40 mg total) by mouth daily 90 tablet 1    potassium chloride (K-DUR,KLOR-CON) 10 mEq tablet Take 1 tablet (10 mEq total) by mouth daily 90 tablet 1    rosuvastatin (CRESTOR) 5 mg tablet Take 1 tablet (5 mg total) by mouth daily 90 tablet 3    diphenhydrAMINE (BENADRYL) 25 mg capsule Take 25 mg by mouth as needed for itching      Elastic Bandages & Supports (Wrist/Thumb Splint/Right Large) MISC Use daily (Patient not taking: Reported on 2/15/2022 ) 1 each 0     No current facility-administered medications for this visit  Allergies   Allergen Reactions    Ace Inhibitors Angioedema      Immunizations:     Immunization History   Administered Date(s) Administered    COVID-19 MODERNA VACC 0 5 ML IM 01/26/2021, 02/22/2021, 10/27/2021    IPV 01/06/2005    Influenza Split High Dose Preservative Free IM 10/21/2016    Influenza, high dose seasonal 0 7 mL 12/02/2021    Influenza, injectable, quadrivalent, preservative free 0 5 mL 10/13/2020    Influenza, seasonal, injectable 10/13/2003    Pneumococcal Conjugate 13-Valent 06/11/2019    Pneumococcal Polysaccharide PPV23 09/11/2020    Td (adult), adsorbed 01/06/2005, 09/26/2017    Tdap 04/03/2021      Health Maintenance:         Topic Date Due    Colorectal Cancer Screening  02/23/2027    Hepatitis C Screening  Completed     There are no preventive care reminders to display for this patient  Medicare Screening Tests and Risk Assessments:     Rebecca France is here for his Subsequent Wellness visit  Health Risk Assessment:   Patient rates overall health as good  Patient feels that their physical health rating is same  Patient is satisfied with their life  Eyesight was rated as same  Hearing was rated as same   Patient feels that their emotional and mental health rating is same  Patients states they are sometimes angry  Patient states they are sometimes unusually tired/fatigued  Pain experienced in the last 7 days has been some  Patient's pain rating has been 4/10  Patient states that he has experienced no weight loss or gain in last 6 months  Depression Screening:   PHQ-2 Score: 0      Fall Risk Screening: In the past year, patient has experienced: no history of falling in past year      Home Safety:  Patient does not have trouble with stairs inside or outside of their home  Patient has working smoke alarms and has working carbon monoxide detector  Home safety hazards include: none  Nutrition:   Current diet is Regular  Medications:   Patient is currently taking over-the-counter supplements  OTC medications include: see medication list  Patient is able to manage medications  Activities of Daily Living (ADLs)/Instrumental Activities of Daily Living (IADLs):   Walk and transfer into and out of bed and chair?: Yes  Dress and groom yourself?: Yes    Bathe or shower yourself?: Yes    Feed yourself?  Yes  Do your laundry/housekeeping?: Yes  Manage your money, pay your bills and track your expenses?: Yes  Make your own meals?: Yes    Do your own shopping?: Yes    Previous Hospitalizations:   Any hospitalizations or ED visits within the last 12 months?: Yes      Hospitalization Comments: concussion    Advance Care Planning:   Living will: No    Durable POA for healthcare: No    Advanced directive: No      Cognitive Screening:   Provider or family/friend/caregiver concerned regarding cognition?: No    PREVENTIVE SCREENINGS      Cardiovascular Screening:    General: Screening Not Indicated, History Lipid Disorder, Risks and Benefits Discussed and Screening Current      Diabetes Screening:     General: Screening Not Indicated, History Diabetes, Risks and Benefits Discussed and Screening Current      Colorectal Cancer Screening:     General: Screening Current      Prostate Cancer Screening:    General: Risks and Benefits Discussed    Due for: PSA      Osteoporosis Screening:    General: Risks and Benefits Discussed      Abdominal Aortic Aneurysm (AAA) Screening:    Risk factors include: age between 73-69 yo and tobacco use        General: Risks and Benefits Discussed and Screening Not Indicated      Lung Cancer Screening:     General: Screening Not Indicated and Risks and Benefits Discussed      Hepatitis C Screening:    General: Screening Current and Risks and Benefits Discussed    Other Counseling Topics:   Regular weightbearing exercise  No exam data present     Physical Exam:     There were no vitals taken for this visit      Physical Exam     Dequan Luis DO

## 2022-04-22 ENCOUNTER — OFFICE VISIT (OUTPATIENT)
Dept: FAMILY MEDICINE CLINIC | Facility: CLINIC | Age: 72
End: 2022-04-22
Payer: COMMERCIAL

## 2022-04-22 DIAGNOSIS — J06.9 ACUTE URI: Primary | ICD-10-CM

## 2022-04-22 PROCEDURE — 1160F RVW MEDS BY RX/DR IN RCRD: CPT | Performed by: FAMILY MEDICINE

## 2022-04-22 PROCEDURE — 0241U HB NFCT DS VIR RESP RNA 4 TRGT: CPT | Performed by: FAMILY MEDICINE

## 2022-04-22 PROCEDURE — 99213 OFFICE O/P EST LOW 20 MIN: CPT | Performed by: FAMILY MEDICINE

## 2022-04-22 PROCEDURE — 1036F TOBACCO NON-USER: CPT | Performed by: FAMILY MEDICINE

## 2022-04-22 RX ORDER — BRIMONIDINE TARTRATE 2 MG/ML
SOLUTION/ DROPS OPHTHALMIC
COMMUNITY
Start: 2022-03-14

## 2022-04-22 RX ORDER — ASPIRIN 81 MG/1
81 TABLET, CHEWABLE ORAL DAILY
COMMUNITY
End: 2022-06-02

## 2022-04-22 RX ORDER — BRIMONIDINE TARTRATE 0.1 %
DROPS OPHTHALMIC (EYE)
COMMUNITY
Start: 2022-02-10

## 2022-04-22 RX ORDER — OSELTAMIVIR PHOSPHATE 75 MG/1
75 CAPSULE ORAL 2 TIMES DAILY
Qty: 10 CAPSULE | Refills: 0 | Status: SHIPPED | OUTPATIENT
Start: 2022-04-22 | End: 2022-04-27

## 2022-04-22 RX ORDER — DOXYCYCLINE HYCLATE 100 MG/1
100 CAPSULE ORAL EVERY 12 HOURS SCHEDULED
Qty: 20 CAPSULE | Refills: 0 | Status: SHIPPED | OUTPATIENT
Start: 2022-04-22 | End: 2022-05-02

## 2022-04-22 RX ORDER — OMEGA-3S/DHA/EPA/FISH OIL/D3 300MG-1000
400 CAPSULE ORAL DAILY
COMMUNITY

## 2022-04-22 RX ORDER — UBIDECARENONE 75 MG
CAPSULE ORAL DAILY
COMMUNITY

## 2022-04-22 RX ORDER — PREDNISOLONE ACETATE 10 MG/ML
SUSPENSION/ DROPS OPHTHALMIC
COMMUNITY
Start: 2022-01-26

## 2022-04-22 NOTE — PROGRESS NOTES
Assessment/Plan:  Supportive care recommended at this time  Patient will be tested for COVID, flu, RSV  Patient will start Tamiflu as directed  Diagnoses and all orders for this visit:    Acute URI  -     oseltamivir (TAMIFLU) 75 mg capsule; Take 1 capsule (75 mg total) by mouth 2 (two) times a day for 5 days  -     doxycycline hyclate (VIBRAMYCIN) 100 mg capsule; Take 1 capsule (100 mg total) by mouth every 12 (twelve) hours for 10 days    Other orders  -     cyanocobalamin (VITAMIN B-12) 100 mcg tablet; Take by mouth daily  -     aspirin 81 mg chewable tablet; Chew 81 mg daily  -     cholecalciferol (VITAMIN D3) 400 units tablet; Take 400 Units by mouth daily  -     Alphagan P 0 1 %; INSTILL 1 DROP INTO EACH EYE TWICE DAILY  -     brimonidine tartrate 0 2 % ophthalmic solution; INSTILL 1 DROP INTO EACH EYE TWICE DAILY  -     prednisoLONE acetate (PRED FORTE) 1 % ophthalmic suspension            Subjective:        Patient ID: Anyi Mattson is a 71 y o  male  Patient is here with sore throat as well as some rhinorrhea and congestion the sinuses over the past 3 days  Patient with some fatigue associated with this  No fevers or chills  Patient is sweating  Patient also with sneezing  No vomiting or diarrhea associated with this  No lost are smell or taste  Over-the-counter medications use  Patient did have contact with grandkids that did have flu  The following portions of the patient's history were reviewed and updated as appropriate: allergies, current medications, past family history, past medical history, past social history, past surgical history and problem list       Review of Systems   Constitutional: Positive for fatigue  Negative for chills and fever  HENT: Positive for congestion, postnasal drip, rhinorrhea, sneezing and sore throat  Eyes: Negative  Respiratory: Negative  Negative for cough  Cardiovascular: Negative  Gastrointestinal: Negative      Endocrine: Negative  Genitourinary: Negative  Musculoskeletal: Negative  Skin: Negative  Allergic/Immunologic: Negative  Neurological: Negative  Hematological: Negative  Psychiatric/Behavioral: Negative  Objective:      BMI Counseling: There is no height or weight on file to calculate BMI  The BMI is above normal  Nutrition recommendations include decreasing portion sizes  Exercise recommendations include exercising 3-5 times per week  Rationale for BMI follow-up plan is due to patient being overweight or obese  There were no vitals taken for this visit  Physical Exam  Vitals and nursing note reviewed  Constitutional:       General: He is not in acute distress  Appearance: Normal appearance  He is not ill-appearing, toxic-appearing or diaphoretic  HENT:      Head: Normocephalic and atraumatic  Right Ear: Tympanic membrane, ear canal and external ear normal  There is no impacted cerumen  Left Ear: Tympanic membrane, ear canal and external ear normal  There is no impacted cerumen  Nose: Rhinorrhea present  No congestion  Mouth/Throat:      Mouth: Mucous membranes are moist       Pharynx: Oropharyngeal exudate and posterior oropharyngeal erythema present  Eyes:      General: No scleral icterus  Right eye: No discharge  Left eye: No discharge  Extraocular Movements: Extraocular movements intact  Conjunctiva/sclera: Conjunctivae normal       Pupils: Pupils are equal, round, and reactive to light  Neck:      Vascular: No carotid bruit  Cardiovascular:      Rate and Rhythm: Normal rate and regular rhythm  Pulses: Normal pulses  Heart sounds: Normal heart sounds  No murmur heard  No friction rub  No gallop  Pulmonary:      Effort: Pulmonary effort is normal  No respiratory distress  Breath sounds: Normal breath sounds  No stridor  No wheezing, rhonchi or rales  Chest:      Chest wall: No tenderness  Musculoskeletal:         General: No swelling, tenderness, deformity or signs of injury  Normal range of motion  Cervical back: Normal range of motion and neck supple  No rigidity  No muscular tenderness  Right lower leg: No edema  Left lower leg: No edema  Lymphadenopathy:      Cervical: No cervical adenopathy  Skin:     General: Skin is warm and dry  Capillary Refill: Capillary refill takes less than 2 seconds  Coloration: Skin is not jaundiced  Findings: No bruising, erythema, lesion or rash  Neurological:      Mental Status: He is alert and oriented to person, place, and time  Mental status is at baseline  Cranial Nerves: No cranial nerve deficit  Sensory: No sensory deficit  Motor: No weakness  Coordination: Coordination normal       Gait: Gait normal    Psychiatric:         Mood and Affect: Mood normal          Behavior: Behavior normal          Thought Content:  Thought content normal          Judgment: Judgment normal

## 2022-04-23 LAB
FLUAV RNA RESP QL NAA+PROBE: POSITIVE
FLUBV RNA RESP QL NAA+PROBE: NEGATIVE
RSV RNA RESP QL NAA+PROBE: NEGATIVE
SARS-COV-2 RNA RESP QL NAA+PROBE: NEGATIVE

## 2022-04-26 ENCOUNTER — TELEPHONE (OUTPATIENT)
Dept: FAMILY MEDICINE CLINIC | Facility: CLINIC | Age: 72
End: 2022-04-26

## 2022-04-26 NOTE — TELEPHONE ENCOUNTER
Spoke with wife, gave her results of flu infection  Pt to con't with Tamiflu    Wife stated that even though we sent a message through [employer], they never got it

## 2022-04-28 DIAGNOSIS — I10 ESSENTIAL HYPERTENSION: ICD-10-CM

## 2022-04-28 DIAGNOSIS — I71.2 ASCENDING AORTIC ANEURYSM (HCC): ICD-10-CM

## 2022-04-28 RX ORDER — HYDROCHLOROTHIAZIDE 12.5 MG/1
12.5 TABLET ORAL DAILY
Qty: 90 TABLET | Refills: 1 | Status: SHIPPED | OUTPATIENT
Start: 2022-04-28

## 2022-04-28 RX ORDER — METOPROLOL SUCCINATE 25 MG/1
25 TABLET, EXTENDED RELEASE ORAL DAILY
Qty: 90 TABLET | Refills: 0 | Status: SHIPPED | OUTPATIENT
Start: 2022-04-28 | End: 2022-08-01

## 2022-05-17 ENCOUNTER — RA CDI HCC (OUTPATIENT)
Dept: OTHER | Facility: HOSPITAL | Age: 72
End: 2022-05-17

## 2022-05-17 ENCOUNTER — APPOINTMENT (OUTPATIENT)
Dept: LAB | Facility: HOSPITAL | Age: 72
End: 2022-05-17
Payer: COMMERCIAL

## 2022-05-17 DIAGNOSIS — Z12.5 SCREENING FOR PROSTATE CANCER: ICD-10-CM

## 2022-05-17 LAB — PSA SERPL-MCNC: 2.4 NG/ML (ref 0–4)

## 2022-05-17 PROCEDURE — 36415 COLL VENOUS BLD VENIPUNCTURE: CPT

## 2022-05-17 PROCEDURE — G0103 PSA SCREENING: HCPCS

## 2022-05-17 NOTE — PROGRESS NOTES
E08 [id number]  [facility], [place] [facility]  coding opportunities          Chart Reviewed number of suggestions sent to Provider: 1     Patients Insurance     Medicare Insurance: Floridalma Murray

## 2022-06-01 RX ORDER — SODIUM CHLORIDE 9 MG/ML
125 INJECTION, SOLUTION INTRAVENOUS CONTINUOUS
Status: CANCELLED | OUTPATIENT
Start: 2022-06-01

## 2022-06-02 ENCOUNTER — ANESTHESIA EVENT (OUTPATIENT)
Dept: GASTROENTEROLOGY | Facility: HOSPITAL | Age: 72
End: 2022-06-02

## 2022-06-02 ENCOUNTER — ANESTHESIA (OUTPATIENT)
Dept: GASTROENTEROLOGY | Facility: HOSPITAL | Age: 72
End: 2022-06-02

## 2022-06-02 ENCOUNTER — HOSPITAL ENCOUNTER (OUTPATIENT)
Dept: GASTROENTEROLOGY | Facility: HOSPITAL | Age: 72
Setting detail: OUTPATIENT SURGERY
Discharge: HOME | End: 2022-06-02
Attending: INTERNAL MEDICINE | Admitting: INTERNAL MEDICINE
Payer: COMMERCIAL

## 2022-06-02 VITALS
OXYGEN SATURATION: 96 % | SYSTOLIC BLOOD PRESSURE: 106 MMHG | WEIGHT: 205 LBS | HEIGHT: 73 IN | RESPIRATION RATE: 16 BRPM | DIASTOLIC BLOOD PRESSURE: 53 MMHG | HEART RATE: 72 BPM | TEMPERATURE: 97.6 F | BODY MASS INDEX: 27.17 KG/M2

## 2022-06-02 DIAGNOSIS — Z87.19 HX OF DIVERTICULITIS OF COLON: ICD-10-CM

## 2022-06-02 DIAGNOSIS — Z86.010 HX OF COLONIC POLYP: ICD-10-CM

## 2022-06-02 LAB — GLUCOSE SERPL-MCNC: 173 MG/DL (ref 65–140)

## 2022-06-02 PROCEDURE — 45380 COLONOSCOPY AND BIOPSY: CPT | Performed by: INTERNAL MEDICINE

## 2022-06-02 PROCEDURE — 88305 TISSUE EXAM BY PATHOLOGIST: CPT | Performed by: PATHOLOGY

## 2022-06-02 PROCEDURE — 82948 REAGENT STRIP/BLOOD GLUCOSE: CPT

## 2022-06-02 RX ORDER — SODIUM CHLORIDE 9 MG/ML
125 INJECTION, SOLUTION INTRAVENOUS CONTINUOUS
Status: DISCONTINUED | OUTPATIENT
Start: 2022-06-02 | End: 2022-06-06 | Stop reason: HOSPADM

## 2022-06-02 RX ORDER — EPHEDRINE SULFATE 50 MG/ML
INJECTION INTRAVENOUS AS NEEDED
Status: DISCONTINUED | OUTPATIENT
Start: 2022-06-02 | End: 2022-06-02

## 2022-06-02 RX ORDER — LIDOCAINE HYDROCHLORIDE 20 MG/ML
INJECTION, SOLUTION EPIDURAL; INFILTRATION; INTRACAUDAL; PERINEURAL AS NEEDED
Status: DISCONTINUED | OUTPATIENT
Start: 2022-06-02 | End: 2022-06-02

## 2022-06-02 RX ORDER — PROPOFOL 10 MG/ML
INJECTION, EMULSION INTRAVENOUS AS NEEDED
Status: DISCONTINUED | OUTPATIENT
Start: 2022-06-02 | End: 2022-06-02

## 2022-06-02 RX ADMIN — LIDOCAINE HYDROCHLORIDE 100 MG: 20 INJECTION, SOLUTION EPIDURAL; INFILTRATION; INTRACAUDAL at 08:22

## 2022-06-02 RX ADMIN — PROPOFOL 100 MG: 10 INJECTION, EMULSION INTRAVENOUS at 08:22

## 2022-06-02 RX ADMIN — PROPOFOL 50 MG: 10 INJECTION, EMULSION INTRAVENOUS at 08:44

## 2022-06-02 RX ADMIN — PROPOFOL 30 MG: 10 INJECTION, EMULSION INTRAVENOUS at 08:40

## 2022-06-02 RX ADMIN — PROPOFOL 50 MG: 10 INJECTION, EMULSION INTRAVENOUS at 08:36

## 2022-06-02 RX ADMIN — PROPOFOL 50 MG: 10 INJECTION, EMULSION INTRAVENOUS at 08:28

## 2022-06-02 RX ADMIN — EPHEDRINE SULFATE 10 MG: 50 INJECTION, SOLUTION INTRAVENOUS at 08:40

## 2022-06-02 RX ADMIN — SODIUM CHLORIDE 125 ML/HR: 0.9 INJECTION, SOLUTION INTRAVENOUS at 07:49

## 2022-06-02 RX ADMIN — PROPOFOL 30 MG: 10 INJECTION, EMULSION INTRAVENOUS at 08:32

## 2022-06-02 RX ADMIN — PROPOFOL 50 MG: 10 INJECTION, EMULSION INTRAVENOUS at 08:25

## 2022-06-02 NOTE — ANESTHESIA POSTPROCEDURE EVALUATION
Post-Op Assessment Note    CV Status:  Stable    Pain management: adequate     Mental Status:  Awake   Hydration Status:  Stable   PONV Controlled:  None   Airway Patency:  Patent      Post Op Vitals Reviewed: Yes      Staff: Anesthesiologist         No complications documented      BP      Temp      Pulse     Resp      SpO2      /53   Pulse 72   Temp 97 6 °F (36 4 °C) (Tympanic)   Resp 16   Ht 6' 1" (1 854 m)   Wt 93 kg (205 lb)   SpO2 96%   BMI 27 05 kg/m²

## 2022-06-02 NOTE — H&P
History and Physical - [facility]  Susanne Noble 71 y o  male MRN: [id number]                  HPI: Susanne Noble is a 71y o  year old male who presents for colon polyp surveillance      REVIEW OF SYSTEMS: Per the HPI, and otherwise unremarkable  Historical Information   Past Medical History:   Diagnosis Date    Abnormal CAT scan     Bilateral inguinal hernia 1990    recurrent    Colitis     Gastritis 2012    Hypertension     Impaired fasting glucose 2019    Nausea & vomiting     Right knee meniscal tear 2017    Sleep apnea     uses cpap     Past Surgical History:   Procedure Laterality Date    CHOLECYSTECTOMY      COLONOSCOPY      ABOUT 2-3 YEARS AGO    EXPLORATORY LAPAROTOMY  2014    done for repair of peritoneal "RENT"?    HEMORRHOID SURGERY      INGUINAL HERNIA REPAIR Bilateral 2014    [place] ESOPHAGOGASTRODUODENOSCOPY TRANSORAL DIAGNOSTIC N/A 2017    Procedure: EGD AND COLONOSCOPY;  Surgeon: Nicole Jones MD;  Location: [facility];   Service: Gastroenterology    US GUIDED THYROID BIOPSY  3/15/2021     Social History   Social History     Substance and Sexual Activity   Alcohol Use Yes    Comment: social     Social History     Substance and Sexual Activity   Drug Use No     Social History     Tobacco Use   Smoking Status Former Smoker    Packs/day: 0 25    Years: 40 00    Pack years: 10 00    Types: Cigarettes    Quit date: 2019    Years since quittin 5   Smokeless Tobacco Never Used     Family History   Problem Relation Age of Onset    Breast cancer Mother     Heart disease Father     Heart disease Brother     Heart disease Family        Meds/Allergies       Current Outpatient Medications:     Alphagan P 0 1 %    amLODIPine (NORVASC) 10 mg tablet    brimonidine tartrate 0 2 % ophthalmic solution    cholecalciferol (VITAMIN D3) 400 units tablet    cyanocobalamin (VITAMIN B-12) 100 mcg tablet    hydrochlorothiazide (HYDRODIURIL) 12 5 mg tablet    metoprolol succinate (TOPROL-XL) 25 mg 24 hr tablet    pantoprazole (PROTONIX) 40 mg tablet    prednisoLONE acetate (PRED FORTE) 1 % ophthalmic suspension    rosuvastatin (CRESTOR) 5 mg tablet    Elastic Bandages & Supports (Wrist/Thumb Splint/Right Large) MISC    metFORMIN (GLUCOPHAGE) 500 mg tablet    Current Facility-Administered Medications:     sodium chloride 0 9 % infusion, 125 mL/hr, Intravenous, Continuous, 125 mL/hr at 06/02/22 0749    Allergies   Allergen Reactions    Ace Inhibitors Angioedema       Objective     /77   Pulse 70   Temp 97 6 °F (36 4 °C) (Tympanic)   Resp 16   Ht 6' 1" (1 854 m)   Wt 93 kg (205 lb)   SpO2 98%   BMI 27 05 kg/m²       PHYSICAL EXAM    Gen: NAD  Head: NCAT  CV: RRR  CHEST: Clear  ABD: soft, NT/ND  EXT: no edema      ASSESSMENT/PLAN:  This is a 71y o  year old male here for colon polyp surveillance, and he is stable and optimized for his procedure

## 2022-06-02 NOTE — ANESTHESIA PREPROCEDURE EVALUATION
Procedure:  COLONOSCOPY    Relevant Problems   CARDIO   (+) Ascending aortic aneurysm (HCC)   (+) Hyperlipidemia   (+) Hypertension      GI/HEPATIC   (+) GERD (gastroesophageal reflux disease)      /RENAL   (+) Stage 3 chronic kidney disease, unspecified whether stage 3a or 3b CKD (HCC)      PULMONARY   (+) Chronic obstructive pulmonary disease (HCC)   (+) Obstructive sleep apnea treated with continuous positive airway pressure (CPAP)      Endocrine   (+) Diabetes mellitus due to underlying condition with diabetic autonomic neuropathy, without long-term current use of insulin (HCC)      Other   (+) Angioedema, Likely ACE inhibitor induced   (+) Pre-diabetes   (+) Tobacco abuse        Physical Exam    Airway    Mallampati score: II  TM Distance: >3 FB  Neck ROM: full     Dental   Comment: Upper full, lower partial,     Cardiovascular  Rhythm: regular, Rate: normal,     Pulmonary  Breath sounds clear to auscultation,     Other Findings        Anesthesia Plan  ASA Score- 2     Anesthesia Type- general with ASA Monitors  Additional Monitors:   Airway Plan:           Plan Factors-Exercise tolerance (METS): >4 METS  Patient is not a current smoker (former)  Patient instructed to abstain from smoking on day of procedure  Patient did not smoke on day of surgery  Obstructive sleep apnea risk education given perioperatively  Induction- intravenous  Postoperative Plan-     Informed Consent- Anesthetic plan and risks discussed with patient

## 2022-06-07 ENCOUNTER — OFFICE VISIT (OUTPATIENT)
Dept: FAMILY MEDICINE CLINIC | Facility: CLINIC | Age: 72
End: 2022-06-07
Payer: COMMERCIAL

## 2022-06-07 VITALS
HEIGHT: 73 IN | BODY MASS INDEX: 25.98 KG/M2 | HEART RATE: 74 BPM | WEIGHT: 196 LBS | OXYGEN SATURATION: 97 % | DIASTOLIC BLOOD PRESSURE: 74 MMHG | TEMPERATURE: 97.9 F | SYSTOLIC BLOOD PRESSURE: 130 MMHG

## 2022-06-07 DIAGNOSIS — Z00.00 MEDICARE ANNUAL WELLNESS VISIT, SUBSEQUENT: ICD-10-CM

## 2022-06-07 DIAGNOSIS — Z78.0 ASYMPTOMATIC POSTMENOPAUSAL STATE: ICD-10-CM

## 2022-06-07 DIAGNOSIS — E78.2 MIXED HYPERLIPIDEMIA: ICD-10-CM

## 2022-06-07 DIAGNOSIS — E08.43 DIABETES MELLITUS DUE TO UNDERLYING CONDITION WITH DIABETIC AUTONOMIC NEUROPATHY, WITHOUT LONG-TERM CURRENT USE OF INSULIN (HCC): Primary | ICD-10-CM

## 2022-06-07 LAB — SL AMB POCT HEMOGLOBIN AIC: 7.3 (ref ?–6.5)

## 2022-06-07 PROCEDURE — 1125F AMNT PAIN NOTED PAIN PRSNT: CPT | Performed by: FAMILY MEDICINE

## 2022-06-07 PROCEDURE — 3288F FALL RISK ASSESSMENT DOCD: CPT | Performed by: FAMILY MEDICINE

## 2022-06-07 PROCEDURE — 3008F BODY MASS INDEX DOCD: CPT | Performed by: FAMILY MEDICINE

## 2022-06-07 PROCEDURE — 3075F SYST BP GE 130 - 139MM HG: CPT | Performed by: FAMILY MEDICINE

## 2022-06-07 PROCEDURE — 83036 HEMOGLOBIN GLYCOSYLATED A1C: CPT | Performed by: FAMILY MEDICINE

## 2022-06-07 PROCEDURE — 1160F RVW MEDS BY RX/DR IN RCRD: CPT | Performed by: FAMILY MEDICINE

## 2022-06-07 PROCEDURE — 1036F TOBACCO NON-USER: CPT | Performed by: FAMILY MEDICINE

## 2022-06-07 PROCEDURE — G0439 PPPS, SUBSEQ VISIT: HCPCS | Performed by: FAMILY MEDICINE

## 2022-06-07 PROCEDURE — 3078F DIAST BP <80 MM HG: CPT | Performed by: FAMILY MEDICINE

## 2022-06-07 PROCEDURE — 1170F FXNL STATUS ASSESSED: CPT | Performed by: FAMILY MEDICINE

## 2022-06-07 PROCEDURE — 3725F SCREEN DEPRESSION PERFORMED: CPT | Performed by: FAMILY MEDICINE

## 2022-06-07 RX ORDER — ROSUVASTATIN CALCIUM 5 MG/1
5 TABLET, COATED ORAL DAILY
Qty: 90 TABLET | Refills: 3 | Status: SHIPPED | OUTPATIENT
Start: 2022-06-07

## 2022-06-07 NOTE — PATIENT INSTRUCTIONS

## 2022-06-07 NOTE — PROGRESS NOTES
Assessment and Plan:     Problem List Items Addressed This Visit    None          Preventive health issues were discussed with patient, and age appropriate screening tests were ordered as noted in patient's After Visit Summary  Personalized health advice and appropriate referrals for health education or preventive services given if needed, as noted in patient's After Visit Summary       History of Present Illness:     Patient presents for Medicare Annual Wellness visit    Patient Care Team:  Juan Rai DO as PCP - General  MD Velia Suarez PA-ROWENA Brewer MD as Endoscopist     Problem List:     Patient Active Problem List   Diagnosis    Tobacco abuse    Obstructive sleep apnea treated with continuous positive airway pressure (CPAP)    Hypertension    Hyperlipidemia    Angioedema, Likely ACE inhibitor induced    GERD (gastroesophageal reflux disease)    Cough    Ascending aortic aneurysm (HCC)    Hyperglycemia    Chronic obstructive pulmonary disease ([facility] [address] )    Pre-diabetes    Diabetes mellitus due to underlying condition with diabetic autonomic neuropathy, without long-term current use of insulin (HCC)    Thyroid nodule    Right wrist pain    Hypokalemia    Stage 3 chronic kidney disease, unspecified whether stage 3a or 3b CKD ([facility] [address] )    Acute non-recurrent maxillary sinusitis    Acute URI      Past Medical and Surgical History:     Past Medical History:   Diagnosis Date    Abnormal CAT scan     Bilateral inguinal hernia 1990    recurrent    Colitis     Gastritis 12/11/2012    Hypertension     Impaired fasting glucose 7/9/2019    Nausea & vomiting     Right knee meniscal tear 12/20/2017    Sleep apnea     uses cpap     Past Surgical History:   Procedure Laterality Date    CHOLECYSTECTOMY      COLONOSCOPY  2017    ABOUT 2-3 YEARS AGO    EXPLORATORY LAPAROTOMY  08/16/2014    done for repair of peritoneal "RENT"?    HEMORRHOID SURGERY      INGUINAL HERNIA REPAIR Bilateral 2014    [place] ESOPHAGOGASTRODUODENOSCOPY TRANSORAL DIAGNOSTIC N/A 2017    Procedure: EGD AND COLONOSCOPY;  Surgeon: Alana Ansari MD;  Location: [facility];   Service: Gastroenterology    US GUIDED THYROID BIOPSY  3/15/2021      Family History:     Family History   Problem Relation Age of Onset    Breast cancer Mother     Heart disease Father     Heart disease Brother     Heart disease Family       Social History:     Social History     Socioeconomic History    Marital status: /Civil Union     Spouse name: None    Number of children: None    Years of education: None    Highest education level: None   Occupational History    None   Tobacco Use    Smoking status: Former Smoker     Packs/day: 0 25     Years: 40 00     Pack years: 10 00     Types: Cigarettes     Quit date: 2019     Years since quittin 5    Smokeless tobacco: Never Used   Vaping Use    Vaping Use: Never used   Substance and Sexual Activity    Alcohol use: Yes     Comment: social    Drug use: No    Sexual activity: Not Currently     Partners: Female   Other Topics Concern    None   Social History Narrative    None     Social Determinants of Health     Financial Resource Strain: Not on file   Food Insecurity: Not on file   Transportation Needs: Not on file   Physical Activity: Not on file   Stress: Not on file   Social Connections: Not on file   Intimate Partner Violence: Not on file   Housing Stability: Not on file      Medications and Allergies:     Current Outpatient Medications   Medication Sig Dispense Refill    Alphagan P 0 1 % INSTILL 1 DROP INTO EACH EYE TWICE DAILY      amLODIPine (NORVASC) 10 mg tablet Take 1 tablet (10 mg total) by mouth daily 90 tablet 1    cholecalciferol (VITAMIN D3) 400 units tablet Take 400 Units by mouth daily      cyanocobalamin (VITAMIN B-12) 100 mcg tablet Take by mouth daily      hydrochlorothiazide (HYDRODIURIL) 12 5 mg tablet Take 1 tablet (12 5 mg total) by mouth daily 90 tablet 1    metFORMIN (GLUCOPHAGE) 500 mg tablet Take 1 tablet (500 mg total) by mouth daily with breakfast 90 tablet 1    metoprolol succinate (TOPROL-XL) 25 mg 24 hr tablet Take 1 tablet (25 mg total) by mouth daily 90 tablet 0    pantoprazole (PROTONIX) 40 mg tablet Take 1 tablet (40 mg total) by mouth daily 90 tablet 1    rosuvastatin (CRESTOR) 5 mg tablet Take 1 tablet (5 mg total) by mouth daily 90 tablet 3    brimonidine tartrate 0 2 % ophthalmic solution INSTILL 1 DROP INTO EACH EYE TWICE DAILY      Elastic Bandages & Supports (Wrist/Thumb Splint/Right Large) MISC Use daily (Patient not taking: No sig reported) 1 each 0    prednisoLONE acetate (PRED FORTE) 1 % ophthalmic suspension        No current facility-administered medications for this visit  Allergies   Allergen Reactions    Ace Inhibitors Angioedema      Immunizations:     Immunization History   Administered Date(s) Administered    COVID-19 MODERNA VACC 0 5 ML IM 01/26/2021, 02/22/2021, 10/27/2021    IPV 01/06/2005    Influenza Split High Dose Preservative Free IM 10/21/2016    Influenza, high dose seasonal 0 7 mL 12/02/2021    Influenza, injectable, quadrivalent, preservative free 0 5 mL 10/13/2020    Influenza, seasonal, injectable 10/13/2003    Pneumococcal Conjugate 13-Valent 06/11/2019    Pneumococcal Polysaccharide PPV23 09/11/2020    Td (adult), adsorbed 01/06/2005, 09/26/2017    Tdap 04/03/2021      Health Maintenance:         Topic Date Due    Colorectal Cancer Screening  06/01/2027    Hepatitis C Screening  Completed         Topic Date Due    COVID-19 Vaccine (4 - Booster for Moderna series) 02/27/2022      Medicare Health Risk Assessment:     There were no vitals taken for this visit  Lee Portillo is here for his Subsequent Wellness visit  Health Risk Assessment:   Patient rates overall health as good  Patient feels that their physical health rating is slightly better  Patient is satisfied with their life  Eyesight was rated as same  Hearing was rated as same  Patient feels that their emotional and mental health rating is same  Patients states they are sometimes angry  Patient states they are sometimes unusually tired/fatigued  Pain experienced in the last 7 days has been none  Patient states that he has experienced no weight loss or gain in last 6 months  Depression Screening:   PHQ-2 Score: 0      Fall Risk Screening: In the past year, patient has experienced: no history of falling in past year      Home Safety:  Patient does not have trouble with stairs inside or outside of their home  Patient has working smoke alarms and has working carbon monoxide detector  Home safety hazards include: none  Nutrition:   Current diet is Regular and Low Carb  Patient watches what he eats     Medications:   Patient is currently taking over-the-counter supplements  OTC medications include: see medication list  Patient is not able to manage medications  Activities of Daily Living (ADLs)/Instrumental Activities of Daily Living (IADLs):   Walk and transfer into and out of bed and chair?: Yes  Dress and groom yourself?: Yes    Bathe or shower yourself?: Yes    Feed yourself?  Yes  Do your laundry/housekeeping?: Yes  Manage your money, pay your bills and track your expenses?: Yes  Make your own meals?: Yes    Do your own shopping?: Yes    Previous Hospitalizations:   Any hospitalizations or ED visits within the last 12 months?: No      Advance Care Planning:   Living will: No    Durable POA for healthcare: No    Advanced directive: No      Cognitive Screening:   Provider or family/friend/caregiver concerned regarding cognition?: No    PREVENTIVE SCREENINGS      Cardiovascular Screening:    General: Screening Not Indicated, History Lipid Disorder, Risks and Benefits Discussed and Screening Current      Diabetes Screening:     General: Screening Not Indicated, History Diabetes, Risks and Benefits Discussed and Screening Current      Colorectal Cancer Screening:     General: Screening Current      Prostate Cancer Screening:    General: Screening Current and Risks and Benefits Discussed      Osteoporosis Screening:    General: Risks and Benefits Discussed    Due for: DXA Axial      Abdominal Aortic Aneurysm (AAA) Screening:    Risk factors include: age between 73-67 yo and tobacco use        General: Risks and Benefits Discussed      Lung Cancer Screening:     General: Screening Not Indicated and Risks and Benefits Discussed      Hepatitis C Screening:    General: Screening Current and Risks and Benefits Discussed    Other Counseling Topics:   Regular weightbearing exercise         Devang Blanton DO

## 2022-06-07 NOTE — PROGRESS NOTES
Diabetic Foot Exam    Patient's shoes and socks removed  Right Foot/Ankle   Right Foot Inspection  Skin Exam: skin normal and skin intact  No dry skin, no warmth, no callus, no erythema, no maceration, no abnormal color, no pre-ulcer, no ulcer and no callus  Toe Exam: ROM and strength within normal limits  Sensory   Monofilament testing: intact    Vascular  Capillary refills: < 3 seconds  The right DP pulse is 2+  The right PT pulse is 2+  Left Foot/Ankle  Left Foot Inspection  Skin Exam: skin normal and skin intact  No dry skin, no warmth, no erythema, no maceration, normal color, no pre-ulcer, no ulcer and no callus  Toe Exam: ROM and strength within normal limits  Sensory   Monofilament testing: intact    Vascular  Capillary refills: < 3 seconds  The left DP pulse is 2+  The left PT pulse is 2+       Assign Risk Category  No deformity present  Loss of protective sensation  No weak pulses  Risk: 1

## 2022-06-20 ENCOUNTER — TELEPHONE (OUTPATIENT)
Dept: GASTROENTEROLOGY | Facility: MEDICAL CENTER | Age: 72
End: 2022-06-20

## 2022-06-20 NOTE — TELEPHONE ENCOUNTER
----- Message from Paul Meyers MD sent at 6/15/2022  9:49 PM EDT -----  Two polyps removed were high risk polyps  Repeat colonoscopy in 5 years

## 2022-08-01 DIAGNOSIS — I10 ESSENTIAL HYPERTENSION: ICD-10-CM

## 2022-08-01 RX ORDER — METOPROLOL SUCCINATE 25 MG/1
TABLET, EXTENDED RELEASE ORAL
Qty: 90 TABLET | Refills: 0 | Status: SHIPPED | OUTPATIENT
Start: 2022-08-01

## 2022-09-07 DIAGNOSIS — I10 ESSENTIAL HYPERTENSION: ICD-10-CM

## 2022-09-07 DIAGNOSIS — K29.00 ACUTE SUPERFICIAL GASTRITIS WITHOUT HEMORRHAGE: ICD-10-CM

## 2022-09-07 RX ORDER — PANTOPRAZOLE SODIUM 40 MG/1
TABLET, DELAYED RELEASE ORAL
Qty: 90 TABLET | Refills: 0 | Status: SHIPPED | OUTPATIENT
Start: 2022-09-07

## 2022-09-07 RX ORDER — AMLODIPINE BESYLATE 10 MG/1
TABLET ORAL
Qty: 90 TABLET | Refills: 0 | Status: SHIPPED | OUTPATIENT
Start: 2022-09-07

## 2022-09-20 DIAGNOSIS — E08.43 DIABETES MELLITUS DUE TO UNDERLYING CONDITION WITH DIABETIC AUTONOMIC NEUROPATHY, WITHOUT LONG-TERM CURRENT USE OF INSULIN (HCC): ICD-10-CM

## 2022-09-20 NOTE — TELEPHONE ENCOUNTER
[address], Pharmacist       Reason for documentation: Patient was flagged by Third Party Payer and/or internal report as being due for a refill on the following medications:    Metformin last filled on 6/6 for 90-day supply with no refills remaining at [facility]  pharmacy fill rate 93% (goal > 80%)    Outcome:  · RX pended for metformin refill     · Last OV 6//22; next OV 10/17/22    PCP: Please sign pended rxs     Pharmacist Tracking Tool  Reason For Outreach: Population Health Pharmacist  Demographics:  Intervention Method: Chart Review  Type of Intervention: New  Topics Addressed: Quality measures  Pharmacologic Interventions: N/A  Non-Pharmacologic Interventions: Care Gap  Time:  Direct Patient Care: 0 mins  Care Coordination: 15 mins  Recommendation Recipient: Provider  Outcome: Accepted

## 2022-10-05 ENCOUNTER — OFFICE VISIT (OUTPATIENT)
Dept: SLEEP CENTER | Facility: CLINIC | Age: 72
End: 2022-10-05

## 2022-10-05 VITALS
DIASTOLIC BLOOD PRESSURE: 81 MMHG | BODY MASS INDEX: 26.37 KG/M2 | HEART RATE: 83 BPM | SYSTOLIC BLOOD PRESSURE: 160 MMHG | WEIGHT: 199 LBS | HEIGHT: 73 IN

## 2022-10-05 DIAGNOSIS — G47.33 OBSTRUCTIVE SLEEP APNEA TREATED WITH CONTINUOUS POSITIVE AIRWAY PRESSURE (CPAP): Primary | ICD-10-CM

## 2022-10-05 DIAGNOSIS — Z99.89 OBSTRUCTIVE SLEEP APNEA TREATED WITH CONTINUOUS POSITIVE AIRWAY PRESSURE (CPAP): Primary | ICD-10-CM

## 2022-10-05 NOTE — PROGRESS NOTES
Progress Note - Sim KINNEY 71 y o  male   SVS:[id number][id number], MRN: [id number]  10/5/2022        Follow Up Evaluation / Problem: Moderate Obstructive Sleep Apnea  Obesity      Thank you for the opportunity of participating in the evaluation and care of this patient in the Sleep Clinic at [facility]  HPI: Geronimo Golden is a 71y o  year old male  The patient presents for follow up of moderate obstructive sleep apnea  He was diagnosed with moderate obstructive sleep apnea in May 2015, with an AHI of 16 3, that worsened to 49 1 during REM sleep  Study was noted to have been scored for hypopneas at 4% oxygen desat  He subsequently completed a CPAP titration study and was set up with CPAP equipment at 9cm of water pressure  He presents today for follow up to review compliance and effectiveness of treatment      Review of Systems      Genitourinary need to urinate more than twice a night   Cardiology none   Gastrointestinal none   Neurology none   Constitutional none   Integumentary none   Psychiatry none   Musculoskeletal none   Pulmonary none   ENT none   Endocrine none   Hematological none       Current Outpatient Medications:     Alphagan P 0 1 %, INSTILL 1 DROP INTO EACH EYE TWICE DAILY, Disp: , Rfl:     amLODIPine (NORVASC) 10 mg tablet, Take 1 tablet by mouth once daily, Disp: 90 tablet, Rfl: 0    brimonidine tartrate 0 2 % ophthalmic solution, INSTILL 1 DROP INTO EACH EYE TWICE DAILY, Disp: , Rfl:     cholecalciferol (VITAMIN D3) 400 units tablet, Take 400 Units by mouth daily, Disp: , Rfl:     cyanocobalamin (VITAMIN B-12) 100 mcg tablet, Take by mouth daily, Disp: , Rfl:     hydrochlorothiazide (HYDRODIURIL) 12 5 mg tablet, Take 1 tablet (12 5 mg total) by mouth daily, Disp: 90 tablet, Rfl: 1    metFORMIN (GLUCOPHAGE) 500 mg tablet, Take 1 tablet (500 mg total) by mouth daily with breakfast, Disp: 90 tablet, Rfl: 1    metoprolol succinate (TOPROL-XL) 25 mg 24 hr tablet, Take 1 tablet by mouth once daily, Disp: 90 tablet, Rfl: 0    pantoprazole (PROTONIX) 40 mg tablet, Take 1 tablet by mouth once daily, Disp: 90 tablet, Rfl: 0    prednisoLONE acetate (PRED FORTE) 1 % ophthalmic suspension, , Disp: , Rfl:     rosuvastatin (CRESTOR) 5 mg tablet, Take 1 tablet (5 mg total) by mouth daily, Disp: 90 tablet, Rfl: 3    [place] Sleepiness Scale  Sitting and reading: Slight chance of dozing  Watching TV: Slight chance of dozing  Sitting, inactive in a public place (e g  a theatre or a meeting): Would never doze  As a passenger in a car for an hour without a break: Would never doze  Lying down to rest in the afternoon when circumstances permit: Slight chance of dozing  Sitting and talking to someone: Would never doze  Sitting quietly after a lunch without alcohol: Would never doze  In a car, while stopped for a few minutes in traffic: Would never doze  Total score: 3              Vitals:    10/05/22 1140   BP: 160/81   BP Location: Left arm   Patient Position: Sitting   Cuff Size: Large   Pulse: 83   Weight: 90 3 kg (199 lb)   Height: 6' 1" (1 854 m)       Body mass index is 26 25 kg/m²  EPWORTH SLEEPINESS SCORE  Total score: 3      Past History Since Last Sleep Center Visit:   He denies any changes to his health since his last visit  He has lost 6 lbs since last year  He has been reducing portions  He continues to use his CPAP equipment every night and with naps  He feels the current mask and settings are comfortable  He initiates sleep without difficulty  He denies frequent night time awakenings or daytime sleepiness  The patient's CPAP equipment has been recalled  The patient has never used an ozone  to clean the equipment  The patient reports that he cleans the equipment appropriately with mild soap and water and uses vinegar and water on occasion to clean the humidifier tank    He changes supplies on a regular basis  The patient has not had any symptoms consistent with those associated with the recall  The review of systems and following portions of the patient's history were reviewed and updated as appropriate: allergies, current medications, past family history, past medical history, past social history, past surgical history, and problem list         OBJECTIVE  Equipment set up:  7/28/2020  PAP Pressure: Nasal CPAP set to deliver 9 cm of water pressure  Type of mask used: full face  DME Provider: [employer] [facility]    Physical Exam:     General Appearance:   Alert, cooperative, no distress, appears stated age, slightly overweight     Head:   Normocephalic, without obvious abnormality, atraumatic     Eyes:   PERRL, conjunctiva/corneas clear          Nose:  Nares normal, septum midline, no drainage or sinus tenderness           Throat:  Lips and gums normal; wears dentures, tongue normal size and midline mucosa moist with low-lying soft palatal tissue noted, uvula barely visualized, tonsils not visualized, Mallampati class 4       Neck:  Supple, symmetrical, trachea midline, no adenopathy; Thyroid: No enlargement, tenderness or nodules; no carotid bruit or JVD     Lungs:      Clear to auscultation bilaterally, respirations unlabored     Heart:   Regular rate and rhythm, S1 and S2 normal, no murmur, rub or gallop       Extremities:  Extremities normal, atraumatic, no cyanosis or edema       Skin:  Skin color, texture, turgor normal, no rashes or lesions       Neurologic:  No focal deficits noted       ASSESSMENT / PLAN    1  Obstructive sleep apnea treated with continuous positive airway pressure (CPAP)  PAP DME Resupply/Reorder           Counseling / Coordination of Care  Total clinic time spent today 25 minutes  Greater than 50% of total time was spent with the patient and / or family counseling and / or coordination of care       A description of the counseling / coordination of care:     Impressions, Diagnostic results, Prognosis, Instructions for management, Risks and benefits of treatment, Patient and family education, Risk factor reductions and Importance of compliance with treatment    Today I reviewed the patient's compliance data  he has been able to use the equipment 100% of all days recorded  Average usage was 4 or more hours 93 3% of all days recorded  The estimated AHI is 1 2 abnormal breathing events per hour  The patient feels they benefit from the use of PAP equipment and would like to continue PAP therapy  Response to treatment has been very good  [place] score is 3/24  A prescription for supplies has been given for the next year  We discussed the recent recall of the patient's PAP equipment  The risks and benefits for continued use vs  Discontinuation of PAP therapy were discussed  It is ultimately the patient's decision whether or not to continue PAP therapy at this time  The patient was advised to register their equipment on the [employer], which will give them further direction in the future replacement of the equipment  He is not sure if he has done this since his last visit  I will have the [place] rep check the website and register the equipment for him if he has not done this  Since the ozone cleaning devices have been suspected as a causative agent in the recall, the patient has been advised to avoid using any ozone cleaning device and clean the equipment using mild soap and water  He plans to continue to use his CPAP equipment every night  He plans to register his equipment under the recall, as discussed  He will schedule a follow up visit in one year  He was instructed to call with any questions or concerns prior to his next visit, as needed  The following instructions have been given to the patient today:    Patient Instructions   1  Continue use of CPAP equipment nightly  2    Continue to clean your equipment, as discussed  3  Contact the Sleep [address] [place] [address] with any questions or concerns prior to your next visit, as needed  4  Schedule visit for follow-up in 1 year     Nursing Support:  When: Monday through Friday 7A-5PM except holidays  Where: Our direct line is [phone]  If you are having a true emergency please call 911  In the event that the line is busy or it is after hours please leave a voice message and we will return your call  Please speak clearly, leaving your full name, birth date, best number to reach you and the reason for your call  Medication refills: We will need the name of the medication, the dosage, the ordering provider, whether you get a 30 or 90 day refill, and the pharmacy name and address  Medications will be ordered by the provider only  Nurses cannot call in prescriptions  Please allow 7 days for medication refills  Physician requested updates: If your provider requested that you call with an update after starting medication, please be ready to provide us the medication and dosage, what time you take your medication, the time you attempt to fall asleep, time you fall asleep, when you wake up, and what time you get out of bed  Sleep Study Results: We will contact you with sleep study results and/or next steps after the physician has reviewed your testing        Ange Richards, [address] [facility]

## 2022-10-06 ENCOUNTER — TELEPHONE (OUTPATIENT)
Dept: SLEEP CENTER | Facility: CLINIC | Age: 72
End: 2022-10-06

## 2022-10-06 NOTE — PATIENT INSTRUCTIONS
1   Continue use of CPAP equipment nightly  2  Continue to clean your equipment, as discussed  3  Contact the Sleep [address] [place] [address] with any questions or concerns prior to your next visit, as needed  4  Schedule visit for follow-up in 1 year     Nursing Support:  When: Monday through Friday 7A-5PM except holidays  Where: Our direct line is [phone]  If you are having a true emergency please call 911  In the event that the line is busy or it is after hours please leave a voice message and we will return your call  Please speak clearly, leaving your full name, birth date, best number to reach you and the reason for your call  Medication refills: We will need the name of the medication, the dosage, the ordering provider, whether you get a 30 or 90 day refill, and the pharmacy name and address  Medications will be ordered by the provider only  Nurses cannot call in prescriptions  Please allow 7 days for medication refills  Physician requested updates: If your provider requested that you call with an update after starting medication, please be ready to provide us the medication and dosage, what time you take your medication, the time you attempt to fall asleep, time you fall asleep, when you wake up, and what time you get out of bed  Sleep Study Results: We will contact you with sleep study results and/or next steps after the physician has reviewed your testing

## 2022-10-11 PROBLEM — J06.9 ACUTE URI: Status: RESOLVED | Noted: 2022-04-22 | Resolved: 2022-10-11

## 2022-10-12 PROBLEM — J01.00 ACUTE NON-RECURRENT MAXILLARY SINUSITIS: Status: RESOLVED | Noted: 2021-12-02 | Resolved: 2022-10-12

## 2022-10-17 ENCOUNTER — OFFICE VISIT (OUTPATIENT)
Dept: FAMILY MEDICINE CLINIC | Facility: CLINIC | Age: 72
End: 2022-10-17
Payer: COMMERCIAL

## 2022-10-17 VITALS
TEMPERATURE: 98.4 F | WEIGHT: 199.8 LBS | BODY MASS INDEX: 26.48 KG/M2 | DIASTOLIC BLOOD PRESSURE: 84 MMHG | SYSTOLIC BLOOD PRESSURE: 148 MMHG | HEART RATE: 78 BPM | HEIGHT: 73 IN | OXYGEN SATURATION: 98 %

## 2022-10-17 DIAGNOSIS — K21.00 GASTROESOPHAGEAL REFLUX DISEASE WITH ESOPHAGITIS WITHOUT HEMORRHAGE: ICD-10-CM

## 2022-10-17 DIAGNOSIS — E78.2 MIXED HYPERLIPIDEMIA: ICD-10-CM

## 2022-10-17 DIAGNOSIS — Z23 NEED FOR IMMUNIZATION AGAINST INFLUENZA: ICD-10-CM

## 2022-10-17 DIAGNOSIS — I10 PRIMARY HYPERTENSION: ICD-10-CM

## 2022-10-17 DIAGNOSIS — K59.01 SLOW TRANSIT CONSTIPATION: ICD-10-CM

## 2022-10-17 DIAGNOSIS — L85.3 DRY SKIN: ICD-10-CM

## 2022-10-17 DIAGNOSIS — J43.1 PANLOBULAR EMPHYSEMA (HCC): ICD-10-CM

## 2022-10-17 DIAGNOSIS — E08.43 DIABETES MELLITUS DUE TO UNDERLYING CONDITION WITH DIABETIC AUTONOMIC NEUROPATHY, WITHOUT LONG-TERM CURRENT USE OF INSULIN (HCC): Primary | ICD-10-CM

## 2022-10-17 LAB — SL AMB POCT HEMOGLOBIN AIC: 7.7 (ref ?–6.5)

## 2022-10-17 PROCEDURE — 90662 IIV NO PRSV INCREASED AG IM: CPT

## 2022-10-17 PROCEDURE — 83036 HEMOGLOBIN GLYCOSYLATED A1C: CPT | Performed by: FAMILY MEDICINE

## 2022-10-17 PROCEDURE — 99214 OFFICE O/P EST MOD 30 MIN: CPT | Performed by: FAMILY MEDICINE

## 2022-10-17 PROCEDURE — G0008 ADMIN INFLUENZA VIRUS VAC: HCPCS

## 2022-10-17 RX ORDER — MOMETASONE FUROATE 1 MG/ML
SOLUTION TOPICAL DAILY
Qty: 60 ML | Refills: 2 | Status: SHIPPED | OUTPATIENT
Start: 2022-10-17

## 2022-10-17 NOTE — PROGRESS NOTES
Assessment/Plan:  A1C 7 7  LDL 80 CMP REVIEWED TSH 1 3  PATIENT USE MOMETASONE LOTION AS WELL AS THIS RALES SHAMPOO FOR DRY SCALP  RECHECK BLOOD PRESSURE AT FOLLOW-UP  PATIENT DID HAVE SOME CAFFEINE THIS MORNING  PATIENT USE MIRALAX FOR CONSTIPATION  PATIENT HAVE LABS PRIOR TO NEXT VISIT  PATIENT WILL FLU SHOT THIS TIME  PATIENT WILL HAVE MEDS REFILLED WHEN NEEDED  PATIENT GOING FOR DEXA SCAN SOON  PATIENT STATUS POST EGD AND COLONOSCOPY  FOLLOW-UP 3 MONTHS       Diagnoses and all orders for this visit:    Diabetes mellitus due to underlying condition with diabetic autonomic neuropathy, without long-term current use of insulin (HCC)  -     POCT hemoglobin A1c  -     CBC and differential; Future  -     Comprehensive metabolic panel; Future  -     Hemoglobin A1C; Future  -     Lipid panel; Future  -     Microalbumin / creatinine urine ratio  -     TSH, 3rd generation with Free T4 reflex; Future    Need for immunization against influenza  -     FLUZONE HIGH-DOSE: influenza vaccine, high-dose, preservative-free 0 7 mL    Primary hypertension  -     CBC and differential; Future  -     Comprehensive metabolic panel; Future  -     Hemoglobin A1C; Future  -     Lipid panel; Future  -     Microalbumin / creatinine urine ratio  -     TSH, 3rd generation with Free T4 reflex; Future    Gastroesophageal reflux disease with esophagitis without hemorrhage    Panlobular emphysema (HCC)    Mixed hyperlipidemia  -     CBC and differential; Future  -     Comprehensive metabolic panel; Future  -     Hemoglobin A1C; Future  -     Lipid panel; Future  -     Microalbumin / creatinine urine ratio  -     TSH, 3rd generation with Free T4 reflex; Future    Dry skin  -     mometasone (ELOCON) 0 1 % lotion; Apply topically daily    Slow transit constipation  -     CBC and differential; Future  -     Comprehensive metabolic panel; Future  -     Hemoglobin A1C; Future  -     Lipid panel;  Future  -     Microalbumin / creatinine urine ratio  -     TSH, 3rd generation with Free T4 reflex; Future            Subjective:        Patient ID: Patrick Nurse is a 71 y o  male  PATIENT FOLLOW-UP ON DIABETES HYPERTENSION HYPERLIPIDEMIA  PATIENT IS HAVING SOME CONSTIPATION ISSUES  PATIENT WITH OCCASIONAL LOOSE STOOLS  NO NAUSEA VOMITING FEVERS OR CHILLS OR CHANGE IN URINATION  PATIENT IS WALKING DAILY  The following portions of the patient's history were reviewed and updated as appropriate: allergies, current medications, past family history, past medical history, past social history, past surgical history and problem list       Review of Systems   Constitutional: Negative  HENT: Negative  Eyes: Negative  Respiratory: Negative  Cardiovascular: Negative  Gastrointestinal: Positive for constipation  Endocrine: Negative  Genitourinary: Negative  Musculoskeletal: Negative  Skin: Negative  Allergic/Immunologic: Negative  Neurological: Negative  Hematological: Negative  Psychiatric/Behavioral: Negative  Objective:               /84 (BP Location: Left arm, Patient Position: Sitting, Cuff Size: Standard)   Pulse 78   Temp 98 4 °F (36 9 °C) (Temporal)   Ht 6' 1" (1 854 m)   Wt 90 6 kg (199 lb 12 8 oz)   SpO2 98%   BMI 26 36 kg/m²          Physical Exam  Vitals and nursing note reviewed  Constitutional:       General: He is not in acute distress  Appearance: Normal appearance  He is not ill-appearing, toxic-appearing or diaphoretic  HENT:      Head: Normocephalic and atraumatic  Right Ear: Tympanic membrane, ear canal and external ear normal  There is no impacted cerumen  Left Ear: Tympanic membrane, ear canal and external ear normal  There is no impacted cerumen  Nose: Nose normal  No congestion or rhinorrhea  Mouth/Throat:      Mouth: Mucous membranes are moist       Pharynx: No oropharyngeal exudate or posterior oropharyngeal erythema     Eyes:      General: No scleral icterus  Right eye: No discharge  Left eye: No discharge  Extraocular Movements: Extraocular movements intact  Conjunctiva/sclera: Conjunctivae normal       Pupils: Pupils are equal, round, and reactive to light  Neck:      Vascular: No carotid bruit  Cardiovascular:      Rate and Rhythm: Normal rate and regular rhythm  Pulses: Normal pulses  Heart sounds: Normal heart sounds  No murmur heard  No friction rub  No gallop  Pulmonary:      Effort: Pulmonary effort is normal  No respiratory distress  Breath sounds: Normal breath sounds  No stridor  No wheezing, rhonchi or rales  Chest:      Chest wall: No tenderness  Musculoskeletal:         General: No swelling, tenderness, deformity or signs of injury  Normal range of motion  Cervical back: Normal range of motion and neck supple  No rigidity  No muscular tenderness  Right lower leg: No edema  Left lower leg: No edema  Lymphadenopathy:      Cervical: No cervical adenopathy  Skin:     General: Skin is warm and dry  Capillary Refill: Capillary refill takes less than 2 seconds  Coloration: Skin is not jaundiced  Findings: Rash present  No bruising, erythema or lesion  Comments: DRY SKIN POSTERIOR SCALP   Neurological:      Mental Status: He is alert and oriented to person, place, and time  Mental status is at baseline  Cranial Nerves: No cranial nerve deficit  Sensory: No sensory deficit  Motor: No weakness  Coordination: Coordination normal       Gait: Gait normal    Psychiatric:         Mood and Affect: Mood normal          Behavior: Behavior normal          Thought Content:  Thought content normal          Judgment: Judgment normal

## 2022-10-25 ENCOUNTER — HOSPITAL ENCOUNTER (OUTPATIENT)
Dept: BONE DENSITY | Facility: MEDICAL CENTER | Age: 72
Discharge: HOME | End: 2022-10-25
Payer: COMMERCIAL

## 2022-10-25 DIAGNOSIS — Z13.820 SCREENING FOR OSTEOPOROSIS: ICD-10-CM

## 2022-10-25 DIAGNOSIS — Z78.0 ASYMPTOMATIC POSTMENOPAUSAL STATE: ICD-10-CM

## 2022-10-25 PROCEDURE — 77080 DXA BONE DENSITY AXIAL: CPT

## 2022-12-02 DIAGNOSIS — K29.00 ACUTE SUPERFICIAL GASTRITIS WITHOUT HEMORRHAGE: ICD-10-CM

## 2022-12-02 RX ORDER — PANTOPRAZOLE SODIUM 40 MG/1
TABLET, DELAYED RELEASE ORAL
Qty: 90 TABLET | Refills: 0 | Status: SHIPPED | OUTPATIENT
Start: 2022-12-02

## 2022-12-08 DIAGNOSIS — I10 ESSENTIAL HYPERTENSION: ICD-10-CM

## 2022-12-08 DIAGNOSIS — I71.21 ASCENDING AORTIC ANEURYSM: ICD-10-CM

## 2022-12-08 RX ORDER — HYDROCHLOROTHIAZIDE 12.5 MG/1
TABLET ORAL
Qty: 90 TABLET | Refills: 0 | Status: SHIPPED | OUTPATIENT
Start: 2022-12-08

## 2023-01-10 ENCOUNTER — RA CDI HCC (OUTPATIENT)
Dept: OTHER | Facility: HOSPITAL | Age: 73
End: 2023-01-10

## 2023-01-10 NOTE — PROGRESS NOTES
E08 22 for  [facility]  coding opportunities          Chart Reviewed number of suggestions sent to Provider: 1     Patients Insurance     Medicare Insurance: [employer]         2023

## 2023-01-16 ENCOUNTER — APPOINTMENT (OUTPATIENT)
Dept: LAB | Facility: HOSPITAL | Age: 73
End: 2023-01-16

## 2023-01-16 DIAGNOSIS — E08.43 DIABETES MELLITUS DUE TO UNDERLYING CONDITION WITH DIABETIC AUTONOMIC NEUROPATHY, WITHOUT LONG-TERM CURRENT USE OF INSULIN (HCC): ICD-10-CM

## 2023-01-16 DIAGNOSIS — K59.01 SLOW TRANSIT CONSTIPATION: ICD-10-CM

## 2023-01-16 DIAGNOSIS — E78.2 MIXED HYPERLIPIDEMIA: ICD-10-CM

## 2023-01-16 DIAGNOSIS — I10 PRIMARY HYPERTENSION: ICD-10-CM

## 2023-01-16 LAB
ALBUMIN SERPL BCP-MCNC: 3.7 G/DL (ref 3.5–5)
ALP SERPL-CCNC: 73 U/L (ref 46–116)
ALT SERPL W P-5'-P-CCNC: 24 U/L (ref 12–78)
ANION GAP SERPL CALCULATED.3IONS-SCNC: 8 MMOL/L (ref 4–13)
AST SERPL W P-5'-P-CCNC: 23 U/L (ref 5–45)
BASOPHILS # BLD AUTO: 0.06 THOUSANDS/ÂΜL (ref 0–0.1)
BASOPHILS NFR BLD AUTO: 1 % (ref 0–1)
BILIRUB SERPL-MCNC: 0.58 MG/DL (ref 0.2–1)
BUN SERPL-MCNC: 15 MG/DL (ref 5–25)
CALCIUM SERPL-MCNC: 9.1 MG/DL (ref 8.3–10.1)
CHLORIDE SERPL-SCNC: 103 MMOL/L (ref 96–108)
CHOLEST SERPL-MCNC: 131 MG/DL
CO2 SERPL-SCNC: 32 MMOL/L (ref 21–32)
CREAT SERPL-MCNC: 1.17 MG/DL (ref 0.6–1.3)
CREAT UR-MCNC: 34.3 MG/DL
EOSINOPHIL # BLD AUTO: 0.33 THOUSAND/ÂΜL (ref 0–0.61)
EOSINOPHIL NFR BLD AUTO: 4 % (ref 0–6)
ERYTHROCYTE [DISTWIDTH] IN BLOOD BY AUTOMATED COUNT: 13.8 % (ref 11.6–15.1)
EST. AVERAGE GLUCOSE BLD GHB EST-MCNC: 166 MG/DL
GFR SERPL CREATININE-BSD FRML MDRD: 62 ML/MIN/1.73SQ M
GLUCOSE P FAST SERPL-MCNC: 168 MG/DL (ref 65–99)
HBA1C MFR BLD: 7.4 %
HCT VFR BLD AUTO: 47.7 % (ref 36.5–49.3)
HDLC SERPL-MCNC: 37 MG/DL
HGB BLD-MCNC: 15.8 G/DL (ref 12–17)
IMM GRANULOCYTES # BLD AUTO: 0.01 THOUSAND/UL (ref 0–0.2)
IMM GRANULOCYTES NFR BLD AUTO: 0 % (ref 0–2)
LDLC SERPL CALC-MCNC: 63 MG/DL (ref 0–100)
LYMPHOCYTES # BLD AUTO: 2.2 THOUSANDS/ÂΜL (ref 0.6–4.47)
LYMPHOCYTES NFR BLD AUTO: 30 % (ref 14–44)
MCH RBC QN AUTO: 28.7 PG (ref 26.8–34.3)
MCHC RBC AUTO-ENTMCNC: 33.1 G/DL (ref 31.4–37.4)
MCV RBC AUTO: 87 FL (ref 82–98)
MICROALBUMIN UR-MCNC: 13 MG/L (ref 0–20)
MICROALBUMIN/CREAT 24H UR: 38 MG/G CREATININE (ref 0–30)
MONOCYTES # BLD AUTO: 0.48 THOUSAND/ÂΜL (ref 0.17–1.22)
MONOCYTES NFR BLD AUTO: 7 % (ref 4–12)
NEUTROPHILS # BLD AUTO: 4.34 THOUSANDS/ÂΜL (ref 1.85–7.62)
NEUTS SEG NFR BLD AUTO: 58 % (ref 43–75)
NONHDLC SERPL-MCNC: 94 MG/DL
NRBC BLD AUTO-RTO: 0 /100 WBCS
PLATELET # BLD AUTO: 170 THOUSANDS/UL (ref 149–390)
PMV BLD AUTO: 10.3 FL (ref 8.9–12.7)
POTASSIUM SERPL-SCNC: 3.3 MMOL/L (ref 3.5–5.3)
PROT SERPL-MCNC: 7.2 G/DL (ref 6.4–8.4)
RBC # BLD AUTO: 5.5 MILLION/UL (ref 3.88–5.62)
SODIUM SERPL-SCNC: 143 MMOL/L (ref 135–147)
TRIGL SERPL-MCNC: 155 MG/DL
TSH SERPL DL<=0.05 MIU/L-ACNC: 1.79 UIU/ML (ref 0.45–4.5)
WBC # BLD AUTO: 7.42 THOUSAND/UL (ref 4.31–10.16)

## 2023-01-17 ENCOUNTER — OFFICE VISIT (OUTPATIENT)
Dept: FAMILY MEDICINE CLINIC | Facility: CLINIC | Age: 73
End: 2023-01-17

## 2023-01-17 VITALS
TEMPERATURE: 98.5 F | SYSTOLIC BLOOD PRESSURE: 128 MMHG | DIASTOLIC BLOOD PRESSURE: 88 MMHG | BODY MASS INDEX: 26.64 KG/M2 | OXYGEN SATURATION: 98 % | HEART RATE: 71 BPM | HEIGHT: 73 IN | WEIGHT: 201 LBS

## 2023-01-17 DIAGNOSIS — K21.00 GASTROESOPHAGEAL REFLUX DISEASE WITH ESOPHAGITIS WITHOUT HEMORRHAGE: ICD-10-CM

## 2023-01-17 DIAGNOSIS — I10 PRIMARY HYPERTENSION: ICD-10-CM

## 2023-01-17 DIAGNOSIS — E08.43 DIABETES MELLITUS DUE TO UNDERLYING CONDITION WITH DIABETIC AUTONOMIC NEUROPATHY, WITHOUT LONG-TERM CURRENT USE OF INSULIN (HCC): Primary | ICD-10-CM

## 2023-01-17 DIAGNOSIS — J43.1 PANLOBULAR EMPHYSEMA (HCC): ICD-10-CM

## 2023-01-17 DIAGNOSIS — E78.2 MIXED HYPERLIPIDEMIA: ICD-10-CM

## 2023-01-17 DIAGNOSIS — S06.9X0A MILD TRAUMATIC BRAIN INJURY, WITHOUT LOSS OF CONSCIOUSNESS, INITIAL ENCOUNTER (HCC): ICD-10-CM

## 2023-01-17 DIAGNOSIS — N18.30 STAGE 3 CHRONIC KIDNEY DISEASE, UNSPECIFIED WHETHER STAGE 3A OR 3B CKD (HCC): ICD-10-CM

## 2023-01-17 RX ORDER — COVID-19 MOLECULAR TEST ASSAY
KIT MISCELLANEOUS
COMMUNITY
Start: 2022-12-02

## 2023-01-17 NOTE — PROGRESS NOTES
Assessment/Plan: TSH 1 7 glucose 168 CMP reviewed LDL 63 CBC normal A1c 7 4 urine microalbumin 38  Patient will increase metformin to twice daily for uncontrolled diabetes at this time  Patient will continue with other meds for hypertension hyperlipidemia and GERD which is stable at this time  Refills will be given when needed  Patient will follow-up in 3 to 4 months  Diagnoses and all orders for this visit:    Diabetes mellitus due to underlying condition with diabetic autonomic neuropathy, without long-term current use of insulin (HCC)  -     metFORMIN (GLUCOPHAGE) 500 mg tablet; Take 1 tablet (500 mg total) by mouth 2 (two) times a day with meals  -     Glucometer test strips  -     Glucometer  -     Lancets    Primary hypertension    Mixed hyperlipidemia    Gastroesophageal reflux disease with esophagitis without hemorrhage    Mild traumatic brain injury, without loss of consciousness, [facility]    Panlobular emphysema (HCC)    Stage 3 chronic kidney disease, unspecified whether stage 3a or 3b CKD ([facility] )    Other orders  -     [employer] COVID-19 Ag Home Test KIT; Use as Directed on the Package            Subjective:        Patient ID: Elba Peters is a 71 y o  male  Patient is here to follow-up on diabetes hypertension hyperlipidemia  Patient feeling fairly well at this time  No new complaints other than some right ankle pain status post moving  This is improving with ibuprofen        The following portions of the patient's history were reviewed and updated as appropriate: allergies, current medications, past family history, past medical history, past social history, past surgical history and problem list       Review of Systems   Constitutional: Negative  HENT: Negative  Eyes: Negative  Respiratory: Negative  Cardiovascular: Negative  Gastrointestinal: Negative  Endocrine: Negative  Genitourinary: Negative  Musculoskeletal: Positive for arthralgias  Skin: Negative  Allergic/Immunologic: Negative  Neurological: Negative  Hematological: Negative  Psychiatric/Behavioral: Negative  Objective:      BMI Counseling: Body mass index is 26 52 kg/m²  The BMI is above normal  Nutrition recommendations include decreasing portion sizes  Exercise recommendations include exercising 3-5 times per week  Rationale for BMI follow-up plan is due to patient being overweight or obese  Depression Screening and Follow-up Plan: Clincally patient does not have depression  No treatment is required  /88 (BP Location: Left arm, Patient Position: Sitting, Cuff Size: Standard)   Pulse 71   Temp 98 5 °F (36 9 °C) (Temporal)   Ht 6' 1" (1 854 m)   Wt 91 2 kg (201 lb)   SpO2 98%   BMI 26 52 kg/m²          Physical Exam  Vitals and nursing note reviewed  Constitutional:       General: He is not in acute distress  Appearance: Normal appearance  He is not ill-appearing, toxic-appearing or diaphoretic  HENT:      Head: Normocephalic and atraumatic  Right Ear: Tympanic membrane, ear canal and external ear normal  There is no impacted cerumen  Left Ear: Tympanic membrane, ear canal and external ear normal  There is no impacted cerumen  Nose: Nose normal  No congestion or rhinorrhea  Mouth/Throat:      Mouth: Mucous membranes are moist       Pharynx: No oropharyngeal exudate or posterior oropharyngeal erythema  Eyes:      General: No scleral icterus  Right eye: No discharge  Left eye: No discharge  Extraocular Movements: Extraocular movements intact  Conjunctiva/sclera: Conjunctivae normal       Pupils: Pupils are equal, round, and reactive to light  Neck:      Vascular: No carotid bruit  Cardiovascular:      Rate and Rhythm: Normal rate and regular rhythm  Pulses: Normal pulses  Heart sounds: Normal heart sounds  No murmur heard  No friction rub  No gallop     Pulmonary: Effort: Pulmonary effort is normal  No respiratory distress  Breath sounds: Normal breath sounds  No stridor  No wheezing, rhonchi or rales  Chest:      Chest wall: No tenderness  Musculoskeletal:         General: No swelling, tenderness, deformity or signs of injury  Normal range of motion  Cervical back: Normal range of motion and neck supple  No rigidity  No muscular tenderness  Right lower leg: No edema  Left lower leg: No edema  Lymphadenopathy:      Cervical: No cervical adenopathy  Skin:     General: Skin is warm and dry  Capillary Refill: Capillary refill takes less than 2 seconds  Coloration: Skin is not jaundiced  Findings: No bruising, erythema, lesion or rash  Neurological:      Mental Status: He is alert and oriented to person, place, and time  Mental status is at baseline  Cranial Nerves: No cranial nerve deficit  Sensory: No sensory deficit  Motor: No weakness  Coordination: Coordination normal       Gait: Gait normal    Psychiatric:         Mood and Affect: Mood normal          Behavior: Behavior normal          Thought Content:  Thought content normal          Judgment: Judgment normal

## 2023-02-22 ENCOUNTER — TELEPHONE (OUTPATIENT)
Dept: FAMILY MEDICINE CLINIC | Facility: CLINIC | Age: 73
End: 2023-02-22

## 2023-02-22 NOTE — TELEPHONE ENCOUNTER
Pt's wife Lovena Colder, stated pt has a productive cough, congestion, runny nose & body aches  Pt had a slight fever yesterday 02/21  Pt does not have covid  They made an appt for tomorrow 02/23, however what does pt take in the meantime due to pt having high blood pressure issues  Please advise

## 2023-02-23 ENCOUNTER — OFFICE VISIT (OUTPATIENT)
Dept: FAMILY MEDICINE CLINIC | Facility: CLINIC | Age: 73
End: 2023-02-23

## 2023-02-23 VITALS
SYSTOLIC BLOOD PRESSURE: 128 MMHG | OXYGEN SATURATION: 98 % | HEART RATE: 82 BPM | DIASTOLIC BLOOD PRESSURE: 80 MMHG | WEIGHT: 208 LBS | BODY MASS INDEX: 27.57 KG/M2 | TEMPERATURE: 98 F | HEIGHT: 73 IN

## 2023-02-23 DIAGNOSIS — M25.571 RIGHT ANKLE PAIN, UNSPECIFIED CHRONICITY: ICD-10-CM

## 2023-02-23 DIAGNOSIS — J06.9 ACUTE URI: Primary | ICD-10-CM

## 2023-02-23 RX ORDER — AZITHROMYCIN 250 MG/1
TABLET, FILM COATED ORAL
Qty: 6 TABLET | Refills: 0 | Status: SHIPPED | OUTPATIENT
Start: 2023-02-23 | End: 2023-02-27

## 2023-02-23 NOTE — PROGRESS NOTES
Assessment/Plan: Per care recommended  Patient will use [facility] if worsening occurs  Viral testing done at this time  Diagnoses and all orders for this visit:    Acute URI  -     azithromycin (ZITHROMAX) 250 mg tablet; Take 2 tablets today then 1 tablet daily x 4 days            Subjective:        Patient ID: Zari Hernandez is a 68 y o  male  Patient is here with 2-day history of cough, congestion along with some rhinorrhea postnasal drip sore throat sinus pain and pressure cough with sputum production and body aches  Use Nasonex as well as Tylenol  Also use honey  Decrease smell noted  No Nausea vomiting diarrhea  The following portions of the patient's history were reviewed and updated as appropriate: allergies, current medications, past family history, past medical history, past social history, past surgical history and problem list       Review of Systems   Constitutional: Positive for fatigue  Negative for fever  HENT: Positive for congestion, postnasal drip, rhinorrhea, sinus pressure, sinus pain and sore throat  Eyes: Negative  Respiratory: Positive for cough  Cardiovascular: Negative  Gastrointestinal: Negative  Endocrine: Negative  Genitourinary: Negative  Musculoskeletal: Positive for arthralgias  Skin: Negative  Allergic/Immunologic: Negative  Neurological: Negative  Hematological: Negative  Psychiatric/Behavioral: Negative  Objective:      BMI Counseling: Body mass index is 27 44 kg/m²  The BMI is above normal  Nutrition recommendations include consuming healthier snacks  Exercise recommendations include exercising 3-5 times per week  Rationale for BMI follow-up plan is due to patient being overweight or obese  Depression Screening and Follow-up Plan: Clincally patient does not have depression  No treatment is required               /80 (BP Location: Left arm, Patient Position: Sitting, Cuff Size: Standard)   Pulse 82   Temp 98 °F (36 7 °C) (Temporal)   Ht 6' 1" (1 854 m)   Wt 94 3 kg (208 lb)   SpO2 98%   BMI 27 44 kg/m²          Physical Exam  Vitals and nursing note reviewed  Constitutional:       General: He is not in acute distress  Appearance: He is ill-appearing  He is not toxic-appearing or diaphoretic  HENT:      Head: Normocephalic and atraumatic  Right Ear: Tympanic membrane, ear canal and external ear normal  There is no impacted cerumen  Left Ear: Tympanic membrane, ear canal and external ear normal  There is no impacted cerumen  Nose: Nose normal  No congestion or rhinorrhea  Mouth/Throat:      Mouth: Mucous membranes are moist       Pharynx: Oropharyngeal exudate present  No posterior oropharyngeal erythema  Eyes:      General: No scleral icterus  Right eye: No discharge  Left eye: No discharge  Extraocular Movements: Extraocular movements intact  Conjunctiva/sclera: Conjunctivae normal       Pupils: Pupils are equal, round, and reactive to light  Neck:      Vascular: No carotid bruit  Cardiovascular:      Rate and Rhythm: Normal rate and regular rhythm  Pulses: Normal pulses  Heart sounds: Normal heart sounds  No murmur heard  No friction rub  No gallop  Pulmonary:      Effort: Pulmonary effort is normal  No respiratory distress  Breath sounds: Normal breath sounds  No stridor  No wheezing, rhonchi or rales  Chest:      Chest wall: No tenderness  Musculoskeletal:         General: No swelling, tenderness, deformity or signs of injury  Normal range of motion  Cervical back: Normal range of motion and neck supple  No rigidity  No muscular tenderness  Right lower leg: No edema  Left lower leg: No edema  Lymphadenopathy:      Cervical: No cervical adenopathy  Skin:     General: Skin is warm and dry  Capillary Refill: Capillary refill takes less than 2 seconds  Coloration: Skin is not jaundiced        Findings: No bruising, erythema, lesion or rash  Neurological:      Mental Status: He is alert and oriented to person, place, and time  Mental status is at baseline  Cranial Nerves: No cranial nerve deficit  Sensory: No sensory deficit  Motor: No weakness  Coordination: Coordination normal       Gait: Gait normal    Psychiatric:         Mood and Affect: Mood normal          Behavior: Behavior normal          Thought Content:  Thought content normal          Judgment: Judgment normal

## 2023-02-24 LAB
FLUAV RNA RESP QL NAA+PROBE: NEGATIVE
FLUBV RNA RESP QL NAA+PROBE: NEGATIVE
SARS-COV-2 RNA RESP QL NAA+PROBE: NEGATIVE

## 2023-03-01 LAB
LEFT EYE DIABETIC RETINOPATHY: NORMAL
LEFT EYE IMAGE QUALITY: NORMAL
LEFT EYE MACULAR EDEMA: NORMAL
LEFT EYE OTHER RETINOPATHY: NORMAL
RIGHT EYE DIABETIC RETINOPATHY: NORMAL
RIGHT EYE IMAGE QUALITY: NORMAL
RIGHT EYE MACULAR EDEMA: NORMAL
RIGHT EYE OTHER RETINOPATHY: NORMAL
SEVERITY (EYE EXAM): NORMAL

## 2023-03-06 DIAGNOSIS — I71.21 ASCENDING AORTIC ANEURYSM: ICD-10-CM

## 2023-03-06 DIAGNOSIS — K29.00 ACUTE SUPERFICIAL GASTRITIS WITHOUT HEMORRHAGE: ICD-10-CM

## 2023-03-06 DIAGNOSIS — I10 ESSENTIAL HYPERTENSION: ICD-10-CM

## 2023-03-06 RX ORDER — PANTOPRAZOLE SODIUM 40 MG/1
TABLET, DELAYED RELEASE ORAL
Qty: 90 TABLET | Refills: 0 | Status: SHIPPED | OUTPATIENT
Start: 2023-03-06

## 2023-03-06 RX ORDER — HYDROCHLOROTHIAZIDE 12.5 MG/1
TABLET ORAL
Qty: 90 TABLET | Refills: 0 | Status: SHIPPED | OUTPATIENT
Start: 2023-03-06

## 2023-04-20 PROBLEM — I25.9 CHEST PAIN DUE TO MYOCARDIAL ISCHEMIA: Status: ACTIVE | Noted: 2023-04-20

## 2023-04-21 ENCOUNTER — LAB (OUTPATIENT)
Dept: LAB | Facility: HOSPITAL | Age: 73
End: 2023-04-21

## 2023-04-21 DIAGNOSIS — E08.43 DIABETES MELLITUS DUE TO UNDERLYING CONDITION WITH DIABETIC AUTONOMIC NEUROPATHY, WITHOUT LONG-TERM CURRENT USE OF INSULIN (HCC): ICD-10-CM

## 2023-04-21 LAB
ALBUMIN SERPL BCP-MCNC: 4.5 G/DL (ref 3.5–5)
ALP SERPL-CCNC: 60 U/L (ref 34–104)
ALT SERPL W P-5'-P-CCNC: 15 U/L (ref 7–52)
ANION GAP SERPL CALCULATED.3IONS-SCNC: 7 MMOL/L (ref 4–13)
AST SERPL W P-5'-P-CCNC: 12 U/L (ref 13–39)
BILIRUB SERPL-MCNC: 0.81 MG/DL (ref 0.2–1)
BUN SERPL-MCNC: 13 MG/DL (ref 5–25)
CALCIUM SERPL-MCNC: 9.8 MG/DL (ref 8.4–10.2)
CHLORIDE SERPL-SCNC: 103 MMOL/L (ref 96–108)
CO2 SERPL-SCNC: 33 MMOL/L (ref 21–32)
CREAT SERPL-MCNC: 1.11 MG/DL (ref 0.6–1.3)
GFR SERPL CREATININE-BSD FRML MDRD: 65 ML/MIN/1.73SQ M
GLUCOSE P FAST SERPL-MCNC: 163 MG/DL (ref 65–99)
MAGNESIUM SERPL-MCNC: 1.9 MG/DL (ref 1.9–2.7)
POTASSIUM SERPL-SCNC: 3.3 MMOL/L (ref 3.5–5.3)
PROT SERPL-MCNC: 7.1 G/DL (ref 6.4–8.4)
SODIUM SERPL-SCNC: 143 MMOL/L (ref 135–147)

## 2023-04-24 PROBLEM — J06.9 ACUTE URI: Status: RESOLVED | Noted: 2023-02-23 | Resolved: 2023-04-24

## 2023-05-11 ENCOUNTER — CONSULT (OUTPATIENT)
Dept: CARDIOLOGY CLINIC | Facility: CLINIC | Age: 73
End: 2023-05-11

## 2023-05-11 VITALS
DIASTOLIC BLOOD PRESSURE: 70 MMHG | SYSTOLIC BLOOD PRESSURE: 110 MMHG | HEART RATE: 79 BPM | BODY MASS INDEX: 26.73 KG/M2 | WEIGHT: 202.6 LBS

## 2023-05-11 DIAGNOSIS — E78.2 MIXED HYPERLIPIDEMIA: ICD-10-CM

## 2023-05-11 DIAGNOSIS — R07.2 PRECORDIAL CHEST PAIN: Primary | ICD-10-CM

## 2023-05-11 DIAGNOSIS — I10 ESSENTIAL HYPERTENSION: ICD-10-CM

## 2023-05-11 DIAGNOSIS — E08.43 DIABETES MELLITUS DUE TO UNDERLYING CONDITION WITH DIABETIC AUTONOMIC NEUROPATHY, WITHOUT LONG-TERM CURRENT USE OF INSULIN (HCC): ICD-10-CM

## 2023-05-11 DIAGNOSIS — I25.10 CORONARY ARTERY DISEASE INVOLVING NATIVE CORONARY ARTERY OF NATIVE HEART WITHOUT ANGINA PECTORIS: ICD-10-CM

## 2023-05-11 DIAGNOSIS — I71.21 ANEURYSM OF ASCENDING AORTA WITHOUT RUPTURE (HCC): ICD-10-CM

## 2023-05-11 NOTE — PROGRESS NOTES
"Outpatient Consultation - General Cardiology   Denny Wray 68 y o  male   MRN: [id number]  Encounter: [id number]      PCP: Yanelis Akins, DO      History of Present Illness   Physician Requesting Consult: Consults   Reason for Consult / Principal Problem: chest pain    HPI: Denny rWay is a 68y o  year old male with PMH of HTN, DM2, ascending aortic aneurysm, HLD, sleep apnea on CPAP, active smoker,  who is referred to cardiology for chest pain  On 4/20 he saw his PCP and had \"Patient has noticed some chest pain with some nausea intermittently over the past 2 weeks  Normal urination defecation  No shortness of breath  No diaphoresis  Patient does notice some lightheadedness associate with this  \"    Patient has had intermittent episodes of chest discomfort associated with lightheadedness and nausea  He does not have any exertion symptoms and is regularly walking about 2 miles a day at a slow walking pace  The last episode of symptoms was on Saturday at a soccer game he was watching  It was warm out and he got dizziness  Review of Systems  Review of system was conducted and was negative except for as stated in the HPI  Historical Information   Past Medical History:   Diagnosis Date   • Abnormal CAT scan    • Bilateral inguinal hernia 1990    recurrent   • Colitis    • Gastritis 12/11/2012   • Hypertension    • Impaired fasting glucose 7/9/2019   • Nausea & vomiting    • Right knee meniscal tear 12/20/2017   • Sleep apnea     uses cpap     Past Surgical History:   Procedure Laterality Date   • CHOLECYSTECTOMY     • COLONOSCOPY  2017    ABOUT 2-3 YEARS AGO   • EXPLORATORY LAPAROTOMY  08/16/2014    done for repair of peritoneal \"RENT\"? • HEMORRHOID SURGERY     • INGUINAL HERNIA REPAIR Bilateral 08/14/2014   • IA ESOPHAGOGASTRODUODENOSCOPY TRANSORAL DIAGNOSTIC N/A 2/23/2017    Procedure: EGD AND COLONOSCOPY;  Surgeon: Tony Morgan MD;  Location: [facility];   Service: Gastroenterology   • US GUIDED " THYROID BIOPSY  3/15/2021     Social History     Substance and Sexual Activity   Alcohol Use Yes    Comment: social     Social History     Substance and Sexual Activity   Drug Use No     Social History     Tobacco Use   Smoking Status Former   • Packs/day: 0 25   • Years: 40 00   • Pack years: 10 00   • Types: Cigarettes   • Quit date: 12/2019   • Years since quitting: 3 4   Smokeless Tobacco Never     Family History: no family history of aortopathy       Meds/Allergies   Home Medications:   Current Outpatient Medications:   •  Alphagan P 0 1 %, INSTILL 1 DROP INTO EACH EYE TWICE DAILY, Disp: , Rfl:   •  amLODIPine (NORVASC) 10 mg tablet, Take 1 tablet (10 mg total) by mouth daily, Disp: 90 tablet, Rfl: 1  •  Blood Glucose Monitoring Suppl (ONE TOUCH ULTRA 2) w/Device KIT, CHECK BLOOD SUGARS DAILY, Disp: , Rfl:   •  cholecalciferol (VITAMIN D3) 400 units tablet, Take 400 Units by mouth daily, Disp: , Rfl:   •  hydrochlorothiazide (HYDRODIURIL) 12 5 mg tablet, Take 1 tablet (12 5 mg total) by mouth daily, Disp: 90 tablet, Rfl: 1  •  Lancets (OneTouch Delica Plus COLAYR89S) MISC, daily Check blood sugar, Disp: , Rfl:   •  metFORMIN (GLUCOPHAGE) 500 mg tablet, Take 1 tablet (500 mg total) by mouth 2 (two) times a day with meals, Disp: 180 tablet, Rfl: 1  •  metoprolol succinate (TOPROL-XL) 25 mg 24 hr tablet, Take 1 tablet (25 mg total) by mouth daily, Disp: 90 tablet, Rfl: 1  •  mometasone (ELOCON) 0 1 % lotion, Apply topically daily, Disp: 60 mL, Rfl: 2  •  OneTouch Ultra test strip, USE STRIP TO CHECK GLUCOSE ONCE DAILY, Disp: , Rfl:   •  pantoprazole (PROTONIX) 40 mg tablet, Take 1 tablet (40 mg total) by mouth daily, Disp: 90 tablet, Rfl: 1  •  potassium chloride (MICRO-K) 10 MEQ CR capsule, Take 1 capsule (10 mEq total) by mouth daily, Disp: 90 capsule, Rfl: 1  •  prednisoLONE acetate (PRED FORTE) 1 % ophthalmic suspension, , Disp: , Rfl:   •  rosuvastatin (CRESTOR) 5 mg tablet, Take 1 tablet (5 mg total) by mouth daily, Disp: 90 tablet, Rfl: 3    Allergies   Allergen Reactions   • Ace Inhibitors Angioedema         Objective   Vitals: Blood pressure 110/70, pulse 79, weight 91 9 kg (202 lb 9 6 oz)  Physical Exam  GEN: Sam Ferro appears well, alert and oriented x 3, pleasant and cooperative   HEENT:  Normocephalic, atraumatic, anicteric, moist mucous membranes  NECK: No JVD or carotid bruits   HEART: reg rhythm, reg rate, normal S1 and S2, no murmurs, clicks, gallops or rubs   LUNGS: Clear to auscultation bilaterally; no wheezes, rales, or rhonchi; respiration nonlabored   ABDOMEN:  Normoactive bowel sounds, soft, no tenderness, no distention  EXTREMITIES: peripheral pulses palpable; no edema  NEURO: no gross focal findings; cranial nerves grossly intact   SKIN:  Dry, intact, warm to touch    Lab Results: I have personally reviewed pertinent lab results  No results found for: HSTNI0, HSTNI2, HSTNI4, HSTNI  No results found for: NTBNP  Lab Results   Component Value Date    CHOL 181 05/27/2015    TRIG 155 (H) 01/16/2023    HDL 37 (L) 01/16/2023    LDLCALC 63 01/16/2023     Lab Results   Component Value Date     05/27/2015    K 3 3 (L) 04/21/2023    CO2 33 (H) 04/21/2023     04/21/2023    BUN 13 04/21/2023    CREATININE 1 11 04/21/2023    ALT 15 04/21/2023    AST 12 (L) 04/21/2023     Lab Results   Component Value Date    WBC 7 42 01/16/2023    HGB 15 8 01/16/2023    HCT 47 7 01/16/2023    MCV 87 01/16/2023     01/16/2023     Lab Results   Component Value Date    INR 1 04 08/17/2014    INR 0 93 08/16/2014         Imaging: I have personally reviewed pertinent reports  ECHO:  Results for orders placed during the hospital encounter of 02/25/21    Echo complete with contrast if indicated    Narrative  [address] [address]    [place], [address]  [phone]    Transthoracic Echocardiogram  2D, M-mode, Doppler, and Color Doppler    Study date: 2021    Patient: Omero Lauren  MR number: [id number]  Account number: [de-identified]  : 1950  Age: 80 years  Gender: Male  Status: Outpatient  Location: [facility]  Height: 72 in  Weight: 218 5 lb  BP: 154/ 82 mmHg    Indications: ascending aortic aneurysm  Diagnoses: I71 2 - Thoracic aortic aneurysm, without rupture    Sonographer:  Brien Ly RDCS  Primary Physician:  Genaro Holt DO  Referring Physician:  MELVIN Stroud  Group:  [facility]  Interpreting Physician:  Khloe Webber MD    IMPRESSIONS:  Technical quality: Good  Cardiac rhythm: Sinus    1  Mildly increased left ventricular wall thickness, normal left ventricular systolic function, grade 1 diastolic dysfunction, EF around 55%  2  Normal right ventricular size and systolic function  3  Mild right atrial cavity enlargement  4  Aortic valve sclerosis, no aortic valve stenosis or regurgitation  5  Mild mitral annular calcification and leaflet sclerosis, mild , 1+ mitral valve regurgitation  6  Mild, 2+ tricuspid valve regurgitation, trace pulmonic valve regurgitation  7  No obvious pulmonary hypertension  8  No pericardial effusion  9  Mildly ascending thoracic aortic aneurysm ascending aorta measuring up to 4 1 cm  No previous echocardiogram is available for comparison  SUMMARY    LEFT VENTRICLE:  Normal left ventricular cavity size, mildly increased wall thickness, normal left ventricular systolic function, there is no obvious regional wall motion abnormality  Ejection fraction is estimated as around 55%  Early grade 1 diastolic  dysfunction, impaired LV relaxation  Estimated left ventricular filling pressure is normal     RIGHT VENTRICLE:  Normal right ventricular size and systolic function  Normal estimated right ventricular systolic pressure, 33 mmHg  LEFT ATRIUM:  Normal left atrial cavity size  Intact interatrial septum      RIGHT ATRIUM:  Mild right atrial cavity enlargement  MITRAL VALVE:  Mild mitral annular calcification and leaflet sclerosis, adequate leaflet mobility  Mild mitral valve regurgitation  AORTIC VALVE:  Tricuspid aortic valve with mild sclerosis, adequate cuspal separation  No aortic valve stenosis or regurgitation  TRICUSPID VALVE:  Mild, 2+ eccentric mitral valve regurgitation  PULMONIC VALVE:  Trace pulmonic valve regurgitation  AORTA:  Mildly dilated aortic root and proximal ascending aorta measuring up to 4 1 cm  Mild thoracic aortic aneurysm  Mild fibrocalcific changes noted in aortic root  IVC, HEPATIC VEINS:  Inferior vena cava is normal in size and demonstrates appropriate respiratory phasic changes in diameter  PERICARDIUM:  No pericardial effusion  SUMMARY MEASUREMENTS  CW measurements:  Unspecified Anatomy:   TR Vmax was 2 4 m/s  TR maxPG was 23 3 mmHg  MM measurements:  Unspecified Anatomy:   TAPSE was 2 3 cm  PW measurements:  Unspecified Anatomy:   E' Avg was 0 1 m/s  E' Lat was 0 1 m/s  E' Sept was 0 1 m/s  E/E' Avg was 7 8   E/E' Lat was 7 9   E/E' Sept was 7 8   MV A Darrell was 0 9 m/s  MV Dec Floyd was 2 m/s2  MV DecT was 292 7 ms   MV E Darrell was 0 6  m/s  MV E/A Ratio was 0 7   MV PHT was 84 9 ms  MVA By PHT was 2 6 cm2   TV E' lat was 0 1 m/s  HISTORY: PRIOR HISTORY: obstructive sleep apnea, hypertension, GERD, COPD, current smoker, diabetes mellitus type 2  PROCEDURE: The study was performed in the [facility]  This was a routine study  The transthoracic approach was used  The study included complete 2D imaging, M-mode, complete spectral Doppler, and color Doppler  The heart rate was 70 bpm, at  the start of the study  Image quality was adequate  LEFT VENTRICLE: Normal left ventricular cavity size, mildly increased wall thickness, normal left ventricular systolic function, there is no obvious regional wall motion abnormality  Ejection fraction is estimated as around 55%   Early  grade 1 diastolic dysfunction, impaired LV relaxation  Estimated left ventricular filling pressure is normal     RIGHT VENTRICLE: Normal right ventricular size and systolic function  Normal estimated right ventricular systolic pressure, 33 mmHg  LEFT ATRIUM: Normal left atrial cavity size  Intact interatrial septum  RIGHT ATRIUM: Mild right atrial cavity enlargement  MITRAL VALVE: Mild mitral annular calcification and leaflet sclerosis, adequate leaflet mobility  Mild mitral valve regurgitation  AORTIC VALVE: Tricuspid aortic valve with mild sclerosis, adequate cuspal separation  No aortic valve stenosis or regurgitation  TRICUSPID VALVE: Mild, 2+ eccentric mitral valve regurgitation  PULMONIC VALVE: Trace pulmonic valve regurgitation  PERICARDIUM: No pericardial effusion  AORTA: Mildly dilated aortic root and proximal ascending aorta measuring up to 4 1 cm  Mild thoracic aortic aneurysm  Mild fibrocalcific changes noted in aortic root  SYSTEMIC VEINS: IVC: Inferior vena cava is normal in size and demonstrates appropriate respiratory phasic changes in diameter      SYSTEM MEASUREMENT TABLES    2D  Ao Diam: 3 7 cm  LA Diam: 3 5 cm  LAAs A2C: 24 5 cm2  LAAs A4C: 22 8 cm2  LAESV A-L A2C: 71 8 ml  LAESV A-L A4C: 75 8 ml  LAESV Index (A-L): 36 9 ml/m2  LAESV MOD A2C: 68 9 ml  LAESV MOD A4C: 71 5 ml  LAESV(A-L): 81 5 ml  LAESV(MOD BP): 77 2 ml  LAESVInd MOD BP: 34 9 ml/m2  LALs A2C: 7 1 cm  LALs A4C: 5 8 cm  LVEDV MOD A4C: 95 ml  LVEF MOD A4C: 52 9 %  LVESV MOD A4C: 44 8 ml  LVLd A4C: 8 1 cm  LVLs A4C: 6 8 cm  RA Major: 4 6 cm  RA Minor: 4 6 cm  RAAs A4C: 25 9 cm2  RAESV A-L: 95 5 ml  RAESV MOD: 86 ml  RALs: 6 cm  RVIDd: 3 5 cm  SV MOD A4C: 50 3 ml    CW  TR Vmax: 2 4 m/s  TR maxP 3 mmHg    MM  TAPSE: 2 3 cm    PW  E' Av 1 m/s  E' Lat: 0 1 m/s  E' Sept: 0 1 m/s  E/E' Av 8  E/E' Lat: 7 9  E/E' Sept: 7 8  MV A Darrell: 0 9 m/s  MV Dec [place]: 2 m/s2  MV DecT: 292 7 ms  MV E Darrell: 0 6 m/s  MV E/A Ratio: 0 7  MV PHT: 84 9 ms  MVA By PHT: 2 6 cm2  TV E' lat: 0 1 m/s    [facility] Accredited Echocardiography Laboratory    Prepared and electronically signed by    Mack Ceballos MD  Signed [id number] 16:39:38    No results found for this or any previous visit  Assessment/Plan   Lucía Belle is a 68y o  year old male with PMH of HTN, DM2, ascending aortic aneurysm, HLD, sleep apnea on CPAP, active smoker,  who is referred to cardiology for chest pain  #chest pain  Patient with intermittent chest discomfort at rest   He has not had any exertional symptoms however he has multiple cardiac risk factors (HTN, HLD, DM, family history, active smoker) and also has coronary calcifications on CT  Symptoms concerning enough and with enough risk factors that we need to determine if symptoms are due to ischemia  Will get TTE and exercise nuclear stress test      #HTN  /70 today  GOAL BP less than 120/80 given ascending aortic aneursym  Continue current regimen  HCTZ 12 5mg, metoprolol 25mg, amlodipine 10mg  #ascending aortic aneurysm  4 1cm which was unchanged in 2021  Follows with Dr Padmini Nova  Next CT planned for 12/2023  -BP at goal as above    -still smoking-- counseling done    -on crestor    -check TTE    #DM2  a1c 6 9%  on metformin  #HLD:  , , HDL 37, LDL 63  Controlled on currently therapy  Continue crestor 5mg    #active smoker  Smoking about 1 pack of cigarettes per week  Quitting has been difficult  We discussed risks of smoking including worsening his aortic aneursym  Case discussed and reviewed with Dr Nash Hickman who agrees with my assessment and plan  Thank you for involving us in the care of your patient        Tigre Atkinson MD  Cardiology Fellow   PGY-4  ===========================================================

## 2023-05-19 LAB
LEFT EYE DIABETIC RETINOPATHY: NORMAL
RIGHT EYE DIABETIC RETINOPATHY: NORMAL

## 2023-06-01 ENCOUNTER — HOSPITAL ENCOUNTER (OUTPATIENT)
Dept: NON INVASIVE DIAGNOSTICS | Facility: CLINIC | Age: 73
Discharge: HOME | End: 2023-06-01

## 2023-06-01 VITALS
HEART RATE: 70 BPM | SYSTOLIC BLOOD PRESSURE: 110 MMHG | HEIGHT: 73 IN | BODY MASS INDEX: 26.77 KG/M2 | WEIGHT: 202 LBS | DIASTOLIC BLOOD PRESSURE: 70 MMHG

## 2023-06-01 VITALS — HEIGHT: 73 IN | WEIGHT: 202 LBS | BODY MASS INDEX: 26.77 KG/M2

## 2023-06-01 DIAGNOSIS — R07.2 PRECORDIAL CHEST PAIN: ICD-10-CM

## 2023-06-01 DIAGNOSIS — I71.21 ANEURYSM OF ASCENDING AORTA WITHOUT RUPTURE (HCC): ICD-10-CM

## 2023-06-01 LAB
AORTIC ROOT: 3.7 CM
APICAL FOUR CHAMBER EJECTION FRACTION: 54 %
ASCENDING AORTA: 4 CM
CHEST PAIN STATEMENT: NORMAL
E WAVE DECELERATION TIME: 246 MS
FRACTIONAL SHORTENING: 32 % (ref 28–44)
GLOBAL LONGITUIDAL STRAIN: -17 %
INTERVENTRICULAR SEPTUM IN DIASTOLE (PARASTERNAL SHORT AXIS VIEW): 1.1 CM
INTERVENTRICULAR SEPTUM: 1.1 CM (ref 0.6–1.1)
LAAS-AP2: 11.7 CM2
LAAS-AP4: 12.6 CM2
LEFT ATRIUM AREA SYSTOLE SINGLE PLANE A4C: 11.8 CM2
LEFT ATRIUM SIZE: 3.6 CM
LEFT INTERNAL DIMENSION IN SYSTOLE: 3.2 CM (ref 2.1–4)
LEFT VENTRICLE DIASTOLIC VOLUME (MOD BIPLANE): 132 ML
LEFT VENTRICLE SYSTOLIC VOLUME (MOD BIPLANE): 59 ML
LEFT VENTRICULAR INTERNAL DIMENSION IN DIASTOLE: 4.7 CM (ref 3.5–6)
LEFT VENTRICULAR POSTERIOR WALL IN END DIASTOLE: 1.1 CM
LEFT VENTRICULAR STROKE VOLUME: 60 ML
LV EF: 55 %
LVSV (TEICH): 60 ML
MAX DIASTOLIC BP: 86 MMHG
MAX HEART RATE: 104 BPM
MAX HR PERCENT: 70 %
MAX HR: 104 BPM
MAX PREDICTED HEART RATE: 148 BPM
MAX. SYSTOLIC BP: 204 MMHG
MV E'TISSUE VEL-SEP: 7 CM/S
MV PEAK A VEL: 0.77 M/S
MV PEAK E VEL: 63 CM/S
MV STENOSIS PRESSURE HALF TIME: 71 MS
MV VALVE AREA P 1/2 METHOD: 3.1 CM2
NUC STRESS EJECTION FRACTION: 49 %
PROTOCOL NAME: NORMAL
RA PRESSURE ESTIMATED: 3 MMHG
RATE PRESSURE PRODUCT: NORMAL
REASON FOR TERMINATION: NORMAL
RIGHT ATRIUM AREA SYSTOLE A4C: 20.5 CM2
RIGHT VENTRICLE ID DIMENSION: 4.1 CM
RV PSP: 27 MMHG
SL CV LEFT ATRIUM LENGTH A2C: 3.9 CM
SL CV LV EF: 55
SL CV PED ECHO LEFT VENTRICLE DIASTOLIC VOLUME (MOD BIPLANE) 2D: 100 ML
SL CV PED ECHO LEFT VENTRICLE SYSTOLIC VOLUME (MOD BIPLANE) 2D: 40 ML
SL CV REST NUCLEAR ISOTOPE DOSE: 10.36 MCI
SL CV STRESS NUCLEAR ISOTOPE DOSE: 31.1 MCI
SL CV STRESS RECOVERY BP: NORMAL MMHG
SL CV STRESS RECOVERY HR: 86 BPM
SL CV STRESS RECOVERY O2 SAT: 98 %
STRESS ANGINA INDEX: 1
STRESS BASELINE BP: NORMAL MMHG
STRESS BASELINE HR: 72 BPM
STRESS O2 SAT REST: 97 %
STRESS PEAK HR: 90 BPM
STRESS POST ESTIMATED WORKLOAD: 6.8 METS
STRESS POST EXERCISE DUR MIN: 3 MIN
STRESS POST EXERCISE DUR SEC: 55 SEC
STRESS POST O2 SAT PEAK: 98 %
STRESS POST PEAK BP: 204 MMHG
STRESS/REST PERFUSION RATIO: 1.11
TARGET HR FORMULA: NORMAL
TEST INDICATION: NORMAL
TIME IN EXERCISE PHASE: NORMAL
TR MAX PG: 24 MMHG
TR PEAK VELOCITY: 2.5 M/S
TRICUSPID ANNULAR PLANE SYSTOLIC EXCURSION: 2.4 CM
TRICUSPID VALVE PEAK REGURGITATION VELOCITY: 2.46 M/S

## 2023-06-01 RX ORDER — REGADENOSON 0.08 MG/ML
0.4 INJECTION, SOLUTION INTRAVENOUS ONCE
Status: DISCONTINUED | OUTPATIENT
Start: 2023-06-01 | End: 2023-06-02 | Stop reason: HOSPADM

## 2023-06-09 ENCOUNTER — TELEPHONE (OUTPATIENT)
Dept: CARDIOLOGY CLINIC | Facility: CLINIC | Age: 73
End: 2023-06-09

## 2023-06-12 ENCOUNTER — TELEPHONE (OUTPATIENT)
Dept: NON INVASIVE DIAGNOSTICS | Facility: HOSPITAL | Age: 73
End: 2023-06-12

## 2023-06-29 ENCOUNTER — OFFICE VISIT (OUTPATIENT)
Dept: FAMILY MEDICINE CLINIC | Facility: CLINIC | Age: 73
End: 2023-06-29
Payer: COMMERCIAL

## 2023-06-29 VITALS
SYSTOLIC BLOOD PRESSURE: 144 MMHG | HEART RATE: 84 BPM | TEMPERATURE: 97.7 F | OXYGEN SATURATION: 98 % | DIASTOLIC BLOOD PRESSURE: 82 MMHG | BODY MASS INDEX: 26.01 KG/M2 | WEIGHT: 192 LBS | HEIGHT: 72 IN

## 2023-06-29 DIAGNOSIS — R05.3 CHRONIC COUGH: Primary | ICD-10-CM

## 2023-06-29 DIAGNOSIS — Z72.0 TOBACCO ABUSE: ICD-10-CM

## 2023-06-29 PROCEDURE — 99214 OFFICE O/P EST MOD 30 MIN: CPT | Performed by: FAMILY MEDICINE

## 2023-06-29 NOTE — PROGRESS NOTES
Assessment/Plan:   Cough some labs done and a CT of the lungs  And follow-up with Dr Rosie Dimas  Diagnoses and all orders for this visit:    Chronic cough  -     CT lung screening program; Future  -     Comprehensive metabolic panel  -     CBC and differential    Tobacco abuse  -     CT lung screening program; Future  -     Comprehensive metabolic panel  -     CBC and differential    Other orders  -     aspirin (ECOTRIN LOW STRENGTH) 81 mg EC tablet; Take 81 mg by mouth daily          Subjective:     Patient ID: Zach Figueredo is a 68 y o  male  Patient presents with:  Cough: Patient states he started coughing yesterday and brought up red and dark blood in the phlegm  This was the patient first time seeing blood while coughing  Cough        Review of Systems   Constitutional: Negative  HENT: Negative  Respiratory: Positive for cough  As noted in HPI  Cardiovascular: Negative  Objective:     Physical Exam  Vitals and nursing note reviewed  Constitutional:       Appearance: He is well-developed  HENT:      Head: Normocephalic and atraumatic  Eyes:      Pupils: Pupils are equal, round, and reactive to light  Cardiovascular:      Rate and Rhythm: Normal rate  Pulmonary:      Effort: Pulmonary effort is normal       Breath sounds: No wheezing  Abdominal:      Palpations: Abdomen is soft  Musculoskeletal:      Cervical back: Normal range of motion and neck supple  Lymphadenopathy:      Cervical: No cervical adenopathy  Skin:     General: Skin is warm  Neurological:      Mental Status: He is alert and oriented to person, place, and time

## 2023-07-07 DIAGNOSIS — E08.43 DIABETES MELLITUS DUE TO UNDERLYING CONDITION WITH DIABETIC AUTONOMIC NEUROPATHY, WITHOUT LONG-TERM CURRENT USE OF INSULIN (HCC): ICD-10-CM

## 2023-07-10 ENCOUNTER — HOSPITAL ENCOUNTER (OUTPATIENT)
Dept: CT IMAGING | Facility: HOSPITAL | Age: 73
Discharge: HOME | End: 2023-07-10
Payer: COMMERCIAL

## 2023-07-10 DIAGNOSIS — R05.3 CHRONIC COUGH: ICD-10-CM

## 2023-07-10 DIAGNOSIS — Z72.0 TOBACCO ABUSE: ICD-10-CM

## 2023-07-10 PROCEDURE — 71271 CT THORAX LUNG CANCER SCR C-: CPT

## 2023-07-11 ENCOUNTER — APPOINTMENT (OUTPATIENT)
Dept: LAB | Facility: HOSPITAL | Age: 73
End: 2023-07-11
Payer: COMMERCIAL

## 2023-07-11 ENCOUNTER — RA CDI HCC (OUTPATIENT)
Dept: OTHER | Facility: HOSPITAL | Age: 73
End: 2023-07-11

## 2023-07-11 LAB
ALBUMIN SERPL BCP-MCNC: 4.2 G/DL (ref 3.5–5)
ALP SERPL-CCNC: 53 U/L (ref 34–104)
ALT SERPL W P-5'-P-CCNC: 14 U/L (ref 7–52)
ANION GAP SERPL CALCULATED.3IONS-SCNC: 8 MMOL/L
AST SERPL W P-5'-P-CCNC: 12 U/L (ref 13–39)
BASOPHILS # BLD AUTO: 0.06 THOUSANDS/ÂΜL (ref 0–0.1)
BASOPHILS NFR BLD AUTO: 1 % (ref 0–1)
BILIRUB SERPL-MCNC: 0.57 MG/DL (ref 0.2–1)
BUN SERPL-MCNC: 14 MG/DL (ref 5–25)
CALCIUM SERPL-MCNC: 9.2 MG/DL (ref 8.4–10.2)
CHLORIDE SERPL-SCNC: 101 MMOL/L (ref 96–108)
CO2 SERPL-SCNC: 27 MMOL/L (ref 21–32)
CREAT SERPL-MCNC: 1.02 MG/DL (ref 0.6–1.3)
EOSINOPHIL # BLD AUTO: 0.29 THOUSAND/ÂΜL (ref 0–0.61)
EOSINOPHIL NFR BLD AUTO: 5 % (ref 0–6)
ERYTHROCYTE [DISTWIDTH] IN BLOOD BY AUTOMATED COUNT: 13.5 % (ref 11.6–15.1)
GFR SERPL CREATININE-BSD FRML MDRD: 73 ML/MIN/1.73SQ M
GLUCOSE P FAST SERPL-MCNC: 150 MG/DL (ref 65–99)
HCT VFR BLD AUTO: 46 % (ref 36.5–49.3)
HGB BLD-MCNC: 15 G/DL (ref 12–17)
IMM GRANULOCYTES # BLD AUTO: 0.01 THOUSAND/UL (ref 0–0.2)
IMM GRANULOCYTES NFR BLD AUTO: 0 % (ref 0–2)
LYMPHOCYTES # BLD AUTO: 1.72 THOUSANDS/ÂΜL (ref 0.6–4.47)
LYMPHOCYTES NFR BLD AUTO: 27 % (ref 14–44)
MCH RBC QN AUTO: 28.3 PG (ref 26.8–34.3)
MCHC RBC AUTO-ENTMCNC: 32.6 G/DL (ref 31.4–37.4)
MCV RBC AUTO: 87 FL (ref 82–98)
MONOCYTES # BLD AUTO: 0.46 THOUSAND/ÂΜL (ref 0.17–1.22)
MONOCYTES NFR BLD AUTO: 7 % (ref 4–12)
NEUTROPHILS # BLD AUTO: 3.95 THOUSANDS/ÂΜL (ref 1.85–7.62)
NEUTS SEG NFR BLD AUTO: 60 % (ref 43–75)
NRBC BLD AUTO-RTO: 0 /100 WBCS
PLATELET # BLD AUTO: 173 THOUSANDS/UL (ref 149–390)
PMV BLD AUTO: 10.6 FL (ref 8.9–12.7)
POTASSIUM SERPL-SCNC: 3.3 MMOL/L (ref 3.5–5.3)
PROT SERPL-MCNC: 6.7 G/DL (ref 6.4–8.4)
RBC # BLD AUTO: 5.3 MILLION/UL (ref 3.88–5.62)
SODIUM SERPL-SCNC: 136 MMOL/L (ref 135–147)
WBC # BLD AUTO: 6.49 THOUSAND/UL (ref 4.31–10.16)

## 2023-07-11 NOTE — PROGRESS NOTES
E08.22  [facility] coding opportunities          Chart Reviewed number of suggestions sent to Provider: 1     Patients Insurance     Medicare Insurance: [address] [place] [address]

## 2023-07-18 ENCOUNTER — OFFICE VISIT (OUTPATIENT)
Dept: FAMILY MEDICINE CLINIC | Facility: CLINIC | Age: 73
End: 2023-07-18
Payer: COMMERCIAL

## 2023-07-18 VITALS
HEART RATE: 75 BPM | OXYGEN SATURATION: 98 % | WEIGHT: 198.8 LBS | DIASTOLIC BLOOD PRESSURE: 72 MMHG | HEIGHT: 72 IN | TEMPERATURE: 97.6 F | BODY MASS INDEX: 26.93 KG/M2 | SYSTOLIC BLOOD PRESSURE: 124 MMHG

## 2023-07-18 DIAGNOSIS — Z00.00 MEDICARE ANNUAL WELLNESS VISIT, SUBSEQUENT: Primary | ICD-10-CM

## 2023-07-18 DIAGNOSIS — I71.21 ASCENDING AORTIC ANEURYSM (HCC): ICD-10-CM

## 2023-07-18 DIAGNOSIS — I10 ESSENTIAL HYPERTENSION: ICD-10-CM

## 2023-07-18 DIAGNOSIS — E87.6 HYPOKALEMIA: ICD-10-CM

## 2023-07-18 PROCEDURE — G0439 PPPS, SUBSEQ VISIT: HCPCS | Performed by: FAMILY MEDICINE

## 2023-07-18 NOTE — PROGRESS NOTES
Assessment and Plan:     Problem List Items Addressed This Visit        Cardiovascular and Mediastinum    Ascending aortic aneurysm [facility])   Other Visit Diagnoses     Essential hypertension               Preventive health issues were discussed with patient, and age appropriate screening tests were ordered as noted in patient's After Visit Summary. Personalized health advice and appropriate referrals for health education or preventive services given if needed, as noted in patient's After Visit Summary.      History of Present Illness:     Patient presents for a Medicare Wellness Visit    HPI   Patient Care Team:  Prasanth Barrios DO as PCP - General  MD Alondra Mary PA-C Jerilee Huron, MD as Endoscopist     Review of Systems:     Review of Systems     Problem List:     Patient Active Problem List   Diagnosis   • Tobacco abuse   • Obstructive sleep apnea treated with continuous positive airway pressure (CPAP)   • Hypertension   • Hyperlipidemia   • Angioedema, Likely ACE inhibitor induced   • GERD (gastroesophageal reflux disease)   • Cough   • Ascending aortic aneurysm (HCC)   • Hyperglycemia   • Chronic obstructive pulmonary disease ([address])   • Pre-diabetes   • Diabetes mellitus due to underlying condition with diabetic autonomic neuropathy, without long-term current use of insulin ([address])   • Thyroid nodule   • Right wrist pain   • Hypokalemia   • Stage 3 chronic kidney disease, unspecified whether stage 3a or 3b CKD ([address])   • Dry skin   • Slow transit constipation   • Mild traumatic brain injury, without loss of consciousness, initial encounter [facility])   • Chest pain due to myocardial ischemia      Past Medical and Surgical History:     Past Medical History:   Diagnosis Date   • Abnormal CAT scan    • Bilateral inguinal hernia 1990    recurrent   • Colitis    • Gastritis 12/11/2012   • Hypertension    • Impaired fasting glucose 7/9/2019   • Nausea & vomiting    • Right knee meniscal tear 12/20/2017   • Sleep apnea     uses cpap     Past Surgical History:   Procedure Laterality Date   • CHOLECYSTECTOMY     • COLONOSCOPY  2017    ABOUT 2-3 YEARS AGO   • EXPLORATORY LAPAROTOMY  08/16/2014    done for repair of peritoneal "RENT"?   • HEMORRHOID SURGERY     • INGUINAL HERNIA REPAIR Bilateral 08/14/2014   • WI ESOPHAGOGASTRODUODENOSCOPY TRANSORAL DIAGNOSTIC N/A 2/23/2017    Procedure: EGD AND COLONOSCOPY;  Surgeon: Toby Egan MD;  Location:  GI LAB; Service: Gastroenterology   • US GUIDED THYROID BIOPSY  3/15/2021      Family History:     Family History   Problem Relation Age of Onset   • Breast cancer Mother    • Heart disease Father    • Heart disease Brother    • Heart disease Family       Social History:     Social History     Socioeconomic History   • Marital status: /Civil Union     Spouse name: None   • Number of children: None   • Years of education: None   • Highest education level: None   Occupational History   • None   Tobacco Use   • Smoking status: Former     Packs/day: 0.25     Years: 40.00     Total pack years: 10.00     Types: Cigarettes     Quit date: 12/2019     Years since quitting: 3.6   • Smokeless tobacco: Never   Vaping Use   • Vaping Use: Never used   Substance and Sexual Activity   • Alcohol use: Yes     Comment: social   • Drug use: No   • Sexual activity: Not Currently     Partners: Female   Other Topics Concern   • None   Social History Narrative   • None     Social Determinants of Health     Financial Resource Strain: Low Risk  (7/18/2023)    Overall Financial Resource Strain (CARDIA)    • Difficulty of Paying Living Expenses: Not hard at all   Food Insecurity: Not on file   Transportation Needs: No Transportation Needs (7/18/2023)    PRAPARE - Transportation    • Lack of Transportation (Medical): No    • Lack of Transportation (Non-Medical):  No   Physical Activity: Not on file   Stress: Not on file   Social Connections: Not on file   Intimate Partner Violence: Not on file   Housing Stability: Not on file      Medications and Allergies:     Current Outpatient Medications   Medication Sig Dispense Refill   • Alphagan P 0.1 % INSTILL 1 DROP INTO EACH EYE TWICE DAILY     • amLODIPine (NORVASC) 10 mg tablet Take 1 tablet (10 mg total) by mouth daily 90 tablet 1   • aspirin (ECOTRIN LOW STRENGTH) 81 mg EC tablet Take 81 mg by mouth daily     • Blood Glucose Monitoring Suppl (ONE TOUCH ULTRA 2) w/Device KIT CHECK BLOOD SUGARS DAILY     • cholecalciferol (VITAMIN D3) 400 units tablet Take 400 Units by mouth daily     • hydrochlorothiazide (HYDRODIURIL) 12.5 mg tablet Take 1 tablet (12.5 mg total) by mouth daily 90 tablet 1   • Lancets (OneTouch Delica Plus [id number]) MISC daily Check blood sugar     • metFORMIN (GLUCOPHAGE) 500 mg tablet TAKE 1 TABLET BY MOUTH TWICE DAILY WITH MEALS 180 tablet 0   • metoprolol succinate (TOPROL-XL) 25 mg 24 hr tablet Take 1 tablet (25 mg total) by mouth daily 90 tablet 1   • mometasone (ELOCON) 0.1 % lotion Apply topically daily 60 mL 2   • OneTouch Ultra test strip USE STRIP TO CHECK GLUCOSE ONCE DAILY     • pantoprazole (PROTONIX) 40 mg tablet Take 1 tablet (40 mg total) by mouth daily 90 tablet 1   • potassium chloride (MICRO-K) 10 MEQ CR capsule Take 1 capsule (10 mEq total) by mouth daily 90 capsule 1   • prednisoLONE acetate (PRED FORTE) 1 % ophthalmic suspension      • rosuvastatin (CRESTOR) 5 mg tablet Take 1 tablet (5 mg total) by mouth daily 90 tablet 3     No current facility-administered medications for this visit.      Allergies   Allergen Reactions   • Ace Inhibitors Angioedema      Immunizations:     Immunization History   Administered Date(s) Administered   • COVID-19 MODERNA VACC 0.5 ML IM 01/26/2021, 02/22/2021, 10/27/2021   • IPV 01/06/2005   • Influenza Split High Dose Preservative Free IM 10/21/2016   • Influenza, high dose seasonal 0.7 mL 12/02/2021, 10/17/2022   • Influenza, injectable, quadrivalent, preservative free 0.5 mL 10/13/2020   • Influenza, seasonal, injectable 10/13/2003   • Pneumococcal Conjugate 13-Valent 06/11/2019   • Pneumococcal Polysaccharide PPV23 09/11/2020   • Td (adult), adsorbed 01/06/2005, 09/26/2017   • Tdap 04/03/2021      Health Maintenance:         Topic Date Due   • Colorectal Cancer Screening  06/01/2027   • Hepatitis C Screening  Completed         Topic Date Due   • COVID-19 Vaccine (4 - Moderna series) 12/22/2021   • Influenza Vaccine (1) 09/01/2023      Medicare Screening Tests and Risk Assessments:     Pearl Taylor is here for his Subsequent Wellness visit. Health Risk Assessment:   Patient rates overall health as fair. Patient feels that their physical health rating is same. Patient is satisfied with their life. Eyesight was rated as same. Hearing was rated as same. Patient feels that their emotional and mental health rating is same. Patients states they are never, rarely angry. Patient states they are sometimes unusually tired/fatigued. Pain experienced in the last 7 days has been some. Patient states that he has experienced no weight loss or gain in last 6 months. Depression Screening:   PHQ-2 Score: 0      Fall Risk Screening: In the past year, patient has experienced: no history of falling in past year      Home Safety:  Patient does not have trouble with stairs inside or outside of their home. Patient has working smoke alarms and has working carbon monoxide detector. Home safety hazards include: none. Nutrition:   Current diet is Regular and Low Carb. Medications:   Patient is currently taking over-the-counter supplements. OTC medications include: see medication list. Patient is able to manage medications. Activities of Daily Living (ADLs)/Instrumental Activities of Daily Living (IADLs):   Walk and transfer into and out of bed and chair?: Yes  Dress and groom yourself?: Yes    Bathe or shower yourself?: Yes    Feed yourself?  Yes  Do your laundry/housekeeping?: Yes  Manage your money, pay your bills and track your expenses?: Yes  Make your own meals?: Yes    Do your own shopping?: Yes    Previous Hospitalizations:   Any hospitalizations or ED visits within the last 12 months?: No      Advance Care Planning:   Living will: No    Durable POA for healthcare: No    Advanced directive: No      Cognitive Screening:   Provider or family/friend/caregiver concerned regarding cognition?: No    PREVENTIVE SCREENINGS      Cardiovascular Screening:    General: Screening Not Indicated, History Lipid Disorder, Risks and Benefits Discussed and Screening Current      Diabetes Screening:     General: Screening Not Indicated, History Diabetes, Risks and Benefits Discussed and Screening Current      Colorectal Cancer Screening:     General: Screening Current      Prostate Cancer Screening:    General: Risks and Benefits Discussed and Screening Current      Osteoporosis Screening:    General: Risks and Benefits Discussed and Screening Current      Abdominal Aortic Aneurysm (AAA) Screening:    Risk factors include: age between 70-77 yo and tobacco use        General: Risks and Benefits Discussed      Lung Cancer Screening:     General: Screening Not Indicated, Risks and Benefits Discussed and Screening Current      Hepatitis C Screening:    General: Screening Current and Risks and Benefits Discussed    Other Counseling Topics:   Regular weightbearing exercise. No results found.      Physical Exam:     /72 (BP Location: Right arm, Patient Position: Sitting, Cuff Size: Large)   Pulse 75   Temp 97.6 °F (36.4 °C) (Temporal)   Ht 6' (1.829 m)   Wt 90.2 kg (198 lb 12.8 oz)   SpO2 98%   BMI 26.96 kg/m²     Physical Exam     Katharina Becerra DO

## 2023-07-18 NOTE — PATIENT INSTRUCTIONS
Medicare Preventive Visit Patient Instructions  Thank you for completing your Welcome to Medicare Visit or Medicare Annual Wellness Visit today. Your next wellness visit will be due in one year (7/18/2024). The screening/preventive services that you may require over the next 5-10 years are detailed below. Some tests may not apply to you based off risk factors and/or age. Screening tests ordered at today's visit but not completed yet may show as past due. Also, please note that scanned in results may not display below. Preventive Screenings:  Service Recommendations Previous Testing/Comments   Colorectal Cancer Screening  · Colonoscopy    · Fecal Occult Blood Test (FOBT)/Fecal Immunochemical Test (FIT)  · Fecal DNA/Cologuard Test  · Flexible Sigmoidoscopy Age: 43-73 years old   Colonoscopy: every 10 years (May be performed more frequently if at higher risk)  OR  FOBT/FIT: every 1 year  OR  Cologuard: every 3 years  OR  Sigmoidoscopy: every 5 years  Screening may be recommended earlier than age 39 if at higher risk for colorectal cancer. Also, an individualized decision between you and your healthcare provider will decide whether screening between the ages of 77-80 would be appropriate.  Colonoscopy: 06/02/2022  FOBT/FIT: Not on file  Cologuard: Not on file  Sigmoidoscopy: Not on file    Screening Current     Prostate Cancer Screening Individualized decision between patient and health care provider in men between ages of 53-66   Medicare will cover every 12 months beginning on the day after your 50th birthday PSA: 2.4 ng/mL           Hepatitis C Screening Once for adults born between 1945 and 1965  More frequently in patients at high risk for Hepatitis C Hep C Antibody: 02/06/2019    Screening Current   Diabetes Screening 1-2 times per year if you're at risk for diabetes or have pre-diabetes Fasting glucose: 150 mg/dL (7/11/2023)  A1C: 6.9 (4/20/2023)  Screening Not Indicated  History Diabetes   Cholesterol Screening Once every 5 years if you don't have a lipid disorder. May order more often based on risk factors. Lipid panel: 01/16/2023  Screening Not Indicated  History Lipid Disorder      Other Preventive Screenings Covered by Medicare:  1. Abdominal Aortic Aneurysm (AAA) Screening: covered once if your at risk. You're considered to be at risk if you have a family history of AAA or a male between the age of 70-76 who smoking at least 100 cigarettes in your lifetime. 2. Lung Cancer Screening: covers low dose CT scan once per year if you meet all of the following conditions: (1) Age 48-67; (2) No signs or symptoms of lung cancer; (3) Current smoker or have quit smoking within the last 15 years; (4) You have a tobacco smoking history of at least 20 pack years (packs per day x number of years you smoked); (5) You get a written order from a healthcare provider. 3. Glaucoma Screening: covered annually if you're considered high risk: (1) You have diabetes OR (2) Family history of glaucoma OR (3)  aged 48 and older OR (3)  American aged 72 and older  3. Osteoporosis Screening: covered every 2 years if you meet one of the following conditions: (1) Have a vertebral abnormality; (2) On glucocorticoid therapy for more than 3 months; (3) Have primary hyperparathyroidism; (4) On osteoporosis medications and need to assess response to drug therapy. 5. HIV Screening: covered annually if you're between the age of 14-79. Also covered annually if you are younger than 13 and older than 72 with risk factors for HIV infection. For pregnant patients, it is covered up to 3 times per pregnancy.     Immunizations:  Immunization Recommendations   Influenza Vaccine Annual influenza vaccination during flu season is recommended for all persons aged >= 6 months who do not have contraindications   Pneumococcal Vaccine   * Pneumococcal conjugate vaccine = PCV13 (Prevnar 13), PCV15 (Vaxneuvance), PCV20 (Prevnar 20)  * Pneumococcal polysaccharide vaccine = PPSV23 (Pneumovax) Adults 2364 years old: 1-3 doses may be recommended based on certain risk factors  Adults 72 years old: 1-2 doses may be recommended based off what pneumonia vaccine you previously received   Hepatitis B Vaccine 3 dose series if at intermediate or high risk (ex: diabetes, end stage renal disease, liver disease)   Tetanus (Td) Vaccine - COST NOT COVERED BY MEDICARE PART B Following completion of primary series, a booster dose should be given every 10 years to maintain immunity against tetanus. Td may also be given as tetanus wound prophylaxis. Tdap Vaccine - COST NOT COVERED BY MEDICARE PART B Recommended at least once for all adults. For pregnant patients, recommended with each pregnancy. Shingles Vaccine (Shingrix) - COST NOT COVERED BY MEDICARE PART B  2 shot series recommended in those aged 48 and above     Health Maintenance Due:      Topic Date Due   • Colorectal Cancer Screening  06/01/2027   • Hepatitis C Screening  Completed     Immunizations Due:      Topic Date Due   • COVID-19 Vaccine (4 - Moderna series) 12/22/2021   • Influenza Vaccine (1) 09/01/2023     Advance Directives   What are advance directives? Advance directives are legal documents that state your wishes and plans for medical care. These plans are made ahead of time in case you lose your ability to make decisions for yourself. Advance directives can apply to any medical decision, such as the treatments you want, and if you want to donate organs. What are the types of advance directives? There are many types of advance directives, and each state has rules about how to use them. You may choose a combination of any of the following:  · Living will: This is a written record of the treatment you want. You can also choose which treatments you do not want, which to limit, and which to stop at a certain time. This includes surgery, medicine, IV fluid, and tube feedings.    · Durable power of  for [facility]): This is a written record that states who you want to make healthcare choices for you when you are unable to make them for yourself. This person, called a proxy, is usually a family member or a friend. You may choose more than 1 proxy. · Do not resuscitate (DNR) order:  A DNR order is used in case your heart stops beating or you stop breathing. It is a request not to have certain forms of treatment, such as CPR. A DNR order may be included in other types of advance directives. · Medical directive: This covers the care that you want if you are in a coma, near death, or unable to make decisions for yourself. You can list the treatments you want for each condition. Treatment may include pain medicine, surgery, blood transfusions, dialysis, IV or tube feedings, and a ventilator (breathing machine). · Values history: This document has questions about your views, beliefs, and how you feel and think about life. This information can help others choose the care that you would choose. Why are advance directives important? An advance directive helps you control your care. Although spoken wishes may be used, it is better to have your wishes written down. Spoken wishes can be misunderstood, or not followed. Treatments may be given even if you do not want them. An advance directive may make it easier for your family to make difficult choices about your care. Weight Management   Why it is important to manage your weight:  Being overweight increases your risk of health conditions such as heart disease, high blood pressure, type 2 diabetes, and certain types of cancer. It can also increase your risk for osteoarthritis, sleep apnea, and other respiratory problems. Aim for a slow, steady weight loss. Even a small amount of weight loss can lower your risk of health problems.   How to lose weight safely:  A safe and healthy way to lose weight is to eat fewer calories and get regular exercise. You can lose up about 1 pound a week by decreasing the number of calories you eat by 500 calories each day. Healthy meal plan for weight management:  A healthy meal plan includes a variety of foods, contains fewer calories, and helps you stay healthy. A healthy meal plan includes the following:  · Eat whole-grain foods more often. A healthy meal plan should contain fiber. Fiber is the part of grains, fruits, and vegetables that is not broken down by your body. Whole-grain foods are healthy and provide extra fiber in your diet. Some examples of whole-grain foods are whole-wheat breads and pastas, oatmeal, brown rice, and bulgur. · Eat a variety of vegetables every day. Include dark, leafy greens such as spinach, kale, khushi greens, and mustard greens. Eat yellow and orange vegetables such as carrots, sweet potatoes, and winter squash. · Eat a variety of fruits every day. Choose fresh or canned fruit (canned in its own juice or light syrup) instead of juice. Fruit juice has very little or no fiber. · Eat low-fat dairy foods. Drink fat-free (skim) milk or 1% milk. Eat fat-free yogurt and low-fat cottage cheese. Try low-fat cheeses such as mozzarella and other reduced-fat cheeses. · Choose meat and other protein foods that are low in fat. Choose beans or other legumes such as split peas or lentils. Choose fish, skinless poultry (chicken or turkey), or lean cuts of red meat (beef or pork). Before you cook meat or poultry, cut off any visible fat. · Use less fat and oil. Try baking foods instead of frying them. Add less fat, such as margarine, sour cream, regular salad dressing and mayonnaise to foods. Eat fewer high-fat foods. Some examples of high-fat foods include french fries, doughnuts, ice cream, and cakes. · Eat fewer sweets. Limit foods and drinks that are high in sugar. This includes candy, cookies, regular soda, and sweetened drinks.   Exercise:  Exercise at least 30 minutes per day on most days of the week. Some examples of exercise include walking, biking, dancing, and swimming. You can also fit in more physical activity by taking the stairs instead of the elevator or parking farther away from stores. Ask your healthcare provider about the best exercise plan for you. © Copyright [employer] 2018 Information is for End User's use only and may not be sold, redistributed or otherwise used for commercial purposes.  All illustrations and images included in CareNotes® are the copyrighted property of [employer] or  [place] [address]

## 2023-07-19 RX ORDER — POTASSIUM CHLORIDE 20 MEQ/1
20 TABLET, EXTENDED RELEASE ORAL DAILY
Qty: 90 TABLET | Refills: 1 | Status: SHIPPED | OUTPATIENT
Start: 2023-07-19

## 2023-08-24 ENCOUNTER — OFFICE VISIT (OUTPATIENT)
Dept: CARDIOLOGY CLINIC | Facility: CLINIC | Age: 73
End: 2023-08-24
Payer: COMMERCIAL

## 2023-08-24 VITALS
DIASTOLIC BLOOD PRESSURE: 50 MMHG | HEIGHT: 72 IN | HEART RATE: 78 BPM | WEIGHT: 195 LBS | OXYGEN SATURATION: 95 % | SYSTOLIC BLOOD PRESSURE: 122 MMHG | BODY MASS INDEX: 26.41 KG/M2

## 2023-08-24 DIAGNOSIS — I10 PRIMARY HYPERTENSION: ICD-10-CM

## 2023-08-24 DIAGNOSIS — I25.10 CORONARY ARTERY DISEASE INVOLVING NATIVE CORONARY ARTERY OF NATIVE HEART WITHOUT ANGINA PECTORIS: ICD-10-CM

## 2023-08-24 DIAGNOSIS — E78.2 MIXED HYPERLIPIDEMIA: ICD-10-CM

## 2023-08-24 DIAGNOSIS — I71.21 ANEURYSM OF ASCENDING AORTA WITHOUT RUPTURE (HCC): ICD-10-CM

## 2023-08-24 DIAGNOSIS — Z72.0 TOBACCO ABUSE: ICD-10-CM

## 2023-08-24 DIAGNOSIS — R94.39 ABNORMAL NUCLEAR STRESS TEST: Primary | ICD-10-CM

## 2023-08-24 PROCEDURE — 99214 OFFICE O/P EST MOD 30 MIN: CPT | Performed by: STUDENT IN AN ORGANIZED HEALTH CARE EDUCATION/TRAINING PROGRAM

## 2023-08-24 RX ORDER — ROSUVASTATIN CALCIUM 10 MG/1
10 TABLET, COATED ORAL DAILY
Qty: 30 TABLET | Refills: 6 | Status: SHIPPED | OUTPATIENT
Start: 2023-08-24

## 2023-08-24 NOTE — PROGRESS NOTES
General Cardiology - Outpatient Progress Note   Jose Maria Morales 68 y.o. male   MRN: [id number]  @ Encounter: [id number]    Everardo Weldon DO  Consults      History and subjective  Jose Maria Morales is a 68y.o. year old male with PMH of CAD with basal inferior infarct on nuclear and TTE, HTN, DM2, ascending aortic aneurysm, HLD, sleep apnea on CPAP, active smoker, who is presenting for follow up. Saw the patient for atypical chest pain that occurred intermittently at rest.  But nuclear stress test and echocardiogram were ordered. The echo showed LVEF of 55% with mild LVH and basal inferior hypokinesis. He also had a mildly dilated right atrium aortic valve sclerosis. Exercise nuclear stress test was also abnormal.  The patient was unable to reach target heart rate due to fatigue and leg pain so was switched to pharmacological stress test.  Nuclear imaging showed a possible basal inferior infarction without ischemia. The patient says he has been feeling well without any more chest pain. He denies any exertional symptoms and is able to exercise without chest pain or shortness of breath. The patient has been compliant with medications and has not had any side effects. Potassium was recently increased by PCP. Objective:  Review of Systems  Review of system was conducted and was negative except for as stated in the interval history      Physical Exam:  Vitals: Blood pressure 122/50, pulse 78, height 6' (1.829 m), weight 88.5 kg (195 lb), SpO2 95 %. , Body mass index is 26.45 kg/m².     GEN: Jose Maria Morales appears well, alert and oriented x 3, pleasant and cooperative   HEENT:  Normocephalic, atraumatic, anicteric, moist mucous membranes  NECK:  No JVD or carotid bruits   HEART: reg rhythm, reg rate, normal S1 and S2, no murmurs, clicks, gallops or rubs   LUNGS: Clear to auscultation bilaterally; no wheezes, rales, or rhonchi; respiration nonlabored   ABDOMEN:  Normoactive bowel sounds, soft, no tenderness, no distention  EXTREMITIES:  no edema  NEURO: no gross focal findings; cranial nerves grossly intact   SKIN:  Dry, intact, warm to touch    Home Medications: (Not in a hospital admission)      Current Outpatient Medications:   •  Alphagan P 0.1 %, INSTILL 1 DROP INTO EACH EYE TWICE DAILY, Disp: , Rfl:   •  amLODIPine (NORVASC) 10 mg tablet, Take 1 tablet (10 mg total) by mouth daily, Disp: 90 tablet, Rfl: 1  •  aspirin (ECOTRIN LOW STRENGTH) 81 mg EC tablet, Take 81 mg by mouth daily, Disp: , Rfl:   •  Blood Glucose Monitoring Suppl (ONE TOUCH ULTRA 2) w/Device KIT, CHECK BLOOD SUGARS DAILY, Disp: , Rfl:   •  cholecalciferol (VITAMIN D3) 400 units tablet, Take 400 Units by mouth daily, Disp: , Rfl:   •  hydrochlorothiazide (HYDRODIURIL) 12.5 mg tablet, Take 1 tablet (12.5 mg total) by mouth daily, Disp: 90 tablet, Rfl: 1  •  Lancets (OneTouch Delica Plus [id number]) MISC, daily Check blood sugar, Disp: , Rfl:   •  metFORMIN (GLUCOPHAGE) 500 mg tablet, TAKE 1 TABLET BY MOUTH TWICE DAILY WITH MEALS, Disp: 180 tablet, Rfl: 0  •  metoprolol succinate (TOPROL-XL) 25 mg 24 hr tablet, Take 1 tablet (25 mg total) by mouth daily, Disp: 90 tablet, Rfl: 1  •  mometasone (ELOCON) 0.1 % lotion, Apply topically daily, Disp: 60 mL, Rfl: 2  •  OneTouch Ultra test strip, USE STRIP TO CHECK GLUCOSE ONCE DAILY, Disp: , Rfl:   •  pantoprazole (PROTONIX) 40 mg tablet, Take 1 tablet (40 mg total) by mouth daily, Disp: 90 tablet, Rfl: 1  •  potassium chloride (K-DUR,KLOR-CON) 20 mEq tablet, Take 1 tablet (20 mEq total) by mouth daily, Disp: 90 tablet, Rfl: 1  •  prednisoLONE acetate (PRED FORTE) 1 % ophthalmic suspension, , Disp: , Rfl:   •  rosuvastatin (CRESTOR) 5 mg tablet, Take 1 tablet (5 mg total) by mouth daily, Disp: 90 tablet, Rfl: 3  •  potassium chloride (MICRO-K) 10 MEQ CR capsule, Take 1 capsule (10 mEq total) by mouth daily (Patient not taking: Reported on 8/24/2023), Disp: 90 capsule, Rfl: 1    Labs & Results:  No results found for: "HSTNI0", "HSTNI2", "HSTNI4", "HSTNI"  No results found for: "NTBNP"  Lab Results   Component Value Date    CHOL 181 05/27/2015    TRIG 155 (H) 01/16/2023    HDL 37 (L) 01/16/2023    LDLCALC 63 01/16/2023     Lab Results   Component Value Date     05/27/2015    K 3.3 (L) 07/11/2023    CO2 27 07/11/2023     07/11/2023    BUN 14 07/11/2023    CREATININE 1.02 07/11/2023    ALT 14 07/11/2023    AST 12 (L) 07/11/2023     Lab Results   Component Value Date    WBC 6.49 07/11/2023    HGB 15.0 07/11/2023    HCT 46.0 07/11/2023    MCV 87 07/11/2023     07/11/2023     Lab Results   Component Value Date    INR 1.04 08/17/2014    INR 0.93 08/16/2014       Nuclear stress test 6/1/23  Interpretation Summary       •  Resting ECG: ECG is abnormal. The ECG shows normal sinus rhythm. Resting ECG shows no ST-segment deviation. There are nonspecific T changes in lead 3. The ECG shows rare premature atrial contractions. •  A Goldy protocol stress test was performed. In addition to exercise, the patient was administered regadenoson. The patient exercised for 3 min and 55 sec and had a maximal HR of 104 bpm (70 % of MPHR) and 6.8 METS. Pt was changed to pharmacologic study due to leg fatigue, blunted heart rate responce and fatigue. The patient experienced chest pressure during stress test.  •  Stress ECG: No ST deviation is noted. Arrhythmias during stress: occasional PACs, rare PVCs. Arrhythmias during recovery: rare PVCs. The ECG was not diagnostic due to pharmacological (vasodilator) stress. •  Perfusion: There is a left ventricular perfusion defect that is medium in size with moderate reduction in uptake present in the basal to mid inferior location(s) that is predominantly fixed. There may be a small degree of diaphragmatic attenuation. •  Stress Function: Left ventricular function post-stress is normal. Stress ejection fraction is 49 %. There is a defect in the mid to apical inferior location(s).  The defect has mildly reduced function. •  Stress Combined Conclusion: Left ventricular perfusion is abnormal. Findings are suggestive of a possible basal inferior infarction. No ischemia identified.     Impression:  Patient was switched from exercise to pharmacologic stress test due to fatigue and leg pain. Findings consistent with medium size infarction in basal-mid inferior walls. No evidence of inducible ischemia.         TTE 6/1/23  •  Left Ventricle: Left ventricular cavity size is normal. Wall thickness is mildly increased. There is mild concentric hypertrophy. The left ventricular ejection fraction is 55%. Systolic function is normal. Global longitudinal strain is normal at -17%. Diastolic function is normal for age. •  The following segments are hypokinetic: basal inferior. •  All other segments are normal.  •  IVS: There is abnormal septal motion of unclear etiology. •  Right Ventricle: Right ventricular cavity size is normal. Systolic function is normal.  •  Right Atrium: The atrium is mildly dilated. •  Aortic Valve: There is aortic valve sclerosis. •  Mitral Valve: There is mild annular calcification. •  Aorta: The aortic root is mildly dilated. The ascending aorta is mildly dilated. The aortic root is 3.70 cm. The ascending aorta is 4 cm. Imaging: I have personally reviewed pertinent reports. Assessment/Plan     Tobias Sims is a 68y.o. year old male with PMH of CAD with basal inferior infarct on nuclear and TTE, HTN, DM2, ascending aortic aneurysm, HLD, sleep apnea on CPAP, active smoker, who is presenting for follow up.      #CAD with suspected basal inferior infarct  Patient has a history of coronary artery calcifications on CT and is a longtime smoker. Patient had a nuclear stress test and echo ordered for intermittent chest pain which were both suggestive of inferior basal infarct.   Since last visit the patient has not had any exertional or rest chest pain and symptoms have resolved. The patient denies any angina he states he does not have any significant exertional symptoms however he did only complete 4 minutes of Goldy protocol. Discussed the options with this patient and at this time we will medically manage CAD. I did encourage him to exercise more and if he develops angina or claudication to let me know. If patient has worsening symptoms refractory to medical management will proceed with left heart catheterization.  -Increase rosuvastatin to 10 mg as below  -Continue aspirin 81 mg  -Continue metoprolol 25 mg daily for antianginal effect. #HTN  /50. Continue current regimen. HCTZ 12.5mg, metoprolol 25mg, amlodipine 10mg. potassium 20 meq recently added for hypokalemia.      #ascending aortic aneurysm  4.1cm which was unchanged in 2021. Follows with Dr. Tania Hester. Next CT planned for 12/2023. -BP at goal as above.   -still smoking-- counseling done.      #DM2  a1c 6.9%. on metformin.      #HLD:  , , HDL 37, LDL 63. Controlled on currently therapy. Given CAD as above will increase crestor to 10mg. Increase rosuvastatin to 10mg daily. #active smoker  Smoking about 1 pack of cigarettes per week. Quitting has been difficult. Extensive smoking counseling done and discussed risks in detail. Patient seems motivated to quit. Hand out for smoking cessation program provided.      Case discussed and reviewed with Dr. Herb Mckay who agrees with my assessment and plan.       Pushpa Rock MD  Cardiology Fellow   PGY-5    ==========================================================================================

## 2023-09-29 DIAGNOSIS — E08.43 DIABETES MELLITUS DUE TO UNDERLYING CONDITION WITH DIABETIC AUTONOMIC NEUROPATHY, WITHOUT LONG-TERM CURRENT USE OF INSULIN (HCC): ICD-10-CM

## 2023-10-10 ENCOUNTER — OFFICE VISIT (OUTPATIENT)
Dept: SLEEP CENTER | Facility: CLINIC | Age: 73
End: 2023-10-10
Payer: COMMERCIAL

## 2023-10-10 VITALS
SYSTOLIC BLOOD PRESSURE: 132 MMHG | OXYGEN SATURATION: 98 % | HEART RATE: 72 BPM | HEIGHT: 72 IN | DIASTOLIC BLOOD PRESSURE: 90 MMHG | RESPIRATION RATE: 18 BRPM | BODY MASS INDEX: 26.55 KG/M2 | WEIGHT: 196 LBS

## 2023-10-10 DIAGNOSIS — R06.83 SNORING: ICD-10-CM

## 2023-10-10 DIAGNOSIS — G47.33 OSA (OBSTRUCTIVE SLEEP APNEA): Primary | ICD-10-CM

## 2023-10-10 DIAGNOSIS — G47.19 EXCESSIVE DAYTIME SLEEPINESS: ICD-10-CM

## 2023-10-10 PROCEDURE — 99213 OFFICE O/P EST LOW 20 MIN: CPT | Performed by: INTERNAL MEDICINE

## 2023-10-10 NOTE — PROGRESS NOTES
Progress Note - Sleep Medicine  Aisha Oh Dayoub 68 y.o. male MRN: [id number]       Impression & Plan:       1. Moderate BRAVO  Sleep studies:  AHI 16.3, REM AHI 49.1    Compliance Data:   D[employer] fixed pressure of 9  More than 4 hours 96.7%  Average use of 6-1/2 hours  Average time in large leak 24 seconds  Average AHI 0.7    Sulligent score 5  Patient is doing well  I discussed recent recall of his machine, he does not remember if he registered for the recall  I discussed the risks and benefits of continuing with CPAP therapy on current machine  His machine is from 2020, I will order a new machine in 2025    Plan   Ordered CPAP supplies  Follow up in 1 year    2. Snoring  As above    3. Excessive daytime sleepiness  Improved    4. Overweight  Counseled patient on lifestyle modifications including diet and exercise  BMI 26.58    ______________________________________________________________________    HPI:    Darby Beavers is a pleasant 36-year-old male with past medical history of type 2 diabetes, hypertension who presents for follow-up of moderate obstructive sleep apnea. He is on CPAP with a fixed pressure of 9 cm H2O. He states he is doing well. He notes improvement in all symptoms. His Sulligent score is 5. He has excellent compliance. Patient states he is doing well overall. He has frequent nighttime awakenings to urinate. Review of Systems:  Review of Systems   All other systems reviewed and are negative.         Social history updates:  Social History     Tobacco Use   Smoking Status Former   • Packs/day: 0.25   • Years: 40.00   • Total pack years: 10.00   • Types: Cigarettes   • Quit date: 12/2019   • Years since quitting: 3.8   Smokeless Tobacco Never     Social History     Socioeconomic History   • Marital status: /Civil Union     Spouse name: Not on file   • Number of children: Not on file   • Years of education: Not on file   • Highest education level: Not on file   Occupational History   • Not on file   Tobacco Use   • Smoking status: Former     Packs/day: 0.25     Years: 40.00     Total pack years: 10.00     Types: Cigarettes     Quit date: 12/2019     Years since quitting: 3.8   • Smokeless tobacco: Never   Vaping Use   • Vaping Use: Never used   Substance and Sexual Activity   • Alcohol use: Yes     Comment: social   • Drug use: No   • Sexual activity: Not Currently     Partners: Female   Other Topics Concern   • Not on file   Social History Narrative   • Not on file     Social Determinants of Health     Financial Resource Strain: Low Risk  (7/18/2023)    Overall Financial Resource Strain (CARDIA)    • Difficulty of Paying Living Expenses: Not hard at all   Food Insecurity: Not on file   Transportation Needs: No Transportation Needs (7/18/2023)    PRAPARE - Transportation    • Lack of Transportation (Medical): No    • Lack of Transportation (Non-Medical): No   Physical Activity: Not on file   Stress: Not on file   Social Connections: Not on file   Intimate Partner Violence: Not on file   Housing Stability: Not on file       PhysicalExamination:  Vitals:   /90   Pulse 72   Resp 18   Ht 6' (1.829 m)   Wt 88.9 kg (196 lb)   SpO2 98%   BMI 26.58 kg/m²     Physical Exam  General:  Awake alert and oriented x 3, conversant without conversational dyspnea, NAD, normal affect  HEENT:  PERRL, Sclera noninjected, nonicteric OU, Nares patent,  no craniofacial abnormalities, Mucous membranes, moist, no oral lesions, normal dentitiion  NECK:  Trachea midline, no accessory muscle use, no stridor, no cervical or supraclavicular adenopathy, JVP not elevated  CARDIAC: Reg, single s1/S2, no m/r/g  PULM: CTA bilaterally no wheezing, rhonchi or rales  EXT: No cyanosis, no clubbing, no edema, normal capillary refill  NEURO: no focal neurologic deficits, AAOx3, moving all extremities appropriately     Diagnostic Data:  Labs:   I personally reviewed the most recent laboratory data pertinent to today's visit  Office Visit on 06/29/2023   Component Date Value   • Sodium 07/11/2023 136    • Potassium 07/11/2023 3.3 (L)    • Chloride 07/11/2023 101    • CO2 07/11/2023 27    • ANION GAP 07/11/2023 8    • BUN 07/11/2023 14    • Creatinine 07/11/2023 1.02    • Glucose, Fasting 07/11/2023 150 (H)    • Calcium 07/11/2023 9.2    • AST 07/11/2023 12 (L)    • ALT 07/11/2023 14    • Alkaline Phosphatase 07/11/2023 53    • Total Protein 07/11/2023 6.7    • Albumin 07/11/2023 4.2    • Total Bilirubin 07/11/2023 0.57    • eGFR 07/11/2023 73    • WBC 07/11/2023 6.49    • RBC 07/11/2023 5.30    • Hemoglobin 07/11/2023 15.0    • Hematocrit 07/11/2023 46.0    • MCV 07/11/2023 87    • MCH 07/11/2023 28.3    • MCHC 07/11/2023 32.6    • RDW 07/11/2023 13.5    • MPV 07/11/2023 10.6    • Platelets 93/62/6072 173    • nRBC 07/11/2023 0    • Neutrophils Relative 07/11/2023 60    • Immat GRANS % 07/11/2023 0    • Lymphocytes Relative 07/11/2023 27    • Monocytes Relative 07/11/2023 7    • Eosinophils Relative 07/11/2023 5    • Basophils Relative 07/11/2023 1    • Neutrophils Absolute 07/11/2023 3.95    • Immature Grans Absolute 07/11/2023 0.01    • Lymphocytes Absolute 07/11/2023 1.72    • Monocytes Absolute 07/11/2023 0.46    • Eosinophils Absolute 07/11/2023 0.29    • Basophils Absolute 07/11/2023 0.06    Hospital Outpatient Visit on 06/01/2023   Component Date Value   • Baseline HR 06/01/2023 72    • Baseline BP 06/01/2023 152/80    • O2 sat rest 06/01/2023 97    • Stress peak HR 06/01/2023 90    • Post peak BP 06/01/2023 204    • Rate Pressure Product 06/01/2023 18,360.0    • O2 sat peak 06/01/2023 98    • Recovery HR 06/01/2023 86    • Recovery BP 06/01/2023 140/80    • O2 sat recovery 06/01/2023 98    • Max HR 06/01/2023 104    • Max HR Percent 06/01/2023 70    • Exercise duration (min) 06/01/2023 3    • Exercise duration (sec) 06/01/2023 55    • Estimated workload 06/01/2023 6.8    • Angina Index 06/01/2023 1    • Rest Nuclear Isotope Dose 06/01/2023 10.36    • Stress Nuclear Isotope D* 06/01/2023 31.10    • Stress/rest perfusion ra* 06/01/2023 1.11    • EF (%) 06/01/2023 49    • Protocol Name 06/01/2023 MATT    • Time In Exercise Phase 06/01/2023 00:03:55    • MAX. SYSTOLIC BP 78/74/0406 375    • Max Diastolic Bp 56/64/7597 86    • Max Heart Rate 06/01/2023 104    • Max Predicted Heart Rate 06/01/2023 148    • Reason for Termination 06/01/2023 TEST COMPLETE. ..    • Test Indication 06/01/2023 CHEST PAIN    • Target Hr Formular 06/01/2023 (220 - Age)*100%    • Chest Pain Statement 06/01/2023 limiting    Hospital Outpatient Visit on 06/01/2023   Component Date Value   • LA size 06/01/2023 3.6    • Triscuspid Valve Regurgi* 06/01/2023 24.0    • Tricuspid valve peak reg* 06/01/2023 2.46    • LVPWd 06/01/2023 1.10    • Left Atrium Area-systoli* 06/01/2023 11.7    • Left Atrium Area-systoli* 06/01/2023 12.6    • MV E' Tissue Velocity Se* 06/01/2023 7    • Tricuspid annular plane * 06/01/2023 2.40    • TR Peak Darrell 06/01/2023 2.5    • IVSd 06/01/2023 7.32    • LV DIASTOLIC VOLUME (MOD* 14/51/5861 100    • LEFT VENTRICLE SYSTOLIC * 93/65/0759 40    • Left ventricular stroke * 06/01/2023 60.00    • EF 06/01/2023 55    • A4C EF 06/01/2023 54    • LA length (A2C) 06/01/2023 3.90    • LVIDd 06/01/2023 4.70    • IVS 06/01/2023 1.1    • LVIDS 06/01/2023 3.20    • FS 06/01/2023 32    • Asc Ao 06/01/2023 4    • Ao root 06/01/2023 3.70    • RVID d 06/01/2023 4.1    • MV valve area p 1/2 meth* 06/01/2023 3.10    • E wave deceleration time 06/01/2023 246    • MV Peak E Darrell 06/01/2023 63    • MV Peak A Darrell 06/01/2023 5.23    • LV Systolic Volume (BP) 25/96/1025 59    • LV Diastolic Volume (BP) 21/49/7677 132    • FELIPE A4C 06/01/2023 11.8    • RAA A4C 06/01/2023 20.5    • MV stenosis pressure 1/2* 06/01/2023 71    • LVSV, 2D 06/01/2023 60    • LV EF 06/01/2023 55    • GLS 06/01/2023 -17    • Est. RA pres 06/01/2023 3.0    • Right Ventricular Peak S* 06/01/2023 27.00    Orders Only on 05/19/2023   Component Date Value   • Right Eye Diabetic Retin* 05/19/2023 None    • Left Eye Diabetic Retino* 05/19/2023 None    Lab on 04/21/2023   Component Date Value   • Sodium 04/21/2023 143    • Potassium 04/21/2023 3.3 (L)    • Chloride 04/21/2023 103    • CO2 04/21/2023 33 (H)    • ANION GAP 04/21/2023 7    • BUN 04/21/2023 13    • Creatinine 04/21/2023 1.11    • Glucose, Fasting 04/21/2023 163 (H)    • Calcium 04/21/2023 9.8    • AST 04/21/2023 12 (L)    • ALT 04/21/2023 15    • Alkaline Phosphatase 04/21/2023 60    • Total Protein 04/21/2023 7.1    • Albumin 04/21/2023 4.5    • Total Bilirubin 04/21/2023 0.81    • eGFR 04/21/2023 65    • Magnesium 04/21/2023 1.9    Office Visit on 04/20/2023   Component Date Value   • Hemoglobin A1C 04/20/2023 6.9 (A)        I have personally reviewed pertinent lab results.   Lab Results   Component Value Date    WBC 6.49 07/11/2023    HGB 15.0 07/11/2023    HCT 46.0 07/11/2023    MCV 87 07/11/2023     07/11/2023     Lab Results   Component Value Date    GLUCOSE 114 05/27/2015    CALCIUM 9.2 07/11/2023     05/27/2015    K 3.3 (L) 07/11/2023    CO2 27 07/11/2023     07/11/2023    BUN 14 07/11/2023    CREATININE 1.02 07/11/2023     No results found for: "IGE"  Lab Results   Component Value Date    ALT 14 07/11/2023    AST 12 (L) 07/11/2023    ALKPHOS 53 07/11/2023    BILITOT 0.5 05/27/2015     No results found for: "IRON", "TIBC", "FERRITIN"  Lab Results   Component Value Date    [id number] 332 05/27/2015     No results found for: "FOLATE"      Arterial Blood Gas result:  NA    Sleep studies:  AHI 16.3, REM AHI 49.1    Compliance Data:   [employer] fixed pressure of 9  More than 4 hours 96.7%  Average use of 6-1/2 hours  Average time in large leak 24 seconds  Average AHI 0.7                                           Morena Kelley MD  [facility] Sleep Medicine

## 2023-10-11 ENCOUNTER — TELEPHONE (OUTPATIENT)
Dept: SLEEP CENTER | Facility: CLINIC | Age: 73
End: 2023-10-11

## 2023-10-12 LAB
DME PARACHUTE DELIVERY DATE ACTUAL: NORMAL
DME PARACHUTE DELIVERY DATE REQUESTED: NORMAL
DME PARACHUTE ITEM DESCRIPTION: NORMAL
DME PARACHUTE ORDER STATUS: NORMAL
DME PARACHUTE SUPPLIER NAME: NORMAL
DME PARACHUTE SUPPLIER PHONE: NORMAL

## 2023-10-23 DIAGNOSIS — I10 ESSENTIAL HYPERTENSION: ICD-10-CM

## 2023-10-23 RX ORDER — METOPROLOL SUCCINATE 25 MG/1
25 TABLET, EXTENDED RELEASE ORAL DAILY
Qty: 90 TABLET | Refills: 0 | Status: SHIPPED | OUTPATIENT
Start: 2023-10-23

## 2023-10-23 RX ORDER — AMLODIPINE BESYLATE 10 MG/1
10 TABLET ORAL DAILY
Qty: 90 TABLET | Refills: 0 | Status: SHIPPED | OUTPATIENT
Start: 2023-10-23

## 2023-11-03 LAB
LEFT EYE DIABETIC RETINOPATHY: NORMAL
RIGHT EYE DIABETIC RETINOPATHY: NORMAL

## 2023-11-10 ENCOUNTER — TELEPHONE (OUTPATIENT)
Dept: CARDIAC SURGERY | Facility: CLINIC | Age: 73
End: 2023-11-10

## 2023-11-10 DIAGNOSIS — I71.21 ASCENDING AORTIC ANEURYSM, UNSPECIFIED WHETHER RUPTURED (HCC): Primary | ICD-10-CM

## 2023-11-10 NOTE — TELEPHONE ENCOUNTER
Left v/m to schedule a ct chest and a 2 year aortic clinic f/u with Nurse practitioner. Ct chest is already ordered.

## 2023-11-21 ENCOUNTER — OFFICE VISIT (OUTPATIENT)
Dept: FAMILY MEDICINE CLINIC | Facility: CLINIC | Age: 73
End: 2023-11-21
Payer: COMMERCIAL

## 2023-11-21 VITALS
BODY MASS INDEX: 25.73 KG/M2 | DIASTOLIC BLOOD PRESSURE: 82 MMHG | OXYGEN SATURATION: 97 % | HEART RATE: 81 BPM | WEIGHT: 190 LBS | HEIGHT: 72 IN | SYSTOLIC BLOOD PRESSURE: 138 MMHG | TEMPERATURE: 97.9 F

## 2023-11-21 DIAGNOSIS — J32.9 RHINOSINUSITIS: Primary | ICD-10-CM

## 2023-11-21 PROCEDURE — 99213 OFFICE O/P EST LOW 20 MIN: CPT | Performed by: FAMILY MEDICINE

## 2023-11-21 RX ORDER — AZITHROMYCIN 250 MG/1
TABLET, FILM COATED ORAL
Qty: 6 TABLET | Refills: 0 | Status: SHIPPED | OUTPATIENT
Start: 2023-11-21 | End: 2023-11-25

## 2023-11-21 NOTE — PROGRESS NOTES
Assessment/Plan: Patient use Z-Kings and Benadryl as directed. Diagnoses and all orders for this visit:    Rhinosinusitis  -     azithromycin (ZITHROMAX) 250 mg tablet; Take 2 tablets today then 1 tablet daily x 4 days            Subjective:        Patient ID: Deniz Munoz is a 68 y.o. male. Patient is here with cough, sneezing, fatigue ear pain sore throat over the past 3 days. COVID test negative. No nausea vomiting or diarrhea. Possible fever noted. Patient did use Mucinex and other over-the-counter medication without significant improvement. Cough  Associated symptoms include ear pain, postnasal drip, rhinorrhea and a sore throat. Sore Throat   Associated symptoms include congestion, coughing and ear pain. The following portions of the patient's history were reviewed and updated as appropriate: allergies, current medications, past family history, past medical history, past social history, past surgical history and problem list.      Review of Systems   Constitutional:  Positive for fatigue. HENT:  Positive for congestion, ear pain, postnasal drip, rhinorrhea, sinus pressure, sinus pain and sore throat. Eyes: Negative. Respiratory:  Positive for cough. Cardiovascular: Negative. Gastrointestinal: Negative. Endocrine: Negative. Genitourinary: Negative. Musculoskeletal: Negative. Skin: Negative. Allergic/Immunologic: Negative. Neurological: Negative. Hematological: Negative. Psychiatric/Behavioral: Negative. Objective:               /82 (BP Location: Right arm, Patient Position: Sitting, Cuff Size: Standard)   Pulse 81   Temp 97.9 °F (36.6 °C) (Temporal)   Ht 6' (1.829 m)   Wt 86.2 kg (190 lb)   SpO2 97%   BMI 25.77 kg/m²          Physical Exam  Vitals and nursing note reviewed. Constitutional:       General: He is not in acute distress. Appearance: He is ill-appearing. He is not toxic-appearing or diaphoretic.    HENT: Head: Normocephalic and atraumatic. Right Ear: Tympanic membrane, ear canal and external ear normal. There is no impacted cerumen. Left Ear: Tympanic membrane, ear canal and external ear normal. There is no impacted cerumen. Nose: Nose normal. No congestion or rhinorrhea. Mouth/Throat:      Mouth: Mucous membranes are moist.      Pharynx: Oropharyngeal exudate and posterior oropharyngeal erythema present. Eyes:      General: No scleral icterus. Right eye: No discharge. Left eye: No discharge. Neck:      Vascular: No carotid bruit. Cardiovascular:      Rate and Rhythm: Normal rate and regular rhythm. Pulses: Normal pulses. Heart sounds: Normal heart sounds. No murmur heard. No friction rub. No gallop. Pulmonary:      Effort: Pulmonary effort is normal. No respiratory distress. Breath sounds: Normal breath sounds. No stridor. No wheezing, rhonchi or rales. Chest:      Chest wall: No tenderness. Musculoskeletal:         General: No swelling, tenderness, deformity or signs of injury. Normal range of motion. Cervical back: Normal range of motion and neck supple. No rigidity. No muscular tenderness. Right lower leg: No edema. Left lower leg: No edema. Lymphadenopathy:      Cervical: No cervical adenopathy. Skin:     General: Skin is warm and dry. Capillary Refill: Capillary refill takes less than 2 seconds. Coloration: Skin is not jaundiced. Findings: No bruising, erythema, lesion or rash. Neurological:      Mental Status: He is alert and oriented to person, place, and time. Mental status is at baseline. Cranial Nerves: No cranial nerve deficit. Sensory: No sensory deficit. Motor: No weakness. Coordination: Coordination normal.      Gait: Gait normal.   Psychiatric:         Mood and Affect: Mood normal.         Behavior: Behavior normal.         Thought Content:  Thought content normal. Judgment: Judgment normal.

## 2023-11-26 ENCOUNTER — HOSPITAL ENCOUNTER (OUTPATIENT)
Dept: CT IMAGING | Facility: HOSPITAL | Age: 73
Discharge: HOME | End: 2023-11-26
Payer: COMMERCIAL

## 2023-11-26 DIAGNOSIS — I71.21 ASCENDING AORTIC ANEURYSM, UNSPECIFIED WHETHER RUPTURED (HCC): ICD-10-CM

## 2023-11-26 PROCEDURE — G1004 CDSM NDSC: HCPCS

## 2023-11-26 PROCEDURE — 71250 CT THORAX DX C-: CPT

## 2023-11-28 DIAGNOSIS — I10 ESSENTIAL HYPERTENSION: ICD-10-CM

## 2023-11-28 DIAGNOSIS — K29.00 ACUTE SUPERFICIAL GASTRITIS WITHOUT HEMORRHAGE: ICD-10-CM

## 2023-11-28 DIAGNOSIS — I71.21 ASCENDING AORTIC ANEURYSM (HCC): ICD-10-CM

## 2023-11-28 RX ORDER — HYDROCHLOROTHIAZIDE 12.5 MG/1
12.5 TABLET ORAL DAILY
Qty: 90 TABLET | Refills: 0 | Status: SHIPPED | OUTPATIENT
Start: 2023-11-28

## 2023-11-28 RX ORDER — PANTOPRAZOLE SODIUM 40 MG/1
40 TABLET, DELAYED RELEASE ORAL DAILY
Qty: 90 TABLET | Refills: 0 | Status: SHIPPED | OUTPATIENT
Start: 2023-11-28

## 2023-12-05 ENCOUNTER — OFFICE VISIT (OUTPATIENT)
Dept: CARDIAC SURGERY | Facility: CLINIC | Age: 73
End: 2023-12-05
Payer: COMMERCIAL

## 2023-12-05 VITALS
BODY MASS INDEX: 25.73 KG/M2 | DIASTOLIC BLOOD PRESSURE: 69 MMHG | HEIGHT: 72 IN | OXYGEN SATURATION: 98 % | HEART RATE: 82 BPM | WEIGHT: 190 LBS | SYSTOLIC BLOOD PRESSURE: 146 MMHG

## 2023-12-05 DIAGNOSIS — I77.810 THORACIC AORTIC ECTASIA (HCC): Primary | ICD-10-CM

## 2023-12-05 PROCEDURE — 99214 OFFICE O/P EST MOD 30 MIN: CPT | Performed by: NURSE PRACTITIONER

## 2023-12-05 NOTE — PROGRESS NOTES
[facility]  Naomi Powell 68 y.o. male MRN: [id number]      Reason for Consult / Principal Problem: Ascending aortic ectasia    History of Present Illness: Naomi Powell is a 68y.o. year old male who presents today for ongoing surveillance of ascending aortic ectasia. This was initially identified in 2014 and measured 4.0 cm. The size of his ascending aorta has remained stable over the years on serial imaging. Recent echo demonstrates normal trilabe aortic valve with normal function and root size. Patient's PMHx is notable for HTN, HLD, tobacco abuse, DM2, BRAVO, COPD and GERD    Patient is accompanied by his wife today. He recently underwent w/u for chest pain and his followed by Dr. Steffanie Patel. Patient currently denies CP, SOB, back pain, lightheadedness, palpitations, edema, numbness or tingling. He admits to staring to smoke again, 1-2 cigarettes per day. Patient's FH negative for aneurysms. Past Medical History:  Past Medical History:   Diagnosis Date    Abnormal CAT scan     Bilateral inguinal hernia 1990    recurrent    Colitis     Gastritis 12/11/2012    Hypertension     Impaired fasting glucose 7/9/2019    Nausea & vomiting     Right knee meniscal tear 12/20/2017    Sleep apnea     uses cpap         Past Surgical History:   Past Surgical History:   Procedure Laterality Date    CHOLECYSTECTOMY      COLONOSCOPY  2017    ABOUT 2-3 YEARS AGO    EXPLORATORY LAPAROTOMY  08/16/2014    done for repair of peritoneal "RENT"? HEMORRHOID SURGERY      INGUINAL HERNIA REPAIR Bilateral 08/14/2014    MD ESOPHAGOGASTRODUODENOSCOPY TRANSORAL DIAGNOSTIC N/A 2/23/2017    Procedure: EGD AND COLONOSCOPY;  Surgeon: Al Flores MD;  Location: [facility];   Service: Gastroenterology    US GUIDED THYROID BIOPSY  3/15/2021         Family History:  Family History   Problem Relation Age of Onset    Breast cancer Mother     Heart disease Father     Heart disease Brother     Heart disease Family          Social History:    Social History     Substance and Sexual Activity   Alcohol Use Not Currently    Comment: special occ. Social History     Substance and Sexual Activity   Drug Use No     Social History     Tobacco Use   Smoking Status Some Days    Packs/day: 0.25    Years: 40.00    Total pack years: 10.00    Types: Cigarettes    Last attempt to quit: 2019    Years since quittin.0   Smokeless Tobacco Never         Home Medications:   Prior to Admission medications    Medication Sig Start Date End Date Taking?  Authorizing Provider   Alphagan P 0.1 % INSTILL 1 DROP INTO EACH EYE TWICE DAILY 2/10/22  Yes Historical Provider, MD   amLODIPine (NORVASC) 10 mg tablet Take 1 tablet by mouth once daily 10/23/23  Yes Marie Simmons DO   aspirin (ECOTRIN LOW STRENGTH) 81 mg EC tablet Take 81 mg by mouth daily   Yes Historical Provider, MD   Blood Glucose Monitoring Suppl (ONE TOUCH ULTRA 2) w/Device KIT CHECK BLOOD SUGARS DAILY 23  Yes Historical Provider, MD   cholecalciferol (VITAMIN D3) 400 units tablet Take 400 Units by mouth daily   Yes Historical Provider, MD   hydrochlorothiazide (HYDRODIURIL) 12.5 mg tablet Take 1 tablet by mouth once daily 23  Yes Marie Simmons DO   Lancets (OneTouch Delica Plus [id number]) MISC daily Check blood sugar 23  Yes Historical Provider, MD   metFORMIN (GLUCOPHAGE) 500 mg tablet TAKE 1 TABLET BY MOUTH TWICE DAILY WITH MEALS 23  Yes Marie Simmons DO   metoprolol succinate (TOPROL-XL) 25 mg 24 hr tablet Take 1 tablet by mouth once daily 10/23/23  Yes Marie Simmons DO   mometasone (ELOCON) 0.1 % lotion Apply topically daily 10/17/22  Yes Marie Simmons DO   OneTouch Ultra test strip USE STRIP TO CHECK GLUCOSE ONCE DAILY 3/10/23  Yes Historical Provider, MD   pantoprazole (PROTONIX) 40 mg tablet Take 1 tablet by mouth once daily 23  Yes Marie Simmons DO   potassium chloride (K-DUR,KLOR-CON) 20 mEq tablet Take 1 tablet (20 mEq total) by mouth daily 23  Yes Linda Edouard DO Niko   prednisoLONE acetate (PRED FORTE) 1 % ophthalmic suspension  1/26/22  Yes Historical Provider, MD   rosuvastatin (CRESTOR) 10 MG tablet Take 1 tablet (10 mg total) by mouth daily 8/24/23  Yes Vickey Leary MD   potassium chloride (MICRO-K) 10 MEQ CR capsule Take 1 capsule (10 mEq total) by mouth daily  Patient not taking: Reported on 8/24/2023 4/21/23 12/5/23  Niki Person DO       Allergies: Allergies   Allergen Reactions    Ace Inhibitors Angioedema       Review of Systems:   Review of Systems - History obtained from chart review and the patient  General ROS: negative  Psychological ROS: negative  Ophthalmic ROS: negative  ENT ROS: negative  Allergy and Immunology ROS: negative  Hematological and Lymphatic ROS: negative  Endocrine ROS: negative  Breast ROS: negative  Respiratory ROS: no cough, shortness of breath, or wheezing  Cardiovascular ROS: no chest pain or dyspnea on exertion  Gastrointestinal ROS: no abdominal pain, change in bowel habits, or black or bloody stools  Genito-Urinary ROS: no dysuria, trouble voiding, or hematuria  Musculoskeletal ROS: negative  Neurological ROS: no TIA or stroke symptoms  Dermatological ROS: negative    Vital Signs:   Vitals:    12/05/23 1133 12/05/23 1141   BP: 158/76 146/69   BP Location: Right arm Left arm   Patient Position: Sitting Sitting   Cuff Size: Standard Standard   Pulse: 82    SpO2: 98%    Weight: 86.2 kg (190 lb)    Height: 6' (1.829 m)        Physical Exam:  General: Alert, oriented, well developed, no acute distress  HEENT/NECK:  PERRLA. No jugular venous distention. Cardiac:Regular rate and rhythm, no murmurs rubs or gallops. Carotid arteries: 2+ pulses, no bruits  Pulmonary:  Breath sounds clear bilaterally. Abdomen:  Non-tender, Non-distended. Positive bowel sounds. Upper extremities: 2+ radial pulses; brisk capillary refill  Lower extremities: Extremities warm/dry. PT/DP pulses 2+ bilaterally.   No edema B/L  Neuro: Alert and oriented X 3. Sensation is grossly intact. No focal deficits. Musculoskeletal: MAEE, stable gait  Skin: Warm/Dry, without rashes or lesions. Lab Results:   Lab Results   Component Value Date    WBC 6.49 07/11/2023    HGB 15.0 07/11/2023    HCT 46.0 07/11/2023    MCV 87 07/11/2023     07/11/2023     Lab Results   Component Value Date     05/27/2015    SODIUM 136 07/11/2023    K 3.3 (L) 07/11/2023     07/11/2023    CO2 27 07/11/2023    ANIONGAP 9 05/27/2015    AGAP 8 07/11/2023    BUN 14 07/11/2023    CREATININE 1.02 07/11/2023    GLUC 134 12/23/2019    GLUF 150 (H) 07/11/2023    CALCIUM 9.2 07/11/2023    AST 12 (L) 07/11/2023    ALT 14 07/11/2023    ALKPHOS 53 07/11/2023    PROT 7.4 05/27/2015    TP 6.7 07/11/2023    BILITOT 0.5 05/27/2015    TBILI 0.57 07/11/2023    EGFR 73 07/11/2023     Lab Results   Component Value Date    HGBA1C 6.9 (A) 04/20/2023     Lab Results   Component Value Date    CHOLESTEROL 131 01/16/2023    CHOLESTEROL 131 03/08/2022    CHOLESTEROL 208 (H) 12/01/2021     Lab Results   Component Value Date    HDL 37 (L) 01/16/2023    HDL 29 (L) 03/08/2022    HDL 29 (L) 12/01/2021     Lab Results   Component Value Date    TRIG 155 (H) 01/16/2023    TRIG 108 03/08/2022    TRIG 138 12/01/2021     Lab Results   Component Value Date    [address] 01/16/2023    [address] 102 03/08/2022    [address] 179 12/01/2021       Imaging Studies:     CT Chest: 11/26/23    1. Ectatic ascending thoracic aorta measuring 4.0 cm, stable. 2.  Increase in size of a 1.7 cm indeterminate splenic lesion since July 2023 and new since December 2021. Recommend further evaluation with MRI abdomen    Echocardiogram: 6/1/23      Left Ventricle: Left ventricular cavity size is normal. Wall thickness is mildly increased. There is mild concentric hypertrophy. The left ventricular ejection fraction is 55%. Systolic function is normal. Global longitudinal strain is normal at -17%.  Diastolic function is normal for age.    The following segments are hypokinetic: basal inferior. All other segments are normal.    IVS: There is abnormal septal motion of unclear etiology. Right Ventricle: Right ventricular cavity size is normal. Systolic function is normal.    Right Atrium: The atrium is mildly dilated. Aortic Valve: There is aortic valve sclerosis. Mitral Valve: There is mild annular calcification. Aorta: The aortic root is mildly dilated. The ascending aorta is mildly dilated. The aortic root is 3.70 cm. The ascending aorta is 4 cm. Findings    Left Ventricle Left ventricular cavity size is normal. Wall thickness is mildly increased. There is mild concentric hypertrophy. The left ventricular ejection fraction is 55%. Systolic function is normal. Global longitudinal strain is normal at -17%. Diastolic function is normal for age. Wall Scoring Baseline     The following segments are hypokinetic: basal inferior. All other segments are normal.            Interventricular Septum There is abnormal septal motion of unclear etiology. Right Ventricle Right ventricular cavity size is normal. Systolic function is normal.   Left Atrium The atrium is normal in size. Right Atrium The atrium is mildly dilated. Aortic Valve The aortic valve is trileaflet. The leaflets are not thickened. The leaflets are not calcified. The leaflets exhibit normal mobility. There is trace regurgitation. There is aortic valve sclerosis. Mitral Valve There is mild annular calcification. There is no evidence of regurgitation. There is no evidence of stenosis. The valve has normal function. Tricuspid Valve Tricuspid valve structure is normal. There is trace regurgitation. There is no evidence of stenosis. The right ventricular systolic pressure is normal. The estimated right ventricular systolic pressure is 53.64 mmHg. Pulmonic Valve Pulmonic valve structure is normal. There is trace regurgitation. There is no evidence of stenosis. Ascending Aorta The aortic root is mildly dilated. The ascending aorta is mildly dilated. The aortic root is 3.70 cm. The ascending aorta is 4 cm. IVC/SVC The right atrial pressure is estimated at 3.0 mmHg. The inferior vena cava is normal in size. Respirophasic changes were normal.   Pericardium There is no pericardial effusion. The pericardium is normal in appearance.      Left Ventricle Measurements    Function/Volumes   A4C EF 54 %         LV Diastolic Volume (BP) 535 mL         LV Systolic Volume (BP) 59 mL         EF 55 %         Dimensions   LVIDd 4.7 cm         LVIDS 3.2 cm         IVSd 1.1 cm         LVPWd 1.1 cm         FS 32 %         Diastolic Filling   MV E' Tissue Velocity Septal 7 cm/s         E wave deceleration time 246 ms         MV Peak E Darrell 63 cm/s         MV Peak A Darrell 0.77 m/s         Strain   GLS -17 %               Right Ventricle Measurements    Dimensions   RVID d 4.1 cm         Tricuspid annular plane systolic excursion 2.4 cm               Left Atrium Measurements    Dimensions   LA size 3.6 cm         LA length (A2C) 3.9 cm         FELIPE A4C 11.8 cm2               Right Atrium Measurements    Dimensions   RAA A4C 20.5 cm2               Mitral Valve Measurements    Stenosis   MV stenosis pressure 1/2 time 71 ms         MV valve area p 1/2 method 3.1 cm2               Tricuspid Valve Measurements    RVSP Parameters   TR Peak Darrell 2.5 m/s         Est. RA pres 3 mmHg         Triscuspid Valve Regurgitation Peak Gradient 24 mmHg         Right Ventricular Peak Systolic Pressure 27 mmHg               Aorta Measurements    Aortic Dimensions   Ao root 3.7 cm         Asc Ao 4 cm               IVC/SVC Measurements    IVC/SVC   Est. RA pres 3 mmHg             I have personally reviewed pertinent films in PACS    Cardiology notes reviewed    Assessment:  Patient Active Problem List    Diagnosis Date Noted    Rhinosinusitis 11/21/2023    BRAVO (obstructive sleep apnea) 10/10/2023    Chest pain due to myocardial ischemia 04/20/2023    Mild traumatic brain injury, without loss of consciousness, initial encounter ([address]) 01/17/2023    Dry skin 10/17/2022    Slow transit constipation 10/17/2022    Stage 3 chronic kidney disease, unspecified whether stage 3a or 3b CKD ([address]) 12/02/2021    Hypokalemia 07/07/2021    Right wrist pain 05/21/2021    Diabetes mellitus due to underlying condition with diabetic autonomic neuropathy, without long-term current use of insulin ([address]) 02/09/2021    Thyroid nodule 02/09/2021    Pre-diabetes 10/13/2020    Hyperglycemia 09/11/2020    Chronic obstructive pulmonary disease ([address]) 09/11/2020    Ascending aortic aneurysm ([address]) 01/21/2020    Cough 01/17/2020    Angioedema, Likely ACE inhibitor induced 12/21/2019    GERD (gastroesophageal reflux disease) 12/21/2019    Tobacco abuse 03/09/2016    Obstructive sleep apnea treated with continuous positive airway pressure (CPAP) 05/20/2015    Hypertension 12/11/2012    Hyperlipidemia 12/11/2012       Impression/Plan:    Ascending aortic ectasia- 4.0 cm   Stable and unchanged since 2014. Does not meet surgical criteria. Continue HTN and HLD management- per Cardiology and PCP. Recommended quit smoking. Follow up in aortic clinic in 3 years with non-contrast chest CT scan. CT imaging reviewed and findings were confirmed and shared with the patient today. Incidental finding of splenic lesion- sent to PCP for f/u. Anai Rock was comfortable with our recommendations, and his questions were answered to his satisfaction. The patient recently had a screening colonoscopy in 6/2/22. Therefore GI referral is not indicated at this time.      SIGNATURE: MELVIN Castillo  DATE: December 5, 2023  TIME: 12:19 PM

## 2023-12-30 DIAGNOSIS — E08.43 DIABETES MELLITUS DUE TO UNDERLYING CONDITION WITH DIABETIC AUTONOMIC NEUROPATHY, WITHOUT LONG-TERM CURRENT USE OF INSULIN (HCC): ICD-10-CM

## 2024-01-03 ENCOUNTER — TELEPHONE (OUTPATIENT)
Dept: FAMILY MEDICINE CLINIC | Facility: CLINIC | Age: 74
End: 2024-01-03

## 2024-01-11 ENCOUNTER — OFFICE VISIT (OUTPATIENT)
Dept: URGENT CARE | Age: 74
End: 2024-01-11
Payer: COMMERCIAL

## 2024-01-11 VITALS
OXYGEN SATURATION: 95 % | TEMPERATURE: 97.8 F | BODY MASS INDEX: 25.77 KG/M2 | DIASTOLIC BLOOD PRESSURE: 74 MMHG | RESPIRATION RATE: 20 BRPM | SYSTOLIC BLOOD PRESSURE: 158 MMHG | HEART RATE: 71 BPM | WEIGHT: 190 LBS

## 2024-01-11 DIAGNOSIS — H57.89 IRRITATION OF RIGHT EYE: Primary | ICD-10-CM

## 2024-01-11 PROCEDURE — 99213 OFFICE O/P EST LOW 20 MIN: CPT

## 2024-01-11 RX ORDER — CEPHALEXIN 500 MG/1
500 CAPSULE ORAL EVERY 12 HOURS SCHEDULED
Qty: 14 CAPSULE | Refills: 0 | Status: SHIPPED | OUTPATIENT
Start: 2024-01-11 | End: 2024-01-18

## 2024-01-11 NOTE — PROGRESS NOTES
[facility]        NAME: Jaciel Van is a 73 y.o. male  : 1950    MRN: [id number]  DATE: 2024  TIME: 6:27 PM    Assessment and Plan   Irritation of right eye [H57.89]  1. Irritation of right eye  cephalexin (KEFLEX) 500 mg capsule        A week ago started with right lower eyelid itching and irritation. Has been rubbing/scratching and now has some drainage to skin below eye. Assessment notes skin abrasion/irritation and mild swelling to area ( consistent with irritation/constant rubbing). Advised to stop rubbing/wiping. Considering open skin area will order abx- no eye involvement, no changes to visual acuity.     Patient Instructions       Follow up with PCP ias needed    Chief Complaint     Chief Complaint   Patient presents with    Eye Pain     Pt started one week ago with increasing right eye redness, pain, drainage and mild itching. Has swelling below eye.  Has been applying Alphagan eye drops for increased eye pressure and antibiotic ointment on cheek area.          History of Present Illness       A week ago started with right lower eyelid itching and irritation. Has been rubbing/scratching and now has some drainage to skin below eye. Assessment notes skin abrasion/irritation and mild swelling to area ( consistent with irritation/constant rubbing). Advised to stop rubbing/wiping. Considering open skin area will order abx- no eye involvement, no changes to visual acuity.     Eye Pain   Pertinent negatives include no eye discharge or itching.       Review of Systems   Review of Systems   Constitutional:  Negative for activity change.   Eyes:  Negative for pain, discharge, itching and visual disturbance.   Skin:  Positive for wound.         Current Medications       Current Outpatient Medications:     Alphagan P 0.1 %, INSTILL 1 DROP INTO EACH EYE TWICE DAILY, Disp: , Rfl:     amLODIPine (NORVASC) 10 mg tablet, Take 1 tablet by mouth once daily, Disp: 90 tablet, Rfl: 0    aspirin  (ECOTRIN LOW STRENGTH) 81 mg EC tablet, Take 81 mg by mouth daily, Disp: , Rfl:     Blood Glucose Monitoring Suppl (ONE TOUCH ULTRA 2) w/Device KIT, CHECK BLOOD SUGARS DAILY, Disp: , Rfl:     cephalexin (KEFLEX) 500 mg capsule, Take 1 capsule (500 mg total) by mouth every 12 (twelve) hours for 7 days, Disp: 14 capsule, Rfl: 0    cholecalciferol (VITAMIN D3) 400 units tablet, Take 400 Units by mouth daily, Disp: , Rfl:     hydrochlorothiazide (HYDRODIURIL) 12.5 mg tablet, Take 1 tablet by mouth once daily, Disp: 90 tablet, Rfl: 0    metFORMIN (GLUCOPHAGE) 500 mg tablet, TAKE 1 TABLET BY MOUTH TWICE DAILY WITH MEALS, Disp: 180 tablet, Rfl: 0    metoprolol succinate (TOPROL-XL) 25 mg 24 hr tablet, Take 1 tablet by mouth once daily, Disp: 90 tablet, Rfl: 0    mometasone (ELOCON) 0.1 % lotion, Apply topically daily, Disp: 60 mL, Rfl: 2    OneTouch Ultra test strip, USE STRIP TO CHECK GLUCOSE ONCE DAILY, Disp: , Rfl:     pantoprazole (PROTONIX) 40 mg tablet, Take 1 tablet by mouth once daily, Disp: 90 tablet, Rfl: 0    potassium chloride (K-DUR,KLOR-CON) 20 mEq tablet, Take 1 tablet (20 mEq total) by mouth daily, Disp: 90 tablet, Rfl: 1    prednisoLONE acetate (PRED FORTE) 1 % ophthalmic suspension, , Disp: , Rfl:     rosuvastatin (CRESTOR) 10 MG tablet, Take 1 tablet (10 mg total) by mouth daily, Disp: 30 tablet, Rfl: 6    Lancets (OneTouch Delica Plus [id number]) MISC, daily Check blood sugar, Disp: , Rfl:     Current Allergies     Allergies as of 01/11/2024 - Reviewed 01/11/2024   Allergen Reaction Noted    Ace inhibitors Angioedema 12/21/2019            The following portions of the patient's history were reviewed and updated as appropriate: allergies, current medications, past family history, past medical history, past social history, past surgical history and problem list.     Past Medical History:   Diagnosis Date    Abnormal CAT scan     Bilateral inguinal hernia 1990    recurrent    Colitis     Gastritis  "12/11/2012    Hypertension     Impaired fasting glucose 7/9/2019    Nausea & vomiting     Right knee meniscal tear 12/20/2017    Sleep apnea     uses cpap       Past Surgical History:   Procedure Laterality Date    CHOLECYSTECTOMY      COLONOSCOPY  2017    ABOUT 2-3 YEARS AGO    COLONOSCOPY  06/02/2022    EXPLORATORY LAPAROTOMY  08/16/2014    done for repair of peritoneal \"RENT\"?    HEMORRHOID SURGERY      INGUINAL HERNIA REPAIR Bilateral 08/14/2014    [place] ESOPHAGOGASTRODUODENOSCOPY TRANSORAL DIAGNOSTIC N/A 02/23/2017    Procedure: EGD AND COLONOSCOPY;  Surgeon: Harrison Turcios MD;  Location: [facility];  Service: Gastroenterology    US GUIDED THYROID BIOPSY  03/15/2021       Family History   Problem Relation Age of Onset    Breast cancer Mother     Heart disease Father     Heart disease Brother     Heart disease Family          Medications have been verified.        Objective   /74   Pulse 71   Temp 97.8 °F (36.6 °C) (Tympanic)   Resp 20   Wt 86.2 kg (190 lb)   SpO2 95%   BMI 25.77 kg/m²   No LMP for male patient.       Physical Exam     Physical Exam  Vitals reviewed.   Constitutional:       Appearance: Normal appearance.   Eyes:        Comments: Swelling/abrasion   Neurological:      Mental Status: He is alert.                   "

## 2024-01-15 NOTE — PROGRESS NOTES
No answer, left voicemail. Urine culture was negative.      PT Re-Evaluation     Today's date: 3/29/2018  Patient name: Hal Deng  : 1950  MRN: [id number]  Referring provider: Sam Aburto DO  Dx:   Encounter Diagnosis     ICD-10-CM    1  Tear of right meniscus as current injury, initial encounter S83 [id number]        Start Time: 08  Stop Time: 30  Total time in clinic (min): 60 minutes    Assessment  Impairments: abnormal gait, abnormal or restricted ROM, impaired balance, impaired physical strength, lacks appropriate home exercise program and pain with function    Assessment details: Dorado has been attending outpatient physical therapy with Tear of right meniscus, and myofascial dysfunction   Since the last assessment, patient demonstrates decreased pain levels, improved range of motion, improved strength, and increased tolerance to activity  Pt continues to present with pain, demonstrate decreased range of motion, decreased strength, and decreased tolerance to activity  Understanding of Dx/Px/POC: good   Prognosis: good    Goals  ST  Patient will report 25% decrease in pain in 4 weeks  MET  2  Patient will demonstrate 25% improvement in ROM in 4 weeks  MET  3  Patient will demonstrate 1/2 grade improvement in strength in 4 weeks  Partially met    LT  Patient will be able to perform IADLS without restriction or pain by discharge  Partially met  2  Patient will be independent in HEP by discharge  Not met  3  Patient will be able to return to recreational/work duties without restriction or pain by discharge   Not met      Plan  Patient would benefit from: skilled PT  Planned modality interventions: biofeedback, TENS, thermotherapy: hydrocollator packs and cryotherapy  Planned therapy interventions: joint mobilization, manual therapy, neuromuscular re-education, patient education, postural training, stretching, therapeutic activities, therapeutic exercise, home exercise program, functional ROM exercises and balance  Frequency: 2x week  Duration in visits: 8  Duration in weeks: 4  Treatment plan discussed with: patient  Plan details: Emphasis on quad and hip strengthening,  Continue to increase ROM, balance (static/dynamic), functional activities  Subjective Evaluation    History of Present Illness  Mechanism of injury: Pt reports pain is worst in the morning with his first steps, Sx improve with movement and stretching  Pt states that he is experiencing worse pain in his L knee at this point    (3/29/18) Pt reports that he has been feeling better, however today is a bad day due to weather  He states that stiffness is his chief complaint and that it comes and goes  Walking, navigating stairs, and duties around the house have become easier  He states that overall he noticed a 70% improvement since beginning PT   The pt would like to continue therapy and states that he feels that he can continue to improve     Quality of life: good    Pain  Current pain ratin  At best pain ratin  At worst pain ratin  Quality: pulling, cramping and discomfort  Relieving factors: rest  Aggravating factors: nothing  Progression: improved    Treatments  Current treatment: physical therapy  Patient Goals  Patient goals for therapy: decreased pain, increased motion and independence with ADLs/IADLs          Objective     Neurological Testing     Additional Neurological Details  Tandem balance: R/L 22 sec , L/R 18 sec    Active Range of Motion   Left Knee   Flexion: 120 degrees   Extension: 3 degrees     Right Knee   Flexion: 120 degrees   Extension: 3 degrees     Additional Active Range of Motion Details  Pain with AROM has resolved    Strength/Myotome Testing     Left Hip   Planes of Motion   Flexion: 4  Extension: 4  Abduction: 4    Right Hip   Planes of Motion   Flexion: 4  Extension: 4  Abduction: 4    Left Knee   Flexion: 4  Extension: 4+  Quadriceps contraction: fair    Right Knee   Flexion: 4+  Extension: 5  Quadriceps contraction: good    Additional Strength Details  Continued weakness on the L LE    Tests     Left Knee   Negative anterior drawer, valgus stress test at 0 degrees and valgus stress test at 30 degrees  Right Knee   Negative anterior drawer, valgus stress test at 0 degrees and valgus stress test at 30 degrees  Additional Tests Details  SLR: 45 deg B  (2/22) R 70 deg, L 65 deg (3/29) R 75, L 70 deg  Balance: Tandem R/L 23 sec mod sway, L/R 24 sec mod sway    Ambulation     Ambulation: Level Surfaces     Additional Level Surfaces Ambulation Details  No longer utilizing axillary crutches for ambulation  R lateral trunk lean    Observational Gait   Gait: antalgic   Decreased walking speed and stride length  Additional Observational Gait Details  Decreased push off B  Decreased hip extension B    Quality of Movement During Gait   Trunk    Trunk (Right): Positive lateral lean over stance limb  Knee    Knee (Left): Positive increased flexion during stance  Knee (Right): Positive increased flexion during stance              Flowsheet Rows    Flowsheet Row Most Recent Value   PT/OT G-Codes   Current Score  52   Projected Score  51   FOTO information reviewed  Yes   Assessment Type  Re-evaluation   G code set  Mobility: Walking & Moving Around   Mobility: Walking and Moving Around Current Status ()  CK   Mobility: Walking and Moving Around Goal Status ()  CK          Precautions: none    Daily Treatment Diary   Emphasis on quad/hip strength,   Manuals 1/30/18 2/1/18  2/6  2/8/18  2/21/18  3/29           HS TPR, STM & stretch NC, PT  10 mins  15 min  10 min  NC, PT 10 mins  15' [facility]           Manual HS resistance prone                           Exercise Diary  3/29 1/30/18  2/1/18  2/6 2/8/18 2/21 2/22   Bike 5  mins 5 mins  10 min   10 min  10 min  10'  10'   Standing hip 3-way (add weights) NV 20x ea`      20x ea  20x ea     LAQ (add weights) 3#, 30x 3#, 5"x20             Mod  tandem stance foam  DC 30"x3      30"x3 ea  3x30"   HR/TR HEP 30x ea    30x ea  30x ea  30x ea       Rockerboard AP/ML DC 30x ea  30x ea  30x ea  30x ea  30x ea      Rockerboard balance DC 30"x3 ea  30"x3 ea 30"x3 ea  30"x3 ea  3x30" ea     Standing knee flexion HEP 20x ea      20x  20x     Gastroc/soleus stretch B  HEP   3x30" 30"x3 ea  30"x3 ea  3x30"  3 x 30"   Hamstring stretch  Strap B  MAN, HEP   5 x 20"  5 x 20"      3 x 30"   Seated hip flexion          Standing HS curls          Bridges c ADD, ABD          SL hip ABD 10 ea         Tandem on foam                              SLS         20"x5 5x20"           Modalities 1/25/18                     CP 10 min

## 2024-01-20 PROBLEM — J32.9 RHINOSINUSITIS: Status: RESOLVED | Noted: 2023-11-21 | Resolved: 2024-01-20

## 2024-01-22 DIAGNOSIS — I10 ESSENTIAL HYPERTENSION: ICD-10-CM

## 2024-01-22 RX ORDER — METOPROLOL SUCCINATE 25 MG/1
25 TABLET, EXTENDED RELEASE ORAL DAILY
Qty: 90 TABLET | Refills: 0 | Status: SHIPPED | OUTPATIENT
Start: 2024-01-22 | End: 2024-01-30 | Stop reason: SDUPTHER

## 2024-01-24 ENCOUNTER — RA CDI HCC (OUTPATIENT)
Dept: OTHER | Facility: HOSPITAL | Age: 74
End: 2024-01-24

## 2024-01-24 NOTE — PROGRESS NOTES
HCC coding opportunities          Chart Reviewed number of suggestions sent to Provider: 1   E08.22    Patients Insurance     Medicare Insurance: [employer]

## 2024-01-28 ENCOUNTER — APPOINTMENT (EMERGENCY)
Dept: RADIOLOGY | Facility: HOSPITAL | Age: 74
End: 2024-01-28
Payer: COMMERCIAL

## 2024-01-28 ENCOUNTER — APPOINTMENT (EMERGENCY)
Dept: CT IMAGING | Facility: HOSPITAL | Age: 74
End: 2024-01-28
Payer: COMMERCIAL

## 2024-01-28 ENCOUNTER — HOSPITAL ENCOUNTER (EMERGENCY)
Facility: HOSPITAL | Age: 74
Discharge: HOME | End: 2024-01-28
Attending: EMERGENCY MEDICINE
Payer: COMMERCIAL

## 2024-01-28 VITALS
OXYGEN SATURATION: 96 % | RESPIRATION RATE: 20 BRPM | HEART RATE: 86 BPM | TEMPERATURE: 98.1 F | SYSTOLIC BLOOD PRESSURE: 149 MMHG | DIASTOLIC BLOOD PRESSURE: 71 MMHG | WEIGHT: 190.92 LBS | BODY MASS INDEX: 25.89 KG/M2

## 2024-01-28 DIAGNOSIS — R11.2 NAUSEA AND VOMITING, UNSPECIFIED VOMITING TYPE: Primary | ICD-10-CM

## 2024-01-28 DIAGNOSIS — R10.84 GENERALIZED ABDOMINAL PAIN: ICD-10-CM

## 2024-01-28 LAB
2HR DELTA HS TROPONIN: 1 NG/L
ALBUMIN SERPL BCP-MCNC: 4.7 G/DL (ref 3.5–5)
ALP SERPL-CCNC: 60 U/L (ref 34–104)
ALT SERPL W P-5'-P-CCNC: 16 U/L (ref 7–52)
ANION GAP SERPL CALCULATED.3IONS-SCNC: 8 MMOL/L
AST SERPL W P-5'-P-CCNC: 20 U/L (ref 13–39)
ATRIAL RATE: 79 BPM
ATRIAL RATE: 87 BPM
ATRIAL RATE: 87 BPM
BASOPHILS # BLD AUTO: 0.04 THOUSANDS/ÂΜL (ref 0–0.1)
BASOPHILS NFR BLD AUTO: 0 % (ref 0–1)
BILIRUB SERPL-MCNC: 0.88 MG/DL (ref 0.2–1)
BILIRUB UR QL STRIP: NEGATIVE
BUN SERPL-MCNC: 18 MG/DL (ref 5–25)
CALCIUM SERPL-MCNC: 9.7 MG/DL (ref 8.4–10.2)
CARDIAC TROPONIN I PNL SERPL HS: 8 NG/L
CARDIAC TROPONIN I PNL SERPL HS: 9 NG/L
CHLORIDE SERPL-SCNC: 101 MMOL/L (ref 96–108)
CLARITY UR: CLEAR
CO2 SERPL-SCNC: 30 MMOL/L (ref 21–32)
COLOR UR: YELLOW
CREAT SERPL-MCNC: 1.01 MG/DL (ref 0.6–1.3)
EOSINOPHIL # BLD AUTO: 0.41 THOUSAND/ÂΜL (ref 0–0.61)
EOSINOPHIL NFR BLD AUTO: 3 % (ref 0–6)
ERYTHROCYTE [DISTWIDTH] IN BLOOD BY AUTOMATED COUNT: 13.7 % (ref 11.6–15.1)
GFR SERPL CREATININE-BSD FRML MDRD: 73 ML/MIN/1.73SQ M
GLUCOSE SERPL-MCNC: 166 MG/DL (ref 65–140)
GLUCOSE UR STRIP-MCNC: NEGATIVE MG/DL
HCT VFR BLD AUTO: 50.7 % (ref 36.5–49.3)
HGB BLD-MCNC: 16.6 G/DL (ref 12–17)
HGB UR QL STRIP.AUTO: NEGATIVE
IMM GRANULOCYTES # BLD AUTO: 0.06 THOUSAND/UL (ref 0–0.2)
IMM GRANULOCYTES NFR BLD AUTO: 1 % (ref 0–2)
KETONES UR STRIP-MCNC: NEGATIVE MG/DL
LACTATE SERPL-SCNC: 1.1 MMOL/L (ref 0.5–2)
LEUKOCYTE ESTERASE UR QL STRIP: NEGATIVE
LIPASE SERPL-CCNC: 67 U/L (ref 11–82)
LYMPHOCYTES # BLD AUTO: 0.54 THOUSANDS/ÂΜL (ref 0.6–4.47)
LYMPHOCYTES NFR BLD AUTO: 4 % (ref 14–44)
MCH RBC QN AUTO: 29 PG (ref 26.8–34.3)
MCHC RBC AUTO-ENTMCNC: 32.7 G/DL (ref 31.4–37.4)
MCV RBC AUTO: 89 FL (ref 82–98)
MONOCYTES # BLD AUTO: 0.41 THOUSAND/ÂΜL (ref 0.17–1.22)
MONOCYTES NFR BLD AUTO: 3 % (ref 4–12)
NEUTROPHILS # BLD AUTO: 11.61 THOUSANDS/ÂΜL (ref 1.85–7.62)
NEUTS SEG NFR BLD AUTO: 89 % (ref 43–75)
NITRITE UR QL STRIP: NEGATIVE
NRBC BLD AUTO-RTO: 0 /100 WBCS
P AXIS: 41 DEGREES
P AXIS: 41 DEGREES
P AXIS: 43 DEGREES
PH UR STRIP.AUTO: 7.5 [PH] (ref 4.5–8)
PLATELET # BLD AUTO: 169 THOUSANDS/UL (ref 149–390)
PMV BLD AUTO: 11.4 FL (ref 8.9–12.7)
POTASSIUM SERPL-SCNC: 3.8 MMOL/L (ref 3.5–5.3)
PR INTERVAL: 168 MS
PR INTERVAL: 168 MS
PR INTERVAL: 176 MS
PROT SERPL-MCNC: 7.6 G/DL (ref 6.4–8.4)
PROT UR STRIP-MCNC: NEGATIVE MG/DL
QRS AXIS: 14 DEGREES
QRS AXIS: 17 DEGREES
QRS AXIS: 18 DEGREES
QRSD INTERVAL: 82 MS
QRSD INTERVAL: 86 MS
QRSD INTERVAL: 88 MS
QT INTERVAL: 360 MS
QT INTERVAL: 364 MS
QT INTERVAL: 366 MS
QTC INTERVAL: 417 MS
QTC INTERVAL: 433 MS
QTC INTERVAL: 440 MS
RBC # BLD AUTO: 5.73 MILLION/UL (ref 3.88–5.62)
SODIUM SERPL-SCNC: 139 MMOL/L (ref 135–147)
SP GR UR STRIP.AUTO: 1.01 (ref 1–1.03)
T WAVE AXIS: -25 DEGREES
T WAVE AXIS: 25 DEGREES
T WAVE AXIS: 34 DEGREES
UROBILINOGEN UR QL STRIP.AUTO: 0.2 E.U./DL
VENTRICULAR RATE: 79 BPM
VENTRICULAR RATE: 87 BPM
VENTRICULAR RATE: 87 BPM
WBC # BLD AUTO: 13.07 THOUSAND/UL (ref 4.31–10.16)

## 2024-01-28 PROCEDURE — 99285 EMERGENCY DEPT VISIT HI MDM: CPT | Performed by: EMERGENCY MEDICINE

## 2024-01-28 PROCEDURE — 85025 COMPLETE CBC W/AUTO DIFF WBC: CPT | Performed by: EMERGENCY MEDICINE

## 2024-01-28 PROCEDURE — 81003 URINALYSIS AUTO W/O SCOPE: CPT

## 2024-01-28 PROCEDURE — 99285 EMERGENCY DEPT VISIT HI MDM: CPT

## 2024-01-28 PROCEDURE — 93005 ELECTROCARDIOGRAM TRACING: CPT

## 2024-01-28 PROCEDURE — 96374 THER/PROPH/DIAG INJ IV PUSH: CPT

## 2024-01-28 PROCEDURE — 83690 ASSAY OF LIPASE: CPT | Performed by: EMERGENCY MEDICINE

## 2024-01-28 PROCEDURE — 71045 X-RAY EXAM CHEST 1 VIEW: CPT

## 2024-01-28 PROCEDURE — G1004 CDSM NDSC: HCPCS

## 2024-01-28 PROCEDURE — 83605 ASSAY OF LACTIC ACID: CPT | Performed by: EMERGENCY MEDICINE

## 2024-01-28 PROCEDURE — 36415 COLL VENOUS BLD VENIPUNCTURE: CPT | Performed by: EMERGENCY MEDICINE

## 2024-01-28 PROCEDURE — 80053 COMPREHEN METABOLIC PANEL: CPT | Performed by: EMERGENCY MEDICINE

## 2024-01-28 PROCEDURE — 74177 CT ABD & PELVIS W/CONTRAST: CPT

## 2024-01-28 PROCEDURE — 84484 ASSAY OF TROPONIN QUANT: CPT | Performed by: EMERGENCY MEDICINE

## 2024-01-28 PROCEDURE — 96361 HYDRATE IV INFUSION ADD-ON: CPT

## 2024-01-28 RX ORDER — ONDANSETRON 4 MG/1
4 TABLET, ORALLY DISINTEGRATING ORAL EVERY 8 HOURS PRN
Qty: 10 TABLET | Refills: 0 | Status: SHIPPED | OUTPATIENT
Start: 2024-01-28 | End: 2024-01-30 | Stop reason: SDUPTHER

## 2024-01-28 RX ORDER — ONDANSETRON 4 MG/1
4 TABLET, ORALLY DISINTEGRATING ORAL EVERY 8 HOURS PRN
Qty: 10 TABLET | Refills: 0 | Status: SHIPPED | OUTPATIENT
Start: 2024-01-28 | End: 2024-01-28

## 2024-01-28 RX ORDER — ONDANSETRON 2 MG/ML
4 INJECTION INTRAMUSCULAR; INTRAVENOUS ONCE
Status: COMPLETED | OUTPATIENT
Start: 2024-01-28 | End: 2024-01-28

## 2024-01-28 RX ADMIN — SODIUM CHLORIDE 1000 ML: 0.9 INJECTION, SOLUTION INTRAVENOUS at 10:52

## 2024-01-28 RX ADMIN — IOHEXOL 100 ML: 350 INJECTION, SOLUTION INTRAVENOUS at 12:27

## 2024-01-28 RX ADMIN — ONDANSETRON 4 MG: 2 INJECTION INTRAMUSCULAR; INTRAVENOUS at 10:52

## 2024-01-28 NOTE — ED PROVIDER NOTES
History  Chief Complaint   Patient presents with    Chest Pain     Patient c/o chest tightness and vomiting that began today. Has hx of aneurism. Denies leg swelling, reporting shortness of breath after vomiting     74-year-old male with history of hypertension, COPD, diabetes, ascending aortic ectasia presents to the emergency department with nausea, vomiting and abdominal pain that started upon awakening this morning at 7 AM.  Patient had several episodes of nonbloody, nonbilious vomiting.  He complains of crampy epigastric pain.  No fever or chills.  He did have a sick grandchild with similar symptoms staying with them.  He states while he was vomiting this morning he did have some chest tightness and shortness of breath.      History provided by:  Patient   used: No    Vomiting  Duration:  4 hours  Timing:  Intermittent  Quality:  Stomach contents  Progression:  Unchanged  Chronicity:  New  Associated symptoms: abdominal pain    Associated symptoms: no chills, no cough, no diarrhea, no fever, no headaches, no myalgias and no URI    Risk factors: diabetes, prior abdominal surgery and sick contacts    Risk factors: no alcohol use    Chest Pain  Chest pain location: Entire precordium.  Pain quality: tightness    Pain radiates to:  Does not radiate  Pain severity:  Moderate  Onset quality:  Gradual  Duration:  4 hours  Timing:  Constant  Progression:  Unchanged  Chronicity:  New  Context comment:  After vomiting  Relieved by:  None tried  Worsened by:  Nothing tried  Ineffective treatments:  None tried  Associated symptoms: abdominal pain, nausea, shortness of breath and vomiting    Associated symptoms: no altered mental status, no anorexia, no anxiety, no back pain, no claudication, no cough, no diaphoresis, no dizziness, no fatigue, no fever, no headache, no heartburn, no lower extremity edema, no near-syncope, no numbness, no orthopnea, no palpitations, no PND, no syncope and no weakness     Abdominal pain:     Location:  Epigastric    Quality:  Cramping    Onset quality:  Gradual    Duration:  4 hours    Timing:  Intermittent    Progression:  Unchanged    Chronicity:  New  Vomiting:     Quality:  Stomach contents    Number of occurrences:  5    Duration:  4 hours    Progression:  Unchanged  Risk factors: aortic disease, diabetes mellitus and hypertension        Prior to Admission Medications   Prescriptions Last Dose Informant Patient Reported? Taking?   Alphagan P 0.1 %  Spouse/Significant Other Yes No   Sig: INSTILL 1 DROP INTO EACH EYE TWICE DAILY   Blood Glucose Monitoring Suppl (ONE TOUCH ULTRA 2) w/Device KIT  Spouse/Significant Other Yes No   Sig: CHECK BLOOD SUGARS DAILY   Lancets (OneTouch Delica Plus [id number]) MISC  Spouse/Significant Other Yes No   Sig: daily Check blood sugar   OneTouch Ultra test strip  Spouse/Significant Other Yes No   Sig: USE STRIP TO CHECK GLUCOSE ONCE DAILY   amLODIPine (NORVASC) 10 mg tablet  Spouse/Significant Other No No   Sig: Take 1 tablet by mouth once daily   aspirin (ECOTRIN LOW STRENGTH) 81 mg EC tablet  Spouse/Significant Other Yes No   Sig: Take 81 mg by mouth daily   cholecalciferol (VITAMIN D3) 400 units tablet  Spouse/Significant Other Yes No   Sig: Take 400 Units by mouth daily   hydrochlorothiazide (HYDRODIURIL) 12.5 mg tablet  Spouse/Significant Other No No   Sig: Take 1 tablet by mouth once daily   metFORMIN (GLUCOPHAGE) 500 mg tablet   No No   Sig: TAKE 1 TABLET BY MOUTH TWICE DAILY WITH MEALS   metoprolol succinate (TOPROL-XL) 25 mg 24 hr tablet   No No   Sig: Take 1 tablet by mouth once daily   mometasone (ELOCON) 0.1 % lotion  Spouse/Significant Other No No   Sig: Apply topically daily   pantoprazole (PROTONIX) 40 mg tablet  Spouse/Significant Other No No   Sig: Take 1 tablet by mouth once daily   potassium chloride (K-DUR,KLOR-CON) 20 mEq tablet  Spouse/Significant Other No No   Sig: Take 1 tablet (20 mEq total) by mouth daily   prednisoLONE  "acetate (PRED FORTE) 1 % ophthalmic suspension  Spouse/Significant Other Yes No   rosuvastatin (CRESTOR) 10 MG tablet  Spouse/Significant Other No No   Sig: Take 1 tablet (10 mg total) by mouth daily      Facility-Administered Medications: None       Past Medical History:   Diagnosis Date    Abnormal CAT scan     Bilateral inguinal hernia     recurrent    Colitis     Gastritis 2012    Hypertension     Impaired fasting glucose 2019    Nausea & vomiting     Right knee meniscal tear 2017    Sleep apnea     uses cpap       Past Surgical History:   Procedure Laterality Date    CHOLECYSTECTOMY      COLONOSCOPY      ABOUT 2-3 YEARS AGO    COLONOSCOPY  2022    EXPLORATORY LAPAROTOMY  2014    done for repair of peritoneal \"RENT\"?    HEMORRHOID SURGERY      INGUINAL HERNIA REPAIR Bilateral 2014    MI ESOPHAGOGASTRODUODENOSCOPY TRANSORAL DIAGNOSTIC N/A 2017    Procedure: EGD AND COLONOSCOPY;  Surgeon: Harrison Turcios MD;  Location: [facility];  Service: Gastroenterology    US GUIDED THYROID BIOPSY  03/15/2021       Family History   Problem Relation Age of Onset    Breast cancer Mother     Heart disease Father     Heart disease Brother     Heart disease Family      I have reviewed and agree with the history as documented.    E-Cigarette/Vaping    E-Cigarette Use Never User      E-Cigarette/Vaping Substances    Nicotine No     THC No     CBD No     Flavoring No     Other No     Unknown No      Social History     Tobacco Use    Smoking status: Some Days     Current packs/day: 0.00     Average packs/day: 0.3 packs/day for 40.0 years (10.0 ttl pk-yrs)     Types: Cigarettes     Start date: 1979     Last attempt to quit: 2019     Years since quittin.1    Smokeless tobacco: Never   Vaping Use    Vaping status: Never Used   Substance Use Topics    Alcohol use: Not Currently     Comment: special occ.    Drug use: No       Review of Systems   Constitutional: Negative.  Negative for " chills, diaphoresis, fatigue and fever.   HENT: Negative.     Eyes: Negative.    Respiratory:  Positive for shortness of breath. Negative for cough.    Cardiovascular:  Positive for chest pain. Negative for palpitations, orthopnea, claudication, leg swelling, syncope, PND and near-syncope.   Gastrointestinal:  Positive for abdominal pain, nausea and vomiting. Negative for abdominal distention, anorexia, blood in stool, constipation, diarrhea and heartburn.   Genitourinary: Negative.    Musculoskeletal:  Negative for back pain, myalgias and neck pain.   Skin: Negative.    Allergic/Immunologic: Negative.    Neurological: Negative.  Negative for dizziness, weakness, numbness and headaches.   Hematological: Negative.    Psychiatric/Behavioral: Negative.     All other systems reviewed and are negative.      Physical Exam  Physical Exam  Vitals and nursing note reviewed.   Constitutional:       General: He is awake. He is not in acute distress.     Appearance: Normal appearance. He is well-developed and normal weight. He is not ill-appearing, toxic-appearing or diaphoretic.   HENT:      Head: Normocephalic and atraumatic.      Right Ear: External ear normal.      Left Ear: External ear normal.      Nose: Nose normal.      Mouth/Throat:      Mouth: Mucous membranes are moist.   Eyes:      General: No scleral icterus.     Conjunctiva/sclera: Conjunctivae normal.   Neck:      Thyroid: No thyromegaly.      Vascular: No JVD.   Cardiovascular:      Rate and Rhythm: Normal rate and regular rhythm.      Heart sounds: Normal heart sounds. No murmur heard.     No gallop.   Pulmonary:      Effort: Pulmonary effort is normal. No respiratory distress.      Breath sounds: Normal breath sounds. No stridor. No wheezing, rhonchi or rales.   Abdominal:      General: Bowel sounds are normal. There is no distension.      Palpations: Abdomen is soft. There is no mass.      Tenderness: There is generalized abdominal tenderness. There is no  guarding or rebound.      Hernia: No hernia is present.   Musculoskeletal:      Right lower leg: No edema.      Left lower leg: No edema.   Skin:     General: Skin is warm and dry.      Coloration: Skin is not jaundiced or pale.      Findings: No bruising, erythema, lesion or rash.   Neurological:      General: No focal deficit present.      Mental Status: He is alert and oriented to person, place, and time.      Motor: No weakness.      Deep Tendon Reflexes: Reflexes are normal and symmetric.   Psychiatric:         Mood and Affect: Mood normal.         Behavior: Behavior is cooperative.         Vital Signs  ED Triage Vitals   Temperature Pulse Respirations Blood Pressure SpO2   01/28/24 1013 01/28/24 1008 01/28/24 1008 01/28/24 1008 01/28/24 1008   98.1 °F (36.7 °C) 92 18 155/82 98 %      Temp Source Heart Rate Source Patient Position - Orthostatic VS BP Location FiO2 (%)   01/28/24 1013 01/28/24 1008 01/28/24 1008 01/28/24 1008 --   Oral Monitor Lying Right arm       Pain Score       01/28/24 1200       4           Vitals:    01/28/24 1200 01/28/24 1230 01/28/24 1300 01/28/24 1330   BP: 151/66 157/75 142/68 149/71   Pulse: 80 84 84 86   Patient Position - Orthostatic VS: Lying Lying Lying Lying         Visual Acuity      ED Medications  Medications   sodium chloride 0.9 % bolus 1,000 mL (0 mL Intravenous Stopped 1/28/24 1151)   ondansetron (ZOFRAN) injection 4 mg (4 mg Intravenous Given 1/28/24 1052)   iohexol (OMNIPAQUE) 350 MG/ML injection (MULTI-DOSE) 100 mL (100 mL Intravenous Given 1/28/24 1227)       Diagnostic Studies  Results Reviewed       Procedure Component Value Units Date/Time    HS Troponin I 2hr [[id number]]  (Normal) Collected: 01/28/24 1158    Lab Status: Final result Specimen: Blood from Arm, Right Updated: 01/28/24 1237     hs TnI 2hr 9 ng/L      Delta 2hr hsTnI 1 ng/L     Lactic acid, plasma (w/reflex if result > 2.0) [[id number]]  (Normal) Collected: 01/28/24 1158    Lab Status: Final result  Specimen: Blood from Arm, Right Updated: 01/28/24 1227     LACTIC ACID 1.1 mmol/L     Narrative:      Result may be elevated if tourniquet was used during collection.    Urine Macroscopic, POC [[id number]] Collected: 01/28/24 1224    Lab Status: Final result Specimen: Urine Updated: 01/28/24 1226     Color, UA Yellow     Clarity, UA Clear     pH, UA 7.5     Leukocytes, UA Negative     Nitrite, UA Negative     Protein, UA Negative mg/dl      Glucose, UA Negative mg/dl      Ketones, UA Negative mg/dl      Urobilinogen, UA 0.2 E.U./dl      Bilirubin, UA Negative     Occult Blood, UA Negative     Specific Gravity, UA 1.015    Narrative:      CLINITEK RESULT    Comprehensive metabolic panel [[id number]]  (Abnormal) Collected: 01/28/24 1008    Lab Status: Final result Specimen: Blood from Arm, Left Updated: 01/28/24 1157     Sodium 139 mmol/L      Potassium 3.8 mmol/L      Chloride 101 mmol/L      CO2 30 mmol/L      ANION GAP 8 mmol/L      BUN 18 mg/dL      Creatinine 1.01 mg/dL      Glucose 166 mg/dL      Calcium 9.7 mg/dL      AST 20 U/L      ALT 16 U/L      Alkaline Phosphatase 60 U/L      Total Protein 7.6 g/dL      Albumin 4.7 g/dL      Total Bilirubin 0.88 mg/dL      eGFR 73 ml/min/1.73sq m     Narrative:      National Kid[employer] guidelines for Chronic Kidney Disease (CKD):     Stage 1 with normal or high GFR (GFR > 90 mL/min/1.73 square meters)    Stage 2 Mild CKD (GFR = 60-89 mL/min/1.73 square meters)    Stage 3A Moderate CKD (GFR = 45-59 mL/min/1.73 square meters)    Stage 3B Moderate CKD (GFR = 30-44 mL/min/1.73 square meters)    Stage 4 Severe CKD (GFR = 15-29 mL/min/1.73 square meters)    Stage 5 End Stage CKD (GFR <15 mL/min/1.73 square meters)  Note: GFR calculation is accurate only with a steady state creatinine    Lipase [[id number]]  (Normal) Collected: 01/28/24 1008    Lab Status: Final result Specimen: Blood from Arm, Left Updated: 01/28/24 1157     Lipase 67 u/L     HS Troponin 0hr (reflex  protocol) [[id number]]  (Normal) Collected: 01/28/24 1008    Lab Status: Final result Specimen: Blood from Arm, Left Updated: 01/28/24 1129     hs TnI 0hr 8 ng/L     CBC and differential [[id number]]  (Abnormal) Collected: 01/28/24 1008    Lab Status: Final result Specimen: Blood from Arm, Left Updated: 01/28/24 1110     WBC 13.07 Thousand/uL      RBC 5.73 Million/uL      Hemoglobin 16.6 g/dL      Hematocrit 50.7 %      MCV 89 fL      MCH 29.0 pg      MCHC 32.7 g/dL      RDW 13.7 %      MPV 11.4 fL      Platelets 169 Thousands/uL      nRBC 0 /100 WBCs      Neutrophils Relative 89 %      Immat GRANS % 1 %      Lymphocytes Relative 4 %      Monocytes Relative 3 %      Eosinophils Relative 3 %      Basophils Relative 0 %      Neutrophils Absolute 11.61 Thousands/µL      Immature Grans Absolute 0.06 Thousand/uL      Lymphocytes Absolute 0.54 Thousands/µL      Monocytes Absolute 0.41 Thousand/µL      Eosinophils Absolute 0.41 Thousand/µL      Basophils Absolute 0.04 Thousands/µL                    CT abdomen pelvis with contrast   Final Result by Otis Ruffin MD (01/28 1412)      No acute findings in the abdomen or pelvis.         Workstation performed: [id number]         XR chest 1 view portable   ED Interpretation by Janki Goldsetin DO (01/28 1134)   nad                 Procedures  ECG 12 Lead Documentation Only    Date/Time: 1/28/2024 11:09 AM    Performed by: Janki Goldstein DO  Authorized by: Janki Goldstein DO    Indications / Diagnosis:  Cp  ECG reviewed by me, the ED Provider: yes    Patient location:  ED  Interpretation:     Interpretation: normal    Rate:     ECG rate:  88    ECG rate assessment: normal    Rhythm:     Rhythm: sinus rhythm    Ectopy:     Ectopy: none    Conduction:     Conduction: normal    ST segments:     ST segments:  Normal  T waves:     T waves: normal             ED Course  ED Course as of 01/28/24 1426   Sun Jan 28, 2024   1111 WBC(!): 13.07   1130 hs TnI 0hr: 8   1134  Cxr: nad   1206 Lipase: 67   1206 GLUCOSE(!): 166   1229 LACTIC ACID: 1.1   1237 hs TnI 2hr: 9   1238 Delta 2hr hsTnI: 1   1238 Patient feeling better.  Updated regarding results so far awaiting CT results   1303 Ua normal   1425 CT abdomen pelvis:No acute findings in the abdomen or pelvis.             HEART Risk Score      Flowsheet Row Most Recent Value   Heart Score Risk Calculator    History 0 Filed at: 01/28/2024 1131   ECG 0 Filed at: 01/28/2024 1131   Age 2 Filed at: 01/28/2024 1131   Risk Factors 1 Filed at: 01/28/2024 1131   Troponin 0 Filed at: 01/28/2024 1131   HEART Score 3 Filed at: 01/28/2024 1131                          SBIRT 20yo+      Flowsheet Row Most Recent Value   Initial Alcohol Screen: US AUDIT-C     1. How often do you have a drink containing alcohol? 0 Filed at: 01/28/2024 1009   2. How many drinks containing alcohol do you have on a typical day you are drinking?  0 Filed at: 01/28/2024 1009   3b. FEMALE Any Age, or MALE 65+: How often do you have 4 or more drinks on one occassion? 0 Filed at: 01/28/2024 1009   Audit-C Score 0 Filed at: 01/28/2024 1009   AL[place]: How many times in the past year have you...    Used an illegal drug or used a prescription medication for non-medical reasons? Never Filed at: 01/28/2024 1009                      Medical Decision Making  74-year-old male presents to the ED with nonbloody, nonbilious nausea vomiting and abdominal pain that started this morning upon awakening at 7 AM.  He also complained of chest tightness and shortness of breath with the vomiting.  He did have a sick grandchild staying with him last evening with similar symptoms.  No fever.  No diarrhea.  On exam he is in no acute distress.  He does have diffuse tenderness of his abdomen without peritoneal signs or distention.  Patient had recent visit with CT surgery in December:Ascending aortic ectasia- 4.0 cm              Stable and unchanged since 2014.              Does not meet surgical  criteria.              Continue HTN and HLD management- per Cardiology and PCP.              Recommended quit smoking.                       Follow up in aortic clinic in 3 years with non-contrast chest CT scan.     Low clinical suspicion for ACS or aortic dissection/rupture.  Likely viral etiology for his vomiting given close ill contact with similar symptoms but given his history we will do basic labs, cardiac workup, will CT abdomen pelvis to rule out possibility of obstruction since he has had past surgeries.  Will treat vomiting    Amount and/or Complexity of Data Reviewed  Labs: ordered. Decision-making details documented in ED Course.  Radiology: ordered and independent interpretation performed.  ECG/medicine tests: ordered and independent interpretation performed.     Details: Normal sinus rhythm rate 88.  No ectopy.  Normal conduction.  Normal ST-T waves.  No ischemia.  No STEMI    Risk  Prescription drug management.             Disposition  Final diagnoses:   Nausea and vomiting, unspecified vomiting type   Generalized abdominal pain     Time reflects when diagnosis was documented in both MDM as applicable and the Disposition within this note       Time User Action Codes Description Comment    1/28/2024  2:26 PM Janki Goldstein Add [R11.2] Nausea and vomiting, unspecified vomiting type     1/28/2024  2:26 PM Janki Goldstein Add [R10.84] Generalized abdominal pain           ED Disposition       ED Disposition   Discharge    Condition   Good    Date/Time   Sun Jan 28, 2024 1425    Comment   Jaciel Reynaoub discharge to home/self care.                   Follow-up Information       Follow up With Specialties Details Why Contact Info    Alfie Pope DO Family Medicine Schedule an appointment as soon as possible for a visit in 2 days If symptoms worsen or return to the emergency department [address] [place] [address]  [phone]              Patient's Medications   Discharge Prescriptions     ONDANSETRON (ZOFRAN-ODT) 4 MG DISINTEGRATING TABLET    Take 1 tablet (4 mg total) by mouth every 8 (eight) hours as needed for vomiting or nausea       Start Date: 1/28/2024 End Date: --       Order Dose: 4 mg       Quantity: 10 tablet    Refills: 0       No discharge procedures on file.    PDMP Review         Value Time User    PDMP Reviewed  Yes 1/17/2020  3:47 PM Alfie Pope DO            ED Provider  Electronically Signed by             Janki Goldstein DO  01/28/24 1110       Janki Goldstein DO  01/28/24 1427

## 2024-01-30 ENCOUNTER — OFFICE VISIT (OUTPATIENT)
Dept: FAMILY MEDICINE CLINIC | Facility: CLINIC | Age: 74
End: 2024-01-30
Payer: COMMERCIAL

## 2024-01-30 VITALS
WEIGHT: 189 LBS | TEMPERATURE: 98.5 F | BODY MASS INDEX: 25.63 KG/M2 | HEART RATE: 72 BPM | OXYGEN SATURATION: 99 % | DIASTOLIC BLOOD PRESSURE: 80 MMHG | SYSTOLIC BLOOD PRESSURE: 132 MMHG

## 2024-01-30 DIAGNOSIS — F51.01 PRIMARY INSOMNIA: ICD-10-CM

## 2024-01-30 DIAGNOSIS — I10 PRIMARY HYPERTENSION: ICD-10-CM

## 2024-01-30 DIAGNOSIS — E08.43 DIABETES MELLITUS DUE TO UNDERLYING CONDITION WITH DIABETIC AUTONOMIC NEUROPATHY, WITHOUT LONG-TERM CURRENT USE OF INSULIN (HCC): Primary | ICD-10-CM

## 2024-01-30 DIAGNOSIS — I71.21 ASCENDING AORTIC ANEURYSM (HCC): ICD-10-CM

## 2024-01-30 DIAGNOSIS — J41.0 SIMPLE CHRONIC BRONCHITIS (HCC): ICD-10-CM

## 2024-01-30 DIAGNOSIS — E87.6 HYPOKALEMIA: ICD-10-CM

## 2024-01-30 DIAGNOSIS — S06.9X0A MILD TRAUMATIC BRAIN INJURY, WITHOUT LOSS OF CONSCIOUSNESS, INITIAL ENCOUNTER (HCC): ICD-10-CM

## 2024-01-30 DIAGNOSIS — R11.2 NAUSEA AND VOMITING, UNSPECIFIED VOMITING TYPE: ICD-10-CM

## 2024-01-30 DIAGNOSIS — I25.10 CORONARY ARTERY DISEASE INVOLVING NATIVE CORONARY ARTERY OF NATIVE HEART WITHOUT ANGINA PECTORIS: ICD-10-CM

## 2024-01-30 DIAGNOSIS — I10 ESSENTIAL HYPERTENSION: ICD-10-CM

## 2024-01-30 DIAGNOSIS — K29.00 ACUTE SUPERFICIAL GASTRITIS WITHOUT HEMORRHAGE: ICD-10-CM

## 2024-01-30 DIAGNOSIS — E78.2 MIXED HYPERLIPIDEMIA: ICD-10-CM

## 2024-01-30 DIAGNOSIS — N18.30 STAGE 3 CHRONIC KIDNEY DISEASE, UNSPECIFIED WHETHER STAGE 3A OR 3B CKD (HCC): ICD-10-CM

## 2024-01-30 DIAGNOSIS — L03.211 CELLULITIS OF FACE: ICD-10-CM

## 2024-01-30 LAB — SL AMB POCT HEMOGLOBIN AIC: 6.1 (ref ?–6.5)

## 2024-01-30 PROCEDURE — 1159F MED LIST DOCD IN RCRD: CPT | Performed by: FAMILY MEDICINE

## 2024-01-30 PROCEDURE — 99214 OFFICE O/P EST MOD 30 MIN: CPT | Performed by: FAMILY MEDICINE

## 2024-01-30 PROCEDURE — 1160F RVW MEDS BY RX/DR IN RCRD: CPT | Performed by: FAMILY MEDICINE

## 2024-01-30 RX ORDER — POTASSIUM CHLORIDE 20 MEQ/1
20 TABLET, EXTENDED RELEASE ORAL DAILY
Qty: 90 TABLET | Refills: 1 | Status: SHIPPED | OUTPATIENT
Start: 2024-01-30

## 2024-01-30 RX ORDER — ROSUVASTATIN CALCIUM 10 MG/1
10 TABLET, COATED ORAL DAILY
Qty: 30 TABLET | Refills: 6 | Status: SHIPPED | OUTPATIENT
Start: 2024-01-30

## 2024-01-30 RX ORDER — ZOLPIDEM TARTRATE 10 MG/1
10 TABLET ORAL
Qty: 30 TABLET | Refills: 0 | Status: SHIPPED | OUTPATIENT
Start: 2024-01-30

## 2024-01-30 RX ORDER — HYDROCHLOROTHIAZIDE 12.5 MG/1
12.5 TABLET ORAL DAILY
Qty: 90 TABLET | Refills: 0 | Status: SHIPPED | OUTPATIENT
Start: 2024-01-30

## 2024-01-30 RX ORDER — METOPROLOL SUCCINATE 25 MG/1
25 TABLET, EXTENDED RELEASE ORAL DAILY
Qty: 90 TABLET | Refills: 0 | Status: SHIPPED | OUTPATIENT
Start: 2024-01-30

## 2024-01-30 RX ORDER — ONDANSETRON 4 MG/1
4 TABLET, ORALLY DISINTEGRATING ORAL EVERY 8 HOURS PRN
Qty: 10 TABLET | Refills: 0 | Status: SHIPPED | OUTPATIENT
Start: 2024-01-30

## 2024-01-30 RX ORDER — PANTOPRAZOLE SODIUM 40 MG/1
40 TABLET, DELAYED RELEASE ORAL DAILY
Qty: 90 TABLET | Refills: 0 | Status: SHIPPED | OUTPATIENT
Start: 2024-01-30

## 2024-01-30 RX ORDER — AMLODIPINE BESYLATE 10 MG/1
10 TABLET ORAL DAILY
Qty: 90 TABLET | Refills: 0 | Status: SHIPPED | OUTPATIENT
Start: 2024-01-30

## 2024-01-30 RX ORDER — AMOXICILLIN AND CLAVULANATE POTASSIUM 875; 125 MG/1; MG/1
1 TABLET, FILM COATED ORAL EVERY 12 HOURS SCHEDULED
Qty: 14 TABLET | Refills: 0 | Status: SHIPPED | OUTPATIENT
Start: 2024-01-30 | End: 2024-02-06

## 2024-01-30 NOTE — PROGRESS NOTES
Diabetic Foot Exam    Patient's shoes and socks removed.    Right Foot/Ankle   Right Foot Inspection  Skin Exam: skin normal, skin intact and dry skin. No warmth, no callus, no erythema, no maceration, no abnormal color, no pre-ulcer, no ulcer and no callus.     Toe Exam: ROM and strength within normal limits.     Sensory   Monofilament testing: intact    Vascular  Capillary refills: < 3 seconds  The right DP pulse is 2+. The right PT pulse is 2+.     Left Foot/Ankle  Left Foot Inspection  Skin Exam: skin normal, skin intact and dry skin. No warmth, no erythema, no maceration, normal color, no pre-ulcer, no ulcer and no callus.     Toe Exam: ROM and strength within normal limits.     Sensory   Monofilament testing: intact    Vascular  Capillary refills: < 3 seconds  The left DP pulse is 2+. The left PT pulse is 2+.     Assign Risk Category  No deformity present  No loss of protective sensation  No weak pulses  Risk: 0

## 2024-01-30 NOTE — PROGRESS NOTES
Assessment/Plan: A1c 6.1 CMP CBC troponin levels lipase levels are reviewed at present time.  Previous LDL 63.  Diabetes hypertension hyperlipidemia all stable at present time continue with current regimen.  Refills given at this time.  Patient will use Augmentin for cellulitis right face.  Patient will labs prior to next visit in 6 months.  Guidance given.       Diagnoses and all orders for this visit:    Diabetes mellitus due to underlying condition with diabetic autonomic neuropathy, without long-term current use of insulin ([facility])  -     Comprehensive metabolic panel; Future  -     CBC and differential; Future  -     Albumin / creatinine urine ratio; Future  -     Hemoglobin A1C; Future  -     Lipid panel; Future  -     TSH, 3rd generation with Free T4 reflex; Future    Essential hypertension  -     amLODIPine (NORVASC) 10 mg tablet; Take 1 tablet (10 mg total) by mouth daily  -     hydroCHLOROthiazide 12.5 mg tablet; Take 1 tablet (12.5 mg total) by mouth daily  -     metoprolol succinate (TOPROL-XL) 25 mg 24 hr tablet; Take 1 tablet (25 mg total) by mouth daily  -     Comprehensive metabolic panel; Future  -     CBC and differential; Future  -     Albumin / creatinine urine ratio; Future  -     Hemoglobin A1C; Future  -     Lipid panel; Future  -     TSH, 3rd generation with Free T4 reflex; Future    Mixed hyperlipidemia  -     rosuvastatin (CRESTOR) 10 MG tablet; Take 1 tablet (10 mg total) by mouth daily  -     Comprehensive metabolic panel; Future  -     CBC and differential; Future  -     Albumin / creatinine urine ratio; Future  -     Hemoglobin A1C; Future  -     Lipid panel; Future  -     TSH, 3rd generation with Free T4 reflex; Future    Coronary artery disease involving native coronary artery of native heart without angina pectoris  -     rosuvastatin (CRESTOR) 10 MG tablet; Take 1 tablet (10 mg total) by mouth daily  -     Comprehensive metabolic panel; Future  -     CBC and differential; Future  -      Albumin / creatinine urine ratio; Future  -     Hemoglobin A1C; Future  -     Lipid panel; Future  -     TSH, 3rd generation with Free T4 reflex; Future    Ascending aortic aneurysm (HCC)  -     hydroCHLOROthiazide 12.5 mg tablet; Take 1 tablet (12.5 mg total) by mouth daily    Hypokalemia  -     potassium chloride (Klor-Con M20) 20 mEq tablet; Take 1 tablet (20 mEq total) by mouth daily    Nausea and vomiting, unspecified vomiting type  -     ondansetron (ZOFRAN-ODT) 4 mg disintegrating tablet; Take 1 tablet (4 mg total) by mouth every 8 (eight) hours as needed for vomiting or nausea    Acute superficial gastritis without hemorrhage  -     pantoprazole (PROTONIX) 40 mg tablet; Take 1 tablet (40 mg total) by mouth daily    Primary hypertension    Stage 3 chronic kidney disease, unspecified whether stage 3a or 3b CKD ([facility])  -     Comprehensive metabolic panel; Future  -     CBC and differential; Future  -     Albumin / creatinine urine ratio; Future  -     Hemoglobin A1C; Future  -     Lipid panel; Future  -     TSH, 3rd generation with Free T4 reflex; Future    Simple chronic bronchitis ([facility])    Cellulitis of face  -     amoxicillin-clavulanate (AUGMENTIN) 875-125 mg per tablet; Take 1 tablet by mouth every 12 (twelve) hours for 7 days    Mild traumatic brain injury, without loss of consciousness, initial encounter ([facility])    Primary insomnia  -     zolpidem (AMBIEN) 10 mg tablet; Take 1 tablet (10 mg total) by mouth daily at bedtime as needed for sleep            Subjective:        Patient ID: Jaciel Van is a 74 y.o. male.      Patient here to follow-up on diabetes hypertension hyperlipidemia patient status post ER for chest pain and nausea vomiting.  This resolved.  Patient did receive Zofran and IV fluids.  All laboratory studies reviewed at this time.  Patient otherwise in normal state of health is having some muscle pains of bilateral lower extremities.  Patient also having some insomnia and wishes Ambien  which he has used in the past with good results.          The following portions of the patient's history were reviewed and updated as appropriate: allergies, current medications, past family history, past medical history, past social history, past surgical history and problem list.      Review of Systems   Constitutional: Negative.    HENT: Negative.     Eyes: Negative.    Respiratory: Negative.     Cardiovascular: Negative.    Gastrointestinal: Negative.    Endocrine: Negative.    Genitourinary: Negative.    Musculoskeletal:  Positive for arthralgias and myalgias.   Skin: Negative.    Allergic/Immunologic: Negative.    Neurological: Negative.    Hematological: Negative.    Psychiatric/Behavioral:  Positive for sleep disturbance.            Objective:        Depression Screening and Follow-up Plan: Patient was screened for depression during today's encounter. They screened negative with a PHQ-2 score of 0.    Tobacco Cessation Counseling: Tobacco cessation counseling was provided. The patient is sincerely urged to quit consumption of tobacco. He is not ready to quit tobacco. Medication options discussed.             /80 (BP Location: Right arm, Patient Position: Sitting, Cuff Size: Standard)   Pulse 72   Temp 98.5 °F (36.9 °C) (Temporal)   Wt 85.7 kg (189 lb)   SpO2 99%   BMI 25.63 kg/m²          Physical Exam  Vitals and nursing note reviewed.   Constitutional:       General: He is not in acute distress.     Appearance: Normal appearance. He is not ill-appearing, toxic-appearing or diaphoretic.   HENT:      Head: Normocephalic and atraumatic.      Right Ear: Tympanic membrane, ear canal and external ear normal. There is no impacted cerumen.      Left Ear: Tympanic membrane, ear canal and external ear normal. There is no impacted cerumen.      Nose: Nose normal. No congestion or rhinorrhea.      Mouth/Throat:      Mouth: Mucous membranes are moist.      Pharynx: No oropharyngeal exudate or posterior  oropharyngeal erythema.   Eyes:      General: No scleral icterus.        Right eye: No discharge.         Left eye: No discharge.   Neck:      Vascular: No carotid bruit.   Cardiovascular:      Rate and Rhythm: Normal rate and regular rhythm.      Pulses: Normal pulses.      Heart sounds: Normal heart sounds. No murmur heard.     No friction rub. No gallop.   Pulmonary:      Effort: Pulmonary effort is normal. No respiratory distress.      Breath sounds: Normal breath sounds. No stridor. No wheezing, rhonchi or rales.   Chest:      Chest wall: No tenderness.   Musculoskeletal:         General: Tenderness present. No swelling, deformity or signs of injury.      Cervical back: Normal range of motion and neck supple. No rigidity. No muscular tenderness.      Right lower leg: No edema.      Left lower leg: No edema.   Lymphadenopathy:      Cervical: No cervical adenopathy.   Skin:     General: Skin is warm and dry.      Capillary Refill: Capillary refill takes less than 2 seconds.      Coloration: Skin is not jaundiced.      Findings: No bruising, erythema, lesion or rash.   Neurological:      Mental Status: He is alert and oriented to person, place, and time. Mental status is at baseline.      Cranial Nerves: No cranial nerve deficit.      Sensory: No sensory deficit.      Motor: No weakness.      Coordination: Coordination normal.      Gait: Gait normal.   Psychiatric:         Mood and Affect: Mood normal.         Behavior: Behavior normal.         Thought Content: Thought content normal.         Judgment: Judgment normal.

## 2024-02-21 PROBLEM — R05.9 COUGH: Status: RESOLVED | Noted: 2020-01-17 | Resolved: 2024-02-21

## 2024-02-27 DIAGNOSIS — I10 ESSENTIAL HYPERTENSION: ICD-10-CM

## 2024-02-27 DIAGNOSIS — I71.21 ASCENDING AORTIC ANEURYSM (HCC): ICD-10-CM

## 2024-02-27 RX ORDER — HYDROCHLOROTHIAZIDE 12.5 MG/1
12.5 TABLET ORAL DAILY
Qty: 90 TABLET | Refills: 1 | Status: SHIPPED | OUTPATIENT
Start: 2024-02-27

## 2024-02-27 NOTE — TELEPHONE ENCOUNTER
Medication: hydrochlorothiazide    Dose/Frequency: 12.5mg daily     Quantity: 90    Pharmacy: [id number]    Office:   [x] PCP/Provider - Dr. Pope  [] Speciality/Provider -     Does the patient have enough for 3 days?   [] Yes   [x] No - Send as HP to POD

## 2024-03-28 DIAGNOSIS — E08.43 DIABETES MELLITUS DUE TO UNDERLYING CONDITION WITH DIABETIC AUTONOMIC NEUROPATHY, WITHOUT LONG-TERM CURRENT USE OF INSULIN (HCC): ICD-10-CM

## 2024-04-26 DIAGNOSIS — I10 ESSENTIAL HYPERTENSION: ICD-10-CM

## 2024-04-27 RX ORDER — AMLODIPINE BESYLATE 10 MG/1
10 TABLET ORAL DAILY
Qty: 90 TABLET | Refills: 0 | Status: SHIPPED | OUTPATIENT
Start: 2024-04-27

## 2024-05-01 DIAGNOSIS — K29.00 ACUTE SUPERFICIAL GASTRITIS WITHOUT HEMORRHAGE: ICD-10-CM

## 2024-05-02 RX ORDER — PANTOPRAZOLE SODIUM 40 MG/1
40 TABLET, DELAYED RELEASE ORAL DAILY
Qty: 90 TABLET | Refills: 0 | Status: SHIPPED | OUTPATIENT
Start: 2024-05-02

## 2024-05-13 ENCOUNTER — APPOINTMENT (OUTPATIENT)
Dept: LAB | Facility: HOSPITAL | Age: 74
End: 2024-05-13
Payer: COMMERCIAL

## 2024-05-13 DIAGNOSIS — E78.2 MIXED HYPERLIPIDEMIA: ICD-10-CM

## 2024-05-13 DIAGNOSIS — I10 ESSENTIAL HYPERTENSION: ICD-10-CM

## 2024-05-13 DIAGNOSIS — N18.30 STAGE 3 CHRONIC KIDNEY DISEASE, UNSPECIFIED WHETHER STAGE 3A OR 3B CKD (HCC): ICD-10-CM

## 2024-05-13 DIAGNOSIS — E08.43 DIABETES MELLITUS DUE TO UNDERLYING CONDITION WITH DIABETIC AUTONOMIC NEUROPATHY, WITHOUT LONG-TERM CURRENT USE OF INSULIN (HCC): ICD-10-CM

## 2024-05-13 DIAGNOSIS — I25.10 CORONARY ARTERY DISEASE INVOLVING NATIVE CORONARY ARTERY OF NATIVE HEART WITHOUT ANGINA PECTORIS: ICD-10-CM

## 2024-05-13 LAB
ALBUMIN SERPL BCP-MCNC: 4.3 G/DL (ref 3.5–5)
ALP SERPL-CCNC: 64 U/L (ref 34–104)
ALT SERPL W P-5'-P-CCNC: 11 U/L (ref 7–52)
ANION GAP SERPL CALCULATED.3IONS-SCNC: 6 MMOL/L (ref 4–13)
AST SERPL W P-5'-P-CCNC: 11 U/L (ref 13–39)
BASOPHILS # BLD AUTO: 0.08 THOUSANDS/ÂΜL (ref 0–0.1)
BASOPHILS NFR BLD AUTO: 1 % (ref 0–1)
BILIRUB SERPL-MCNC: 0.61 MG/DL (ref 0.2–1)
BUN SERPL-MCNC: 16 MG/DL (ref 5–25)
CALCIUM SERPL-MCNC: 9.8 MG/DL (ref 8.4–10.2)
CHLORIDE SERPL-SCNC: 103 MMOL/L (ref 96–108)
CHOLEST SERPL-MCNC: 117 MG/DL
CO2 SERPL-SCNC: 32 MMOL/L (ref 21–32)
CREAT SERPL-MCNC: 1.02 MG/DL (ref 0.6–1.3)
CREAT UR-MCNC: 30 MG/DL
EOSINOPHIL # BLD AUTO: 0.32 THOUSAND/ÂΜL (ref 0–0.61)
EOSINOPHIL NFR BLD AUTO: 4 % (ref 0–6)
ERYTHROCYTE [DISTWIDTH] IN BLOOD BY AUTOMATED COUNT: 13.4 % (ref 11.6–15.1)
EST. AVERAGE GLUCOSE BLD GHB EST-MCNC: 146 MG/DL
GFR SERPL CREATININE-BSD FRML MDRD: 72 ML/MIN/1.73SQ M
GLUCOSE P FAST SERPL-MCNC: 128 MG/DL (ref 65–99)
HBA1C MFR BLD: 6.7 %
HCT VFR BLD AUTO: 46.9 % (ref 36.5–49.3)
HDLC SERPL-MCNC: 37 MG/DL
HGB BLD-MCNC: 15.1 G/DL (ref 12–17)
IMM GRANULOCYTES # BLD AUTO: 0.02 THOUSAND/UL (ref 0–0.2)
IMM GRANULOCYTES NFR BLD AUTO: 0 % (ref 0–2)
LDLC SERPL CALC-MCNC: 50 MG/DL (ref 0–100)
LYMPHOCYTES # BLD AUTO: 1.73 THOUSANDS/ÂΜL (ref 0.6–4.47)
LYMPHOCYTES NFR BLD AUTO: 24 % (ref 14–44)
MCH RBC QN AUTO: 27.9 PG (ref 26.8–34.3)
MCHC RBC AUTO-ENTMCNC: 32.2 G/DL (ref 31.4–37.4)
MCV RBC AUTO: 87 FL (ref 82–98)
MICROALBUMIN UR-MCNC: 7.2 MG/L
MICROALBUMIN/CREAT 24H UR: 24 MG/G CREATININE (ref 0–30)
MONOCYTES # BLD AUTO: 0.48 THOUSAND/ÂΜL (ref 0.17–1.22)
MONOCYTES NFR BLD AUTO: 7 % (ref 4–12)
NEUTROPHILS # BLD AUTO: 4.61 THOUSANDS/ÂΜL (ref 1.85–7.62)
NEUTS SEG NFR BLD AUTO: 64 % (ref 43–75)
NONHDLC SERPL-MCNC: 80 MG/DL
NRBC BLD AUTO-RTO: 0 /100 WBCS
PLATELET # BLD AUTO: 275 THOUSANDS/UL (ref 149–390)
PMV BLD AUTO: 9.4 FL (ref 8.9–12.7)
POTASSIUM SERPL-SCNC: 4.2 MMOL/L (ref 3.5–5.3)
PROT SERPL-MCNC: 7 G/DL (ref 6.4–8.4)
RBC # BLD AUTO: 5.42 MILLION/UL (ref 3.88–5.62)
SODIUM SERPL-SCNC: 141 MMOL/L (ref 135–147)
TRIGL SERPL-MCNC: 151 MG/DL
TSH SERPL DL<=0.05 MIU/L-ACNC: 1.1 UIU/ML (ref 0.45–4.5)
WBC # BLD AUTO: 7.24 THOUSAND/UL (ref 4.31–10.16)

## 2024-05-13 PROCEDURE — 36415 COLL VENOUS BLD VENIPUNCTURE: CPT

## 2024-05-13 PROCEDURE — 80053 COMPREHEN METABOLIC PANEL: CPT

## 2024-05-13 PROCEDURE — 82570 ASSAY OF URINE CREATININE: CPT

## 2024-05-13 PROCEDURE — 82043 UR ALBUMIN QUANTITATIVE: CPT

## 2024-05-13 PROCEDURE — 83036 HEMOGLOBIN GLYCOSYLATED A1C: CPT

## 2024-05-13 PROCEDURE — 80061 LIPID PANEL: CPT

## 2024-05-13 PROCEDURE — 85025 COMPLETE CBC W/AUTO DIFF WBC: CPT

## 2024-05-13 PROCEDURE — 84443 ASSAY THYROID STIM HORMONE: CPT

## 2024-06-06 ENCOUNTER — HOSPITAL ENCOUNTER (INPATIENT)
Facility: HOSPITAL | Age: 74
LOS: 2 days | Discharge: HOME | DRG: 149 | End: 2024-06-08
Attending: EMERGENCY MEDICINE | Admitting: INTERNAL MEDICINE
Payer: COMMERCIAL

## 2024-06-06 ENCOUNTER — TELEPHONE (OUTPATIENT)
Dept: FAMILY MEDICINE CLINIC | Facility: CLINIC | Age: 74
End: 2024-06-06

## 2024-06-06 ENCOUNTER — APPOINTMENT (EMERGENCY)
Dept: CT IMAGING | Facility: HOSPITAL | Age: 74
DRG: 149 | End: 2024-06-06
Payer: COMMERCIAL

## 2024-06-06 DIAGNOSIS — R42 DIZZINESS: Primary | ICD-10-CM

## 2024-06-06 DIAGNOSIS — I65.29 CAROTID ARTERY STENOSIS: ICD-10-CM

## 2024-06-06 DIAGNOSIS — E87.6 HYPOKALEMIA: ICD-10-CM

## 2024-06-06 DIAGNOSIS — R03.0 ELEVATED BLOOD PRESSURE READING: ICD-10-CM

## 2024-06-06 DIAGNOSIS — R42 VERTIGO: ICD-10-CM

## 2024-06-06 DIAGNOSIS — Z72.0 TOBACCO ABUSE: ICD-10-CM

## 2024-06-06 LAB
2HR DELTA HS TROPONIN: 2 NG/L
ALBUMIN SERPL BCP-MCNC: 4.4 G/DL (ref 3.5–5)
ALP SERPL-CCNC: 57 U/L (ref 34–104)
ALT SERPL W P-5'-P-CCNC: 11 U/L (ref 7–52)
ANION GAP SERPL CALCULATED.3IONS-SCNC: 8 MMOL/L (ref 4–13)
APTT PPP: 38 SECONDS (ref 23–37)
AST SERPL W P-5'-P-CCNC: 12 U/L (ref 13–39)
ATRIAL RATE: 69 BPM
BASOPHILS # BLD AUTO: 0.06 THOUSANDS/ÂΜL (ref 0–0.1)
BASOPHILS NFR BLD AUTO: 1 % (ref 0–1)
BILIRUB SERPL-MCNC: 0.77 MG/DL (ref 0.2–1)
BNP SERPL-MCNC: 29 PG/ML (ref 0–100)
BUN SERPL-MCNC: 15 MG/DL (ref 5–25)
CALCIUM SERPL-MCNC: 9.5 MG/DL (ref 8.4–10.2)
CARDIAC TROPONIN I PNL SERPL HS: 11 NG/L
CARDIAC TROPONIN I PNL SERPL HS: 13 NG/L
CHLORIDE SERPL-SCNC: 102 MMOL/L (ref 96–108)
CO2 SERPL-SCNC: 28 MMOL/L (ref 21–32)
CREAT SERPL-MCNC: 1.13 MG/DL (ref 0.6–1.3)
EOSINOPHIL # BLD AUTO: 0.2 THOUSAND/ÂΜL (ref 0–0.61)
EOSINOPHIL NFR BLD AUTO: 3 % (ref 0–6)
ERYTHROCYTE [DISTWIDTH] IN BLOOD BY AUTOMATED COUNT: 14 % (ref 11.6–15.1)
GFR SERPL CREATININE-BSD FRML MDRD: 64 ML/MIN/1.73SQ M
GLUCOSE SERPL-MCNC: 134 MG/DL (ref 65–140)
GLUCOSE SERPL-MCNC: 156 MG/DL (ref 65–140)
HCT VFR BLD AUTO: 43.6 % (ref 36.5–49.3)
HGB BLD-MCNC: 14.6 G/DL (ref 12–17)
IMM GRANULOCYTES # BLD AUTO: 0.03 THOUSAND/UL (ref 0–0.2)
IMM GRANULOCYTES NFR BLD AUTO: 0 % (ref 0–2)
INR PPP: 1.03 (ref 0.84–1.19)
LYMPHOCYTES # BLD AUTO: 1.57 THOUSANDS/ÂΜL (ref 0.6–4.47)
LYMPHOCYTES NFR BLD AUTO: 21 % (ref 14–44)
MAGNESIUM SERPL-MCNC: 2 MG/DL (ref 1.9–2.7)
MCH RBC QN AUTO: 28.9 PG (ref 26.8–34.3)
MCHC RBC AUTO-ENTMCNC: 33.5 G/DL (ref 31.4–37.4)
MCV RBC AUTO: 86 FL (ref 82–98)
MONOCYTES # BLD AUTO: 0.38 THOUSAND/ÂΜL (ref 0.17–1.22)
MONOCYTES NFR BLD AUTO: 5 % (ref 4–12)
NEUTROPHILS # BLD AUTO: 5.08 THOUSANDS/ÂΜL (ref 1.85–7.62)
NEUTS SEG NFR BLD AUTO: 70 % (ref 43–75)
NRBC BLD AUTO-RTO: 0 /100 WBCS
P AXIS: 44 DEGREES
PLATELET # BLD AUTO: 171 THOUSANDS/UL (ref 149–390)
PMV BLD AUTO: 10.9 FL (ref 8.9–12.7)
POTASSIUM SERPL-SCNC: 3.2 MMOL/L (ref 3.5–5.3)
PR INTERVAL: 176 MS
PROT SERPL-MCNC: 6.8 G/DL (ref 6.4–8.4)
PROTHROMBIN TIME: 13.8 SECONDS (ref 11.6–14.5)
QRS AXIS: 25 DEGREES
QRSD INTERVAL: 90 MS
QT INTERVAL: 386 MS
QTC INTERVAL: 413 MS
RBC # BLD AUTO: 5.06 MILLION/UL (ref 3.88–5.62)
SODIUM SERPL-SCNC: 138 MMOL/L (ref 135–147)
T WAVE AXIS: -3 DEGREES
VENTRICULAR RATE: 69 BPM
WBC # BLD AUTO: 7.32 THOUSAND/UL (ref 4.31–10.16)

## 2024-06-06 PROCEDURE — 70496 CT ANGIOGRAPHY HEAD: CPT

## 2024-06-06 PROCEDURE — 82948 REAGENT STRIP/BLOOD GLUCOSE: CPT

## 2024-06-06 PROCEDURE — 99285 EMERGENCY DEPT VISIT HI MDM: CPT

## 2024-06-06 PROCEDURE — 85025 COMPLETE CBC W/AUTO DIFF WBC: CPT | Performed by: EMERGENCY MEDICINE

## 2024-06-06 PROCEDURE — 99285 EMERGENCY DEPT VISIT HI MDM: CPT | Performed by: EMERGENCY MEDICINE

## 2024-06-06 PROCEDURE — 96361 HYDRATE IV INFUSION ADD-ON: CPT

## 2024-06-06 PROCEDURE — 99222 1ST HOSP IP/OBS MODERATE 55: CPT | Performed by: INTERNAL MEDICINE

## 2024-06-06 PROCEDURE — 70498 CT ANGIOGRAPHY NECK: CPT

## 2024-06-06 PROCEDURE — 85610 PROTHROMBIN TIME: CPT | Performed by: EMERGENCY MEDICINE

## 2024-06-06 PROCEDURE — 85730 THROMBOPLASTIN TIME PARTIAL: CPT | Performed by: EMERGENCY MEDICINE

## 2024-06-06 PROCEDURE — 36415 COLL VENOUS BLD VENIPUNCTURE: CPT | Performed by: EMERGENCY MEDICINE

## 2024-06-06 PROCEDURE — 83735 ASSAY OF MAGNESIUM: CPT | Performed by: EMERGENCY MEDICINE

## 2024-06-06 PROCEDURE — 80053 COMPREHEN METABOLIC PANEL: CPT | Performed by: EMERGENCY MEDICINE

## 2024-06-06 PROCEDURE — 93005 ELECTROCARDIOGRAM TRACING: CPT

## 2024-06-06 PROCEDURE — 84484 ASSAY OF TROPONIN QUANT: CPT | Performed by: EMERGENCY MEDICINE

## 2024-06-06 PROCEDURE — 93010 ELECTROCARDIOGRAM REPORT: CPT

## 2024-06-06 PROCEDURE — 96374 THER/PROPH/DIAG INJ IV PUSH: CPT

## 2024-06-06 PROCEDURE — 83880 ASSAY OF NATRIURETIC PEPTIDE: CPT | Performed by: EMERGENCY MEDICINE

## 2024-06-06 RX ORDER — ENOXAPARIN SODIUM 100 MG/ML
40 INJECTION SUBCUTANEOUS DAILY
Status: DISCONTINUED | OUTPATIENT
Start: 2024-06-07 | End: 2024-06-08 | Stop reason: HOSPADM

## 2024-06-06 RX ORDER — LORATADINE 10 MG/1
10 TABLET ORAL DAILY
Status: DISCONTINUED | OUTPATIENT
Start: 2024-06-07 | End: 2024-06-08 | Stop reason: HOSPADM

## 2024-06-06 RX ORDER — BRIMONIDINE TARTRATE 2 MG/ML
1 SOLUTION/ DROPS OPHTHALMIC 3 TIMES DAILY
COMMUNITY
Start: 2024-06-05

## 2024-06-06 RX ORDER — CETIRIZINE HYDROCHLORIDE 10 MG/1
10 TABLET, CHEWABLE ORAL DAILY
COMMUNITY

## 2024-06-06 RX ORDER — POTASSIUM CHLORIDE 20 MEQ/1
40 TABLET, EXTENDED RELEASE ORAL ONCE
Status: COMPLETED | OUTPATIENT
Start: 2024-06-06 | End: 2024-06-06

## 2024-06-06 RX ORDER — CLOPIDOGREL BISULFATE 75 MG/1
300 TABLET ORAL ONCE
Status: COMPLETED | OUTPATIENT
Start: 2024-06-06 | End: 2024-06-06

## 2024-06-06 RX ORDER — METOPROLOL SUCCINATE 25 MG/1
25 TABLET, EXTENDED RELEASE ORAL DAILY
Status: DISCONTINUED | OUTPATIENT
Start: 2024-06-07 | End: 2024-06-08 | Stop reason: HOSPADM

## 2024-06-06 RX ORDER — NICOTINE 21 MG/24HR
1 PATCH, TRANSDERMAL 24 HOURS TRANSDERMAL DAILY
Status: DISCONTINUED | OUTPATIENT
Start: 2024-06-07 | End: 2024-06-08 | Stop reason: HOSPADM

## 2024-06-06 RX ORDER — MECLIZINE HYDROCHLORIDE 25 MG/1
25 TABLET ORAL ONCE
Status: COMPLETED | OUTPATIENT
Start: 2024-06-06 | End: 2024-06-06

## 2024-06-06 RX ORDER — AMLODIPINE BESYLATE 10 MG/1
10 TABLET ORAL DAILY
Status: DISCONTINUED | OUTPATIENT
Start: 2024-06-07 | End: 2024-06-08 | Stop reason: HOSPADM

## 2024-06-06 RX ORDER — ASPIRIN 325 MG
325 TABLET ORAL ONCE
Status: COMPLETED | OUTPATIENT
Start: 2024-06-06 | End: 2024-06-06

## 2024-06-06 RX ORDER — PANTOPRAZOLE SODIUM 40 MG/1
40 TABLET, DELAYED RELEASE ORAL
Status: DISCONTINUED | OUTPATIENT
Start: 2024-06-07 | End: 2024-06-08 | Stop reason: HOSPADM

## 2024-06-06 RX ORDER — ATORVASTATIN CALCIUM 40 MG/1
40 TABLET, FILM COATED ORAL
Status: DISCONTINUED | OUTPATIENT
Start: 2024-06-06 | End: 2024-06-08 | Stop reason: HOSPADM

## 2024-06-06 RX ORDER — BRIMONIDINE TARTRATE 2 MG/ML
1 SOLUTION/ DROPS OPHTHALMIC 3 TIMES DAILY
Status: DISCONTINUED | OUTPATIENT
Start: 2024-06-06 | End: 2024-06-08 | Stop reason: HOSPADM

## 2024-06-06 RX ORDER — HYDROCHLOROTHIAZIDE 12.5 MG/1
12.5 TABLET ORAL DAILY
Status: DISCONTINUED | OUTPATIENT
Start: 2024-06-07 | End: 2024-06-08 | Stop reason: HOSPADM

## 2024-06-06 RX ORDER — SODIUM CHLORIDE 9 MG/ML
150 INJECTION, SOLUTION INTRAVENOUS CONTINUOUS
Status: DISCONTINUED | OUTPATIENT
Start: 2024-06-06 | End: 2024-06-07

## 2024-06-06 RX ORDER — ONDANSETRON 2 MG/ML
4 INJECTION INTRAMUSCULAR; INTRAVENOUS ONCE
Status: COMPLETED | OUTPATIENT
Start: 2024-06-06 | End: 2024-06-06

## 2024-06-06 RX ORDER — POTASSIUM CHLORIDE 20 MEQ/1
20 TABLET, EXTENDED RELEASE ORAL DAILY
Status: DISCONTINUED | OUTPATIENT
Start: 2024-06-07 | End: 2024-06-08

## 2024-06-06 RX ORDER — ACETAMINOPHEN 325 MG/1
650 TABLET ORAL ONCE
Status: COMPLETED | OUTPATIENT
Start: 2024-06-06 | End: 2024-06-06

## 2024-06-06 RX ADMIN — POTASSIUM CHLORIDE 40 MEQ: 1500 TABLET, EXTENDED RELEASE ORAL at 19:20

## 2024-06-06 RX ADMIN — CLOPIDOGREL BISULFATE 300 MG: 75 TABLET ORAL at 21:57

## 2024-06-06 RX ADMIN — MECLIZINE HYDROCHLORIDE 25 MG: 25 TABLET ORAL at 19:20

## 2024-06-06 RX ADMIN — ASPIRIN 325 MG ORAL TABLET 325 MG: 325 PILL ORAL at 21:57

## 2024-06-06 RX ADMIN — ACETAMINOPHEN 325MG 650 MG: 325 TABLET ORAL at 19:20

## 2024-06-06 RX ADMIN — SODIUM CHLORIDE 150 ML/HR: 0.9 INJECTION, SOLUTION INTRAVENOUS at 18:00

## 2024-06-06 RX ADMIN — ONDANSETRON 4 MG: 2 INJECTION INTRAMUSCULAR; INTRAVENOUS at 17:54

## 2024-06-06 RX ADMIN — MECLIZINE HYDROCHLORIDE 25 MG: 25 TABLET ORAL at 17:54

## 2024-06-06 RX ADMIN — IOHEXOL 85 ML: 350 INJECTION, SOLUTION INTRAVENOUS at 19:12

## 2024-06-06 NOTE — ED PROVIDER NOTES
History  Chief Complaint   Patient presents with    Dizziness     Pt reports feeling dizzy and nauseous since this morning.      Patient is a 74-year-old male here with family coming in today for complaints of dizziness nauseousness and some head discomfort this morning.  He has a history of diabetes, hypertension, hyperlipidemia, coronary artery disease, ascending aortic aneurysm, gastritis without hemorrhage, chronic kidney disease stage III, insomnia reports that he was feeling well yesterday.  He woke up this morning and felt like he was dizzy.  He initially describes as feeling wobbly.  However when he went to lay down he felt like the room was spinning and he had to sit up.  He states whenever he lies down that most of the symptoms start but resolved with sitting up.   It is associated with nausea without any vomiting.  He has no fevers, chills, vomiting, diarrhea, chest pain, palpitations, syncope.  No lower extremity edema.  He states that throughout the morning his blood pressure was markedly elevated systolically greater than 180.  He did take an extra half tablet of his metoprolol.  He has no focal weakness throughout the bilateral lower extremities or lower extremities.  He has no focal paresthesias throughout the bilateral upper extremities or lower extremities.  He has been taking his medications as prescribed without any recent changes.  No falls or injury.  He denies any tenderness or diplopia      History provided by:  Medical records, patient and spouse   used: No    Dizziness  Quality:  Imbalance  Severity:  Moderate  Timing:  Intermittent  Progression:  Waxing and waning  Chronicity:  New  Context: not when bending over, not with bowel movement, not with ear pain, not with eye movement, not with head movement, not with inactivity, not with loss of consciousness, not with medication, not with physical activity, not when standing up and not when urinating    Relieved by:   Nothing  Worsened by:  Nothing  Ineffective treatments:  None tried  Associated symptoms: no blood in stool, no chest pain, no diarrhea, no headaches, no hearing loss, no nausea, no palpitations, no shortness of breath, no syncope, no tinnitus, no vision changes, no vomiting and no weakness    Risk factors: no anemia, no heart disease, no hx of stroke, no hx of vertigo, no Meniere's disease, no multiple medications and no new medications        Prior to Admission Medications   Prescriptions Last Dose Informant Patient Reported? Taking?   Blood Glucose Monitoring Suppl (ONE TOUCH ULTRA 2) w/Device KIT  Spouse/Significant Other, Self Yes No   Sig: CHECK BLOOD SUGARS DAILY   Lancets (OneTouch Delica Plus [id number]) MISC  Spouse/Significant Other, Self Yes No   Sig: daily Check blood sugar   OneTouch Ultra test strip  Spouse/Significant Other, Self Yes No   Sig: USE STRIP TO CHECK GLUCOSE ONCE DAILY   amLODIPine (NORVASC) 10 mg tablet   No Yes   Sig: Take 1 tablet by mouth once daily   aspirin (ECOTRIN LOW STRENGTH) 81 mg EC tablet  Spouse/Significant Other, Self Yes Yes   Sig: Take 81 mg by mouth daily   brimonidine tartrate 0.2 % ophthalmic solution   Yes Yes   Sig: Administer 1 drop to both eyes 3 (three) times a day   cetirizine (ZyrTEC) 10 MG chewable tablet   Yes Yes   Sig: Chew 10 mg daily   cholecalciferol (VITAMIN D3) 400 units tablet Not Taking Spouse/Significant Other, Self Yes No   Sig: Take 400 Units by mouth daily   Patient not taking: Reported on 6/6/2024   hydroCHLOROthiazide 12.5 mg tablet   No Yes   Sig: Take 1 tablet (12.5 mg total) by mouth daily   metFORMIN (GLUCOPHAGE) 500 mg tablet   No Yes   Sig: TAKE 1 TABLET BY MOUTH TWICE DAILY WITH MEALS   metoprolol succinate (TOPROL-XL) 25 mg 24 hr tablet   No Yes   Sig: Take 1 tablet (25 mg total) by mouth daily   mometasone (ELOCON) 0.1 % lotion  Spouse/Significant Other, Self No No   Sig: Apply topically daily   ondansetron (ZOFRAN-ODT) 4 mg  "disintegrating tablet Not Taking  No No   Sig: Take 1 tablet (4 mg total) by mouth every 8 (eight) hours as needed for vomiting or nausea   Patient not taking: Reported on 6/6/2024   pantoprazole (PROTONIX) 40 mg tablet   No Yes   Sig: Take 1 tablet by mouth once daily   potassium chloride (Klor-Con M20) 20 mEq tablet   No Yes   Sig: Take 1 tablet (20 mEq total) by mouth daily   prednisoLONE acetate (PRED FORTE) 1 % ophthalmic suspension Not Taking Spouse/Significant Other, Self Yes No   Patient not taking: Reported on 6/6/2024   rosuvastatin (CRESTOR) 10 MG tablet   No Yes   Sig: Take 1 tablet (10 mg total) by mouth daily   zolpidem (AMBIEN) 10 mg tablet Not Taking  No No   Sig: Take 1 tablet (10 mg total) by mouth daily at bedtime as needed for sleep   Patient not taking: Reported on 6/6/2024      Facility-Administered Medications: None       Past Medical History:   Diagnosis Date    Abnormal CAT scan     Bilateral inguinal hernia 1990    recurrent    Colitis     Gastritis 12/11/2012    Hypertension     Impaired fasting glucose 7/9/2019    Nausea & vomiting     Right knee meniscal tear 12/20/2017    Sleep apnea     uses cpap       Past Surgical History:   Procedure Laterality Date    CHOLECYSTECTOMY      COLONOSCOPY  2017    ABOUT 2-3 YEARS AGO    COLONOSCOPY  06/02/2022    EXPLORATORY LAPAROTOMY  08/16/2014    done for repair of peritoneal \"RENT\"?    HEMORRHOID SURGERY      INGUINAL HERNIA REPAIR Bilateral 08/14/2014    CA ESOPHAGOGASTRODUODENOSCOPY TRANSORAL DIAGNOSTIC N/A 02/23/2017    Procedure: EGD AND COLONOSCOPY;  Surgeon: Harrison Turcios MD;  Location: [facility];  Service: Gastroenterology    US GUIDED THYROID BIOPSY  03/15/2021       Family History   Problem Relation Age of Onset    Breast cancer Mother     Heart disease Father     Heart disease Brother     Heart disease Family      I have reviewed and agree with the history as documented.    E-Cigarette/Vaping    E-Cigarette Use Never User  "     E-Cigarette/Vaping Substances    Nicotine No     THC No     CBD No     Flavoring No     Other No     Unknown No      Social History     Tobacco Use    Smoking status: Some Days     Current packs/day: 0.00     Average packs/day: 0.3 packs/day for 40.0 years (10.0 ttl pk-yrs)     Types: Cigarettes     Start date: 1979     Last attempt to quit: 2019     Years since quittin.5    Smokeless tobacco: Never   Vaping Use    Vaping status: Never Used   Substance Use Topics    Alcohol use: Not Currently     Comment: special occ.    Drug use: No       Review of Systems   Constitutional: Negative.  Negative for chills and fever.   HENT: Negative.  Negative for ear pain, hearing loss, sore throat and tinnitus.    Eyes: Negative.  Negative for pain and visual disturbance.   Respiratory: Negative.  Negative for cough and shortness of breath.    Cardiovascular: Negative.  Negative for chest pain, palpitations and syncope.   Gastrointestinal: Negative.  Negative for abdominal pain, blood in stool, diarrhea, nausea and vomiting.   Genitourinary: Negative.  Negative for dysuria and hematuria.   Musculoskeletal: Negative.  Negative for arthralgias and back pain.   Skin:  Negative for color change and rash.   Neurological:  Positive for dizziness. Negative for seizures, syncope, weakness and headaches.   Hematological: Negative.    Psychiatric/Behavioral: Negative.     All other systems reviewed and are negative.      Physical Exam  Physical Exam  Vitals and nursing note reviewed.   Constitutional:       General: He is not in acute distress.     Appearance: Normal appearance. He is well-developed.   HENT:      Head: Normocephalic and atraumatic.      Comments: Patient maintaining airway and secretions. No stridor . No brawniness under tongue.          Right Ear: External ear normal.      Left Ear: External ear normal.      Nose: Nose normal.      Mouth/Throat:      Mouth: Mucous membranes are moist.   Eyes:      Extraocular  Movements: Extraocular movements intact.      Conjunctiva/sclera: Conjunctivae normal.      Pupils: Pupils are equal, round, and reactive to light.   Neck:      Trachea: Trachea normal.   Cardiovascular:      Rate and Rhythm: Normal rate and regular rhythm.      Pulses:           Radial pulses are 2+ on the right side and 2+ on the left side.        Dorsalis pedis pulses are 2+ on the right side and 2+ on the left side.      Heart sounds: Normal heart sounds, S1 normal and S2 normal. No murmur heard.  Pulmonary:      Effort: Pulmonary effort is normal. No respiratory distress.      Breath sounds: Normal breath sounds.      Comments: No conversational dyspnea  Abdominal:      Palpations: Abdomen is soft.      Tenderness: There is no abdominal tenderness.   Musculoskeletal:         General: No swelling.      Cervical back: Neck supple. No rigidity.      Right lower leg: No edema.      Left lower leg: No edema.   Lymphadenopathy:      Cervical: No cervical adenopathy.   Skin:     General: Skin is warm and dry.      Capillary Refill: Capillary refill takes less than 2 seconds.   Neurological:      General: No focal deficit present.      Mental Status: He is alert and oriented to person, place, and time.      GCS: GCS eye subscore is 4. GCS verbal subscore is 5. GCS motor subscore is 6.      Cranial Nerves: Cranial nerves 2-12 are intact.      Sensory: Sensation is intact.      Motor: Motor function is intact.      Coordination: Coordination is intact.      Comments: Cranial nerves 2-12 grossly intact.  EOMI.  PERRLA.  No slurred speech.  No facial asymmetry.  No tongue deviation.  Negative pronator drift.  No nystagmus.    Patient can move bilateral upper extremities and lower extremities independently of each other pain-free with full active range of motion throughout the bilateral shoulders, elbows, wrists, hips, knees and ankles.    Manual muscle grade 5/5 throughout the bilateral upper extremities and lower  "extremities.    Patient does complain of \"an odd feeling to the frontal aspect with turning his head right or left   Psychiatric:         Mood and Affect: Mood normal.         Behavior: Behavior normal.         Vital Signs  ED Triage Vitals   Temperature Pulse Respirations Blood Pressure SpO2   06/06/24 1527 06/06/24 1527 06/06/24 1527 06/06/24 1527 06/06/24 1527   97.8 °F (36.6 °C) 72 20 163/82 97 %      Temp Source Heart Rate Source Patient Position - Orthostatic VS BP Location FiO2 (%)   06/06/24 1527 06/06/24 1527 06/06/24 1549 06/06/24 1527 --   Oral Monitor Lying Right arm       Pain Score       06/06/24 1920       4           Vitals:    06/06/24 1549 06/06/24 1845 06/06/24 1900 06/06/24 2015   BP: 123/73  149/81 157/78   Pulse: 69 66 65 66   Patient Position - Orthostatic VS: Lying  Lying          Visual Acuity      ED Medications  Medications   sodium chloride 0.9 % infusion (150 mL/hr Intravenous New Bag 6/6/24 1800)   meclizine (ANTIVERT) tablet 25 mg (25 mg Oral Given 6/6/24 1754)   ondansetron (ZOFRAN) injection 4 mg (4 mg Intravenous Given 6/6/24 1754)   potassium chloride (Klor-Con M20) CR tablet 40 mEq (40 mEq Oral Given 6/6/24 1920)   meclizine (ANTIVERT) tablet 25 mg (25 mg Oral Given 6/6/24 1920)   acetaminophen (TYLENOL) tablet 650 mg (650 mg Oral Given 6/6/24 1920)   iohexol (OMNIPAQUE) 350 MG/ML injection (MULTI-DOSE) 85 mL (85 mL Intravenous Given 6/6/24 1912)   aspirin tablet 325 mg (325 mg Oral Given 6/6/24 2157)   clopidogrel (PLAVIX) tablet 300 mg (300 mg Oral Given 6/6/24 2157)       Diagnostic Studies  Results Reviewed       Procedure Component Value Units Date/Time    HS Troponin I 2hr [[id number]]  (Normal) Collected: 06/06/24 2012    Lab Status: Final result Specimen: Blood from Arm, Left Updated: 06/06/24 2043     hs TnI 2hr 13 ng/L      Delta 2hr hsTnI 2 ng/L     B-Type Natriuretic Peptide(BNP) [[id number]]  (Normal) Collected: 06/06/24 1802    Lab Status: Final result Specimen: Blood " from Arm, Left Updated: 06/06/24 1842     BNP 29 pg/mL     HS Troponin 0hr (reflex protocol) [[id number]]  (Normal) Collected: 06/06/24 1801    Lab Status: Final result Specimen: Blood from Arm, Left Updated: 06/06/24 1842     hs TnI 0hr 11 ng/L     Comprehensive metabolic panel [[id number]]  (Abnormal) Collected: 06/06/24 1801    Lab Status: Final result Specimen: Blood from Arm, Left Updated: 06/06/24 1840     Sodium 138 mmol/L      Potassium 3.2 mmol/L      Chloride 102 mmol/L      CO2 28 mmol/L      ANION GAP 8 mmol/L      BUN 15 mg/dL      Creatinine 1.13 mg/dL      Glucose 134 mg/dL      Calcium 9.5 mg/dL      AST 12 U/L      ALT 11 U/L      Alkaline Phosphatase 57 U/L      Total Protein 6.8 g/dL      Albumin 4.4 g/dL      Total Bilirubin 0.77 mg/dL      eGFR 64 ml/min/1.73sq m     Narrative:      [employer] Kid[employer] guidelines for Chronic Kidney Disease (CKD):     Stage 1 with normal or high GFR (GFR > 90 mL/min/1.73 square meters)    Stage 2 Mild CKD (GFR = 60-89 mL/min/1.73 square meters)    Stage 3A Moderate CKD (GFR = 45-59 mL/min/1.73 square meters)    Stage 3B Moderate CKD (GFR = 30-44 mL/min/1.73 square meters)    Stage 4 Severe CKD (GFR = 15-29 mL/min/1.73 square meters)    Stage 5 End Stage CKD (GFR <15 mL/min/1.73 square meters)  Note: GFR calculation is accurate only with a steady state creatinine    Magnesium [[id number]]  (Normal) Collected: 06/06/24 1801    Lab Status: Final result Specimen: Blood from Arm, Left Updated: 06/06/24 1840     Magnesium 2.0 mg/dL     Protime-INR [[id number]]  (Normal) Collected: 06/06/24 1801    Lab Status: Final result Specimen: Blood from Arm, Left Updated: 06/06/24 1837     Protime 13.8 seconds      INR 1.03    APTT [[id number]]  (Abnormal) Collected: 06/06/24 1801    Lab Status: Final result Specimen: Blood from Arm, Left Updated: 06/06/24 1837     PTT 38 seconds     CBC and differential [[id number]] Collected: 06/06/24 1801    Lab Status: Final result  Specimen: Blood from Arm, Left Updated: 06/06/24 1817     WBC 7.32 Thousand/uL      RBC 5.06 Million/uL      Hemoglobin 14.6 g/dL      Hematocrit 43.6 %      MCV 86 fL      MCH 28.9 pg      MCHC 33.5 g/dL      RDW 14.0 %      MPV 10.9 fL      Platelets 171 Thousands/uL      nRBC 0 /100 WBCs      Segmented % 70 %      Immature Grans % 0 %      Lymphocytes % 21 %      Monocytes % 5 %      Eosinophils Relative 3 %      Basophils Relative 1 %      Absolute Neutrophils 5.08 Thousands/µL      Absolute Immature Grans 0.03 Thousand/uL      Absolute Lymphocytes 1.57 Thousands/µL      Absolute Monocytes 0.38 Thousand/µL      Eosinophils Absolute 0.20 Thousand/µL      Basophils Absolute 0.06 Thousands/µL     Fingerstick Glucose (POCT) [[id number]]  (Abnormal) Collected: 06/06/24 1547    Lab Status: Final result Specimen: Blood Updated: 06/06/24 1550     POC Glucose 156 mg/dl                    CTA head and neck with and without contrast   Final Result by E. Alec Schoenberger, MD (06/06 2006)      No acute intracranial pathology. Chronic microangiopathy.   Severe (approximately 75% stenosis) in the proximal right internal carotid artery   No significant stenosis of the left cervical carotid or vertebral arteries.   Moderate to severe stenosis in the cavernous segment of the left internal carotid artery   Occlusion of the right A1 segment with distal reconstitution is new since 2010. Age-indeterminate but could be chronic.   Moderate stenosis in the right vertebral artery.   No other high-grade stenosis or acute large vessel occlusion.      The study was marked in EPIC for immediate notification.      Workstation performed: [id number]                      Procedures  ECG 12 Lead Documentation Only    Date/Time: 6/6/2024 5:08 PM    Performed by: Vero Bhandari DO  Authorized by: Vero Bhandari DO    Indications / Diagnosis:  Dizzy  ECG reviewed by me, the ED Provider: yes    Patient location:  ED  Previous ECG:     Previous  "ECG:  Compared to current    Comparison ECG info:  January 28, 2024  Interpretation:     Interpretation: normal    Quality:     Tracing quality:  Limited by artifact  Rate:     ECG rate:  70    ECG rate assessment: normal    Rhythm:     Rhythm: sinus rhythm    Ectopy:     Ectopy: none    QRS:     QRS axis:  Normal    QRS intervals:  Normal  Conduction:     Conduction: normal    ST segments:     ST segments:  Non-specific  T waves:     T waves: non-specific    Comments:      Diffuse artifact  QTc measured at 413 ms normal axis             ED Course        ED Course as of 06/06/24 2238   Thu Jun 06, 2024   1702 Patient is a pleasant 74-year-old male coming in today with complaints of dizziness and elevated blood pressure since this morning.  On exam he is neuro intact with no focal deficits.  He states that his symptoms are worse with lying flat.  Will give Zofran, meclizine, obtain EKG lab work as well as CT angio head and neck    Disclosure: Voice to text software was used in the preparation of this document and could have resulted in translational errors.      Occasional wrong word or \"sound a like\" substitutions may have occurred due to the inherent limitations of voice recognition software.  Read the chart carefully and recognize, using context, where substitutions have occurred.       I have independently reviewed external records are available to me to the level of detail possible within the time constraints of my patient care responsibilities in the ED.       1906 Patient is being wheeled to CT and still has a mild headache.  Will give Tylenol repeat Antivert as he still is symptomatic.  Will replace potassium.      Reviewed results with patient's wife as well     2043 CTA:    No acute intracranial pathology. Chronic microangiopathy.  Severe (approximately 75% stenosis) in the proximal right internal carotid artery  No significant stenosis of the left cervical carotid or vertebral arteries.  Moderate to severe " stenosis in the cavernous segment of the left internal carotid artery  Occlusion of the right A1 segment with distal reconstitution is new since 2010. Age-indeterminate but could be chronic.  Moderate stenosis in the right vertebral artery.  No other high-grade stenosis or acute large vessel occlusion.     The study was marked in EPIC for immediate notification.        2052 Long discussion with patient and family regarding CT findings and workup.  Delta troponin of 2.  Patient still has symptoms and still symptomatic.  Will plan for admission.  He is agreeable    On exam he is resting in bed in no acute distress EOMI.  PERRLA.  Patient sitting upright without any neurological deficits.     2103 Patient's wife wished to discuss with me and states that patient has been having increase in more confusion and decreased concentration over the past several weeks.     2122 Discussing with medicine and will reach out to neurology for further input     2126 Discussed case with neurology Dr. 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  Agree stroke pathway and keep blood pressure systolic 150-160 as well as load with aspirin 325 mg and 300 mg Plavix.  Will continue aspirin 81 mg and Plavix 75 mg tomorrow        Updated medicine team regarding my discussion with neurology.         HEART Risk Score      Flowsheet Row Most Recent Value   Heart Score Risk Calculator    History 0 Filed at: 06/06/2024 1907   ECG 1 Filed at: 06/06/2024 1907   Age 2 Filed at: 06/06/2024 1907   Risk Factors 2 Filed at: 06/06/2024 1907   Troponin 0 Filed at: 06/06/2024 1907   HEART Score 5 Filed at: 06/06/2024 1907             Stroke Assessment       Row Name 06/06/24 1710             NIH Stroke Scale    Interval Baseline      Level of Consciousness (1a.) 0      LOC Questions (1b.) 0      LOC Commands (1c.) 0      Best Gaze (2.) 0      Visual (3.) 0      Facial Palsy (4.) 0      Motor  Arm, Left (5a.) 0      Motor Arm, Right (5b.) 0      Motor Leg, Left (6a.) 0      Motor Leg, Right (6b.) 0      Limb Ataxia (7.) 0      Sensory (8.) 0      Best Language (9.) 0      Dysarthria (10.) 0      Extinction and Inattention (11.) (Formerly Neglect) 0      Total 0                                              Medical Decision Making   Differential diagnosis includes but not limited to: BPPV, Menière's, labyrinthitis, CVA, TIA, arrhthymias, ERICKA, electrolyte dysfunction, orthostatic hypotension, dehydration, medication reaction, OM, vestibular neuritis.       Based on history and physical concerning for hypertensive encephalopathy versus vertigo versus TIA/CVA versus electrolyte abnormalities versus Ménière's    Problems Addressed:  Dizziness: acute illness or injury  Hypokalemia: acute illness or injury    Amount and/or Complexity of Data Reviewed  Independent Historian: spouse     Details: Wife at bedside  External Data Reviewed: notes.     Details: Patient's last stress test in June 2023 showed no arrhythmia with no evidence of inducible ischemia.  Echo shows EF of 55%.  Patient did have CT imaging of chest in November 2023 which continues to show ectatic ascending thoracic aorta 4.0 cm which is stable  Labs: ordered. Decision-making details documented in ED Course.     Details: No anemia, thrombocytopenia or leukocytosis   no acute kidney injury or electrolyte dysfunction except for potassium mildly low at 3.2  troponin 11 with a heart score of 5.  Delta troponin of 2  Coags within normal range  BNP within normal range      Radiology: ordered. Decision-making details documented in ED Course.     Details: CTA interpreted by radiologist at acute intracranial pathology. Chronic microangiopathy.  Severe (approximately 75% stenosis) in the proximal right internal carotid artery  No significant stenosis of the left cervical carotid or vertebral arteries.  Moderate to severe stenosis in the cavernous segment of  the left internal carotid artery  Occlusion of the right A1 segment with distal reconstitution is new since 2010. Age-indeterminate but could be chronic.  Moderate stenosis in the right vertebral artery.  No other high-grade stenosis or acute large vessel occlusion.     The study was marked in EPIC for immediate notification.       ECG/medicine tests: ordered and independent interpretation performed. Decision-making details documented in ED Course.     Details: No ischemia or arrhythmia  Discussion of management or test interpretation with external provider(s): Neurology    Risk  OTC drugs.  Prescription drug management.  Decision regarding hospitalization.             Disposition  Final diagnoses:   Dizziness   Hypokalemia   Carotid artery stenosis     Time reflects when diagnosis was documented in both MDM as applicable and the Disposition within this note       Time User Action Codes Description Comment    6/6/2024  5:17 PM Vero Bhandari [R42] Dizziness     6/6/2024  7:08 PM Vero Bhandari [E87.6] Hypokalemia     6/6/2024 10:37 PM Vero Bhandari [I65.29] Carotid artery stenosis           ED Disposition       ED Disposition   Admit    Condition   Stable    Date/Time   Thu Jun 6, 2024 2129    Comment   Case was discussed with Dr Gao the patient's admission status was agreed to be Admission Status: inpatient status to the service of Dr. Woo               Follow-up Information    None         Patient's Medications   Discharge Prescriptions    No medications on file       No discharge procedures on file.    PDMP Review         Value Time User    PDMP Reviewed  Yes 1/17/2020  3:47 PM Alfie Pope DO            ED Provider  Electronically Signed by             Vero Bhandari DO  06/06/24 8158

## 2024-06-06 NOTE — TELEPHONE ENCOUNTER
The reason for the appointment in the note section was not there initially at the time of doing my initial message.  I then saw that he was placed on the schedule for dizziness and elevated blood pressure.    I did call and speak to Mrs. Van who advised me that his symptoms started this morning.  She states that his blood pressure has been around 170s which is unusual for him.  She states he has been feeling dizzy especially with change in position.  She states these are all new symptoms.  I did review his medical history which is quite extensive.  I did advise her that he should be further evaluated in the emergency room because he does require stat testing.  His wife was agreeable to take him to the emergency room.  She did not want to call 911.  I did advise her I will put an order in the system alerting [facility] that she is bringing him so there is no delay in his care.  Thank you

## 2024-06-07 ENCOUNTER — APPOINTMENT (INPATIENT)
Dept: NON INVASIVE DIAGNOSTICS | Facility: HOSPITAL | Age: 74
DRG: 149 | End: 2024-06-07
Payer: COMMERCIAL

## 2024-06-07 ENCOUNTER — APPOINTMENT (INPATIENT)
Dept: MRI IMAGING | Facility: HOSPITAL | Age: 74
DRG: 149 | End: 2024-06-07
Payer: COMMERCIAL

## 2024-06-07 ENCOUNTER — TELEPHONE (OUTPATIENT)
Dept: VASCULAR SURGERY | Facility: CLINIC | Age: 74
End: 2024-06-07

## 2024-06-07 PROBLEM — I65.29 CAROTID ARTERY STENOSIS: Status: ACTIVE | Noted: 2024-06-07

## 2024-06-07 PROBLEM — I71.21 ASCENDING AORTIC ANEURYSM (HCC): Status: ACTIVE | Noted: 2024-06-07

## 2024-06-07 PROBLEM — E08.43 DIABETES MELLITUS DUE TO UNDERLYING CONDITION WITH DIABETIC AUTONOMIC NEUROPATHY, WITHOUT LONG-TERM CURRENT USE OF INSULIN (HCC): Status: RESOLVED | Noted: 2021-02-09 | Resolved: 2024-06-07

## 2024-06-07 LAB
ANION GAP SERPL CALCULATED.3IONS-SCNC: 5 MMOL/L (ref 4–13)
AORTIC ROOT: 3.8 CM
APICAL FOUR CHAMBER EJECTION FRACTION: 57 %
ASCENDING AORTA: 4.2 CM
ATRIAL RATE: 64 BPM
BASOPHILS # BLD AUTO: 0.06 THOUSANDS/ÂΜL (ref 0–0.1)
BASOPHILS NFR BLD AUTO: 1 % (ref 0–1)
BSA FOR ECHO PROCEDURE: 2.07 M2
BUN SERPL-MCNC: 12 MG/DL (ref 5–25)
CALCIUM SERPL-MCNC: 9 MG/DL (ref 8.4–10.2)
CHLORIDE SERPL-SCNC: 109 MMOL/L (ref 96–108)
CHOLEST SERPL-MCNC: 109 MG/DL
CO2 SERPL-SCNC: 27 MMOL/L (ref 21–32)
CREAT SERPL-MCNC: 0.92 MG/DL (ref 0.6–1.3)
E WAVE DECELERATION TIME: 292 MS
E/A RATIO: 0.71
EOSINOPHIL # BLD AUTO: 0.17 THOUSAND/ÂΜL (ref 0–0.61)
EOSINOPHIL NFR BLD AUTO: 4 % (ref 0–6)
ERYTHROCYTE [DISTWIDTH] IN BLOOD BY AUTOMATED COUNT: 14 % (ref 11.6–15.1)
FRACTIONAL SHORTENING: 30 (ref 28–44)
GFR SERPL CREATININE-BSD FRML MDRD: 82 ML/MIN/1.73SQ M
GLUCOSE SERPL-MCNC: 116 MG/DL (ref 65–140)
GLUCOSE SERPL-MCNC: 119 MG/DL (ref 65–140)
GLUCOSE SERPL-MCNC: 146 MG/DL (ref 65–140)
GLUCOSE SERPL-MCNC: 175 MG/DL (ref 65–140)
GLUCOSE SERPL-MCNC: 188 MG/DL (ref 65–140)
HCT VFR BLD AUTO: 42.4 % (ref 36.5–49.3)
HDLC SERPL-MCNC: 33 MG/DL
HGB BLD-MCNC: 14.2 G/DL (ref 12–17)
IMM GRANULOCYTES # BLD AUTO: 0.01 THOUSAND/UL (ref 0–0.2)
IMM GRANULOCYTES NFR BLD AUTO: 0 % (ref 0–2)
INTERVENTRICULAR SEPTUM IN DIASTOLE (PARASTERNAL SHORT AXIS VIEW): 1.3 CM
INTERVENTRICULAR SEPTUM: 1.3 CM (ref 0.6–1.1)
LAAS-AP2: 10.3 CM2
LAAS-AP4: 12.5 CM2
LDLC SERPL CALC-MCNC: 48 MG/DL (ref 0–100)
LEFT ATRIUM SIZE: 4.2 CM
LEFT ATRIUM VOLUME (MOD BIPLANE): 26 ML
LEFT ATRIUM VOLUME INDEX (MOD BIPLANE): 12.6 ML/M2
LEFT INTERNAL DIMENSION IN SYSTOLE: 3.2 CM (ref 2.1–4)
LEFT VENTRICULAR INTERNAL DIMENSION IN DIASTOLE: 4.6 CM (ref 3.5–6)
LEFT VENTRICULAR POSTERIOR WALL IN END DIASTOLE: 1.3 CM
LEFT VENTRICULAR STROKE VOLUME: 58 ML
LVSV (TEICH): 58 ML
LYMPHOCYTES # BLD AUTO: 1.47 THOUSANDS/ÂΜL (ref 0.6–4.47)
LYMPHOCYTES NFR BLD AUTO: 31 % (ref 14–44)
MCH RBC QN AUTO: 28.5 PG (ref 26.8–34.3)
MCHC RBC AUTO-ENTMCNC: 33.5 G/DL (ref 31.4–37.4)
MCV RBC AUTO: 85 FL (ref 82–98)
MONOCYTES # BLD AUTO: 0.36 THOUSAND/ÂΜL (ref 0.17–1.22)
MONOCYTES NFR BLD AUTO: 8 % (ref 4–12)
MV E'TISSUE VEL-LAT: 6 CM/S
MV E'TISSUE VEL-SEP: 7 CM/S
MV PEAK A VEL: 0.86 M/S
MV PEAK E VEL: 61 CM/S
MV STENOSIS PRESSURE HALF TIME: 85 MS
MV VALVE AREA P 1/2 METHOD: 2.59
NEUTROPHILS # BLD AUTO: 2.74 THOUSANDS/ÂΜL (ref 1.85–7.62)
NEUTS SEG NFR BLD AUTO: 56 % (ref 43–75)
NRBC BLD AUTO-RTO: 0 /100 WBCS
P AXIS: 39 DEGREES
PLATELET # BLD AUTO: 159 THOUSANDS/UL (ref 149–390)
PMV BLD AUTO: 10.6 FL (ref 8.9–12.7)
POTASSIUM SERPL-SCNC: 3.2 MMOL/L (ref 3.5–5.3)
PR INTERVAL: 186 MS
QRS AXIS: 16 DEGREES
QRSD INTERVAL: 90 MS
QT INTERVAL: 410 MS
QTC INTERVAL: 422 MS
RBC # BLD AUTO: 4.99 MILLION/UL (ref 3.88–5.62)
RIGHT ATRIUM AREA SYSTOLE A4C: 10.2 CM2
RIGHT VENTRICLE ID DIMENSION: 2.8 CM
SINOTUBULAR JUNCTION: 3.3 CM
SL CV LEFT ATRIUM LENGTH A2C: 3.9 CM
SL CV LV EF: 55
SL CV PED ECHO LEFT VENTRICLE DIASTOLIC VOLUME (MOD BIPLANE) 2D: 98 ML
SL CV PED ECHO LEFT VENTRICLE SYSTOLIC VOLUME (MOD BIPLANE) 2D: 41 ML
SL CV SINUS OF VALSALVA 2D: 3.7 CM
SODIUM SERPL-SCNC: 141 MMOL/L (ref 135–147)
STJ: 3.3 CM
T WAVE AXIS: 2 DEGREES
TR MAX PG: 11 MMHG
TR PEAK VELOCITY: 1.7 M/S
TRICUSPID ANNULAR PLANE SYSTOLIC EXCURSION: 2.2 CM
TRICUSPID VALVE PEAK REGURGITATION VELOCITY: 1.67 M/S
TRIGL SERPL-MCNC: 142 MG/DL
VENTRICULAR RATE: 64 BPM
WBC # BLD AUTO: 4.81 THOUSAND/UL (ref 4.31–10.16)

## 2024-06-07 PROCEDURE — 93306 TTE W/DOPPLER COMPLETE: CPT

## 2024-06-07 PROCEDURE — 99222 1ST HOSP IP/OBS MODERATE 55: CPT

## 2024-06-07 PROCEDURE — 97162 PT EVAL MOD COMPLEX 30 MIN: CPT

## 2024-06-07 PROCEDURE — 70551 MRI BRAIN STEM W/O DYE: CPT

## 2024-06-07 PROCEDURE — 82948 REAGENT STRIP/BLOOD GLUCOSE: CPT

## 2024-06-07 PROCEDURE — 99223 1ST HOSP IP/OBS HIGH 75: CPT | Performed by: PSYCHIATRY & NEUROLOGY

## 2024-06-07 PROCEDURE — 85025 COMPLETE CBC W/AUTO DIFF WBC: CPT | Performed by: INTERNAL MEDICINE

## 2024-06-07 PROCEDURE — 80061 LIPID PANEL: CPT | Performed by: INTERNAL MEDICINE

## 2024-06-07 PROCEDURE — 97166 OT EVAL MOD COMPLEX 45 MIN: CPT

## 2024-06-07 PROCEDURE — 93010 ELECTROCARDIOGRAM REPORT: CPT | Performed by: INTERNAL MEDICINE

## 2024-06-07 PROCEDURE — 99223 1ST HOSP IP/OBS HIGH 75: CPT | Performed by: NURSE PRACTITIONER

## 2024-06-07 PROCEDURE — 99232 SBSQ HOSP IP/OBS MODERATE 35: CPT | Performed by: INTERNAL MEDICINE

## 2024-06-07 PROCEDURE — 80048 BASIC METABOLIC PNL TOTAL CA: CPT | Performed by: INTERNAL MEDICINE

## 2024-06-07 RX ORDER — INSULIN LISPRO 100 [IU]/ML
1-5 INJECTION, SOLUTION INTRAVENOUS; SUBCUTANEOUS
Status: DISCONTINUED | OUTPATIENT
Start: 2024-06-07 | End: 2024-06-08 | Stop reason: HOSPADM

## 2024-06-07 RX ORDER — CHLORHEXIDINE GLUCONATE ORAL RINSE 1.2 MG/ML
15 SOLUTION DENTAL ONCE
OUTPATIENT
Start: 2024-06-07 | End: 2024-06-07

## 2024-06-07 RX ORDER — CLOPIDOGREL BISULFATE 75 MG/1
75 TABLET ORAL DAILY
Status: DISCONTINUED | OUTPATIENT
Start: 2024-06-07 | End: 2024-06-08 | Stop reason: HOSPADM

## 2024-06-07 RX ORDER — CEFAZOLIN SODIUM 2 G/50ML
2000 SOLUTION INTRAVENOUS ONCE
OUTPATIENT
Start: 2024-06-07 | End: 2024-06-07

## 2024-06-07 RX ORDER — DORZOLAMIDE HCL 20 MG/ML
1 SOLUTION/ DROPS OPHTHALMIC 3 TIMES DAILY
Status: DISCONTINUED | OUTPATIENT
Start: 2024-06-07 | End: 2024-06-08 | Stop reason: HOSPADM

## 2024-06-07 RX ORDER — DORZOLAMIDE HCL 20 MG/ML
1 SOLUTION/ DROPS OPHTHALMIC 3 TIMES DAILY
Status: DISCONTINUED | OUTPATIENT
Start: 2024-06-07 | End: 2024-06-07 | Stop reason: SDUPTHER

## 2024-06-07 RX ORDER — MECLIZINE HCL 12.5 MG/1
25 TABLET ORAL EVERY 8 HOURS PRN
Status: DISCONTINUED | OUTPATIENT
Start: 2024-06-07 | End: 2024-06-08 | Stop reason: HOSPADM

## 2024-06-07 RX ADMIN — ATORVASTATIN CALCIUM 40 MG: 40 TABLET, FILM COATED ORAL at 17:45

## 2024-06-07 RX ADMIN — METOPROLOL SUCCINATE 25 MG: 25 TABLET, EXTENDED RELEASE ORAL at 08:23

## 2024-06-07 RX ADMIN — LORATADINE 10 MG: 10 TABLET ORAL at 08:23

## 2024-06-07 RX ADMIN — ENOXAPARIN SODIUM 40 MG: 40 INJECTION SUBCUTANEOUS at 08:23

## 2024-06-07 RX ADMIN — DORZOLAMIDE HCL 1 DROP: 20 SOLUTION/ DROPS OPHTHALMIC at 21:30

## 2024-06-07 RX ADMIN — ATORVASTATIN CALCIUM 40 MG: 40 TABLET, FILM COATED ORAL at 01:32

## 2024-06-07 RX ADMIN — BRIMONIDINE TARTRATE 1 DROP: 2 SOLUTION/ DROPS OPHTHALMIC at 21:31

## 2024-06-07 RX ADMIN — DORZOLAMIDE HCL 1 DROP: 20 SOLUTION/ DROPS OPHTHALMIC at 16:04

## 2024-06-07 RX ADMIN — PANTOPRAZOLE SODIUM 40 MG: 40 TABLET, DELAYED RELEASE ORAL at 06:03

## 2024-06-07 RX ADMIN — HYDROCHLOROTHIAZIDE 12.5 MG: 12.5 TABLET ORAL at 08:23

## 2024-06-07 RX ADMIN — POTASSIUM CHLORIDE 20 MEQ: 1500 TABLET, EXTENDED RELEASE ORAL at 08:23

## 2024-06-07 RX ADMIN — BRIMONIDINE TARTRATE 1 DROP: 2 SOLUTION/ DROPS OPHTHALMIC at 08:23

## 2024-06-07 RX ADMIN — CLOPIDOGREL BISULFATE 75 MG: 75 TABLET ORAL at 17:45

## 2024-06-07 RX ADMIN — BRIMONIDINE TARTRATE 1 DROP: 2 SOLUTION/ DROPS OPHTHALMIC at 17:45

## 2024-06-07 RX ADMIN — ASPIRIN 81 MG: 81 TABLET, COATED ORAL at 08:23

## 2024-06-07 RX ADMIN — INSULIN LISPRO 1 UNITS: 100 INJECTION, SOLUTION INTRAVENOUS; SUBCUTANEOUS at 12:59

## 2024-06-07 RX ADMIN — BRIMONIDINE TARTRATE 1 DROP: 2 SOLUTION/ DROPS OPHTHALMIC at 01:35

## 2024-06-07 RX ADMIN — AMLODIPINE BESYLATE 10 MG: 10 TABLET ORAL at 08:23

## 2024-06-07 RX ADMIN — Medication 1 PATCH: at 08:23

## 2024-06-07 NOTE — CONSULTS
[facility]  Consult  Name: Jaciel Van 74 y.o. male I MRN: [id number]  Unit/Bed#: E4 -01 I Date of Admission: 6/6/2024   Date of Service: 6/7/2024 I Hospital Day: 1    Inpatient consult to Vascular Surgery  Consult performed by: MELVIN Dominguez  Consult ordered by: Norma Woo MD          Assessment & Plan   Carotid artery stenosis  Assessment & Plan  75 yo male smoker w/ a hx of HTN, thoracic aortic ectasia (4cm) follows w/ CT surgery, prediabetes and GERD presented to [facility] on 6/6/24 w/ dizziness. Pt was admitted w/ vertigo and for further workup to R/O posterior CVA. CTA head/neck showed severe (approximately 75% stenosis) in the proximal R ICA. Consult to neurology pending. MRI brain pending.    Vascular surgery consulted for R STARR noted on CTA. Pt has a 4cm aortic ectasia for which he follows w/ CT surgery. Otherwise, he denies any prior hx of CVA or prior vascular hx. CTA head/neck showed severe (approximately 75% stenosis) in the proximal R ICA w/ no significant stenosis of L cervical carotid or vertebral arteries.     Diagnostics:  -6/6/24 CTA head/neck: No acute intracranial pathology. Chronic microangiopathy. Severe (approximately 75% stenosis) in the proximal right internal carotid artery. No significant stenosis of the left cervical carotid or vertebral arteries. Moderate to severe stenosis in the cavernous segment of the left internal carotid artery. Occlusion of the right A1 segment with distal reconstitution is new since 2010. Age-indeterminate but could be chronic. Moderate stenosis in the right vertebral artery. No other high-grade stenosis or acute large vessel occlusion.    Plan:  -Dizziness on change of position  -CTA head/neck shows 75% R STARR  -MRI brain pending  -Consult to neuro pending  -No urgent vascular surgical intervention required at this time  -Ordered carotid duplex  -Continue ASA, lipitor  -Continue medical management per primary  team  -Will discuss w/ Dr. Barreto        Consulting Service: SLIM    Chief Complaint: R ICA stenosis    HPI: Jaciel Van is a 74 y.o. male smoker w/ a hx of HTN, thoracic aortic ectasia (4cm) follows w/ CT surgery, prediabetes and GERD who presented to [facility] on 6/6/24 w/ dizziness. Pt was admitted w/ vertigo and for further workup to R/O posterior CVA. CTA head/neck showed severe (approximately 75% stenosis) in the proximal R ICA. Consult to neurology pending. MRI brain pending.     Vascular surgery consulted for R STARR noted on CTA. Pt has a 4cm aortic ectasia for which he follows w/ CT surgery. Otherwise, he denies any prior hx of CVA or prior vascular hx. CTA head/neck showed severe (approximately 75% stenosis) in the proximal R ICA w/ no significant stenosis of L cervical carotid or vertebral arteries.     Per pt, he woke up yesterday morning w/ a posterior and frontal headache and dizziness. Pt was hypertensive at that time w/ /99. PCP was notified who recommended pt report to the ED for further workup. Today, pt reports his headache has improved. He states dizziness is intermittent and is caused by changes in position with resolution when he sits still. He denies any visual changes, facial drooping, speech or swallowing difficulties. He denies any unilateral weakness, numbness, tingling or any hx of TIA/stroke. Otherwise, pt denies any further complaints at this time.    Review of Systems:  General: negative  Cardiovascular: no chest pain or dyspnea on exertion  Respiratory: no cough, shortness of breath, or wheezing  Gastrointestinal: no abdominal pain, change in bowel habits, or black or bloody stools  Genitourinary ROS: no dysuria, trouble voiding, or hematuria  Musculoskeletal ROS: negative  Neurological ROS: dizziness    Past Medical History:  Past Medical History:   Diagnosis Date    Abnormal CAT scan     Bilateral inguinal hernia 1990    recurrent    Colitis     Gastritis 12/11/2012     "Hypertension     Impaired fasting glucose 2019    Nausea & vomiting     Right knee meniscal tear 2017    Sleep apnea     uses cpap       Past Surgical History:  Past Surgical History:   Procedure Laterality Date    CHOLECYSTECTOMY      COLONOSCOPY      ABOUT 2-3 YEARS AGO    COLONOSCOPY  2022    EXPLORATORY LAPAROTOMY  2014    done for repair of peritoneal \"RENT\"?    HEMORRHOID SURGERY      INGUINAL HERNIA REPAIR Bilateral 2014    AR ESOPHAGOGASTRODUODENOSCOPY TRANSORAL DIAGNOSTIC N/A 2017    Procedure: EGD AND COLONOSCOPY;  Surgeon: Harrison Turcios MD;  Location: [facility];  Service: Gastroenterology    US GUIDED THYROID BIOPSY  03/15/2021       Social History:  Social History     Substance and Sexual Activity   Alcohol Use Not Currently    Comment: special occ.     Social History     Substance and Sexual Activity   Drug Use No     Social History     Tobacco Use   Smoking Status Some Days    Current packs/day: 0.00    Average packs/day: 0.3 packs/day for 40.0 years (10.0 ttl pk-yrs)    Types: Cigarettes    Start date: 1979    Last attempt to quit: 2019    Years since quittin.5   Smokeless Tobacco Never       Family History:  Family History   Problem Relation Age of Onset    Breast cancer Mother     Heart disease Father     Heart disease Brother     Heart disease Family        Allergies:  Allergies   Allergen Reactions    Ace Inhibitors Angioedema       Medications:  Current Facility-Administered Medications   Medication Dose Route Frequency    amLODIPine (NORVASC) tablet 10 mg  10 mg Oral Daily    aspirin (ECOTRIN LOW STRENGTH) EC tablet 81 mg  81 mg Oral Daily    atorvastatin (LIPITOR) tablet 40 mg  40 mg Oral Daily With Dinner    brimonidine tartrate 0.2 % ophthalmic solution 1 drop  1 drop Both Eyes TID    enoxaparin (LOVENOX) subcutaneous injection 40 mg  40 mg Subcutaneous Daily    hydroCHLOROthiazide tablet 12.5 mg  12.5 mg Oral Daily    insulin lispro " (HumALOG/ADMELOG) 100 units/mL subcutaneous injection 1-5 Units  1-5 Units Subcutaneous TID AC    loratadine (CLARITIN) tablet 10 mg  10 mg Oral Daily    meclizine (ANTIVERT) tablet 25 mg  25 mg Oral Q8H PRN    metoprolol succinate (TOPROL-XL) 24 hr tablet 25 mg  25 mg Oral Daily    nicotine (NICODERM CQ) 14 mg/24hr TD 24 hr patch 1 patch  1 patch Transdermal Daily    pantoprazole (PROTONIX) EC tablet 40 mg  40 mg Oral Early Morning    potassium chloride (Klor-Con M20) CR tablet 20 mEq  20 mEq Oral Daily       Vitals:  BP      Temp      Pulse     Resp      SpO2        I/Os:  I/O last 3 completed shifts:  In: 1650 [I.V.:1650]  Out: 300 [Urine:300]  No intake/output data recorded.    Lab Results and Cultures:   CBC with diff:   Lab Results   Component Value Date    WBC 4.81 06/07/2024    HGB 14.2 06/07/2024    HCT 42.4 06/07/2024    MCV 85 06/07/2024     06/07/2024    RBC 4.99 06/07/2024    MCH 28.5 06/07/2024    MCHC 33.5 06/07/2024    RDW 14.0 06/07/2024    MPV 10.6 06/07/2024    NRBC 0 06/07/2024     BMP/CMP:  Lab Results   Component Value Date     05/27/2015    K 3.2 (L) 06/07/2024    K 3.2 (L) 05/27/2015     (H) 06/07/2024     05/27/2015    CO2 27 06/07/2024    CO2 30 05/27/2015    ANIONGAP 9 05/27/2015    BUN 12 06/07/2024    BUN 16 05/27/2015    CREATININE 0.92 06/07/2024    CREATININE 1.04 05/27/2015    GLUCOSE 114 05/27/2015    CALCIUM 9.0 06/07/2024    CALCIUM 9.6 05/27/2015    AST 12 (L) 06/06/2024    AST 20 05/27/2015    ALT 11 06/06/2024    ALT 24 05/27/2015    ALKPHOS 57 06/06/2024    ALKPHOS 74 05/27/2015    PROT 7.4 05/27/2015    BILITOT 0.5 05/27/2015    EGFR 82 06/07/2024     Coags:   Lab Results   Component Value Date    PTT 38 (H) 06/06/2024    PTT 42 (H) 08/16/2014    INR 1.03 06/06/2024    INR 1.04 08/17/2014       Imaging:  See above    Physical Exam:    General appearance: alert and oriented, in no acute distress  Head: Normocephalic, without obvious abnormality,  atraumatic  Lungs: clear to auscultation bilaterally  Heart: regular rate and rhythm  Abdomen:  soft, non-tender  Extremities: extremities normal, warm and well-perfused; no cyanosis, clubbing, or edema  Skin: Skin color, texture, turgor normal. No rashes or lesions  Neurologic: Grossly normal      Pulse exam:  Radial: Right: 2+ Left:: 2+  DP: Right: 2+ Left: 2+    MELVIN Aviles  6/7/2024

## 2024-06-07 NOTE — PLAN OF CARE
Problem: PAIN - ADULT  Goal: Verbalizes/displays adequate comfort level or baseline comfort level  Description: Interventions:  - Encourage patient to monitor pain and request assistance  - Assess pain using appropriate pain scale  - Administer analgesics based on type and severity of pain and evaluate response  - Implement non-pharmacological measures as appropriate and evaluate response  - Consider cultural and social influences on pain and pain management  - Notify physician/advanced practitioner if interventions unsuccessful or patient reports new pain  Outcome: Progressing     Problem: SAFETY ADULT  Goal: Patient will remain free of falls  Description: INTERVENTIONS:  - Educate patient/family on patient safety including physical limitations  - Instruct patient to call for assistance with activity   - Consult OT/PT to assist with strengthening/mobility   - Keep Call bell within reach  - Keep bed low and locked with side rails adjusted as appropriate  - Keep care items and personal belongings within reach  - Initiate and maintain comfort rounds  - Make Fall Risk Sign visible to staff  - Offer Toileting every  Hours, in advance of need  - Initiate/Maintain alarm  - Obtain necessary fall risk management equipment:   - Apply yellow socks and bracelet for high fall risk patients  - Consider moving patient to room near nurses station  Outcome: Progressing  Goal: Maintain or return to baseline ADL function  Description: INTERVENTIONS:  -  Assess patient's ability to carry out ADLs; assess patient's baseline for ADL function and identify physical deficits which impact ability to perform ADLs (bathing, care of mouth/teeth, toileting, grooming, dressing, etc.)  - Assess/evaluate cause of self-care deficits   - Assess range of motion  - Assess patient's mobility; develop plan if impaired  - Assess patient's need for assistive devices and provide as appropriate  - Encourage maximum independence but intervene and supervise  when necessary  - Involve family in performance of ADLs  - Assess for home care needs following discharge   - Consider OT consult to assist with ADL evaluation and planning for discharge  - Provide patient education as appropriate  Outcome: Progressing  Goal: Maintains/Returns to pre admission functional level  Description: INTERVENTIONS:  - Perform AM-PAC 6 Click Basic Mobility/ Daily Activity assessment daily.  - Set and communicate daily mobility goal to care team and patient/family/caregiver.   - Collaborate with rehabilitation services on mobility goals if consulted  - Perform Range of Motion  times a day.  - Reposition patient every  hours.  - Dangle patient  times a day  - Stand patient  times a day  - Ambulate patient  times a day  - Out of bed to chair  times a day   - Out of bed for meal times a day  - Out of bed for toileting  - Record patient progress and toleration of activity level   Outcome: Progressing     Problem: DISCHARGE PLANNING  Goal: Discharge to home or other facility with appropriate resources  Description: INTERVENTIONS:  - Identify barriers to discharge w/patient and caregiver  - Arrange for needed discharge resources and transportation as appropriate  - Identify discharge learning needs (meds, wound care, etc.)  - Arrange for interpretive services to assist at discharge as needed  - Refer to Case Management Department for coordinating discharge planning if the patient needs post-hospital services based on physician/advanced practitioner order or complex needs related to functional status, cognitive ability, or social support system  Outcome: Progressing     Problem: Knowledge Deficit  Goal: Patient/family/caregiver demonstrates understanding of disease process, treatment plan, medications, and discharge instructions  Description: Complete learning assessment and assess knowledge base.  Interventions:  - Provide teaching at level of understanding  - Provide teaching via preferred learning  methods  Outcome: Progressing     Problem: NEUROSENSORY - ADULT  Goal: Achieves stable or improved neurological status  Description: INTERVENTIONS  - Monitor and report changes in neurological status  - Monitor vital signs such as temperature, blood pressure, glucose, and any other labs ordered   - Initiate measures to prevent increased intracranial pressure  - Monitor for seizure activity and implement precautions if appropriate      Outcome: Progressing  Goal: Remains free of injury related to seizures activity  Description: INTERVENTIONS  - Maintain airway, patient safety  and administer oxygen as ordered  - Monitor patient for seizure activity, document and report duration and description of seizure to physician/advanced practitioner  - If seizure occurs,  ensure patient safety during seizure  - Reorient patient post seizure  - Seizure pads on all 4 side rails  - Instruct patient/family to notify RN of any seizure activity including if an aura is experienced  - Instruct patient/family to call for assistance with activity based on nursing assessment  - Administer anti-seizure medications if ordered    Outcome: Progressing  Goal: Achieves maximal functionality and self care  Description: INTERVENTIONS  - Monitor swallowing and airway patency with patient fatigue and changes in neurological status  - Encourage and assist patient to increase activity and self care.   - Encourage visually impaired, hearing impaired and aphasic patients to use assistive/communication devices  Outcome: Progressing

## 2024-06-07 NOTE — CASE MANAGEMENT
Case Management Assessment & Discharge Planning Note    Patient name Jaciel Van  Location [facility] /E4 -* MRN [id number]  : 1950 Date 2024       Current Admission Date: 2024  Current Admission Diagnosis:Vertigo   Patient Active Problem List    Diagnosis Date Noted Date Diagnosed    Carotid artery stenosis 2024     Vertigo 2024     Cellulitis of face 2024     Primary insomnia 2024     BRAVO (obstructive sleep apnea) 10/10/2023     Chest pain due to myocardial ischemia 2023     Mild traumatic brain injury, without loss of consciousness, initial encounter ([facility]) 2023     Dry skin 10/17/2022     Slow transit constipation 10/17/2022     Stage 3 chronic kidney disease, unspecified whether stage 3a or 3b CKD ([facility]) 2021     Hypokalemia 2021     Right wrist pain 2021     Thyroid nodule 2021     Pre-diabetes 10/13/2020     Hyperglycemia 2020     Chronic obstructive pulmonary disease (HCC) 2020     Thoracic aortic ectasia ([facility]) 2020     Angioedema, Likely ACE inhibitor induced 2019     GERD (gastroesophageal reflux disease) 2019     Tobacco abuse 2016     Obstructive sleep apnea treated with continuous positive airway pressure (CPAP) 2015     Hypertension 2012     Hyperlipidemia 2012       LOS (days): 1  Geometric Mean LOS (GMLOS) (days):   Days to GMLOS:     OBJECTIVE:    Risk of Unplanned Readmission Score: 14         Current admission status: Inpatient  Referral Reason: Stroke    Preferred Pharmacy:   [id number] - [place] - [address] [address]  Phone: [phone] Fax: [phone]    [id number] - [place] - [address] [facility]  [address] [facility] [address]  Phone: [phone] Fax: [phone]    [id number] - [place] - [address] [place] [address]  [facility] [address]  Phone: [phone] Fax: [phone]    Primary Care Provider: Fan Pope DO    Primary Insurance: [employer] [facility]  Secondary Insurance:     ASSESSMENT:  Active Health Care Proxies       Sruthi Van First Alternate Health Care Representative - Spouse   Primary Phone: [phone] (Mobile)  Home Phone: [phone]                 Advance Directives  Does patient have a Health Care POA?: Yes  Does patient have Advance Directives?: Yes  Advance Directives: Living will, Power of  for health care  Primary Contact: Bita Negro         Readmission Root Cause  30 Day Readmission: No    Patient Information  Admitted from:: Home  Mental Status: Alert  During Assessment patient was accompanied by: Not accompanied during assessment  Assessment information provided by:: Patient  Primary Caregiver: Self  Support Systems: Spouse/significant other  County of Residence: [place]  What city do you live in?: [place]  Home entry access options. Select all that apply.: Stairs  Number of steps to enter home.: 2  Do the steps have railings?: Yes  Type of Current Residence: [address] [place] [address] home  Upon entering residence, is there a bedroom on the main floor (no further steps)?: No  A bedroom is located on the following floor levels of residence (select all that apply):: 2nd Floor  Upon entering residence, is there a bathroom on the main floor (no further steps)?: No  Indicate which floors of current residence have a bathroom (select all the apply):: 2nd Floor  Number of steps to 2nd floor from main floor: One Flight  Living Arrangements: Lives w/ Spouse/significant other  Is patient a ?: No    Activities of Daily Living Prior to Admission  Functional Status: Independent  Completes ADLs independently?: Yes  Ambulates independently?: Yes  Does patient use assisted devices?: No  Does patient currently own DME?: No  Does patient have a history of Outpatient Therapy (PT/OT)?: Yes  Does the  patient have a history of Short-Term Rehab?: No  Does patient have a history of HHC?: No  Does patient currently have HHC?: No         Patient Information Continued  Income Source: Pension/long term  Does patient have prescription coverage?: Yes  Does patient receive dialysis treatments?: No  Does patient have a history of substance abuse?: No  Does patient have a history of Mental Health Diagnosis?: No         Means of Transportation  [facility] Self      Social Determinants of Health (SDOH)      Flowsheet Row Most Recent Value   Housing Stability    In the last 12 months, was there a time when you were not able to pay the mortgage or rent on time? N   In the past 12 months, how many times have you moved where you were living? 0   At any time in the past 12 months, were you homeless or living in a shelter (including now)? N   Transportation Needs    In the past 12 months, has lack of transportation kept you from medical appointments or from getting medications? no   In the past 12 months, has lack of transportation kept you from meetings, work, or from getting things needed for daily living? No   Food Insecurity    Within the past 12 months, you worried that your food would run out before you got the money to buy more. Never true   Within the past 12 months, the food you bought just didn't last and you didn't have money to get more. Never true   Utilities    In the past 12 months has the electric, gas, oil, or water company threatened to shut off services in your home? No            DISCHARGE DETAILS:    Discharge planning discussed with:: Patient  Freedom of Choice: Yes     CM contacted family/caregiver?: No- see comments (Pt declined, Alert and oriented x4)  Were Treatment Team discharge recommendations reviewed with patient/caregiver?: Yes  Did patient/caregiver verbalize understanding of patient care needs?: Yes  Were patient/caregiver advised of the risks associated with not  following Treatment Team discharge recommendations?: Yes         Requested Home Health Care         Is the patient interested in HHC at discharge?: No    DME Referral Provided  Referral made for DME?: No         Would you like to participate in our [facility] service program?  : No - Declined    Treatment Team Recommendation: Home  Discharge Destination Plan:: Home  Transport at Discharge : Family                    CM met with patient at bedside to introduce self and role with discharge planning.  Patient reports living with his wife in a two story home.  Patient reports being independent PTA, he drives.  Patient does not utilize or own any DME.  Patient reports family will transport him home at time of DC.  Patient does not identify any CM needs at this time, CM department remains available.

## 2024-06-07 NOTE — PHYSICAL THERAPY NOTE
PHYSICAL THERAPY EVALUATION          Patient Name: Jaciel Van  Today's Date: 6/7/2024 06/07/24 0858   PT Last Visit   PT Visit Date 06/07/24   Note Type   Note type Evaluation   Pain Assessment   Pain Assessment Tool 0-10   Pain Score No Pain   Restrictions/Precautions   Other Precautions Telemetry   Home Living   Type of Home House   Home Layout Multi-level   Bathroom Shower/Tub Tub/shower unit   Additional Comments no DME   Prior Function   Level of [place] Independent with functional mobility;Independent with ADLs;Independent with IADLS   Lives With Spouse   IADLs Independent with driving;Independent with meal prep;Independent with medication management   Falls in the last 6 months 0   Comments indep at baseline without an AD   General   Additional Pertinent History pt admitted 6/6/24 for vertigo. up and oob orders.   Cognition   Overall Cognitive Status WFL   Arousal/Participation Cooperative   Orientation Level Oriented X4   Memory Within functional limits   Following Commands Follows all commands and directions without difficulty   Vestibular   Gaze Holding Nystagmus (-) no evidence of nystagmus   Transfers   Sit to Stand 7  Independent   Stand to Sit 7  Independent   Ambulation/Elevation   Gait pattern WNL   Gait Assistance 7  Independent   Assistive Device None   Distance about 300'   Stair Management Assistance 6  Modified independent   Stair Management Technique One rail R;Alternating pattern;Foreward   Number of Stairs 4   Balance   Static Standing Normal   Dynamic Standing Good   Ambulatory Good   Endurance Deficit   Endurance Deficit No   Activity Tolerance   Activity Tolerance Patient tolerated treatment well   Medical Staff Made Aware OT   Nurse Made Aware yes   Assessment   Prognosis Good   Assessment Jaciel Van is a 74 y.o. male admitted to [facility] on 6/6/2024 for Vertigo. PT was consulted and pt was seen on 6/7/2024 for mobility assessment and d/c planning. Pt presents  w low fall risk, telemetry. At baseline is indep. Pt is currently functioning at a indep level for transfers, ambulation and stair negotiation. Pt demonstrated ability to ambulate community distances and negotiate steps to simulate home environment. Endorses some sensation of dizziness however not currently impacting functional mobility. The patient's AM-PAC Basic Mobility Inpatient Short Form Raw Score is 24. A Raw score of greater than 16 suggests the patient may benefit from discharge to home. Please also refer to the recommendation of the Physical Therapist for safe discharge planning.   Barriers to Discharge None   Plan   PT Frequency   (d/c PT)   Discharge Recommendation   Rehab Resource Intensity Level, PT No post-acute rehabilitation needs   AM-PAC Basic Mobility Inpatient   Turning in Flat Bed Without Bedrails 4   Lying on Back to Sitting on Edge of Flat Bed Without Bedrails 4   Moving Bed to Chair 4   Standing Up From Chair Using Arms 4   Walk in Room 4   Climb 3-5 Stairs With Railing 4   Basic Mobility Inpatient Raw Score 24   Basic Mobility Standardized Score 57.68   [facility] Highest Level Of Mobility   -HLM Goal 8: Walk 250 feet or more   -HLM Achieved 8: Walk 250 feet ot more   End of Consult   Patient Position at End of Consult Seated edge of bed;All needs within reach     History: co - morbidities including age, current experience including fall risk  Exam: impairments in systems including multiple body structures involved; neuromuscular (balance, gait, transfers; am-pac  Clinical: stable/unpredictable  Complexity: moderate    Mariel Howard, PT

## 2024-06-07 NOTE — SPEECH THERAPY NOTE
"Speech Language/Pathology  Speech/Language Pathology  Assessment    Patient Name: Jaciel Van  Today's Date: 6/7/2024     Problem List  Principal Problem:    Vertigo  Active Problems:    Tobacco abuse    Hypertension    GERD (gastroesophageal reflux disease)    Thoracic aortic ectasia (HCC)    Pre-diabetes    Hypokalemia    Carotid artery stenosis    Past Medical History  Past Medical History:   Diagnosis Date    Abnormal CAT scan     Bilateral inguinal hernia 1990    recurrent    Colitis     Gastritis 12/11/2012    Hypertension     Impaired fasting glucose 7/9/2019    Nausea & vomiting     Right knee meniscal tear 12/20/2017    Sleep apnea     uses cpap     Past Surgical History  Past Surgical History:   Procedure Laterality Date    CHOLECYSTECTOMY      COLONOSCOPY  2017    ABOUT 2-3 YEARS AGO    COLONOSCOPY  06/02/2022    EXPLORATORY LAPAROTOMY  08/16/2014    done for repair of peritoneal \"RENT\"?    HEMORRHOID SURGERY      INGUINAL HERNIA REPAIR Bilateral 08/14/2014    DC ESOPHAGOGASTRODUODENOSCOPY TRANSORAL DIAGNOSTIC N/A 02/23/2017    Procedure: EGD AND COLONOSCOPY;  Surgeon: Harrison Turcios MD;  Location: [facility];  Service: Gastroenterology    US GUIDED THYROID BIOPSY  03/15/2021     ST note: pt screened. Tolerating diet. No changes in speech or swallow. Speech clear and fluent. Wife stated he talks more than she does. Will d/alyson. Screen only.     MRI:  6/7/24  No acute infarct or acute intracranial hemorrhage.   "

## 2024-06-07 NOTE — ASSESSMENT & PLAN NOTE
- CTA head/neck showed severe (approximate 75% stenosis) in the proximal right ICA.  Moderate/severe stenosis in the cavernous segment of the left ICA.   - Vascular surgery following; appreciate recommendations   - Aspirin/statin  - Carotid ultrasound pending

## 2024-06-07 NOTE — ASSESSMENT & PLAN NOTE
75 yo male smoker w/ a hx of HTN, thoracic aortic ectasia (4cm) follows w/ CT surgery, prediabetes and GERD presented to [facility] on 6/6/24 w/ dizziness. Pt was admitted w/ vertigo and for further workup to R/O posterior CVA. CTA head/neck showed severe (approximately 75% stenosis) in the proximal R ICA. Consult to neurology pending. MRI brain pending.    Vascular surgery consulted for R STARR noted on CTA. Pt has a 4cm aortic ectasia for which he follows w/ CT surgery. Otherwise, he denies any prior hx of CVA or prior vascular hx. CTA head/neck showed severe (approximately 75% stenosis) in the proximal R ICA w/ no significant stenosis of L cervical carotid or vertebral arteries.     Diagnostics:  -6/6/24 CTA head/neck: No acute intracranial pathology. Chronic microangiopathy. Severe (approximately 75% stenosis) in the proximal right internal carotid artery. No significant stenosis of the left cervical carotid or vertebral arteries. Moderate to severe stenosis in the cavernous segment of the left internal carotid artery. Occlusion of the right A1 segment with distal reconstitution is new since 2010. Age-indeterminate but could be chronic. Moderate stenosis in the right vertebral artery. No other high-grade stenosis or acute large vessel occlusion.    Plan:  -Dizziness on change of position  -CTA head/neck shows 75% R STARR  -MRI brain pending  -Consult to neuro pending  -No urgent vascular surgical intervention required at this time  -Ordered carotid duplex  -Continue ASA, lipitor  -Continue medical management per primary team  -Will discuss w/ Dr. Barreto

## 2024-06-07 NOTE — CONSULTS
[facility]  Consult  Name: Jaciel Van 74 y.o. male I MRN: [id number]  Unit/Bed#: E4 -01 I Date of Admission: 6/6/2024   Date of Service: 6/7/2024  Hospital Day: 1    Inpatient consult to Cardiology  Consult performed by: Katherine Kelsey PA-C  Consult ordered by: MELVIN Rosenbaum          Assessment & Plan   Carotid artery stenosis  Assessment & Plan  - -6/6/24 CTA head/neck: No acute intracranial pathology. Chronic microangiopathy. Severe (approximately 75% stenosis) in the proximal right internal carotid artery. No significant stenosis of the left cervical carotid or vertebral arteries. Moderate to severe stenosis in the cavernous segment of the left internal carotid artery. Occlusion of the right A1 segment with distal reconstitution is new since 2010. Age-indeterminate but could be chronic. Moderate stenosis in the right vertebral artery. No other high-grade stenosis or acute large vessel occlusion.   - seen by vascular who recommend RCA intervention for asymptomatic high grade stenosis    Pt is considered low risk for vascular surgery. He is able to perform > 4 METS and denies any cardiac symptoms. Updated echo pending.    Ascending aortic aneurysm (HCC)  Assessment & Plan  - CT 01/15/20: 4.1 cm ascending aortic aneurysm.   - last seen by CT surgery 12/05/23 and noted that the aneurysm was unchanged since 2014    Hyperlipidemia  Assessment & Plan  - lipid panel 06/07/24: cholesterol 109, triglycerides 142, HDL 33, LDL 48  - LDL at goal. Continue rosuvastatin 10 mg daily     Tobacco abuse  Assessment & Plan  - currently smoking 7-10 cigarettes a day  - encouraged smoking cessation    Hypertension  Assessment & Plan  - blood pressures this admission   - current regimen: amlodipine 10 mg daily, HCTZ 12.5 mg daily, Toprol- XL 25 mg daily         History Of Present Illness:  Jaciel Van is a 74 year old with PMH of CAD, HTN, ascending aortic aneurysm, and  "HLD, who presented to ED 24 complaining of dizziness. CT and MRI negative for intracranial abnormalities CTA showed severe stenosis in RCA. Pt states he is still experiencing dizziness today especially with positional changes. Cardiology consulted for surgical clearance for intervention of the RCA.     From a cardiac standpoint patient has never had a CHF or ACS event. His blood pressures are controlled this admission. He walks about a mile - 1.5 miles a day without reporting any aniginal symptoms including chest pain and SOB. He has 14 stairs in his house and is able to go up and down the stairs without angina. Denies edema, weight gain, palpitations, flutters in the chest, lightheadedness, and syncope.     Positive family history of CAD in his family for CAD in his father who underwent CABG and brother who  of an MI at 60. He smokes 7-10 cigarettes a day. Denies alcohol and drug use.    Rhythm History: NSR    Primary Cardiologist: Dr. Ruano (last seen in office 23)    ROS:  Review of Systems   Constitutional:  Negative for activity change, chills, diaphoresis and fatigue.   Respiratory:  Negative for cough, chest tightness and shortness of breath.    Cardiovascular:  Negative for chest pain, palpitations and leg swelling.   Gastrointestinal:  Negative for abdominal pain.   Neurological:  Negative for dizziness, syncope, weakness and light-headedness.        Past Medical History:        Past Medical History:   Diagnosis Date    Abnormal CAT scan     Bilateral inguinal hernia     recurrent    Colitis     Gastritis 2012    Hypertension     Impaired fasting glucose 2019    Nausea & vomiting     Right knee meniscal tear 2017    Sleep apnea     uses cpap      Past Surgical History:   Procedure Laterality Date    CHOLECYSTECTOMY      COLONOSCOPY      ABOUT 2-3 YEARS AGO    COLONOSCOPY  2022    EXPLORATORY LAPAROTOMY  2014    done for repair of peritoneal \"RENT\"?    " HEMORRHOID SURGERY      INGUINAL HERNIA REPAIR Bilateral 2014    SD ESOPHAGOGASTRODUODENOSCOPY TRANSORAL DIAGNOSTIC N/A 2017    Procedure: EGD AND COLONOSCOPY;  Surgeon: Harrison Turcios MD;  Location: [facility];  Service: Gastroenterology    US GUIDED THYROID BIOPSY  03/15/2021       Family History:     Family History   Problem Relation Age of Onset    Breast cancer Mother     Heart disease Father     Heart disease Brother     Heart disease Family         Social History:       Social History     Socioeconomic History    Marital status: /Civil Union     Spouse name: None    Number of children: None    Years of education: None    Highest education level: None   Occupational History    None   Tobacco Use    Smoking status: Some Days     Current packs/day: 0.00     Average packs/day: 0.3 packs/day for 40.0 years (10.0 ttl pk-yrs)     Types: Cigarettes     Start date: 1979     Last attempt to quit: 2019     Years since quittin.5    Smokeless tobacco: Never   Vaping Use    Vaping status: Never Used   Substance and Sexual Activity    Alcohol use: Not Currently     Comment: special occ.    Drug use: No    Sexual activity: Not Currently     Partners: Female   Other Topics Concern    None   Social History Narrative    None     Social Determinants of Health     Financial Resource Strain: Low Risk  (2023)    Overall Financial Resource Strain (CARDIA)     Difficulty of Paying Living Expenses: Not hard at all   Food Insecurity: No Food Insecurity (2024)    Hunger Vital Sign     Worried About Running Out of Food in the Last Year: Never true     Ran Out of Food in the Last Year: Never true   Transportation Needs: No Transportation Needs (2024)    PRAPARE - Transportation     Lack of Transportation (Medical): No     Lack of Transportation (Non-Medical): No   Physical Activity: Not on file   Stress: Not on file   Social Connections: Not on file   Intimate Partner Violence: Not on file   Housing  Stability: Low Risk  (6/7/2024)    Housing Stability Vital Sign     Unable to Pay for Housing in the Last Year: No     Number of Times Moved in the Last Year: 0     Homeless in the Last Year: No        Allergy:        Allergies   Allergen Reactions    Ace Inhibitors Angioedema       Medications:       Prior to Admission medications    Medication Sig Start Date End Date Taking? Authorizing Provider   amLODIPine (NORVASC) 10 mg tablet Take 1 tablet by mouth once daily 4/27/24  Yes Alfie Pope DO   aspirin (ECOTRIN LOW STRENGTH) 81 mg EC tablet Take 81 mg by mouth daily   Yes Historical Provider, MD   brimonidine tartrate 0.2 % ophthalmic solution Administer 1 drop to both eyes 3 (three) times a day 6/5/24  Yes Historical Provider, MD   cetirizine (ZyrTEC) 10 MG chewable tablet Chew 10 mg daily   Yes Historical Provider, MD   hydroCHLOROthiazide 12.5 mg tablet Take 1 tablet (12.5 mg total) by mouth daily 2/27/24  Yes Alfie Pope DO   metFORMIN (GLUCOPHAGE) 500 mg tablet TAKE 1 TABLET BY MOUTH TWICE DAILY WITH MEALS 3/28/24  Yes Alfie Pope DO   metoprolol succinate (TOPROL-XL) 25 mg 24 hr tablet Take 1 tablet (25 mg total) by mouth daily 1/30/24  Yes Alfie Pope DO   pantoprazole (PROTONIX) 40 mg tablet Take 1 tablet by mouth once daily 5/2/24  Yes Alfie Pope DO   potassium chloride (Klor-Con M20) 20 mEq tablet Take 1 tablet (20 mEq total) by mouth daily 1/30/24  Yes Alfie Pope DO   rosuvastatin (CRESTOR) 10 MG tablet Take 1 tablet (10 mg total) by mouth daily 1/30/24  Yes Alfie Pope DO   Blood Glucose Monitoring Suppl (ONE TOUCH ULTRA 2) w/Device KIT CHECK BLOOD SUGARS DAILY 1/24/23   Historical Provider, MD   cholecalciferol (VITAMIN D3) 400 units tablet Take 400 Units by mouth daily  Patient not taking: Reported on 6/6/2024    Historical Provider, MD   Lancets (OneTouch Delica Plus Frqavj17H) MISC daily Check blood sugar 1/24/23   Historical Provider, MD   mometasone (ELOCON) 0.1 % lotion Apply  topically daily 10/17/22   Alfie Pope DO   ondansetron (ZOFRAN-ODT) 4 mg disintegrating tablet Take 1 tablet (4 mg total) by mouth every 8 (eight) hours as needed for vomiting or nausea  Patient not taking: Reported on 6/6/2024 1/30/24   Alfie Pope DO   OneTouch Ultra test strip USE STRIP TO CHECK GLUCOSE ONCE DAILY 3/10/23   Historical Provider, MD   prednisoLONE acetate (PRED FORTE) 1 % ophthalmic suspension  1/26/22   Historical Provider, MD   zolpidem (AMBIEN) 10 mg tablet Take 1 tablet (10 mg total) by mouth daily at bedtime as needed for sleep  Patient not taking: Reported on 6/6/2024 1/30/24   Alfie Pope DO       Vitals:    06/07/24 0825 06/07/24 1025 06/07/24 1427 06/07/24 1503   BP: 155/92 134/72 134/72 123/65   BP Location: Left arm Left arm  Left arm   Pulse: 69 58 58 61   Resp: 18 18  18   Temp:  (!) 97.4 °F (36.3 °C)  97.9 °F (36.6 °C)   TempSrc:  Temporal  Temporal   SpO2: 94% 97%  94%   Weight:   84.4 kg (186 lb)    Height:   6' (1.829 m)        Exam:  Physical Exam  Constitutional:       Appearance: Normal appearance.   HENT:      Head: Normocephalic and atraumatic.      Nose: Nose normal.      Mouth/Throat:      Mouth: Mucous membranes are moist.   Eyes:      General: No scleral icterus.  Neck:      Vascular: Carotid bruit (right carotid bruit) present.   Cardiovascular:      Rate and Rhythm: Normal rate and regular rhythm.      Pulses:           Radial pulses are 2+ on the right side and 2+ on the left side.      Heart sounds: S1 normal and S2 normal. Heart sounds not distant. No murmur heard.  Pulmonary:      Effort: No respiratory distress.      Breath sounds: No stridor. No wheezing, rhonchi or rales.   Abdominal:      General: Abdomen is flat.   Musculoskeletal:         General: No swelling. Normal range of motion.      Right lower leg: No edema.      Left lower leg: No edema.   Skin:     General: Skin is warm and dry.      Capillary Refill: Capillary refill takes less than 2 seconds.    Neurological:      Mental Status: He is alert. Mental status is at baseline.   Psychiatric:         Thought Content: Thought content normal.        DATA:        ECG:      Telemetry:   Normal sinus rhythm, . HR ~65 bpm    Echocardiogram:  06/01/23    Left Ventricle: Left ventricular cavity size is normal. Wall thickness is mildly increased. There is mild concentric hypertrophy. The left ventricular ejection fraction is 55%. Systolic function is normal. Global longitudinal strain is normal at -17%. Diastolic function is normal for age.    The following segments are hypokinetic: basal inferior.    All other segments are normal.    IVS: There is abnormal septal motion of unclear etiology.    Right Ventricle: Right ventricular cavity size is normal. Systolic function is normal.    Right Atrium: The atrium is mildly dilated.    Aortic Valve: There is aortic valve sclerosis.    Mitral Valve: There is mild annular calcification.    Aorta: The aortic root is mildly dilated. The ascending aorta is mildly dilated. The aortic root is 3.70 cm. The ascending aorta is 4 cm.  02/25/21  1. Mildly increased left ventricular wall thickness, normal left ventricular systolic function, grade 1 diastolic dysfunction, EF around 55%.  2. Normal right ventricular size and systolic function.  3. Mild right atrial cavity enlargement.  4. Aortic valve sclerosis, no aortic valve stenosis or regurgitation.  5. Mild mitral annular calcification and leaflet sclerosis, mild , 1+ mitral valve regurgitation.  6. Mild, 2+ tricuspid valve regurgitation, trace pulmonic valve regurgitation.  7. No obvious pulmonary hypertension.  8. No pericardial effusion.  9. Mildly ascending thoracic aortic aneurysm ascending aorta measuring up to 4.1 cm.    Ischemic Testing:  Stress test:      06/01/23    Resting ECG: ECG is abnormal. The ECG shows normal sinus rhythm. Resting ECG shows no ST-segment deviation. There are nonspecific T changes in lead 3. The ECG  shows rare premature atrial contractions.    A Goldy protocol stress test was performed. In addition to exercise, the patient was administered regadenoson. The patient exercised for 3 min and 55 sec and had a maximal HR of 104 bpm (70 % of MPHR) and 6.8 METS. Pt was changed to pharmacologic study due to leg fatigue, blunted heart rate responce and fatigue. The patient experienced chest pressure during stress test.    Stress ECG: No ST deviation is noted. Arrhythmias during stress: occasional PACs, rare PVCs. Arrhythmias during recovery: rare PVCs. The ECG was not diagnostic due to pharmacological (vasodilator) stress.    Perfusion: There is a left ventricular perfusion defect that is medium in size with moderate reduction in uptake present in the basal to mid inferior location(s) that is predominantly fixed. There may be a small degree of diaphragmatic attenuation.    Stress Function: Left ventricular function post-stress is normal. Stress ejection fraction is 49 %. There is a defect in the mid to apical inferior location(s). The defect has mildly reduced function.    Weights:    Wt Readings from Last 10 Encounters:   06/07/24 84.4 kg (186 lb)   01/30/24 85.7 kg (189 lb)   01/28/24 86.6 kg (190 lb 14.7 oz)   01/11/24 86.2 kg (190 lb)   12/05/23 86.2 kg (190 lb)   11/21/23 86.2 kg (190 lb)   10/10/23 88.9 kg (196 lb)   08/24/23 88.5 kg (195 lb)   07/18/23 90.2 kg (198 lb 12.8 oz)   06/29/23 87.1 kg (192 lb)            Lab Studies:           Results from last 7 days   Lab Units 06/07/24  0436   TRIGLYCERIDES mg/dL 142   HDL mg/dL 33*     Results from last 7 days   Lab Units 06/07/24  0824 06/06/24  1801   WBC Thousand/uL 4.81 7.32   HEMOGLOBIN g/dL 14.2 14.6   HEMATOCRIT % 42.4 43.6   PLATELETS Thousands/uL 159 171     Results from last 7 days   Lab Units 06/07/24  0824 06/06/24  1801   POTASSIUM mmol/L 3.2* 3.2*   CHLORIDE mmol/L 109* 102   CO2 mmol/L 27 28   BUN mg/dL 12 15   CREATININE mg/dL 0.92 1.13   CALCIUM  mg/dL 9.0 9.5   ALK PHOS U/L  --  57   ALT U/L  --  11   AST U/L  --  12*       Katherine Kelsey PA-C

## 2024-06-07 NOTE — ASSESSMENT & PLAN NOTE
Has thoracic aortic aneurysm, 4cm  Followed by CT surgery outpatient  Continue to monitor  Smoking cessation   Need stricter BP control  Continue with asa, statin

## 2024-06-07 NOTE — ASSESSMENT & PLAN NOTE
Has thoracic aortic aneurysm, 4cm  Followed by CT surgery outpatient  Continue to monitor  Smoking cessation   Need stricter BP control  Continue with asa, statin   Cont outpt follow up

## 2024-06-07 NOTE — ASSESSMENT & PLAN NOTE
74 y.o. male with HTN, HLD, BRAVO, prediabetes, CAD, CKD, and tobacco use who presented to [facility] on 6/6/2024 due to positional dizziness upon waking, unsteady gait, and nausea.  Dizziness worsens when lying down and resolves while sitting up.  Patient is also having a posterior headache, but no photophobia or phonophobia.    Upon arrival to the ED, /82.    NIHSS 0.    CT head negative for acute intracranial abnormalities.  Only noted stable moderate microangiopathic changes and stable mild prominence of the bifrontal extra-axial CSF spaces.    CTA head/neck:  Severe (approximate 75% stenosis) in the proximal right ICA.    Moderate/severe stenosis in the cavernous segment of the left ICA.    Occlusion of the right A1 segment with distal reconstitution (new since 2010; age-indeterminate, but could be chronic).    Moderate stenosis in the right vertebral artery.    Normal caliber basilar artery.    Patient was not a TNK candidate due to being outside the appropriate time window.  Patient was loaded with aspirin 325 mg and Plavix 300 mg and placed on stroke pathway.    MRI brain negative for acute infarct.  LDL 48, cholesterol 109    Etiology for intermittent positional dizziness likely peripheral vestibulopathy.    Plan:  - Carotid ultrasound pending  - Vascular surgery following  - S/p aspirin 325 mg and Plavix 300 mg once  - Continue home aspirin 81 mg daily  - From a Neuro standpoint, can continue home statin (he was taking Crestor 20 mg daily PTA) unless vascular surgery would prefer atorvastatin  - Meclizine PRN  - PT/OT; he may benefit from vestibular therapy  - No further inpatient Neuro recommendations

## 2024-06-07 NOTE — ASSESSMENT & PLAN NOTE
Hold metformin,  patient received contrast  Will resume in 48 hours  Accu-Cheks, sliding scale insulin

## 2024-06-07 NOTE — ASSESSMENT & PLAN NOTE
Noted on CTA head/neck   Severe with about 75%  On asa and statin  Will get vascular surgery input

## 2024-06-07 NOTE — ASSESSMENT & PLAN NOTE
- blood pressures this admission   - current regimen: amlodipine 10 mg daily, HCTZ 12.5 mg daily, Toprol- XL 25 mg daily

## 2024-06-07 NOTE — ASSESSMENT & PLAN NOTE
Continue home metoprolol XL 25 mg daily, hydrochlorothiazide 12.5 mg daily, Norvasc 10 mg daily  Blood pressure adequately controlled.  Continue to monitor and titrate as needed

## 2024-06-07 NOTE — ASSESSMENT & PLAN NOTE
- lipid panel 06/07/24: cholesterol 109, triglycerides 142, HDL 33, LDL 48  - LDL at goal. Continue rosuvastatin 10 mg daily

## 2024-06-07 NOTE — OCCUPATIONAL THERAPY NOTE
"    Occupational Therapy Evaluation     Patient Name: Jaciel Van  Today's Date: 6/7/2024  Problem List  Principal Problem:    Vertigo  Active Problems:    Tobacco abuse    Hypertension    GERD (gastroesophageal reflux disease)    Thoracic aortic ectasia (HCC)    Pre-diabetes    Hypokalemia    Carotid artery stenosis    Past Medical History  Past Medical History:   Diagnosis Date    Abnormal CAT scan     Bilateral inguinal hernia 1990    recurrent    Colitis     Gastritis 12/11/2012    Hypertension     Impaired fasting glucose 7/9/2019    Nausea & vomiting     Right knee meniscal tear 12/20/2017    Sleep apnea     uses cpap     Past Surgical History  Past Surgical History:   Procedure Laterality Date    CHOLECYSTECTOMY      COLONOSCOPY  2017    ABOUT 2-3 YEARS AGO    COLONOSCOPY  06/02/2022    EXPLORATORY LAPAROTOMY  08/16/2014    done for repair of peritoneal \"RENT\"?    HEMORRHOID SURGERY      INGUINAL HERNIA REPAIR Bilateral 08/14/2014    ID ESOPHAGOGASTRODUODENOSCOPY TRANSORAL DIAGNOSTIC N/A 02/23/2017    Procedure: EGD AND COLONOSCOPY;  Surgeon: Harrison Turcios MD;  Location:  GI LAB;  Service: Gastroenterology    US GUIDED THYROID BIOPSY  03/15/2021         06/07/24 0847   OT Last Visit   OT Visit Date 06/07/24   Note Type   Note type Evaluation   Pain Assessment   Pain Assessment Tool 0-10   Pain Score No Pain   Pain Location/Orientation Location: Chest   Restrictions/Precautions   Other Precautions Telemetry   Home Living   Type of Home House   Home Layout Multi-level   Bathroom Shower/Tub Tub/shower unit   Bathroom Toilet Standard   Additional Comments no DME   Prior Function   Level of [place] Independent with ADLs;Independent with functional mobility;Independent with IADLS   Lives With Spouse   Receives Help From Family   IADLs Independent with driving;Independent with meal prep;Independent with medication management   Falls in the last 6 months 0   Comments independent at baseline   Lifestyle "   Autonomy pta pt was (I) w ADLs and IADLs, 0 falls, lives w spouse and walks everyday   Reciprocal Relationships wife and friends that live in the neighborhood   Service to Others retired   Intrinsic Gratification walking   General   Additional Pertinent History hypertenison, hypokalemia, GERD, thoracic aortic ectasia   Family/Caregiver Present No   Additional General Comments Pt was pleasant and cooperative   ADL   Where Assessed Edge of bed  (& standing)   Eating Assistance 7  Independent   Grooming Assistance 6  Modified Independent   Grooming Deficit Increased time to complete   UB Bathing Assistance 7  Independent   LB Bathing Assistance 6  Modified Independent   UB Dressing Assistance 7  Independent   UB Dressing Deficit Thread RUE;Thread LUE;Pull around back;Fasteners   LB Dressing Assistance 6  Modified independent   LB Dressing Deficit Don/doff R shoe;Don/doff L shoe;Increased time to complete   Toileting Assistance  6  Modified independent   Bed Mobility   Additional Comments Pt was greeted OOB in standing, did not evaluate bed mobility at time of OT IE   Transfers   Sit to Stand 7  Independent   Stand to Sit 7  Independent   Functional Mobility   Functional Mobility 7  Independent   Balance   Static Sitting Normal   Dynamic Sitting Good   Static Standing Normal   Dynamic Standing Good   Ambulatory Good   Activity Tolerance   Activity Tolerance Patient tolerated treatment well   Medical Staff Made Aware PT   Nurse Made Aware yes   RUE Assessment   RUE Assessment WFL   LUE Assessment   LUE Assessment WFL   Psychosocial   Psychosocial (WDL) WDL   Cognition   Overall Cognitive Status WFL   Arousal/Participation Alert;Cooperative   Attention Within functional limits   Orientation Level Oriented X4   Memory Within functional limits   Following Commands Follows all commands and directions without difficulty   Assessment   Limitation Decreased high-level ADLs;Decreased endurance   Prognosis Good   Assessment Pt  is a 74 y.o. male seen for OT evaluation s/p admission to [facility] on 6/6/2024 due to increased dizziness. Diagnosed with Vertigo. Personal and env factors supporting pt at time of IE include age, (I) PLOF, supportive wife, attitude towards recovery, and accessible home environment. Personal and env factors inhibiting engagement in occupations include difficulty completing IADLs. Performance deficits that affect the pt’s occupational performance can be seen above. Despite pt's current functional limitations and medical complications pt is functioning at baseline. No further acute OT needs identified at this time. Recommend continued active ADL participation and mobilization with hospital staff while in the hospital to increase pt’s endurance and strength upon D/C. From OT standpoint, recommend D/C to home with family support when medically cleared. D/C pt from OT caseload at this time.   Goals   Patient Goals to go home   Plan   Treatment Interventions Continued evaluation;Energy conservation;Activityengagement   OT Frequency Eval only   Discharge Recommendation   Rehab Resource Intensity Level, OT No post-acute rehabilitation needs   Additional Comments  The patient's raw score on the -PAC Daily Activity Inpatient Short Form is 21. A raw score of greater than or equal to 19 suggests the patient may benefit from discharge to home. Please refer to the recommendation of the Occupational Therapist for safe discharge planning.   AM-PAC Daily Activity Inpatient   Lower Body Dressing 4   Bathing 4   Toileting 3   Upper Body Dressing 3   Grooming 3   Eating 4   Daily Activity Raw Score 21   Daily Activity Standardized Score (Calc for Raw Score >=11) 44.27   AM-PAC Applied Cognition Inpatient   Following a Speech/Presentation 4   Understanding Ordinary Conversation 4   Taking Medications 4   Remembering Where Things Are Placed or Put Away 4   Remembering List of 4-5 Errands 4   Taking Care of Complicated Tasks 4   Applied  Cognition Raw Score 24   Applied Cognition Standardized Score 62.21   End of Consult   Education Provided Yes   Patient Position at End of Consult Seated edge of bed;All needs within reach   Nurse Communication Nurse aware of consult     The patient's raw score on the AM-PAC Daily Activity Inpatient Short Form is 21. A raw score of greater than or equal to 19 suggests the patient may benefit from discharge to home. Please refer to the recommendation of the Occupational Therapist for safe discharge planning.    This session, pt required and most appropriately benefited from skilled OT/PT co-eval due to unpredictable medical and/or functional status. OT and PT goals were addressed separately as seen in documentation.     Aide Gallagher, OTR/L

## 2024-06-07 NOTE — TELEPHONE ENCOUNTER
Amanda Robertson    To:  Vascular Surgery Schedulers;    Vascular Surgeons, PA & NP    Fri 6/7/2024 4:09 PM  George Dayoub 1950    This patient needs to be added to [facility] schedule for 6/13/24 for a Right TCAR. He may or may not still be admitted at [facility] by then.

## 2024-06-07 NOTE — ASSESSMENT & PLAN NOTE
- CT 01/15/20: 4.1 cm ascending aortic aneurysm.   - last seen by CT surgery 12/05/23 and noted that the aneurysm was unchanged since 2014

## 2024-06-07 NOTE — ASSESSMENT & PLAN NOTE
R/o posterior CVA/vertebrobasilar insufficiency  Admit on stroke pathway  Status post aspirin and Plavix given in the emergency room  Monitor blood pressure closely  Allow for permissive hypertension for the next 24 hours  Patient is on Crestor 20 mg daily, continue  Follow-up TSH, lipid panel, A1c  Follow-up MRI of the brain  Neurology input in a.m.  PT/OT

## 2024-06-07 NOTE — H&P
[facility]  H&P  Name: Jaciel Van 74 y.o. male I MRN: [id number]  Unit/Bed#: E4 -01 I Date of Admission: 6/6/2024   Date of Service: 6/7/2024 I Hospital Day: 1      Assessment & Plan   * Vertigo  Assessment & Plan  R/o posterior CVA/vertebrobasilar insufficiency  Admit on stroke pathway  Status post aspirin and Plavix given in the emergency room  Monitor blood pressure closely  Allow for permissive hypertension for the next 24 hours  Patient is on Crestor 20 mg daily, continue  Follow-up TSH, lipid panel, A1c  Follow-up MRI of the brain  Neurology input in a.m.  PT/OT    Carotid artery stenosis  Assessment & Plan  Noted on CTA head/neck   Severe with about 75%  On asa and statin  Will get vascular surgery input    Hypertension  Assessment & Plan  BP elevated  Allowed for permissive HTN today and then stricter control from tomorrow      Hypokalemia  Assessment & Plan  Chronic  Could be due to hctz use  Continue with potassium supplement    Thoracic aortic ectasia (HCC)  Assessment & Plan  Has thoracic aortic aneurysm, 4cm  Followed by CT surgery outpatient  Continue to monitor  Smoking cessation   Need stricter BP control  Continue with asa, statin     Pre-diabetes  Assessment & Plan  Hold metformin,  patient received contrast  ISS as needed      GERD (gastroesophageal reflux disease)  Assessment & Plan  Continue with PPI    Tobacco abuse  Assessment & Plan  Cessation advised  Continue with nicotine patch            VTE Prophylaxis: Enoxaparin (Lovenox)  / sequential compression device   Code Status: Level 1  POLST: There is no POLST form on file for this patient (pre-hospital)  Discussion with family: yes, wife at bedside     Anticipated Length of Stay:  Patient will be admitted on an Inpatient basis with an anticipated length of stay of  1 - 2 midnights.   Justification for Hospital Stay: as above    Total Time for Visit, including Counseling / Coordination of Care: 45 minutes.   Greater than 50% of this total time spent on direct patient counseling and coordination of care.    Chief Complaint:       History of Present Illness:    Jaciel Van is a 74 y.o. male with multiple medical problems including pre-diabetes, HLD, HTN, tobacco user, thoracic aortic aneurysm, GERD who presents with dizziness.  He was in his usual state of health up until this morning when he woke up and felt the room spinning.  He tried walking to the bathroom and helping after using it he will feel better but once he sat down and went to supine position the dizziness intensified.  It was associated with nausea and some chest pressure but he did not vomit.  His wife checked his blood pressure, first reading was around 189/99.  He took an extra half pill of his amlodipine medication and rechecked it in about 20 minutes and his blood pressure remained high.  At this point his PCP was called who recommended patient report to the emergency room for further evaluation.  He also endorses a headache, located in the front and in the back of his head and some neck pain.  He denies any blurry vision, amaurosis fugax, weakness/numbness/tingling in the upper or lower extremities.  He is an active tobacco user, smoking 10 to 15 cigarettes/day although he reports he has cut back recently.    In the emergency room, a CT of the head is performed which shows severe stenosis in the proximal right internal carotid artery.    Case was discussed with neurology provider who recommended admission and stroke pathway, aspirin and Plavix.    Review of Systems:    Review of Systems   Constitutional:  Negative for chills and fever.   HENT:  Negative for ear pain and sore throat.    Eyes:  Negative for photophobia, pain and visual disturbance.   Respiratory:  Negative for cough and shortness of breath.    Cardiovascular:  Negative for chest pain and palpitations.   Gastrointestinal:  Negative for abdominal pain, diarrhea, nausea and vomiting.  "  Endocrine: Negative for polyuria.   Genitourinary:  Negative for dysuria and hematuria.   Musculoskeletal:  Negative for arthralgias and back pain.   Skin:  Negative for color change and rash.   Neurological:  Positive for dizziness and headaches. Negative for seizures, syncope, facial asymmetry, weakness and numbness.   Psychiatric/Behavioral:  Negative for self-injury.    All other systems reviewed and are negative.      Past Medical and Surgical History:     Past Medical History:   Diagnosis Date    Abnormal CAT scan     Bilateral inguinal hernia 1990    recurrent    Colitis     Gastritis 12/11/2012    Hypertension     Impaired fasting glucose 7/9/2019    Nausea & vomiting     Right knee meniscal tear 12/20/2017    Sleep apnea     uses cpap       Past Surgical History:   Procedure Laterality Date    CHOLECYSTECTOMY      COLONOSCOPY  2017    ABOUT 2-3 YEARS AGO    COLONOSCOPY  06/02/2022    EXPLORATORY LAPAROTOMY  08/16/2014    done for repair of peritoneal \"RENT\"?    HEMORRHOID SURGERY      INGUINAL HERNIA REPAIR Bilateral 08/14/2014    DE ESOPHAGOGASTRODUODENOSCOPY TRANSORAL DIAGNOSTIC N/A 02/23/2017    Procedure: EGD AND COLONOSCOPY;  Surgeon: Harrison Turcios MD;  Location: [facility];  Service: Gastroenterology    US GUIDED THYROID BIOPSY  03/15/2021       Meds/Allergies:    Prior to Admission medications    Medication Sig Start Date End Date Taking? Authorizing Provider   amLODIPine (NORVASC) 10 mg tablet Take 1 tablet by mouth once daily 4/27/24  Yes Alfie Pope DO   aspirin (ECOTRIN LOW STRENGTH) 81 mg EC tablet Take 81 mg by mouth daily   Yes Historical Provider, MD   brimonidine tartrate 0.2 % ophthalmic solution Administer 1 drop to both eyes 3 (three) times a day 6/5/24  Yes Historical Provider, MD   cetirizine (ZyrTEC) 10 MG chewable tablet Chew 10 mg daily   Yes Historical Provider, MD   hydroCHLOROthiazide 12.5 mg tablet Take 1 tablet (12.5 mg total) by mouth daily 2/27/24  Yes Alfie Pope DO "   metFORMIN (GLUCOPHAGE) 500 mg tablet TAKE 1 TABLET BY MOUTH TWICE DAILY WITH MEALS 3/28/24  Yes Alfie Pope DO   metoprolol succinate (TOPROL-XL) 25 mg 24 hr tablet Take 1 tablet (25 mg total) by mouth daily 1/30/24  Yes Alfie Pope DO   pantoprazole (PROTONIX) 40 mg tablet Take 1 tablet by mouth once daily 5/2/24  Yes Alfie Pope DO   potassium chloride (Klor-Con M20) 20 mEq tablet Take 1 tablet (20 mEq total) by mouth daily 1/30/24  Yes Alfie Pope DO   rosuvastatin (CRESTOR) 10 MG tablet Take 1 tablet (10 mg total) by mouth daily 1/30/24  Yes Alfie Pope DO   Blood Glucose Monitoring Suppl (ONE TOUCH ULTRA 2) w/Device KIT CHECK BLOOD SUGARS DAILY 1/24/23   Historical Provider, MD   cholecalciferol (VITAMIN D3) 400 units tablet Take 400 Units by mouth daily  Patient not taking: Reported on 6/6/2024    Historical Provider, MD   Lancets (OneTouch Delica Plus [id number]) MISC daily Check blood sugar 1/24/23   Historical Provider, MD   mometasone (ELOCON) 0.1 % lotion Apply topically daily 10/17/22   Alfie Pope DO   ondansetron (ZOFRAN-ODT) 4 mg disintegrating tablet Take 1 tablet (4 mg total) by mouth every 8 (eight) hours as needed for vomiting or nausea  Patient not taking: Reported on 6/6/2024 1/30/24   Alfie Pope DO   OneTouch Ultra test strip USE STRIP TO CHECK GLUCOSE ONCE DAILY 3/10/23   Historical Provider, MD   prednisoLONE acetate (PRED FORTE) 1 % ophthalmic suspension  1/26/22   Historical Provider, MD   zolpidem (AMBIEN) 10 mg tablet Take 1 tablet (10 mg total) by mouth daily at bedtime as needed for sleep  Patient not taking: Reported on 6/6/2024 1/30/24   Alfie Pope DO   Alphagan P 0.1 % INSTILL 1 DROP INTO EACH EYE TWICE DAILY 2/10/22 6/6/24  Historical Provider, MD FAIR have reviewed home medications with patient personally.    Allergies:   Allergies   Allergen Reactions    Ace Inhibitors Angioedema       Social History:     Marital Status: /Civil Union   Substance Use  History:   Social History     Substance and Sexual Activity   Alcohol Use Not Currently    Comment: special occ.     Social History     Tobacco Use   Smoking Status Some Days    Current packs/day: 0.00    Average packs/day: 0.3 packs/day for 40.0 years (10.0 ttl pk-yrs)    Types: Cigarettes    Start date: 1979    Last attempt to quit: 2019    Years since quittin.5   Smokeless Tobacco Never     Social History     Substance and Sexual Activity   Drug Use No       Family History:    non-contributory    Physical Exam:     Vitals:   Blood Pressure: 140/87 (24)  Pulse: 59 (24)  Temperature: 98.5 °F (36.9 °C) (24)  Temp Source: Temporal (24)  Respirations: 18 (24)  Height: 6' (182.9 cm) (24)  Weight - Scale: 84.7 kg (186 lb 11.7 oz) (24)  SpO2: 91 % (24)    Physical Exam  Vitals and nursing note reviewed.   Constitutional:       General: He is not in acute distress.     Appearance: He is well-developed. He is not ill-appearing or toxic-appearing.   HENT:      Head: Normocephalic and atraumatic.      Mouth/Throat:      Mouth: Mucous membranes are moist.   Eyes:      Extraocular Movements: Extraocular movements intact.      Conjunctiva/sclera: Conjunctivae normal.   Neck:      Vascular: Carotid bruit present.   Cardiovascular:      Rate and Rhythm: Normal rate and regular rhythm.      Heart sounds: No murmur heard.  Pulmonary:      Effort: Pulmonary effort is normal. No respiratory distress.      Breath sounds: Normal breath sounds.   Abdominal:      Palpations: Abdomen is soft.      Tenderness: There is no abdominal tenderness.   Musculoskeletal:         General: No swelling.      Cervical back: Neck supple.   Skin:     General: Skin is warm and dry.      Capillary Refill: Capillary refill takes less than 2 seconds.   Neurological:      General: No focal deficit present.      Mental Status: He is alert and oriented to person,  place, and time.      Cranial Nerves: No cranial nerve deficit.      Sensory: No sensory deficit.      Motor: No weakness.   Psychiatric:         Mood and Affect: Mood normal.       Additional Data:     Lab Results: I have personally reviewed pertinent reports.      Results from last 7 days   Lab Units 06/06/24  1801   WBC Thousand/uL 7.32   HEMOGLOBIN g/dL 14.6   HEMATOCRIT % 43.6   PLATELETS Thousands/uL 171   SEGS PCT % 70   LYMPHO PCT % 21   MONO PCT % 5   EOS PCT % 3     Results from last 7 days   Lab Units 06/06/24  1801   SODIUM mmol/L 138   POTASSIUM mmol/L 3.2*   CHLORIDE mmol/L 102   CO2 mmol/L 28   BUN mg/dL 15   CREATININE mg/dL 1.13   ANION GAP mmol/L 8   CALCIUM mg/dL 9.5   ALBUMIN g/dL 4.4   TOTAL BILIRUBIN mg/dL 0.77   ALK PHOS U/L 57   ALT U/L 11   AST U/L 12*   GLUCOSE RANDOM mg/dL 134     Results from last 7 days   Lab Units 06/06/24  1801   INR  1.03     Results from last 7 days   Lab Units 06/06/24  1547   POC GLUCOSE mg/dl 156*               Imaging: I have personally reviewed pertinent reports.      CTA head and neck with and without contrast   Final Result by E. Alec Schoenberger, MD (06/06 2006)      No acute intracranial pathology. Chronic microangiopathy.   Severe (approximately 75% stenosis) in the proximal right internal carotid artery   No significant stenosis of the left cervical carotid or vertebral arteries.   Moderate to severe stenosis in the cavernous segment of the left internal carotid artery   Occlusion of the right A1 segment with distal reconstitution is new since 2010. Age-indeterminate but could be chronic.   Moderate stenosis in the right vertebral artery.   No other high-grade stenosis or acute large vessel occlusion.      The study was marked in EPIC for immediate notification.      Workstation performed: [id number]         MRI Inpatient Order    (Results Pending)       EKG, Pathology, and Other Studies Reviewed on Admission:   EKG: NSR    Allscripts / Epic Records  Reviewed: Yes     ** Please Note: This note has been constructed using a voice recognition system. **

## 2024-06-07 NOTE — ASSESSMENT & PLAN NOTE
Lab Results   Component Value Date    HGBA1C 6.7 (H) 05/13/2024       Recent Labs     06/06/24  1547   POCGLU 156*       Blood Sugar Average: Last 72 hrs:  (P) 156    Insulin sliding scale as per protocol  Hold metformin inpatient

## 2024-06-07 NOTE — ASSESSMENT & PLAN NOTE
"CTA of the head and neck revealed, \"severe (approximately 75% stenosis) in the proximal right internal carotid artery  No significant stenosis of the left cervical carotid or vertebral arteries.  Moderate to severe stenosis in the cavernous segment of the left internal carotid artery  Occlusion of the right A1 segment with distal reconstitution is new since 2010. Age-indeterminate but could be chronic.  Moderate stenosis in the right vertebral artery.  Patient was evaluated by the vascular surgery team: Patient meets criteria for right carotid intervention for asymptomatic high-grade stenosis  Vascular surgery team recommended cardiology preop risk stratification which were performed during this hospital stay  Will follow-up with outpatient vascular surgery for right TCAR  Continue aspirin/Plavix/Crestor per vascular surgery  "

## 2024-06-07 NOTE — PLAN OF CARE
Problem: Knowledge Deficit  Goal: Patient/family/caregiver demonstrates understanding of disease process, treatment plan, medications, and discharge instructions  Description: Complete learning assessment and assess knowledge base.  Interventions:  - Provide teaching at level of understanding  - Provide teaching via preferred learning methods  Outcome: Progressing     Problem: NEUROSENSORY - ADULT  Goal: Achieves stable or improved neurological status  Description: INTERVENTIONS  - Monitor and report changes in neurological status  - Monitor vital signs such as temperature, blood pressure, glucose, and any other labs ordered   - Initiate measures to prevent increased intracranial pressure  - Monitor for seizure activity and implement precautions if appropriate      Outcome: Progressing     Problem: NEUROSENSORY - ADULT  Goal: Remains free of injury related to seizures activity  Description: INTERVENTIONS  - Maintain airway, patient safety  and administer oxygen as ordered  - Monitor patient for seizure activity, document and report duration and description of seizure to physician/advanced practitioner  - If seizure occurs,  ensure patient safety during seizure  - Reorient patient post seizure  - Seizure pads on all 4 side rails  - Instruct patient/family to notify RN of any seizure activity including if an aura is experienced  - Instruct patient/family to call for assistance with activity based on nursing assessment  - Administer anti-seizure medications if ordered    Outcome: Progressing     Problem: NEUROSENSORY - ADULT  Goal: Achieves maximal functionality and self care  Description: INTERVENTIONS  - Monitor swallowing and airway patency with patient fatigue and changes in neurological status  - Encourage and assist patient to increase activity and self care.   - Encourage visually impaired, hearing impaired and aphasic patients to use assistive/communication devices  Outcome: Progressing

## 2024-06-07 NOTE — CONSULTS
Consultation - Neurology   Jaciel Van 74 y.o. male MRN: [id number]  Unit/Bed#: E4 -01 Encounter: [id number]      Assessment & Plan     * Vertigo  Assessment & Plan  74 y.o. male with HTN, HLD, BRAVO, prediabetes, CAD, CKD, and tobacco use who presented to [facility] on 6/6/2024 due to positional dizziness upon waking, unsteady gait, and nausea.  Dizziness worsens when lying down and resolves while sitting up.  Patient is also having a posterior headache, but no photophobia or phonophobia.    Upon arrival to the ED, /82.    NIHSS 0.    CT head negative for acute intracranial abnormalities.  Only noted stable moderate microangiopathic changes and stable mild prominence of the bifrontal extra-axial CSF spaces.    CTA head/neck:  Severe (approximate 75% stenosis) in the proximal right ICA.    Moderate/severe stenosis in the cavernous segment of the left ICA.    Occlusion of the right A1 segment with distal reconstitution (new since 2010; age-indeterminate, but could be chronic).    Moderate stenosis in the right vertebral artery.    Normal caliber basilar artery.    Patient was not a TNK candidate due to being outside the appropriate time window.  Patient was loaded with aspirin 325 mg and Plavix 300 mg and placed on stroke pathway.    MRI brain negative for acute infarct.  LDL 48, cholesterol 109    Etiology for intermittent positional dizziness likely peripheral vestibulopathy.    Plan:  - Carotid ultrasound pending  - Vascular surgery following  - S/p aspirin 325 mg and Plavix 300 mg once  - Continue home aspirin 81 mg daily  - From a Neuro standpoint, can continue home statin (he was taking Crestor 20 mg daily PTA) unless vascular surgery would prefer atorvastatin  - Meclizine PRN  - PT/OT; he may benefit from vestibular therapy  - No further inpatient Neuro recommendations    Carotid artery stenosis  Assessment & Plan  - CTA head/neck showed severe (approximate 75% stenosis) in the proximal right  ICA.  Moderate/severe stenosis in the cavernous segment of the left ICA.   - Vascular surgery following; appreciate recommendations   - Aspirin/statin  - Carotid ultrasound pending        Jaciel Van will not need outpatient follow up with Neurology. He will not require outpatient neurological testing.    History of Present Illness     Reason for Consult / Principal Problem: vertigo/dizziness  Hx and PE limited by: N/A  HPI: Jaciel Van is a 74 y.o. male with HTN, HLD, BRAVO, prediabetes, CAD, CKD, and tobacco use who presented to [facility] on 6/6/2024 due to positional dizziness upon waking, unsteady gait, and nausea.  Dizziness worsens when lying down and resolves while sitting up.    History obtained per patient and chart review.  Yesterday morning, patient got out of bed around 6 AM and felt slightly off balance.  He had to sit down which helped the unsteadiness improved.  While walking, patient had to hold onto the wall.  When he laid down around 8 AM, he had room spinning dizziness.  This resolved while seated.  Dizziness associated with nausea, but no vomiting.  Of note, he states that yesterday his blood pressure at home was elevated with SBP >180.  He took extra amlodipine given the hypertension.  He called his PCP who recommended that the patient come to the ED for further evaluation.    Upon arrival to the ED, /82.  NIHSS 0.  CT head negative for acute intracranial abnormalities.  Only noted stable moderate microangiopathic changes and stable mild prominence of the bifrontal extra-axial CSF spaces.  CTA head/neck showed severe (approximate 75% stenosis) in the proximal right ICA.  Moderate/severe stenosis in the cavernous segment of the left ICA.  Occlusion of the right A1 segment with distal reconstitution (new since 2010; age-indeterminate, but could be chronic).  Moderate stenosis in the right vertebral artery.  Normal caliber basilar artery.  Patient was not a TNK candidate due to being  "outside the appropriate time window.  Patient was loaded with aspirin 325 mg and Plavix 300 mg and placed on stroke pathway.    On exam today, patient sitting comfortably in bed in no acute distress.  He states that he continues to have dizziness when he lies flat, but this resolves when he is seated.  When ambulating or turning his head, the dizziness returns.  He feels unsteady on his feet.  He is having a posterior headache, but no associated photophobia or phonophobia.  Denies any numbness, tingling, arm/leg weakness, visual disturbances, coordination issues, chest pain, shortness of breath, or abdominal pain.    Inpatient consult to Neurology  Consult performed by: Sherron Castro PA-C  Consult ordered by: Norma Woo MD          Review of Systems   Musculoskeletal:  Positive for gait problem.   Neurological:  Positive for dizziness, light-headedness and headaches.   All other systems reviewed and are negative.      Historical Information   Past Medical History:   Diagnosis Date    Abnormal CAT scan     Bilateral inguinal hernia 1990    recurrent    Colitis     Gastritis 12/11/2012    Hypertension     Impaired fasting glucose 7/9/2019    Nausea & vomiting     Right knee meniscal tear 12/20/2017    Sleep apnea     uses cpap     Past Surgical History:   Procedure Laterality Date    CHOLECYSTECTOMY      COLONOSCOPY  2017    ABOUT 2-3 YEARS AGO    COLONOSCOPY  06/02/2022    EXPLORATORY LAPAROTOMY  08/16/2014    done for repair of peritoneal \"RENT\"?    HEMORRHOID SURGERY      INGUINAL HERNIA REPAIR Bilateral 08/14/2014    TN ESOPHAGOGASTRODUODENOSCOPY TRANSORAL DIAGNOSTIC N/A 02/23/2017    Procedure: EGD AND COLONOSCOPY;  Surgeon: Harrison Turcios MD;  Location: [facility];  Service: Gastroenterology    US GUIDED THYROID BIOPSY  03/15/2021     Social History   Social History     Substance and Sexual Activity   Alcohol Use Not Currently    Comment: special occ.     Social History     Substance and Sexual Activity "   Drug Use No     E-Cigarette/Vaping    E-Cigarette Use Never User      E-Cigarette/Vaping Substances    Nicotine No     THC No     CBD No     Flavoring No     Other No     Unknown No      Social History     Tobacco Use   Smoking Status Some Days    Current packs/day: 0.00    Average packs/day: 0.3 packs/day for 40.0 years (10.0 ttl pk-yrs)    Types: Cigarettes    Start date: 1979    Last attempt to quit: 2019    Years since quittin.5   Smokeless Tobacco Never     Family History:   Family History   Problem Relation Age of Onset    Breast cancer Mother     Heart disease Father     Heart disease Brother     Heart disease Family        Review of previous medical records was completed. Reviewed prior notes, labs, CTA head/neck    Meds/Allergies   all current active meds have been reviewed, current meds:   Current Facility-Administered Medications   Medication Dose Route Frequency    amLODIPine (NORVASC) tablet 10 mg  10 mg Oral Daily    aspirin (ECOTRIN LOW STRENGTH) EC tablet 81 mg  81 mg Oral Daily    atorvastatin (LIPITOR) tablet 40 mg  40 mg Oral Daily With Dinner    brimonidine tartrate 0.2 % ophthalmic solution 1 drop  1 drop Both Eyes TID    enoxaparin (LOVENOX) subcutaneous injection 40 mg  40 mg Subcutaneous Daily    hydroCHLOROthiazide tablet 12.5 mg  12.5 mg Oral Daily    insulin lispro (HumALOG/ADMELOG) 100 units/mL subcutaneous injection 1-5 Units  1-5 Units Subcutaneous TID AC    loratadine (CLARITIN) tablet 10 mg  10 mg Oral Daily    meclizine (ANTIVERT) tablet 25 mg  25 mg Oral Q8H PRN    metoprolol succinate (TOPROL-XL) 24 hr tablet 25 mg  25 mg Oral Daily    nicotine (NICODERM CQ) 14 mg/24hr TD 24 hr patch 1 patch  1 patch Transdermal Daily    pantoprazole (PROTONIX) EC tablet 40 mg  40 mg Oral Early Morning    potassium chloride (Klor-Con M20) CR tablet 20 mEq  20 mEq Oral Daily   , and PTA meds:   Prior to Admission Medications   Prescriptions Last Dose Informant Patient Reported? Taking?    Blood Glucose Monitoring Suppl (ONE TOUCH ULTRA 2) w/Device KIT  Spouse/Significant Other, Self Yes No   Sig: CHECK BLOOD SUGARS DAILY   Lancets (OneTouch Delica Plus [id number]) MISC  Spouse/Significant Other, Self Yes No   Sig: daily Check blood sugar   OneTouch Ultra test strip  Spouse/Significant Other, Self Yes No   Sig: USE STRIP TO CHECK GLUCOSE ONCE DAILY   amLODIPine (NORVASC) 10 mg tablet   No Yes   Sig: Take 1 tablet by mouth once daily   aspirin (ECOTRIN LOW STRENGTH) 81 mg EC tablet  Spouse/Significant Other, Self Yes Yes   Sig: Take 81 mg by mouth daily   brimonidine tartrate 0.2 % ophthalmic solution   Yes Yes   Sig: Administer 1 drop to both eyes 3 (three) times a day   cetirizine (ZyrTEC) 10 MG chewable tablet   Yes Yes   Sig: Chew 10 mg daily   cholecalciferol (VITAMIN D3) 400 units tablet Not Taking Spouse/Significant Other, Self Yes No   Sig: Take 400 Units by mouth daily   Patient not taking: Reported on 6/6/2024   hydroCHLOROthiazide 12.5 mg tablet   No Yes   Sig: Take 1 tablet (12.5 mg total) by mouth daily   metFORMIN (GLUCOPHAGE) 500 mg tablet   No Yes   Sig: TAKE 1 TABLET BY MOUTH TWICE DAILY WITH MEALS   metoprolol succinate (TOPROL-XL) 25 mg 24 hr tablet   No Yes   Sig: Take 1 tablet (25 mg total) by mouth daily   mometasone (ELOCON) 0.1 % lotion  Spouse/Significant Other, Self No No   Sig: Apply topically daily   ondansetron (ZOFRAN-ODT) 4 mg disintegrating tablet Not Taking  No No   Sig: Take 1 tablet (4 mg total) by mouth every 8 (eight) hours as needed for vomiting or nausea   Patient not taking: Reported on 6/6/2024   pantoprazole (PROTONIX) 40 mg tablet   No Yes   Sig: Take 1 tablet by mouth once daily   potassium chloride (Klor-Con M20) 20 mEq tablet   No Yes   Sig: Take 1 tablet (20 mEq total) by mouth daily   prednisoLONE acetate (PRED FORTE) 1 % ophthalmic suspension Not Taking Spouse/Significant Other, Self Yes No   Patient not taking: Reported on 6/6/2024   rosuvastatin  (CRESTOR) 10 MG tablet   No Yes   Sig: Take 1 tablet (10 mg total) by mouth daily   zolpidem (AMBIEN) 10 mg tablet Not Taking  No No   Sig: Take 1 tablet (10 mg total) by mouth daily at bedtime as needed for sleep   Patient not taking: Reported on 6/6/2024      Facility-Administered Medications: None       Allergies   Allergen Reactions    Ace Inhibitors Angioedema       Objective   Vitals:Blood pressure 142/79, pulse 62, temperature 98 °F (36.7 °C), temperature source Temporal, resp. rate 16, height 6' (1.829 m), weight 84.7 kg (186 lb 11.7 oz), SpO2 93%.,Body mass index is 25.33 kg/m².    Intake/Output Summary (Last 24 hours) at 6/7/2024 0709  Last data filed at 6/7/2024 0452  Gross per 24 hour   Intake 1650 ml   Output 300 ml   Net 1350 ml       Invasive Devices:   Invasive Devices       Peripheral Intravenous Line  Duration             Peripheral IV 06/06/24 Left Antecubital <1 day                    Physical Exam  Vitals and nursing note reviewed.   Constitutional:       Appearance: Normal appearance.      Comments: Patient sitting comfortably in bed in no acute distress   HENT:      Head: Normocephalic and atraumatic.      Mouth/Throat:      Mouth: Mucous membranes are moist.      Pharynx: Oropharynx is clear.   Eyes:      Extraocular Movements: Extraocular movements intact.      Conjunctiva/sclera: Conjunctivae normal.      Pupils: Pupils are equal, round, and reactive to light.   Pulmonary:      Effort: Pulmonary effort is normal.   Musculoskeletal:         General: Normal range of motion.   Skin:     General: Skin is warm and dry.   Neurological:      Mental Status: He is alert and oriented to person, place, and time.      Deep Tendon Reflexes:      Reflex Scores:       Bicep reflexes are 2+ on the right side and 2+ on the left side.       Brachioradialis reflexes are 2+ on the right side and 2+ on the left side.       Patellar reflexes are 2+ on the right side and 2+ on the left side.       Achilles  reflexes are 2+ on the right side and 2+ on the left side.     Comments: See full neuro exam below       Neurologic Exam     Mental Status   Oriented to person, place, and time.   Patient awake and alert.  Oriented to person, place, month, and year.  No dysarthria or aphasia.  Able to follow simple midline and appendicular commands.     Cranial Nerves     CN III, IV, VI   Pupils are equal, round, and reactive to light.  Pupils 3 mm, round, reactive to light bilaterally.  EOMs intact.  A few beats of nystagmus when looking to the left  No visual field deficits.  Facial sensation to light touch intact throughout.  Slight flattening of left nasolabial fold, but symmetric smile.  When patient looked at himself in the camera phone, he states that his face appears normal.  Tongue midline.  Hearing grossly intact.     Motor Exam   Muscle bulk: normal  Overall muscle tone: normal  Right arm pronator drift: absent  Left arm pronator drift: absent  5/5 strength in bilateral upper and lower extremities.     Sensory Exam   Sensation to light touch, temperature, and vibration intact throughout.     Gait, Coordination, and Reflexes     Reflexes   Right brachioradialis: 2+  Left brachioradialis: 2+  Right biceps: 2+  Left biceps: 2+  Right patellar: 2+  Left patellar: 2+  Right achilles: 2+  Left achilles: 2+  Steady narrow-base gait.  No ataxia with finger to nose or heel-to-shin bilaterally  No resting or action tremor  No ankle clonus bilaterally  Downgoing toes bilaterally.       Lab Results: I have personally reviewed pertinent reports.  , CBC:   Results from last 7 days   Lab Units 06/06/24  1801   WBC Thousand/uL 7.32   RBC Million/uL 5.06   HEMOGLOBIN g/dL 14.6   HEMATOCRIT % 43.6   MCV fL 86   PLATELETS Thousands/uL 171   , BMP/CMP:   Results from last 7 days   Lab Units 06/06/24  1801   SODIUM mmol/L 138   POTASSIUM mmol/L 3.2*   CHLORIDE mmol/L 102   CO2 mmol/L 28   BUN mg/dL 15   CREATININE mg/dL 1.13   CALCIUM mg/dL  "9.5   AST U/L 12*   ALT U/L 11   ALK PHOS U/L 57   EGFR ml/min/1.73sq m 64   , Vitamin B12:   , HgBA1C:   , TSH:   , Coagulation:   Results from last 7 days   Lab Units 06/06/24  1801   INR  1.03   , Lipid Profile:   Results from last 7 days   Lab Units 06/07/24  0436   HDL mg/dL 33*   LDL CALC mg/dL 48   TRIGLYCERIDES mg/dL 142   , Ammonia:   , Urinalysis:       Invalid input(s): \"URIBILINOGEN\", Drug Screen:   , Medication Drug Levels:       Invalid input(s): \"CARBAMAZEPINE\", \"OXCARBAZEPINE\"  Imaging Studies: I have personally reviewed pertinent reports.   and I have personally reviewed pertinent films in PACS  EKG, Pathology, and Other Studies: I have personally reviewed pertinent reports.   and I have personally reviewed pertinent films in PACS  VTE Prophylaxis: Sequential compression device (Venodyne)  and Enoxaparin (Lovenox)    Code Status: Level 1 - Full Code  Advance Directive and Living Will:      Power of :    POLST:        "

## 2024-06-07 NOTE — UTILIZATION REVIEW
Initial Clinical Review    Admission: Date/Time/Statement:   Admission Orders (From admission, onward)       Ordered        06/06/24 2143  INPATIENT ADMISSION  Once                          Orders Placed This Encounter   Procedures    INPATIENT ADMISSION     Standing Status:   Standing     Number of Occurrences:   1     Order Specific Question:   Level of Care     Answer:   Med Surg [16]     Order Specific Question:   Estimated length of stay     Answer:   More than 2 Midnights     Order Specific Question:   Certification     Answer:   I certify that inpatient services are medically necessary for this patient for a duration of greater than two midnights. See H&P and MD Progress Notes for additional information about the patient's course of treatment.     ED Arrival Information       Expected   6/6/2024 14:50    Arrival   6/6/2024 15:20    Acuity   Urgent              Means of arrival   Walk-In    Escorted by   Family Member    Service   Hospitalist    Admission type   Emergency              Arrival complaint   dizzy, High Blood Pressure             Chief Complaint   Patient presents with    Dizziness     Pt reports feeling dizzy and nauseous since this morning.        Initial Presentation: 74 y.o. male presents to the ED from home with c/o dizziness, room spinning since this AM, nausea, chest pressure, no vomiting, headache in front and back of head with neck pain. Spouse notes increase BP, took extra half of Amlodipine with continued increased BP.  PMH: pre-DM. JTM. J:D. Tobacco use, thoracic aorta aneurysm, GERD.    Labs - Low K.  Imaging - no CVA, severe stenosis 75% R ICA. Mod stenosis R vertebral artery. ECG - NSR.  Treated with IV fluids, Meclizine x 2, IV Zofran, IV KCL, Tylenol, ASA, Plavix.    On exam + carotid bruit, A&O x 3, normal breath sounds, no cranial nerve deficit. Admitted to INPATIENT status with Vertigo, CA stenosis, HTN, hypokalemia, thoracic aortic ectasia - stroke pathway, MRI Brain, neuro  consult, therapy evals, statin, Vascular surgery consult, permissive HTN, replete and trend K, strict BP control, PPI.      Anticipated Length of Stay/Certification Statement:  Patient will be admitted on an Inpatient basis with an anticipated length of stay of  1 - 2 midnights.   Justification for Hospital Stay: as above.     Date: 6/7   Day 2:   R/o CVA, carotid stenosis, vertigo.  MRI Brain negative for acute stroke.  Worked with therapy today mobility score 24/16 - no rehab needs. K unchanged at 3.2    6/7 Vascular Surgery Consult - carotid artery stenosis, thoracic aorta ectasia, follows w/ CT surg, with dizziness, vertigo, r/o posterior CVA, with 75% proximal R ICA stensis.  MRI Brain pending, carotid duplex, ASA, statin.  On exam no acute distress, A&O, normal neuro exam, pulses 2+.     6/7 Neuro Consult - NIHSS 0 - vertigo, r/o CVA,  CT and MRI negative for acute stroke, + severe prox R ICA stenosis, mod L ICA, occlusion R A1 Segment, mod stenosis R vertebral artery.  Etiology for intermittent positional dizziness likely peripheral vestibulopathy.  Can continue home statin. May benefit from vestibular therapy.        ED Triage Vitals   Temperature Pulse Respirations Blood Pressure SpO2   06/06/24 1527 06/06/24 1527 06/06/24 1527 06/06/24 1527 06/06/24 1527   97.8 °F (36.6 °C) 72 20 163/82 97 %      Temp Source Heart Rate Source Patient Position - Orthostatic VS BP Location FiO2 (%)   06/06/24 1527 06/06/24 1527 06/06/24 1549 06/06/24 1527 --   Oral Monitor Lying Right arm       Pain Score       06/06/24 1920       4          Wt Readings from Last 1 Encounters:   06/07/24 84.4 kg (186 lb)     Additional Vital Signs:   Date/Time Temp Pulse Resp BP MAP (mmHg) SpO2 O2 Device Patient Position - Orthostatic VS   06/07/24 1052 97.4 °F (36.3 °C) Abnormal  58 18 134/72 97 97 % None (Room air) Lying   06/07/24 0825 -- 69 18 155/92 106 94 % None (Room air) Sitting   06/07/24 0718 98 °F (36.7 °C) 62 16 142/79 105 93 %  None (Room air) Lying   06/07/24 0425 -- 59 18 140/87 100 91 % None (Room air) Lying   06/07/24 0225 -- 64 18 133/74 88 93 % None (Room air) Lying   06/07/24 0125 98.5 °F (36.9 °C) 66 18 165/87 118 92 % None (Room air) Lying   06/07/24 0025 97.5 °F (36.4 °C) 62 18 137/63 90 94 % None (Room air) Lying   06/06/24 2325 98.5 °F (36.9 °C) 65 18 154/75 104 96 % None (Room air) Lying   06/06/24 2308 -- 85 18 153/76 -- 98 % None (Room air) --   06/06/24 2015 -- 66 14 157/78 107 95 % -- --   06/06/24 1900 -- 65 18 149/81 -- 96 % None (Room air) Lying   06/06/24 1845 -- 66 18 -- -- 95 % None (Room air) --   06/06/24 1549 -- 69 18 123/73 -- 96 % -- Lying       Pertinent Labs/Diagnostic Test Results:     6/6 ECG x2 - Normal sinus rhythm  Normal ECG    6/6 Echo  - P     MRI brain wo contrast   Final Result by Warren Robles MD (06/07 1156)      No acute infarct or acute intracranial hemorrhage.      Workstation performed: [id number]         CTA head and neck with and without contrast   Final Result by E. Alec Schoenberger, MD (06/06 2006)      No acute intracranial pathology. Chronic microangiopathy.   Severe (approximately 75% stenosis) in the proximal right internal carotid artery   No significant stenosis of the left cervical carotid or vertebral arteries.   Moderate to severe stenosis in the cavernous segment of the left internal carotid artery   Occlusion of the right A1 segment with distal reconstitution is new since 2010. Age-indeterminate but could be chronic.   Moderate stenosis in the right vertebral artery.   No other high-grade stenosis or acute large vessel occlusion.      VAS carotid complete study    (Results Pending)            Results from last 7 days   Lab Units 06/07/24  0824 06/06/24  1801   WBC Thousand/uL 4.81 7.32   HEMOGLOBIN g/dL 14.2 14.6   HEMATOCRIT % 42.4 43.6   PLATELETS Thousands/uL 159 171   TOTAL NEUT ABS Thousands/µL 2.74 5.08         Results from last 7 days   Lab Units 06/07/24  0824  06/06/24  1801   SODIUM mmol/L 141 138   POTASSIUM mmol/L 3.2* 3.2*   CHLORIDE mmol/L 109* 102   CO2 mmol/L 27 28   ANION GAP mmol/L 5 8   BUN mg/dL 12 15   CREATININE mg/dL 0.92 1.13   EGFR ml/min/1.73sq m 82 64   CALCIUM mg/dL 9.0 9.5   MAGNESIUM mg/dL  --  2.0     Results from last 7 days   Lab Units 06/06/24  1801   AST U/L 12*   ALT U/L 11   ALK PHOS U/L 57   TOTAL PROTEIN g/dL 6.8   ALBUMIN g/dL 4.4   TOTAL BILIRUBIN mg/dL 0.77     Results from last 7 days   Lab Units 06/07/24  1057 06/07/24  0643 06/06/24  1547   POC GLUCOSE mg/dl 188* 146* 156*     Results from last 7 days   Lab Units 06/07/24  0824 06/06/24  1801   GLUCOSE RANDOM mg/dL 116 134     Results from last 7 days   Lab Units 06/06/24 2012 06/06/24  1801   HS TNI 0HR ng/L  --  11   HS TNI 2HR ng/L 13  --    HSTNI D2 ng/L 2  --          Results from last 7 days   Lab Units 06/06/24  1801   PROTIME seconds 13.8   INR  1.03   PTT seconds 38*       Results from last 7 days   Lab Units 06/06/24  1801   BNP pg/mL 29     ED Treatment:   Medication Administration from 06/06/2024 1450 to 06/06/2024 2318         Date/Time Order Dose Route Action     06/06/2024 1800 EDT sodium chloride 0.9 % infusion 150 mL/hr Intravenous New Bag     06/06/2024 1754 EDT meclizine (ANTIVERT) tablet 25 mg 25 mg Oral Given     06/06/2024 1754 EDT ondansetron (ZOFRAN) injection 4 mg 4 mg Intravenous Given     06/06/2024 1920 EDT potassium chloride (Klor-Con M20) CR tablet 40 mEq 40 mEq Oral Given     06/06/2024 1920 EDT meclizine (ANTIVERT) tablet 25 mg 25 mg Oral Given     06/06/2024 1920 EDT acetaminophen (TYLENOL) tablet 650 mg 650 mg Oral Given     06/06/2024 1912 EDT iohexol (OMNIPAQUE) 350 MG/ML injection (MULTI-DOSE) 85 mL 85 mL Intravenous Given     06/06/2024 2157 EDT aspirin tablet 325 mg 325 mg Oral Given     06/06/2024 2157 EDT clopidogrel (PLAVIX) tablet 300 mg 300 mg Oral Given          Past Medical History:   Diagnosis Date    Abnormal CAT scan     Bilateral  inguinal hernia 1990    recurrent    Colitis     Gastritis 12/11/2012    Hypertension     Impaired fasting glucose 7/9/2019    Nausea & vomiting     Right knee meniscal tear 12/20/2017    Sleep apnea     uses cpap     Present on Admission:   Vertigo   GERD (gastroesophageal reflux disease)   Pre-diabetes   Hypokalemia   Tobacco abuse   Thoracic aortic ectasia (HCC)   Hypertension      Admitting Diagnosis: Carotid artery stenosis [I65.29]  Hypokalemia [E87.6]  Dizziness [R42]  Age/Sex: 74 y.o. male  Admission Orders:  Scheduled Medications:  amLODIPine, 10 mg, Oral, Daily  aspirin, 81 mg, Oral, Daily  atorvastatin, 40 mg, Oral, Daily With Dinner  brimonidine tartrate, 1 drop, Both Eyes, TID  dorzolamide, 1 drop, Both Eyes, TID  enoxaparin, 40 mg, Subcutaneous, Daily  hydroCHLOROthiazide, 12.5 mg, Oral, Daily  insulin lispro, 1-5 Units, Subcutaneous, TID AC  loratadine, 10 mg, Oral, Daily  metoprolol succinate, 25 mg, Oral, Daily  nicotine, 1 patch, Transdermal, Daily  pantoprazole, 40 mg, Oral, Early Morning  potassium chloride, 20 mEq, Oral, Daily      Continuous IV Infusions:    IV NSS @ 150 cc/hr - d/c 6/7     PRN Meds:  meclizine, 25 mg, Oral, Q8H PRN    Tele  NIHSS   Neuro checks Every 1 hour x 4 hours, then every 2 hours x 4, then every 4 hours x 72 hours                 POC GLUCOSE AC/HS WITH SSI COVERAGE   Cardiac diet   Echo  IP CONSULT TO NEUROLOGY  IP CONSULT TO CASE MANAGEMENT  IP CONSULT TO NUTRITION SERVICES  IP CONSULT TO VASCULAR SURGERY  IP CONSULT TO CARDIOLOGY    Network Utilization Review Department  ATTENTION: Please call with any questions or concerns to [phone] and carefully listen to the prompts so that you are directed to the right person. All voicemails are confidential.   For Discharge needs, contact Care Management DC Support Team at [phone] opt. [phone]  Send all requests for admission clinical reviews, approved or denied determinations and any other requests to dedicated fax number  below belonging to the campus where the patient is receiving treatment. List of dedicated fax numbers for the Facilities:  FACILITY NAME UR FAX NUMBER   ADMISSION DENIALS (Administrative/Medical Necessity) [phone]   DISCHARGE SUPPORT TEAM (NETWORK) [phone]   PARENT CHILD HEALTH (Maternity/NICU/Pediatrics) [phone]   [facility] [phone]   [facility] [phone]   [facility] [phone]   [facility] [phone]   [facility] [phone]   [facility] [phone]   [facility] [phone]   [facility] [phone]   [facility] [phone]   [facility] [phone]   [facility] [phone]   [facility] [phone]

## 2024-06-07 NOTE — ASSESSMENT & PLAN NOTE
- -6/6/24 CTA head/neck: No acute intracranial pathology. Chronic microangiopathy. Severe (approximately 75% stenosis) in the proximal right internal carotid artery. No significant stenosis of the left cervical carotid or vertebral arteries. Moderate to severe stenosis in the cavernous segment of the left internal carotid artery. Occlusion of the right A1 segment with distal reconstitution is new since 2010. Age-indeterminate but could be chronic. Moderate stenosis in the right vertebral artery. No other high-grade stenosis or acute large vessel occlusion.   - seen by vascular who recommend RCA intervention for asymptomatic high grade stenosis    Pt is considered low risk for vascular surgery. He is able to perform > 4 METS and denies any cardiac symptoms. Updated echo pending.

## 2024-06-07 NOTE — ASSESSMENT & PLAN NOTE
Patient follows with CT surgery as an outpatient  Last seen 12/23 office visit  Continue outpatient follow-up

## 2024-06-07 NOTE — PROGRESS NOTES
"[facility]  Progress Note  Name: Jaciel Van I  MRN: [id number]  Unit/Bed#: E4 -01 I Date of Admission: 6/6/2024   Date of Service: 6/7/2024 I Hospital Day: 1    Assessment & Plan   * Vertigo  Assessment & Plan  Patient is a 74-year-old male with past medical history significant for hypertension who presented to the ER with dizziness     Patient was placed on the stroke pathway initially in the ER  CT scan of the brain: No acute intracranial pathology.  Chronic microangiopathic.  Severe right ICA proximal stenosis  MRI of the brain: No acute infarct or acute intracranial hemorrhage\"  2D echocardiogram: No significant abnormality.  Ascending aorta mildly dilated.  Patient was evaluated by the neurology team: Patient with positionally dependent vertigo, consistent with BPV  No evidence of acute infarct  Not felt to be secondary to symptomatic carotid disease  Per neurology: As needed meclizine  Patient also following with vascular surgery for carotid artery stenosis  Pt still notes dizzy/veritgo with moving head, attempting to walk:  cont meclizine    Carotid artery stenosis  Assessment & Plan  CTA of the head and neck revealed, \"severe (approximately 75% stenosis) in the proximal right internal carotid artery  No significant stenosis of the left cervical carotid or vertebral arteries.  Moderate to severe stenosis in the cavernous segment of the left internal carotid artery  Occlusion of the right A1 segment with distal reconstitution is new since 2010. Age-indeterminate but could be chronic.  Moderate stenosis in the right vertebral artery.  Patient was evaluated by the vascular surgery team: Patient meets criteria for right carotid intervention for asymptomatic high-grade stenosis  Vascular surgery team recommended cardiology preop risk stratification which were performed during this hospital stay  Will follow-up with outpatient vascular surgery for right TCAR  Continue " aspirin/Plavix/Crestor per vascular surgery    Ascending aortic aneurysm (HCC)  Assessment & Plan  Patient follows with CT surgery as an outpatient  Last seen 12/23 office visit  Continue outpatient follow-up    Hypokalemia  Assessment & Plan  Chronic  Could be due to hctz use  Continue with potassium supplement    Pre-diabetes  Assessment & Plan  Hold metformin,  patient received contrast  Will resume in 48 hours  Accu-Cheks, sliding scale insulin        Thoracic aortic ectasia (HCC)  Assessment & Plan  Has thoracic aortic aneurysm, 4cm  Followed by CT surgery outpatient  Continue to monitor  Smoking cessation   Need stricter BP control  Continue with asa, statin   Cont outpt follow up    GERD (gastroesophageal reflux disease)  Assessment & Plan  Continue with PPI    Hyperlipidemia  Assessment & Plan  Continue statin    Hypertension  Assessment & Plan  Continue home metoprolol XL 25 mg daily, hydrochlorothiazide 12.5 mg daily, Norvasc 10 mg daily  Blood pressure adequately controlled.  Continue to monitor and titrate as needed      Tobacco abuse  Assessment & Plan  Cessation advised  Continue with nicotine patch                  Family:  updated pt and wife at bedside     Dw pts nurse and CM  D/w Vascular surgery:  cardiology consultation requested for pre-op    VTE Pharmacologic Prophylaxis: Enoxaparin (Lovenox)  VTE Mechanical Prophylaxis: sequential compression device        Certification Statement: The patient will continue to require additional inpatient hospital stay due to need for further acute intervention for dizziness    Status: inpatient     ===================================================================    Subjective:  Patient relates he still has dizziness.  He notes anytime he moves his head he has a sensation of the room spinning around him as well as a headache.  He denies any pain anywhere else.  He denies any numbness.  Denies any decrease sensation.  Notes unsteady balance when walking to  the bathroom.    Denies any chest pain.  Denies any shortness of breath.  Denies any nausea, vomiting, diarrhea.  Is tolerating p.o.      Physical Exam:   Temp:  [97.4 °F (36.3 °C)-98.5 °F (36.9 °C)] 97.9 °F (36.6 °C)  HR:  [58-85] 61  Resp:  [14-18] 18  BP: (123-165)/(63-92) 123/65    Gen:  Pleasant, non-tachypnic, non-dyspnic.  Conversant.  Heart: regular rate and rhythm, S1S2 present, no murmur, rub or gallop  Lungs: clear to ausculatation bilaterally.  No wheezing, crackles, or rhonchi. No accessory muscle use or respiratory distress.  Abd: soft, non-tender, non-distended. NABS, no guarding, rebound or peritoneal signs.  Extremities: no clubbing, cyanosis or edema.  2+pedal pulses bilaterally. Full range of motion  Neuro: awake, alert and oriented. Cranial nerves :  muscles of facial expression intact.  No facial droop.  Strength and sensation grossly intact.     Skin: warm and dry: no petechiae, purpura and rash.    LABS:   Results from last 7 days   Lab Units 06/07/24  0824 06/06/24  1801   WBC Thousand/uL 4.81 7.32   HEMOGLOBIN g/dL 14.2 14.6   HEMATOCRIT % 42.4 43.6   PLATELETS Thousands/uL 159 171     Results from last 7 days   Lab Units 06/07/24  0824 06/06/24  1801   POTASSIUM mmol/L 3.2* 3.2*   CHLORIDE mmol/L 109* 102   CO2 mmol/L 27 28   BUN mg/dL 12 15   CREATININE mg/dL 0.92 1.13   CALCIUM mg/dL 9.0 9.5       Hospital Data:  6/7 MRI brain  No acute infarct or acute intracranial hemorrhage.     6/6 CTA head/neck  No acute intracranial pathology. Chronic microangiopathy.  Severe (approximately 75% stenosis) in the proximal right internal carotid artery  No significant stenosis of the left cervical carotid or vertebral arteries.  Moderate to severe stenosis in the cavernous segment of the left internal carotid artery  Occlusion of the right A1 segment with distal reconstitution is new since 2010. Age-indeterminate but could be chronic.  Moderate stenosis in the right vertebral artery.  No other  high-grade stenosis or acute large vessel occlusion.    6/7 Echo    Left Ventricle: Left ventricular cavity size is normal. Wall thickness is increased. The left ventricular ejection fraction is 55%. Systolic function is normal. Wall motion is normal. Diastolic function is mildly abnormal, consistent with grade I (abnormal) relaxation.    Mitral Valve: There is mild regurgitation.    Aorta: The aortic root is mildly dilated. The ascending aorta is mildly dilated. The aortic root is 3.80 cm. The ascending aorta is 4.2 cm.            ---------------------------------------------------------------------------------------------------------------  This note has been constructed using a voice recognition system.

## 2024-06-08 VITALS
SYSTOLIC BLOOD PRESSURE: 147 MMHG | BODY MASS INDEX: 25.19 KG/M2 | OXYGEN SATURATION: 96 % | HEIGHT: 72 IN | DIASTOLIC BLOOD PRESSURE: 86 MMHG | HEART RATE: 59 BPM | WEIGHT: 186 LBS | TEMPERATURE: 97.4 F | RESPIRATION RATE: 18 BRPM

## 2024-06-08 LAB
ANION GAP SERPL CALCULATED.3IONS-SCNC: 8 MMOL/L (ref 4–13)
BASOPHILS # BLD AUTO: 0.07 THOUSANDS/ÂΜL (ref 0–0.1)
BASOPHILS NFR BLD AUTO: 1 % (ref 0–1)
BUN SERPL-MCNC: 13 MG/DL (ref 5–25)
CALCIUM SERPL-MCNC: 9.2 MG/DL (ref 8.4–10.2)
CHLORIDE SERPL-SCNC: 107 MMOL/L (ref 96–108)
CO2 SERPL-SCNC: 26 MMOL/L (ref 21–32)
CREAT SERPL-MCNC: 1.07 MG/DL (ref 0.6–1.3)
DME PARACHUTE DELIVERY DATE REQUESTED: NORMAL
DME PARACHUTE ITEM DESCRIPTION: NORMAL
DME PARACHUTE ORDER STATUS: NORMAL
DME PARACHUTE SUPPLIER NAME: NORMAL
DME PARACHUTE SUPPLIER PHONE: NORMAL
EOSINOPHIL # BLD AUTO: 0.24 THOUSAND/ÂΜL (ref 0–0.61)
EOSINOPHIL NFR BLD AUTO: 4 % (ref 0–6)
ERYTHROCYTE [DISTWIDTH] IN BLOOD BY AUTOMATED COUNT: 13.9 % (ref 11.6–15.1)
GFR SERPL CREATININE-BSD FRML MDRD: 68 ML/MIN/1.73SQ M
GLUCOSE SERPL-MCNC: 108 MG/DL (ref 65–140)
GLUCOSE SERPL-MCNC: 131 MG/DL (ref 65–140)
GLUCOSE SERPL-MCNC: 163 MG/DL (ref 65–140)
HCT VFR BLD AUTO: 41.6 % (ref 36.5–49.3)
HGB BLD-MCNC: 13.8 G/DL (ref 12–17)
IMM GRANULOCYTES # BLD AUTO: 0.02 THOUSAND/UL (ref 0–0.2)
IMM GRANULOCYTES NFR BLD AUTO: 0 % (ref 0–2)
LYMPHOCYTES # BLD AUTO: 1.93 THOUSANDS/ÂΜL (ref 0.6–4.47)
LYMPHOCYTES NFR BLD AUTO: 32 % (ref 14–44)
MCH RBC QN AUTO: 28.5 PG (ref 26.8–34.3)
MCHC RBC AUTO-ENTMCNC: 33.2 G/DL (ref 31.4–37.4)
MCV RBC AUTO: 86 FL (ref 82–98)
MONOCYTES # BLD AUTO: 0.44 THOUSAND/ÂΜL (ref 0.17–1.22)
MONOCYTES NFR BLD AUTO: 7 % (ref 4–12)
NEUTROPHILS # BLD AUTO: 3.39 THOUSANDS/ÂΜL (ref 1.85–7.62)
NEUTS SEG NFR BLD AUTO: 56 % (ref 43–75)
NRBC BLD AUTO-RTO: 0 /100 WBCS
PLATELET # BLD AUTO: 156 THOUSANDS/UL (ref 149–390)
PMV BLD AUTO: 10.6 FL (ref 8.9–12.7)
POTASSIUM SERPL-SCNC: 3 MMOL/L (ref 3.5–5.3)
RBC # BLD AUTO: 4.84 MILLION/UL (ref 3.88–5.62)
SODIUM SERPL-SCNC: 141 MMOL/L (ref 135–147)
WBC # BLD AUTO: 6.09 THOUSAND/UL (ref 4.31–10.16)

## 2024-06-08 PROCEDURE — 82948 REAGENT STRIP/BLOOD GLUCOSE: CPT

## 2024-06-08 PROCEDURE — 80048 BASIC METABOLIC PNL TOTAL CA: CPT | Performed by: INTERNAL MEDICINE

## 2024-06-08 PROCEDURE — 99232 SBSQ HOSP IP/OBS MODERATE 35: CPT | Performed by: NURSE PRACTITIONER

## 2024-06-08 PROCEDURE — 99239 HOSP IP/OBS DSCHRG MGMT >30: CPT | Performed by: INTERNAL MEDICINE

## 2024-06-08 PROCEDURE — 85025 COMPLETE CBC W/AUTO DIFF WBC: CPT | Performed by: INTERNAL MEDICINE

## 2024-06-08 RX ORDER — NICOTINE 21 MG/24HR
1 PATCH, TRANSDERMAL 24 HOURS TRANSDERMAL DAILY
Qty: 28 PATCH | Refills: 0 | Status: SHIPPED | OUTPATIENT
Start: 2024-06-09

## 2024-06-08 RX ORDER — CLOPIDOGREL BISULFATE 75 MG/1
75 TABLET ORAL DAILY
Qty: 30 TABLET | Refills: 0 | Status: SHIPPED | OUTPATIENT
Start: 2024-06-09

## 2024-06-08 RX ORDER — POTASSIUM CHLORIDE 20 MEQ/1
40 TABLET, EXTENDED RELEASE ORAL 2 TIMES DAILY
Status: DISCONTINUED | OUTPATIENT
Start: 2024-06-08 | End: 2024-06-08 | Stop reason: HOSPADM

## 2024-06-08 RX ORDER — POTASSIUM CHLORIDE 20 MEQ/1
40 TABLET, EXTENDED RELEASE ORAL 2 TIMES DAILY
Qty: 90 TABLET | Refills: 0 | Status: SHIPPED | OUTPATIENT
Start: 2024-06-08

## 2024-06-08 RX ORDER — MECLIZINE HYDROCHLORIDE 25 MG/1
25 TABLET ORAL EVERY 8 HOURS PRN
Qty: 30 TABLET | Refills: 0 | Status: SHIPPED | OUTPATIENT
Start: 2024-06-08

## 2024-06-08 RX ADMIN — ASPIRIN 81 MG: 81 TABLET, COATED ORAL at 08:21

## 2024-06-08 RX ADMIN — ENOXAPARIN SODIUM 40 MG: 40 INJECTION SUBCUTANEOUS at 08:21

## 2024-06-08 RX ADMIN — HYDROCHLOROTHIAZIDE 12.5 MG: 12.5 TABLET ORAL at 08:21

## 2024-06-08 RX ADMIN — POTASSIUM CHLORIDE 40 MEQ: 1500 TABLET, EXTENDED RELEASE ORAL at 08:22

## 2024-06-08 RX ADMIN — INSULIN LISPRO 1 UNITS: 100 INJECTION, SOLUTION INTRAVENOUS; SUBCUTANEOUS at 12:08

## 2024-06-08 RX ADMIN — DORZOLAMIDE HCL 1 DROP: 20 SOLUTION/ DROPS OPHTHALMIC at 08:22

## 2024-06-08 RX ADMIN — LORATADINE 10 MG: 10 TABLET ORAL at 08:21

## 2024-06-08 RX ADMIN — MECLIZINE 25 MG: 12.5 TABLET ORAL at 08:33

## 2024-06-08 RX ADMIN — Medication 1 PATCH: at 08:21

## 2024-06-08 RX ADMIN — METOPROLOL SUCCINATE 25 MG: 25 TABLET, EXTENDED RELEASE ORAL at 08:22

## 2024-06-08 RX ADMIN — PANTOPRAZOLE SODIUM 40 MG: 40 TABLET, DELAYED RELEASE ORAL at 05:12

## 2024-06-08 RX ADMIN — AMLODIPINE BESYLATE 10 MG: 10 TABLET ORAL at 08:21

## 2024-06-08 RX ADMIN — MECLIZINE 25 MG: 12.5 TABLET ORAL at 14:59

## 2024-06-08 RX ADMIN — CLOPIDOGREL BISULFATE 75 MG: 75 TABLET ORAL at 08:21

## 2024-06-08 RX ADMIN — BRIMONIDINE TARTRATE 1 DROP: 2 SOLUTION/ DROPS OPHTHALMIC at 08:22

## 2024-06-08 NOTE — ASSESSMENT & PLAN NOTE
Cessation advised:  pt notes he has been cutting down and plans to abstain  Will dc with Rx for nicotine patch

## 2024-06-08 NOTE — ASSESSMENT & PLAN NOTE
Held metformin on admission as patient received contrast  resume 48 hours post contrast tomorrow

## 2024-06-08 NOTE — DISCHARGE SUMMARY
"[facility]  Discharge- Jaciel Reynaoub 1950, 74 y.o. male MRN: [id number]  Unit/Bed#: E4 -01 Encounter: [id number]  Primary Care Provider: Fan Pope DO   Date and time admitted to hospital: 6/6/2024  3:32 PM          Admission Date: 6/6/2024       Discharge Date: 6/8/2024        Primary Diagnoses  Principal Problem:    Vertigo  Active Problems:    Carotid artery stenosis    Tobacco abuse    Hypertension    Hyperlipidemia    GERD (gastroesophageal reflux disease)    Thoracic aortic ectasia (HCC)    Pre-diabetes    Hypokalemia    Ascending aortic aneurysm (HCC)  Resolved Problems:    * No resolved hospital problems. *      Hospital course by problem:  * Vertigo  Assessment & Plan  Patient is a 74-year-old male with past medical history significant for hypertension who presented to the ER with dizziness     Patient was placed on the stroke pathway initially in the ER  CT scan of the brain: No acute intracranial pathology.  Chronic microangiopathic.  Severe right ICA proximal stenosis  MRI of the brain: No acute infarct or acute intracranial hemorrhage\"  2D echocardiogram: No significant abnormality.  Ascending aorta mildly dilated.  Patient was evaluated by the neurology team: Patient with positionally dependent vertigo, consistent with BPV  No evidence of acute infarct  Not felt to be secondary to symptomatic carotid disease  Per neurology: As needed meclizine: Discussed with patient, recommend he take it scheduled 3 times daily for the next 2 days and then change back to every 8 hours as needed  Patient also following with vascular surgery for carotid artery stenosis  Pt still notes dizzy/veritgo with moving head, attempting to walk: Wife notes that she will be with him 24 hours a day as he continues to improve.  Discussed with them both fall precautions, slowly changing position, and walking with a walker.    Carotid artery stenosis  Assessment & Plan  CTA of the head and neck " "revealed, \"severe (approximately 75% stenosis) in the proximal right internal carotid artery  No significant stenosis of the left cervical carotid or vertebral arteries.  Moderate to severe stenosis in the cavernous segment of the left internal carotid artery  Occlusion of the right A1 segment with distal reconstitution is new since 2010. Age-indeterminate but could be chronic.  Moderate stenosis in the right vertebral artery.  Patient was evaluated by the vascular surgery team: Patient meets criteria for right carotid intervention for asymptomatic high-grade stenosis  Vascular surgery team recommended cardiology preop risk stratification which were performed during this hospital stay  Will follow-up with outpatient vascular surgery for right TCAR  Continue aspirin/Plavix/Crestor per vascular surgery    Hypokalemia  Assessment & Plan  Chronic, most likely due to hctz use  Continue with potassium supplement:  will increase to 40mg bid  Repeat labs in 1 week w PCP  Magnesium wnl    Pre-diabetes  Assessment & Plan  Held metformin on admission as patient received contrast  resume 48 hours post contrast tomorrow          Thoracic aortic ectasia (HCC)  Assessment & Plan  Has thoracic aortic aneurysm, 4cm:  Followed by [facility] CT surgery outpatient  Smoking cessation counseled   Continue with asa, statin   Cont outpt follow up with primary team    GERD (gastroesophageal reflux disease)  Assessment & Plan  Continue with PPI    Hyperlipidemia  Assessment & Plan  Continue statin    Hypertension  Assessment & Plan  Continue home metoprolol XL 25 mg daily, hydrochlorothiazide 12.5 mg daily, Norvasc 10 mg daily  Blood pressure adequately controlled.  Continue outpatient follow-up      Tobacco abuse  Assessment & Plan  Cessation advised:  pt notes he has been cutting down and plans to abstain  Will dc with Rx for nicotine patch       Service:  [facility], [facility]    Consulting Providers   Neurology: " Dr. Dobson    Procedures Performed   None    Hospital Studies:  6/7 MRI brain  No acute infarct or acute intracranial hemorrhage.      6/6 CTA head/neck  No acute intracranial pathology. Chronic microangiopathy.  Severe (approximately 75% stenosis) in the proximal right internal carotid artery  No significant stenosis of the left cervical carotid or vertebral arteries.  Moderate to severe stenosis in the cavernous segment of the left internal carotid artery  Occlusion of the right A1 segment with distal reconstitution is new since 2010. Age-indeterminate but could be chronic.  Moderate stenosis in the right vertebral artery.  No other high-grade stenosis or acute large vessel occlusion.     6/7 Echo    Left Ventricle: Left ventricular cavity size is normal. Wall thickness is increased. The left ventricular ejection fraction is 55%. Systolic function is normal. Wall motion is normal. Diastolic function is mildly abnormal, consistent with grade I (abnormal) relaxation.    Mitral Valve: There is mild regurgitation.    Aorta: The aortic root is mildly dilated. The ascending aorta is mildly dilated. The aortic root is 3.80 cm. The ascending aorta is 4.2 cm.    Results from last 7 days   Lab Units 06/08/24  0435 06/07/24  0824 06/06/24  1801   WBC Thousand/uL 6.09 4.81 7.32   HEMOGLOBIN g/dL 13.8 14.2 14.6   HEMATOCRIT % 41.6 42.4 43.6   PLATELETS Thousands/uL 156 159 171     Results from last 7 days   Lab Units 06/08/24  0435 06/07/24  0824 06/06/24  1801   POTASSIUM mmol/L 3.0* 3.2* 3.2*   CHLORIDE mmol/L 107 109* 102   CO2 mmol/L 26 27 28   BUN mg/dL 13 12 15   CREATININE mg/dL 1.07 0.92 1.13   CALCIUM mg/dL 9.2 9.0 9.5       History and Physical Exam:  Please refer to the Admission H&P note      Discharge Condition: Improved  Discharge Disposition: Home/Self Care    Discharge Note and Physical Exam:   Patient notes he continues to have the spinning sensation.  He notes it does not occur at rest, however it is  precipitated by moving his head from side-to-side.  It is also precipitated by standing up and walking.  He notes however once he stands, after a minute or so this evening resolves.  He does note he feels off balance.  Has been walking with a walker.  He denies any other complaints.  Denies any weakness in his muscles.  No headache.  Denies any pain anywhere.  Denies any chest pain.  No shortness of breath or cough.  Denies any abdominal pain, nausea, vomiting, diarrhea.  Is tolerating p.o.  His wife notes that she is able to be with him 24 hours a day, while he walks.  They note they are also agreeable to taking a walker at home.    Vitals:    06/08/24 1130   BP: 147/86   Pulse: 59   Resp: 18   Temp: (!) 97.4 °F (36.3 °C)   SpO2: 96%     General:  Pleasant, non-tachypnic, non-dyspnic.  Conversant  Heart: Regular rate and rhythm, S1S2 present.  No murmur, rub or gallop.  Lungs: Clear to auscultation bilaterally, no wheezing, rhonchi, or crackles.  Good air movement.  No accessory muscle use or respiratory distress.  Abdomen: soft, non-tender, non-distended, NABS.  No rebound or guarding.  No mass or peritoneal signs.  Extremities: no clubbing, cyanosis or edema.  2+ pedal pulses bilaterally.  Neurologic: Awake and alert.  Fluent speech.  Smiling and interactive.  No facial droop.  Muscles of facial expression intact.  Strength and sensation intact globally.  Speech fluent and goal directed.  Awake, alert and oriented x 3.  Skin: warm and dry. No petechiae, purpura or rash.      Discharge Medications   Please see Medical Reconciliation Discharge Form    Discharge Follow Up Appointments:   Fan Pope DO: 1 week  DR. Smith: 3-4 weeks, if needed  [facility] Vascular surgery: In the process of scheduling surgery    Discharge  Statement   Total Time Spent today including physical exam, discussion with patient and wife, and discharge arrangements/care = 40 minutes.    This note has been constructed using a voice recognition  system

## 2024-06-08 NOTE — ASSESSMENT & PLAN NOTE
Continue home metoprolol XL 25 mg daily, hydrochlorothiazide 12.5 mg daily, Norvasc 10 mg daily  Blood pressure adequately controlled.  Continue outpatient follow-up

## 2024-06-08 NOTE — ASSESSMENT & PLAN NOTE
Chronic, most likely due to hctz use  Continue with potassium supplement:  will increase to 40mg bid  Repeat labs in 1 week w PCP  Magnesium wnl

## 2024-06-08 NOTE — PROGRESS NOTES
[facility]  Progress Note  Name: Jaciel Van I  MRN: [id number]  Unit/Bed#: E4 -01 I Date of Admission: 6/6/2024   Date of Service: 6/8/2024 I Hospital Day: 2    Assessment & Plan   Carotid artery stenosis  Assessment & Plan  73 yo male smoker w/ a hx of HTN, thoracic aortic ectasia (4cm) follows w/ CT surgery, prediabetes and GERD presented to [facility] on 6/6/24 w/ dizziness. Pt was admitted w/ vertigo and for further workup to R/O posterior CVA. CTA head/neck showed severe (approximately 75% stenosis) in the proximal R ICA. Consult to neurology pending. MRI brain pending.    Vascular surgery consulted for R STARR noted on CTA. Pt has a 4cm aortic ectasia for which he follows w/ CT surgery. Otherwise, he denies any prior hx of CVA or prior vascular hx. CTA head/neck showed severe (approximately 75% stenosis) in the proximal R ICA w/ no significant stenosis of L cervical carotid or vertebral arteries.     Diagnostics:  -6/6/24 CTA head/neck: No acute intracranial pathology. Chronic microangiopathy. Severe (approximately 75% stenosis) in the proximal right internal carotid artery. No significant stenosis of the left cervical carotid or vertebral arteries. Moderate to severe stenosis in the cavernous segment of the left internal carotid artery. Occlusion of the right A1 segment with distal reconstitution is new since 2010. Age-indeterminate but could be chronic. Moderate stenosis in the right vertebral artery. No other high-grade stenosis or acute large vessel occlusion.  -MRI brain 6/7/24: No acute infarct or acute intracranial hemorrhage   -Carotid duplex  (P)    Plan:  -Dizziness, benign positional vertigo  -CTA head/neck high-grade R ICA stenosis  -Recommend nonurgent R TCAR for asymptomatic high-grade R ICA stenosis.  Tentatively planned for 6/13/2024.  Patient may leave and return electively as outpatient from vascular standpoint pending any additional workup.  -Cardiology consulted  "for risk factor stratification.  Deemed \"low risk from a cardiac standpoint for carotid surgery\"   -Continue ASA/Plavix  ( Do not stop preoperatively)  -Continue statin  -Neurology consult appreciated, presentation consistent with BPV  -Smoking cessation   -Carotid duplex pending  -Continue medical management per primary team  -Will discuss w/ Dr. Yoo           Subjective:  Patient resting comfortably in bed.  NAD.  Offers no complaints other than continued \"dizziness\" worse with position change.  Reviewed plan for nonurgent TCAR tentatively planned 6/13/2024.  May return electively.    Vitals:  BP (!) 169/103 (BP Location: Left arm)   Pulse 69   Temp (!) 97.2 °F (36.2 °C) (Temporal)   Resp 18   Ht 6' (1.829 m)   Wt 84.4 kg (186 lb)   SpO2 95%   BMI 25.23 kg/m²     I/Os:  I/O last 3 completed shifts:  In: 1890 [P.O.:240; I.V.:1650]  Out: 300 [Urine:300]  I/O this shift:  In: 480 [P.O.:480]  Out: -     Lab Results and Cultures:   Lab Results   Component Value Date    WBC 6.09 06/08/2024    HGB 13.8 06/08/2024    HCT 41.6 06/08/2024    MCV 86 06/08/2024     06/08/2024     Lab Results   Component Value Date    GLUCOSE 114 05/27/2015    CALCIUM 9.2 06/08/2024     05/27/2015    K 3.0 (L) 06/08/2024    CO2 26 06/08/2024     06/08/2024    BUN 13 06/08/2024    CREATININE 1.07 06/08/2024     Lab Results   Component Value Date    INR 1.03 06/06/2024    INR 1.04 08/17/2014    INR 0.93 08/16/2014    PROTIME 13.8 06/06/2024    PROTIME 13.8 08/17/2014    PROTIME 12.7 08/16/2014        Blood Culture: No results found for: \"BLOODCX\",   Urinalysis:   Lab Results   Component Value Date    COLORU Yellow 01/28/2024    COLORU Yellow 05/27/2015    CLARITYU Clear 01/28/2024    CLARITYU Clear 05/27/2015    SPECGRAV 1.015 01/28/2024    SPECGRAV 1.015 05/27/2015    PHUR 7.5 01/28/2024    PHUR 7.0 05/27/2015    LEUKOCYTESUR Negative 01/28/2024    LEUKOCYTESUR Negative 05/27/2015    NITRITE Negative 01/28/2024 " "   NITRITE Negative 05/27/2015    PROTEINUA Negative 05/27/2015    GLUCOSEU Negative 01/28/2024    GLUCOSEU Negative 05/27/2015    KETONESU Negative 01/28/2024    KETONESU Negative 05/27/2015    BILIRUBINUR Negative 01/28/2024    BILIRUBINUR Negative 05/27/2015    BLOODU Negative 01/28/2024    BLOODU Negative 05/27/2015   ,   Urine Culture: No results found for: \"URINECX\",   Wound Culure: No results found for: \"WOUNDCULT\"    Medications:  Current Facility-Administered Medications   Medication Dose Route Frequency    amLODIPine (NORVASC) tablet 10 mg  10 mg Oral Daily    aspirin (ECOTRIN LOW STRENGTH) EC tablet 81 mg  81 mg Oral Daily    atorvastatin (LIPITOR) tablet 40 mg  40 mg Oral Daily With Dinner    brimonidine tartrate 0.2 % ophthalmic solution 1 drop  1 drop Both Eyes TID    clopidogrel (PLAVIX) tablet 75 mg  75 mg Oral Daily    dorzolamide (TRUSOPT) ophthalmic solution 1 drop  1 drop Both Eyes TID    enoxaparin (LOVENOX) subcutaneous injection 40 mg  40 mg Subcutaneous Daily    hydroCHLOROthiazide tablet 12.5 mg  12.5 mg Oral Daily    insulin lispro (HumALOG/ADMELOG) 100 units/mL subcutaneous injection 1-5 Units  1-5 Units Subcutaneous TID AC    loratadine (CLARITIN) tablet 10 mg  10 mg Oral Daily    meclizine (ANTIVERT) tablet 25 mg  25 mg Oral Q8H PRN    metoprolol succinate (TOPROL-XL) 24 hr tablet 25 mg  25 mg Oral Daily    nicotine (NICODERM CQ) 14 mg/24hr TD 24 hr patch 1 patch  1 patch Transdermal Daily    pantoprazole (PROTONIX) EC tablet 40 mg  40 mg Oral Early Morning    potassium chloride (Klor-Con M20) CR tablet 40 mEq  40 mEq Oral BID       Imaging:  As above    Physical Exam:    General appearance: alert and oriented, in no acute distress  Neurologic: Grossly normal  Neck: no adenopathy, no carotid bruit, no JVD, supple, symmetrical, trachea midline, and thyroid not enlarged, symmetric, no tenderness/mass/nodules  Lungs: clear to auscultation bilaterally  Heart: regular rate and rhythm, S1, S2 " normal, no murmur, click, rub or gallop  Extremities: extremities normal, warm and well-perfused; no cyanosis, clubbing, or edema    Neuro exam intact    Wound/Incision:  N/a      Pulse exam:  Radial: Right: 2+ Left:: 2+      MELVIN Estrada  6/8/2024  [facility], [phone]

## 2024-06-08 NOTE — DISCHARGE INSTR - AVS FIRST PAGE
"============================================================================  Please continue your home medications    Plus new medication:  1-Plavix/clopidogrel 75 mg tablets: 1 tablet daily    2-meclizine 25 mg tablets: Take 1 tablet every 8 hours (3 times a day) as needed for dizziness: The first 2 days at home please take it 3 times a day regardless.  After that you can change it to \"as needed\"      3-potassium levels were low, most likely secondary to your hydrochlorothiazide diuretic medication    Please continue your home potassium however at a higher dose of 40 mEq (2 of your home 20 mill equivalent tablet\" twice a day for the next week    When you see your family doctor in 1 week you will need to have blood work to recheck your potassium level it is expected after the first week of higher dose of potassium you will resume your previous 20 mEq potassium dose        Please be sure to stay hydrated and drink at least 6-8 glasses of water a day: Please also eat/drink foods high in potassium such as potatoes, orange juice, bananas    The dizziness from the vertigo may cause you to lose your balance.  Please only get up out of bed and walk, with your wife present to assist you.  Please also use the walker.      Please see your family doctor in 1 week for a checkup  If your symptoms not improve, please schedule appointment to see the neurologist in 3-4 weeks    The vascular surgery team will call you with the date of your appointment/surgery: Please call them Monday if you have not yet received the information  ==================================================================  "

## 2024-06-08 NOTE — UTILIZATION REVIEW
"Continued Stay Review    Date: 6/8/24                          Current Patient Class: INPT  Current Level of Care: MED-SURG    HPI:74 y.o. male initially admitted on 6/6.     Assessment/Plan: Date: 6/8  Day 3: Has surpassed a 2nd midnight with active treatments and services.K 3.0. MRI of the brain: No acute infarct or acute intracranial hemorrhage\".2D echocardiogram: No significant abnormality.  Ascending aorta mildly dilated.Patient was evaluated by the neurology team: Patient with positionally dependent vertigo, consistent with BPV.No evidence of acute infarct.Not felt to be secondary to symptomatic carotid disease.Per neurology: As needed meclizine. still notes dizzy/veritgo with moving head, attempting to walk: Wife notes that she will be with him 24 hours a day as he continues to improve.  Discussed with them both fall precautions, slowly changing position, and walking with a walker. evaluated by the vascular surgery team: Patient meets criteria for right carotid intervention for asymptomatic high-grade stenosis. follow-up with outpatient vascular surgery for right TCAR. aspirin/Plavix/Crestor. DISCHARGED.     6/7 CARDIOLOGY CONSULT:  Preoperative cardiac risk assessment  Carotid artery stenosis  Ascending aortic aneurysm 4.1 cm on CT 2020, 4.2 cm on echo today  Hypertension  Hyperlipidemia  COPD  Obstructive sleep apnea  Vertigo  Tobacco abuse  Coronary artery disease with moderate to severe coronary artery calcification on CT, however no symptoms of angina     Plan:  Found to have severe proximal right internal carotid artery stenosis  Cardiology's been consulted for preoperative risk assessment  He has good functional capacity, can walk greater than 1 mile per day and definitely able to perform greater than 4.6 METS of activity, with no angina type symptoms  Echo with preserved EF and without significant valvular heart disease  EKG normal sinus rhythm, normal ECG  Blood pressure is controlled 123/65 with " controlled heart rate 61 bpm  He is on aspirin, Lipitor, amlodipine, Plavix, hydrochlorothiazide, Toprol-XL.  Will continue all of these at unaltered doses  Overall considering no angina symptoms, normal ECG, preserved EF, no significant valvular heart disease, he is considered low risk from a cardiac standpoint for carotid surgery       Vital Signs:   06/08/24 1130 97.4 °F (36.3 °C) Abnormal  59 18 147/86 100 96 % None (Room air) Lying   06/08/24 0731 97.2 °F (36.2 °C) Abnormal  69 18 169/103 Abnormal  118 95 % None (Room air) Lying   06/08/24 0246 97.5 °F (36.4 °C) 59 18 135/70 95 94 % None (Room air) Lying   06/07/24 2330 97.3 °F (36.3 °C) Abnormal  58 18 136/70 94 94 % None (Room air) Lying   06/07/24 1847 98.2 °F (36.8 °C) 68 18 151/75 102 96 % None (Room air) Lying   06/07/24 1503 97.9 °F (36.6 °C) 61 18 123/65 87 94 % None (Room air) Lying   06/07/24 1427 -- 58 -- 134/72 -- -- -- --   06/07/24 1025 97.4 °F (36.3 °C) Abnormal  58 18 134/72 97 97 % None (Room air) Lying   06/07/24 0825 -- 69 18 155/92 106 94 % None (Room air) Sitting       Pertinent Labs/Diagnostic Results:   6/7 MRI brain  No acute infarct or acute intracranial hemorrhage.      6/6 CTA head/neck  No acute intracranial pathology. Chronic microangiopathy.  Severe (approximately 75% stenosis) in the proximal right internal carotid artery  No significant stenosis of the left cervical carotid or vertebral arteries.  Moderate to severe stenosis in the cavernous segment of the left internal carotid artery  Occlusion of the right A1 segment with distal reconstitution is new since 2010. Age-indeterminate but could be chronic.  Moderate stenosis in the right vertebral artery.  No other high-grade stenosis or acute large vessel occlusion.     6/7 Echo    Left Ventricle: Left ventricular cavity size is normal. Wall thickness is increased. The left ventricular ejection fraction is 55%. Systolic function is normal. Wall motion is normal. Diastolic function  is mildly abnormal, consistent with grade I (abnormal) relaxation.    Mitral Valve: There is mild regurgitation.    Aorta: The aortic root is mildly dilated. The ascending aorta is mildly dilated. The aortic root is 3.80 cm. The ascending aorta is 4.2 cm.        Results from last 7 days   Lab Units 06/08/24  0435 06/07/24  0824 06/06/24  1801   WBC Thousand/uL 6.09 4.81 7.32   HEMOGLOBIN g/dL 13.8 14.2 14.6   HEMATOCRIT % 41.6 42.4 43.6   PLATELETS Thousands/uL 156 159 171   TOTAL NEUT ABS Thousands/µL 3.39 2.74 5.08         Results from last 7 days   Lab Units 06/08/24  0435 06/07/24  0824 06/06/24  1801   SODIUM mmol/L 141 141 138   POTASSIUM mmol/L 3.0* 3.2* 3.2*   CHLORIDE mmol/L 107 109* 102   CO2 mmol/L 26 27 28   ANION GAP mmol/L 8 5 8   BUN mg/dL 13 12 15   CREATININE mg/dL 1.07 0.92 1.13   EGFR ml/min/1.73sq m 68 82 64   CALCIUM mg/dL 9.2 9.0 9.5   MAGNESIUM mg/dL  --   --  2.0     Results from last 7 days   Lab Units 06/06/24  1801   AST U/L 12*   ALT U/L 11   ALK PHOS U/L 57   TOTAL PROTEIN g/dL 6.8   ALBUMIN g/dL 4.4   TOTAL BILIRUBIN mg/dL 0.77     Results from last 7 days   Lab Units 06/08/24  1132 06/08/24  0734 06/07/24  2042 06/07/24  1649 06/07/24  1057 06/07/24  0643 06/06/24  1547   POC GLUCOSE mg/dl 163* 131 175* 119 188* 146* 156*     Results from last 7 days   Lab Units 06/08/24  0435 06/07/24  0824 06/06/24  1801   GLUCOSE RANDOM mg/dL 108 116 134       Results from last 7 days   Lab Units 06/06/24 2012 06/06/24  1801   HS TNI 0HR ng/L  --  11   HS TNI 2HR ng/L 13  --    HSTNI D2 ng/L 2  --          Results from last 7 days   Lab Units 06/06/24  1801   PROTIME seconds 13.8   INR  1.03   PTT seconds 38*       Results from last 7 days   Lab Units 06/06/24  1801   BNP pg/mL 29       Medications:   Scheduled Medications:  amLODIPine, 10 mg, Oral, Daily  aspirin, 81 mg, Oral, Daily  atorvastatin, 40 mg, Oral, Daily With Dinner  brimonidine tartrate, 1 drop, Both Eyes, TID  clopidogrel, 75 mg,  Oral, Daily  dorzolamide, 1 drop, Both Eyes, TID  enoxaparin, 40 mg, Subcutaneous, Daily  hydroCHLOROthiazide, 12.5 mg, Oral, Daily  insulin lispro, 1-5 Units, Subcutaneous, TID AC  loratadine, 10 mg, Oral, Daily  metoprolol succinate, 25 mg, Oral, Daily  nicotine, 1 patch, Transdermal, Daily  pantoprazole, 40 mg, Oral, Early Morning  potassium chloride, 40 mEq, Oral, BID      Continuous IV Infusions:  sodium chloride 0.9 % infusion  Rate: 150 mL/hr Dose: 150 mL/hr  Freq: Continuous Route: IV  Indications of Use: IV Hydration  Last Dose: Stopped (06/07/24 0452)  Start: 06/06/24 1715 End: 06/07/24 0443           1800      0443-D/C'd  0452               PRN Meds:  meclizine, 25 mg, Oral, Q8H PRN        Discharge Plan: TBD    Network Utilization Review Department  ATTENTION: Please call with any questions or concerns to [phone] and carefully listen to the prompts so that you are directed to the right person. All voicemails are confidential.   For Discharge needs, contact Care Management DC Support Team at [phone] opt. 2  Send all requests for admission clinical reviews, approved or denied determinations and any other requests to dedicated fax number below belonging to the campus where the patient is receiving treatment. List of dedicated fax numbers for the Facilities:  FACILITY NAME UR FAX NUMBER   ADMISSION DENIALS (Administrative/Medical Necessity) [phone]   DISCHARGE SUPPORT TEAM (NETWORK) [phone]   PARENT CHILD HEALTH (Maternity/NICU/Pediatrics) [phone]   [facility] [phone]   [facility] [phone]   [facility] [phone]   [facility] [phone]   [facility] [phone]   [facility] [phone]   [facility] [phone]   [facility] [phone]   [facility]  [facility] [phone]   [facility] [phone]   [facility] [phone]   [facility] [phone]

## 2024-06-08 NOTE — PLAN OF CARE
Problem: PAIN - ADULT  Goal: Verbalizes/displays adequate comfort level or baseline comfort level  Description: Interventions:  - Encourage patient to monitor pain and request assistance  - Assess pain using appropriate pain scale  - Administer analgesics based on type and severity of pain and evaluate response  - Implement non-pharmacological measures as appropriate and evaluate response  - Consider cultural and social influences on pain and pain management  - Notify physician/advanced practitioner if interventions unsuccessful or patient reports new pain  Outcome: Progressing     Problem: SAFETY ADULT  Goal: Patient will remain free of falls  Description: INTERVENTIONS:  - Educate patient/family on patient safety including physical limitations  - Instruct patient to call for assistance with activity   - Consult OT/PT to assist with strengthening/mobility   - Keep Call bell within reach  - Keep bed low and locked with side rails adjusted as appropriate  - Keep care items and personal belongings within reach  - Initiate and maintain comfort rounds  - Make Fall Risk Sign visible to staff  - Offer Toileting every 2 Hours, in advance of need  - Initiate/Maintain bed alarm  - Obtain necessary fall risk management equipment: In place   - Apply yellow socks and bracelet for high fall risk patients  - Consider moving patient to room near nurses station  Outcome: Progressing  Goal: Maintain or return to baseline ADL function  Description: INTERVENTIONS:  -  Assess patient's ability to carry out ADLs; assess patient's baseline for ADL function and identify physical deficits which impact ability to perform ADLs (bathing, care of mouth/teeth, toileting, grooming, dressing, etc.)  - Assess/evaluate cause of self-care deficits   - Assess range of motion  - Assess patient's mobility; develop plan if impaired  - Assess patient's need for assistive devices and provide as appropriate  - Encourage maximum independence but intervene  and supervise when necessary  - Involve family in performance of ADLs  - Assess for home care needs following discharge   - Consider OT consult to assist with ADL evaluation and planning for discharge  - Provide patient education as appropriate  Outcome: Progressing  Goal: Maintains/Returns to pre admission functional level  Description: INTERVENTIONS:  - Perform AM-PAC 6 Click Basic Mobility/ Daily Activity assessment daily.  - Set and communicate daily mobility goal to care team and patient/family/caregiver.   - Collaborate with rehabilitation services on mobility goals if consulted  - Perform Range of Motion 3 times a day.  - Reposition patient every 2 hours.  - Dangle patient 3 times a day  - Stand patient 3 times a day  - Ambulate patient 3 times a day  - Out of bed to chair 3 times a day   - Out of bed for meals 3 times a day  - Out of bed for toileting  - Record patient progress and toleration of activity level   Outcome: Progressing     Problem: DISCHARGE PLANNING  Goal: Discharge to home or other facility with appropriate resources  Description: INTERVENTIONS:  - Identify barriers to discharge w/patient and caregiver  - Arrange for needed discharge resources and transportation as appropriate  - Identify discharge learning needs (meds, wound care, etc.)  - Arrange for interpretive services to assist at discharge as needed  - Refer to Case Management Department for coordinating discharge planning if the patient needs post-hospital services based on physician/advanced practitioner order or complex needs related to functional status, cognitive ability, or social support system  Outcome: Progressing     Problem: Knowledge Deficit  Goal: Patient/family/caregiver demonstrates understanding of disease process, treatment plan, medications, and discharge instructions  Description: Complete learning assessment and assess knowledge base.  Interventions:  - Provide teaching at level of understanding  - Provide teaching  via preferred learning methods  Outcome: Progressing     Problem: NEUROSENSORY - ADULT  Goal: Achieves stable or improved neurological status  Description: INTERVENTIONS  - Monitor and report changes in neurological status  - Monitor vital signs such as temperature, blood pressure, glucose, and any other labs ordered   - Initiate measures to prevent increased intracranial pressure  - Monitor for seizure activity and implement precautions if appropriate      Outcome: Progressing  Goal: Remains free of injury related to seizures activity  Description: INTERVENTIONS  - Maintain airway, patient safety  and administer oxygen as ordered  - Monitor patient for seizure activity, document and report duration and description of seizure to physician/advanced practitioner  - If seizure occurs,  ensure patient safety during seizure  - Reorient patient post seizure  - Seizure pads on all 4 side rails  - Instruct patient/family to notify RN of any seizure activity including if an aura is experienced  - Instruct patient/family to call for assistance with activity based on nursing assessment  - Administer anti-seizure medications if ordered    Outcome: Progressing  Goal: Achieves maximal functionality and self care  Description: INTERVENTIONS  - Monitor swallowing and airway patency with patient fatigue and changes in neurological status  - Encourage and assist patient to increase activity and self care.   - Encourage visually impaired, hearing impaired and aphasic patients to use assistive/communication devices  Outcome: Progressing

## 2024-06-08 NOTE — ASSESSMENT & PLAN NOTE
"Patient is a 74-year-old male with past medical history significant for hypertension who presented to the ER with dizziness     Patient was placed on the stroke pathway initially in the ER  CT scan of the brain: No acute intracranial pathology.  Chronic microangiopathic.  Severe right ICA proximal stenosis  MRI of the brain: No acute infarct or acute intracranial hemorrhage\"  2D echocardiogram: No significant abnormality.  Ascending aorta mildly dilated.  Patient was evaluated by the neurology team: Patient with positionally dependent vertigo, consistent with BPV  No evidence of acute infarct  Not felt to be secondary to symptomatic carotid disease  Per neurology: As needed meclizine: Discussed with patient, recommend he take it scheduled 3 times daily for the next 2 days and then change back to every 8 hours as needed  Patient also following with vascular surgery for carotid artery stenosis  Pt still notes dizzy/veritgo with moving head, attempting to walk: Wife notes that she will be with him 24 hours a day as he continues to improve.  Discussed with them both fall precautions, slowly changing position, and walking with a walker.  "

## 2024-06-08 NOTE — ASSESSMENT & PLAN NOTE
"75 yo male smoker w/ a hx of HTN, thoracic aortic ectasia (4cm) follows w/ CT surgery, prediabetes and GERD presented to [facility] on 6/6/24 w/ dizziness. Pt was admitted w/ vertigo and for further workup to R/O posterior CVA. CTA head/neck showed severe (approximately 75% stenosis) in the proximal R ICA. Consult to neurology pending. MRI brain pending.    Vascular surgery consulted for R STARR noted on CTA. Pt has a 4cm aortic ectasia for which he follows w/ CT surgery. Otherwise, he denies any prior hx of CVA or prior vascular hx. CTA head/neck showed severe (approximately 75% stenosis) in the proximal R ICA w/ no significant stenosis of L cervical carotid or vertebral arteries.     Diagnostics:  -6/6/24 CTA head/neck: No acute intracranial pathology. Chronic microangiopathy. Severe (approximately 75% stenosis) in the proximal right internal carotid artery. No significant stenosis of the left cervical carotid or vertebral arteries. Moderate to severe stenosis in the cavernous segment of the left internal carotid artery. Occlusion of the right A1 segment with distal reconstitution is new since 2010. Age-indeterminate but could be chronic. Moderate stenosis in the right vertebral artery. No other high-grade stenosis or acute large vessel occlusion.  -MRI brain 6/7/24: No acute infarct or acute intracranial hemorrhage   -Carotid duplex  (P)    Plan:  -Dizziness, benign positional vertigo  -CTA head/neck high-grade R ICA stenosis  -Recommend nonurgent R TCAR for asymptomatic high-grade R ICA stenosis.  Tentatively planned for 6/13/2024.  Patient may leave and return electively as outpatient from vascular standpoint pending any additional workup.  -Cardiology consulted for risk factor stratification.  Deemed \"low risk from a cardiac standpoint for carotid surgery\"   -Continue ASA/Plavix  ( Do not stop preoperatively)  -Continue statin  -Neurology consult appreciated, presentation consistent with BPV  -Smoking cessation "   -Carotid duplex pending  -Continue medical management per primary team  -Will discuss w/ Dr. Yoo

## 2024-06-08 NOTE — CASE MANAGEMENT
Case Management Discharge Planning Note    Patient name Jaciel Van  Location [facility] /E4 -* MRN [id number]  : 1950 Date 2024       Current Admission Date: 2024  Current Admission Diagnosis:Vertigo   Patient Active Problem List    Diagnosis Date Noted Date Diagnosed    Carotid artery stenosis 2024     Ascending aortic aneurysm (HCC) 2024     Vertigo 2024     Cellulitis of face 2024     Primary insomnia 2024     BRAVO (obstructive sleep apnea) 10/10/2023     Chest pain due to myocardial ischemia 2023     Mild traumatic brain injury, without loss of consciousness, initial encounter ([facility]) 2023     Dry skin 10/17/2022     Slow transit constipation 10/17/2022     Stage 3 chronic kidney disease, unspecified whether stage 3a or 3b CKD ([facility]) 2021     Hypokalemia 2021     Right wrist pain 2021     Thyroid nodule 2021     Pre-diabetes 10/13/2020     Hyperglycemia 2020     Chronic obstructive pulmonary disease (HCC) 2020     Thoracic aortic ectasia (HCC) 2020     Angioedema, Likely ACE inhibitor induced 2019     GERD (gastroesophageal reflux disease) 2019     Tobacco abuse 2016     Obstructive sleep apnea treated with continuous positive airway pressure (CPAP) 2015     Hypertension 2012     Hyperlipidemia 2012       LOS (days): 2  Geometric Mean LOS (GMLOS) (days): 1.9  Days to GMLOS:0.2     OBJECTIVE:  Risk of Unplanned Readmission Score: 13.6         Current admission status: Inpatient   Preferred Pharmacy:   [id number]  [place] - [address]  [address] [facility] [address]  Phone: [phone] Fax: [phone]    [id number] - [place] - [address]  [address] [facility] [address]  Phone: [phone] Fax: [phone]    [id number] - [place] - [address] [place] [address]  [facility] [address]  Phone: [phone] Fax: [phone]    Primary Care Provider: Fan Pope DO    Primary Insurance: [facility]  Secondary Insurance:     DISCHARGE DETAILS:    Discharge planning discussed with:: Patient  Freedom of Choice: Yes     CM contacted family/caregiver?: Yes  Were Treatment Team discharge recommendations reviewed with patient/caregiver?: Yes  Did patient/caregiver verbalize understanding of patient care needs?: Yes  Were patient/caregiver advised of the risks associated with not following Treatment Team discharge recommendations?: Yes    Contacts  Patient Contacts: Sruthi Van (Spouse)  Relationship to Patient:: Family  Contact Method: In Person  Reason/Outcome: Continuity of Care, Emergency Contact, Discharge Planning    Requested Home Health Care         Is the patient interested in HHC at discharge?: No    DME Referral Provided  Referral made for DME?: Yes  DME referral completed for the following items:: Christian  DME Supplier Name:: [employer]    Other Referral/Resources/Interventions Provided:  Interventions: DME    Would you like to participate in our [facility] service program?  : No - Declined    Treatment Team Recommendation: Home  Discharge Destination Plan:: Home  Transport at Discharge : Family          CM notified by SLIM that patient is medically stable for DC, he just needs a RW to go home with.     CM submitted order for RW via [employer]. CM delivered RW from consignment to patient.  Patient understands that there may be a copay for RW, CM has not yet heard back from company regarding cost.  Patient in agreement with potential costs, patient would like to go home as soon as possible.  Patient signed delivery slip for RW.  Patient's wife is at the hospital to transport him home.

## 2024-06-10 ENCOUNTER — TRANSITIONAL CARE MANAGEMENT (OUTPATIENT)
Dept: FAMILY MEDICINE CLINIC | Facility: CLINIC | Age: 74
End: 2024-06-10

## 2024-06-10 NOTE — UTILIZATION REVIEW
NOTIFICATION OF ADMISSION DISCHARGE   This is a Notification of Discharge from [facility]. Please be advised that this patient has been discharge from our facility. Below you will find the admission and discharge date and time including the patient’s disposition.   UTILIZATION REVIEW CONTACT:  Georgie Valerio  Utilization   Network Utilization Review Department  Phone: [phone] x carefully listen to the prompts. All voicemails are confidential.  Email: [email or web address]     ADMISSION INFORMATION  PRESENTATION DATE: 6/6/2024  3:32 PM  OBERVATION ADMISSION DATE:   INPATIENT ADMISSION DATE: 6/6/24  9:43 PM   DISCHARGE DATE: 6/8/2024  1:25 PM   DISPOSITION:Home/Self Care    Network Utilization Review Department  ATTENTION: Please call with any questions or concerns to [phone] and carefully listen to the prompts so that you are directed to the right person. All voicemails are confidential.   For Discharge needs, contact Care Management DC Support Team at [phone] opt. 2  Send all requests for admission clinical reviews, approved or denied determinations and any other requests to dedicated fax number below belonging to the campus where the patient is receiving treatment. List of dedicated fax numbers for the Facilities:  FACILITY NAME UR FAX NUMBER   ADMISSION DENIALS (Administrative/Medical Necessity) [phone]   DISCHARGE SUPPORT TEAM ([facility]) [phone]   PARENT CHILD HEALTH (Maternity/NICU/Pediatrics) [phone]   [facility] [phone]   [facility] [phone]   [facility] [phone]   [facility] [phone]   Critical access hospital [phone]   [facility] [phone]   [facility] [phone]   [facility] [phone]   [facility]  [facility] [phone]   [facility] [phone]   [facility] [phone]   [facility] [phone]

## 2024-06-10 NOTE — TELEPHONE ENCOUNTER
Dr. Barreto     Patients wife called the office and has many questions regarding her husbands surgery R TCAR which is scheduled with you on Thursday 6-13-24 Patient was in house at [facility] and was discharged home over the weekend.     Please call [phone] Sruthi Van    Thanks   Lesly

## 2024-06-10 NOTE — TELEPHONE ENCOUNTER
patient wife MRN: [id number] wants to talk to the surgery coordinator before surgery on 06/13/24 with DR DIM - Did see message in chart but couldn't transfer the call so I left a message in the chart .

## 2024-06-10 NOTE — UTILIZATION REVIEW
Notification of Unplanned, Urgent, or   Emergency Inpatient Admission   AUTHORIZATION REQUEST   [place] [address]  Tax ID: [id number]  NPI: [id number]  Place of Service: [facility]  Admission Level of Care: Inpatient  Place of Service Code: 21     Attending Physician Information  Attending Name and NPI#: Lolis Person Md [[id number]]  Phone: [phone]     Admission Information  Inpatient Admission Date/Time: 6/6/24  9:43 PM  Discharge Date/Time: 6/8/2024  1:25 PM  Admitting Diagnosis Code/Description:  Carotid artery stenosis [I65.29]  Hypokalemia [E87.6]  Dizziness [R42]     Utilization Review Contact  Georgie Valerio, Utilization   Phone: [phone]  Fax: [phone]  Email: [email or web address]  Contact for approvals/pending authorizations, clinical reviews, and discharge.     Physician Advisory Services Contact  Medical Necessity Denial & Pydq-di-Fipb Discussion  Phone: [phone]  Fax: [phone]  Email: [email or web address]     DISCHARGE SUPPORT TEAM:  For Patients Discharge Needs & Updates  Phone: [phone] opt. 2 Fax: [phone]  Email: [email or web address]

## 2024-06-11 ENCOUNTER — OFFICE VISIT (OUTPATIENT)
Dept: FAMILY MEDICINE CLINIC | Facility: CLINIC | Age: 74
End: 2024-06-11
Payer: COMMERCIAL

## 2024-06-11 ENCOUNTER — TELEPHONE (OUTPATIENT)
Age: 74
End: 2024-06-11

## 2024-06-11 VITALS
DIASTOLIC BLOOD PRESSURE: 70 MMHG | HEIGHT: 72 IN | SYSTOLIC BLOOD PRESSURE: 120 MMHG | BODY MASS INDEX: 25.87 KG/M2 | OXYGEN SATURATION: 98 % | TEMPERATURE: 97.2 F | HEART RATE: 76 BPM | WEIGHT: 191 LBS

## 2024-06-11 DIAGNOSIS — E87.6 HYPOKALEMIA: ICD-10-CM

## 2024-06-11 DIAGNOSIS — I77.810 THORACIC AORTIC ECTASIA (HCC): ICD-10-CM

## 2024-06-11 DIAGNOSIS — R42 VERTIGO: Primary | ICD-10-CM

## 2024-06-11 DIAGNOSIS — E78.2 MIXED HYPERLIPIDEMIA: ICD-10-CM

## 2024-06-11 DIAGNOSIS — I65.21 STENOSIS OF RIGHT CAROTID ARTERY: ICD-10-CM

## 2024-06-11 DIAGNOSIS — Z72.0 TOBACCO ABUSE: ICD-10-CM

## 2024-06-11 DIAGNOSIS — R73.03 PRE-DIABETES: ICD-10-CM

## 2024-06-11 DIAGNOSIS — I10 PRIMARY HYPERTENSION: ICD-10-CM

## 2024-06-11 DIAGNOSIS — J43.1 PANLOBULAR EMPHYSEMA (HCC): ICD-10-CM

## 2024-06-11 PROCEDURE — 99495 TRANSJ CARE MGMT MOD F2F 14D: CPT | Performed by: FAMILY MEDICINE

## 2024-06-11 NOTE — TELEPHONE ENCOUNTER
Spoke to patients wife to let her know that the surgery that was scheduled for Thursday 6-13-24 has been cancelled and that we are setting an office visit up with Dr. Barreto and will call them with a date. LLF

## 2024-06-11 NOTE — PROGRESS NOTES
Assessment/Plan: All hospital records reviewed.  Vertigo has resolved.  Patient replacing hypokalemia.  Patient will follow-up with vascular specialist regarding right carotid stenosis.  Guidance given in this regard.  All questions answered with patient and wife.  Patient is quitting smoking.  Patient will follow with [facility] surgery regarding thoracic aortic ectasia.  Blood pressure stable at this time.  Patient will follow-up per routine in July.       Diagnoses and all orders for this visit:    Vertigo    Stenosis of right carotid artery    Primary hypertension    Thoracic aortic ectasia (HCC)    Panlobular emphysema (HCC)    Tobacco abuse    Pre-diabetes    Mixed hyperlipidemia    Hypokalemia            Subjective:        Patient ID: Jaciel Van is a 74 y.o. male.      Patient is here status post hospitalization from June 6--8  for vertigo, stenosis right carotid artery, hypertension, thoracic aortic ectasia, hyperlipidemia, tobacco abuse, prediabetes and hypokalemia.  Patient is trying to quit smoking.  Patient hospital records reviewed at this time.  Patient without any vertigo at this time.  No dizziness.  No dysarthria or dysphagia or upper extremity weakness or numbness.  No diplopia.  Any chest pain shortness of breath or difficulty with urination or defecation at the present time.  Patient is replacing potassium for hypokalemia.  Patient does have appointment with vascular specialist regarding carotid stenosis.  No other new complaints.          The following portions of the patient's history were reviewed and updated as appropriate: allergies, current medications, past family history, past medical history, past social history, past surgical history and problem list.      Review of Systems   Constitutional: Negative.    HENT: Negative.     Eyes: Negative.    Respiratory: Negative.     Cardiovascular: Negative.    Gastrointestinal: Negative.    Endocrine: Negative.    Genitourinary: Negative.     Musculoskeletal: Negative.    Skin: Negative.    Allergic/Immunologic: Negative.    Neurological: Negative.    Hematological: Negative.    Psychiatric/Behavioral: Negative.             Objective:        Tobacco Cessation Counseling: Tobacco cessation counseling was provided. The patient is sincerely urged to quit consumption of tobacco. He is not ready to quit tobacco.             /70   Pulse 76   Temp (!) 97.2 °F (36.2 °C)   Ht 6' (1.829 m)   Wt 86.6 kg (191 lb)   SpO2 98%   BMI 25.90 kg/m²          Physical Exam  Vitals and nursing note reviewed.   Constitutional:       General: He is not in acute distress.     Appearance: Normal appearance. He is not ill-appearing, toxic-appearing or diaphoretic.   HENT:      Head: Normocephalic and atraumatic.      Right Ear: Tympanic membrane, ear canal and external ear normal. There is no impacted cerumen.      Left Ear: Tympanic membrane, ear canal and external ear normal. There is no impacted cerumen.      Nose: Nose normal. No congestion or rhinorrhea.      Mouth/Throat:      Mouth: Mucous membranes are moist.      Pharynx: No oropharyngeal exudate or posterior oropharyngeal erythema.   Eyes:      General: No scleral icterus.        Right eye: No discharge.         Left eye: No discharge.      Conjunctiva/sclera: Conjunctivae normal.   Neck:      Vascular: No carotid bruit.   Cardiovascular:      Rate and Rhythm: Normal rate and regular rhythm.      Pulses: Normal pulses.      Heart sounds: Normal heart sounds. No murmur heard.     No friction rub. No gallop.   Pulmonary:      Effort: Pulmonary effort is normal. No respiratory distress.      Breath sounds: Normal breath sounds. No stridor. No wheezing, rhonchi or rales.   Chest:      Chest wall: No tenderness.   Abdominal:      General: Abdomen is flat. Bowel sounds are normal. There is no distension.      Palpations: Abdomen is soft.      Tenderness: There is no abdominal tenderness. There is no guarding or  rebound.   Musculoskeletal:         General: No swelling, tenderness, deformity or signs of injury. Normal range of motion.      Cervical back: Normal range of motion and neck supple. No rigidity. No muscular tenderness.      Right lower leg: No edema.      Left lower leg: No edema.   Lymphadenopathy:      Cervical: No cervical adenopathy.   Skin:     General: Skin is warm and dry.      Capillary Refill: Capillary refill takes less than 2 seconds.      Coloration: Skin is not jaundiced.      Findings: No bruising, erythema, lesion or rash.   Neurological:      Mental Status: He is alert and oriented to person, place, and time. Mental status is at baseline.      Cranial Nerves: No cranial nerve deficit.      Sensory: No sensory deficit.      Motor: No weakness.      Coordination: Coordination normal.      Gait: Gait normal.   Psychiatric:         Mood and Affect: Mood normal.         Behavior: Behavior normal.         Thought Content: Thought content normal.         Judgment: Judgment normal.

## 2024-06-11 NOTE — TELEPHONE ENCOUNTER
This pt's wife has called again wishing to speak to someone about her 's upcoming surgery. She would like to speak to someone asap. Pls refer to notes in chart from yesterday.

## 2024-06-11 NOTE — PROGRESS NOTES
TCM Call       Date and time call was made  6/10/2024  9:16 AM    Patient was hospitialized at  [facility]    Date of Admission  06/06/24    Date of discharge  06/08/24    Diagnosis  VERTIGO    Disposition  Home    Were the patients medications reviewed and updated  Yes    Current Symptoms  None          TCM Call       Post hospital issues  None    Should patient be enrolled in anticoag monitoring?  No    Scheduled for follow up?  Yes    Did you obtain your prescribed medications  Yes    Do you need help managing your prescriptions or medications  No    Is transportation to your appointment needed  No    Specify why  WIFE WILL DRIVE HIM    I have advised the patient to call PCP with any new or worsening symptoms  GRICELDA AYALA CMA    Living Arrangements  Spouse or Significiant other    Support System  Spouse    The type of support provided  Emotional; Financial; Physical    Do you have social support  Yes, as much as I need    Are you recieving any outpatient services  No    Are you recieving home care services  No    Are you using any community resources  No    Current waiver services  No    Have you fallen in the last 12 months  No    Interperter language line needed  No    Counseling  Family

## 2024-06-11 NOTE — TELEPHONE ENCOUNTER
Spoke to patients wife with date of office visit for 6-18-24 8:30am at the [facility]

## 2024-06-18 ENCOUNTER — PREP FOR PROCEDURE (OUTPATIENT)
Dept: VASCULAR SURGERY | Facility: CLINIC | Age: 74
End: 2024-06-18

## 2024-06-18 ENCOUNTER — TELEPHONE (OUTPATIENT)
Dept: VASCULAR SURGERY | Facility: CLINIC | Age: 74
End: 2024-06-18

## 2024-06-18 ENCOUNTER — OFFICE VISIT (OUTPATIENT)
Dept: VASCULAR SURGERY | Facility: CLINIC | Age: 74
End: 2024-06-18
Payer: COMMERCIAL

## 2024-06-18 VITALS
OXYGEN SATURATION: 99 % | BODY MASS INDEX: 26.01 KG/M2 | SYSTOLIC BLOOD PRESSURE: 130 MMHG | HEIGHT: 72 IN | HEART RATE: 72 BPM | WEIGHT: 192 LBS | DIASTOLIC BLOOD PRESSURE: 86 MMHG

## 2024-06-18 DIAGNOSIS — J43.1 PANLOBULAR EMPHYSEMA (HCC): ICD-10-CM

## 2024-06-18 DIAGNOSIS — I65.21 STENOSIS OF RIGHT CAROTID ARTERY: ICD-10-CM

## 2024-06-18 DIAGNOSIS — I10 PRIMARY HYPERTENSION: Primary | ICD-10-CM

## 2024-06-18 PROCEDURE — 99214 OFFICE O/P EST MOD 30 MIN: CPT | Performed by: SURGERY

## 2024-06-18 NOTE — PROGRESS NOTES
"Ambulatory Visit  Name: Jaceil Van      : 1950      MRN: [id number]  Encounter Provider: Bossman Pacheco DO  Encounter Date: 2024   Enc[facility] department: [facility] [place]    Assessment & Plan   1. Primary hypertension  2. Panlobular emphysema (HCC)  3. Stenosis of right carotid artery  Assessment & Plan:  High-grade asymptomatic right carotid stenosis.  Patient's wife had requested an office visit to address any questions and/or concerns.  We did review the CT a images.  I did explain to her that according to my interpretation the right carotid stenosis is greater than 75% and closer to 85-90%.  We also discussed that the carotid stenosis is not the underlying etiology for his recent presentation to the hospital where he was noted to be \"dizzy\".  He has had uncontrolled hypertension.  His wife does report a recent life stressor where he smoked 15 cigarettes and approximately 1 hour.  We reviewed the procedure, benefits, risks, alternatives and anticipated postoperative course for right transcarotid artery revascularization.  I believe based on his anatomy (high lesion) that he is best served with a transcarotid artery revascularization (stent).  All questions were answered to their satisfaction today in the office.  Written consent was obtained.  Signs and symptoms of stroke were reviewed.  Should he experience the symptoms he needs to seek immediate medical attention.      History of Present Illness       Patient is here today to discuss surgery. Patient and his wife have additional questions regarding a planned procedure. Patient had a CTA head and neck w/wo contrast done 2024. He denies any TIA or Stroke symptoms. Patient is taking ASA 81 mg, Plavix and Rosuvastatin. He is a former smoker.         Jaciel Van is a 74 y.o. male who presents to the office accompanied by his wife and grandson to discuss right carotid intervention.    Review of Systems   Constitutional: " "Negative.    HENT: Negative.     Eyes: Negative.    Respiratory: Negative.     Cardiovascular: Negative.    Gastrointestinal: Negative.    Endocrine: Negative.    Genitourinary: Negative.    Musculoskeletal: Negative.    Skin: Negative.    Allergic/Immunologic: Negative.    Neurological: Negative.    Hematological: Negative.    Psychiatric/Behavioral: Negative.       Past Medical History   Past Medical History:   Diagnosis Date    Abnormal CAT scan     Allergic     Anxiety     Bilateral inguinal hernia 1990    recurrent    Colitis     Diabetes mellitus (HCC)     Gastritis 12/11/2012    GERD (gastroesophageal reflux disease)     Hypertension     Impaired fasting glucose 07/09/2019    Nausea & vomiting     Right knee meniscal tear 12/20/2017    Sleep apnea     uses cpap     Past Surgical History:   Procedure Laterality Date    CHOLECYSTECTOMY      COLONOSCOPY  2017    ABOUT 2-3 YEARS AGO    COLONOSCOPY  06/02/2022    EXPLORATORY LAPAROTOMY  08/16/2014    done for repair of peritoneal \"RENT\"?    HEMORRHOID SURGERY      INGUINAL HERNIA REPAIR Bilateral 08/14/2014    CO ESOPHAGOGASTRODUODENOSCOPY TRANSORAL DIAGNOSTIC N/A 02/23/2017    Procedure: EGD AND COLONOSCOPY;  Surgeon: Harrison Turcios MD;  Location:  GI LAB;  Service: Gastroenterology    US GUIDED THYROID BIOPSY  03/15/2021     Family History   Problem Relation Age of Onset    Breast cancer Mother     Heart disease Father     Heart disease Brother     Heart disease Family      Current Outpatient Medications on File Prior to Visit   Medication Sig Dispense Refill    amLODIPine (NORVASC) 10 mg tablet Take 1 tablet by mouth once daily 90 tablet 0    aspirin (ECOTRIN LOW STRENGTH) 81 mg EC tablet Take 81 mg by mouth daily      Blood Glucose Monitoring Suppl (ONE TOUCH ULTRA 2) w/Device KIT CHECK BLOOD SUGARS DAILY      brimonidine tartrate 0.2 % ophthalmic solution Administer 1 drop to both eyes 3 (three) times a day      cetirizine (ZyrTEC) 10 MG chewable tablet Chew 10 " mg daily      cholecalciferol (VITAMIN D3) 400 units tablet Take 400 Units by mouth daily      clopidogrel (PLAVIX) 75 mg tablet Take 1 tablet (75 mg total) by mouth daily 30 tablet 0    hydroCHLOROthiazide 12.5 mg tablet Take 1 tablet (12.5 mg total) by mouth daily 90 tablet 1    Lancets (OneTouch Delica Plus [id number]) MISC daily Check blood sugar      metFORMIN (GLUCOPHAGE) 500 mg tablet TAKE 1 TABLET BY MOUTH TWICE DAILY WITH MEALS 180 tablet 0    metoprolol succinate (TOPROL-XL) 25 mg 24 hr tablet Take 1 tablet (25 mg total) by mouth daily 90 tablet 0    nicotine (NICODERM CQ) 14 mg/24hr TD 24 hr patch Place 1 patch on the skin over 24 hours daily 28 patch 0    OneTouch Ultra test strip USE STRIP TO CHECK GLUCOSE ONCE DAILY      pantoprazole (PROTONIX) 40 mg tablet Take 1 tablet by mouth once daily 90 tablet 0    potassium chloride (Klor-Con M20) 20 mEq tablet Take 2 tablets (40 mEq total) by mouth 2 (two) times a day For 1 week, then resume your prior home dose of 20meq once a day 90 tablet 0    rosuvastatin (CRESTOR) 10 MG tablet Take 1 tablet (10 mg total) by mouth daily 30 tablet 6    meclizine (ANTIVERT) 25 mg tablet Take 1 tablet (25 mg total) by mouth every 8 (eight) hours as needed for dizziness (Patient not taking: Reported on 6/18/2024) 30 tablet 0     No current facility-administered medications on file prior to visit.     Allergies   Allergen Reactions    Ace Inhibitors Angioedema      Current Outpatient Medications on File Prior to Visit   Medication Sig Dispense Refill    amLODIPine (NORVASC) 10 mg tablet Take 1 tablet by mouth once daily 90 tablet 0    aspirin (ECOTRIN LOW STRENGTH) 81 mg EC tablet Take 81 mg by mouth daily      Blood Glucose Monitoring Suppl (ONE TOUCH ULTRA 2) w/Device KIT CHECK BLOOD SUGARS DAILY      brimonidine tartrate 0.2 % ophthalmic solution Administer 1 drop to both eyes 3 (three) times a day      cetirizine (ZyrTEC) 10 MG chewable tablet Chew 10 mg daily       cholecalciferol (VITAMIN D3) 400 units tablet Take 400 Units by mouth daily      clopidogrel (PLAVIX) 75 mg tablet Take 1 tablet (75 mg total) by mouth daily 30 tablet 0    hydroCHLOROthiazide 12.5 mg tablet Take 1 tablet (12.5 mg total) by mouth daily 90 tablet 1    Lancets (OneTouch Delica Plus [id number]) MISC daily Check blood sugar      metFORMIN (GLUCOPHAGE) 500 mg tablet TAKE 1 TABLET BY MOUTH TWICE DAILY WITH MEALS 180 tablet 0    metoprolol succinate (TOPROL-XL) 25 mg 24 hr tablet Take 1 tablet (25 mg total) by mouth daily 90 tablet 0    nicotine (NICODERM CQ) 14 mg/24hr TD 24 hr patch Place 1 patch on the skin over 24 hours daily 28 patch 0    OneTouch Ultra test strip USE STRIP TO CHECK GLUCOSE ONCE DAILY      pantoprazole (PROTONIX) 40 mg tablet Take 1 tablet by mouth once daily 90 tablet 0    potassium chloride (Klor-Con M20) 20 mEq tablet Take 2 tablets (40 mEq total) by mouth 2 (two) times a day For 1 week, then resume your prior home dose of 20meq once a day 90 tablet 0    rosuvastatin (CRESTOR) 10 MG tablet Take 1 tablet (10 mg total) by mouth daily 30 tablet 6    meclizine (ANTIVERT) 25 mg tablet Take 1 tablet (25 mg total) by mouth every 8 (eight) hours as needed for dizziness (Patient not taking: Reported on 2024) 30 tablet 0     No current facility-administered medications on file prior to visit.      Social History     Tobacco Use    Smoking status: Former     Current packs/day: 0.00     Average packs/day: 0.3 packs/day for 40.0 years (10.0 ttl pk-yrs)     Types: Cigarettes     Start date: 1979     Quit date: 2019     Years since quittin.5     Passive exposure: Current    Smokeless tobacco: Never   Vaping Use    Vaping status: Never Used   Substance and Sexual Activity    Alcohol use: Yes     Comment: special occ.    Drug use: Never    Sexual activity: Not Currently     Partners: Female     Objective     /86 (BP Location: Left arm, Patient Position: Sitting, Cuff Size:  Standard)   Pulse 72   Ht 6' (1.829 m)   Wt 87.1 kg (192 lb)   SpO2 99%   BMI 26.04 kg/m²     Physical Exam  Constitutional:       General: He is not in acute distress.     Appearance: He is well-developed. He is not ill-appearing, toxic-appearing or diaphoretic.   HENT:      Head: Normocephalic and atraumatic.   Eyes:      General: No scleral icterus.     Conjunctiva/sclera: Conjunctivae normal.   Neck:      Trachea: No tracheal deviation.   Cardiovascular:      Rate and Rhythm: Normal rate and regular rhythm.   Pulmonary:      Effort: Pulmonary effort is normal.   Abdominal:      General: There is no distension.      Palpations: Abdomen is soft. There is no mass (no appreciable aortic pulsation/aneurysm).      Tenderness: There is no abdominal tenderness. There is no guarding or rebound.   Musculoskeletal:         General: Normal range of motion.      Cervical back: Normal range of motion and neck supple.      Right lower leg: No edema.      Left lower leg: No edema.   Skin:     General: Skin is warm and dry.   Neurological:      Mental Status: He is alert and oriented to person, place, and time.   Psychiatric:         Mood and Affect: Mood normal.         Behavior: Behavior normal.         Thought Content: Thought content normal.         Judgment: Judgment normal.       Administrative Statements   I have spent a total time of 30 minutes on 06/18/24 In caring for this patient including Diagnostic results, Prognosis, Risks and benefits of tx options, Instructions for management, Patient and family education, Importance of tx compliance, Risk factor reductions, Impressions, Counseling / Coordination of care, Documenting in the medical record, Reviewing / ordering tests, medicine, procedures  , and Obtaining or reviewing history  .

## 2024-06-18 NOTE — TELEPHONE ENCOUNTER
Verified patient's insurance   CONFIRMED - Patient's insurance is [employer]  Is patient requesting a call when authorization has been obtained? Patient did not request a call.    Surgery Date: 7/8/24  Primary Surgeon: LAM Schmitt/ Bossman Barreto (NPI: [id number])  Assisting Surgeon: Not Applicable (N/A)  Facility: [facility] (Tax: [id number] / NPI: [id number])  Inpatient / Outpatient: Inpatient  Level: 2    Clearance Received: No clearance ordered.  Consent Received: Yes, scanned into Epic on 6/18/24.  Medication Hold / Last Dose: Not Applicable (N/A)  IR Notified:  Yes notified 6/18/24  Rep. Notified:  [place] notified 6/18/24  Equipment Needs: Not Applicable (N/A)  Vas Lab Requested: Not Applicable (N/A)  Patient Contacted: 6/18/24    Diagnosis: I65.29  Procedure/ CPT Code(s): (TCAR)  Transcarotid Artery Revascularization // CPT: 21211    For varicose vein related procedures:   Last LEVDR: Not Applicable (N/A)  CEAP Classification: Not Applicable (N/A)  VCSS: Not Applicable (N/A)    Post Operative Date/ Time: 7/18/24 , 230pm [facility] with Bossman Barreto (NPI: [id number])     *Please review medication hold(s), PATs, and check H&P with patient.*  PATIENT WAS MAILED SURGERY/SHOWERING/DISCHARGE/COVID INSTRUCTIONS AFTER REVIEWING WITH THEM VIA PHONE CALL.

## 2024-06-18 NOTE — TELEPHONE ENCOUNTER
REMINDER: Under Reason For Call, comments MUST be formatted as:   (Surgeon's Initials) / (Procedure)      Special Instructions / FYI:     Consent: I certify that patient has signed, printed, timed, and dated their surgery consent.  I certify that the patient's LEGAL NAME and DATE OF BIRTH are written in the upper left corner on BOTH sides of the consent.  I certify that BOTH sides of the completed surgery consent have been scanned into the patient's Epic chart by myself on 6/18/2024.  Yes, I have LABELED the consent in Epic as Consent for Vascular Procedure.     For Surgical Clearances     Levels   1-3   ROUTE this encounter to [facility] Clearance Pool (AND)   [facility] Surgery Coordinator Pool     Level   4   ROUTE this encounter to [facility] Surgery Coordinator Pool       HYDRATION CLEARANCES   ONLY ROUTE TO  [facility] Clearance Pool       Yes, I have ROUTED this encounter to [facility] Surgery Coordinator and/or [facility] Clearance Pool.

## 2024-06-18 NOTE — ASSESSMENT & PLAN NOTE
"High-grade asymptomatic right carotid stenosis.  Patient's wife had requested an office visit to address any questions and/or concerns.  We did review the CT a images.  I did explain to her that according to my interpretation the right carotid stenosis is greater than 75% and closer to 85-90%.  We also discussed that the carotid stenosis is not the underlying etiology for his recent presentation to the hospital where he was noted to be \"dizzy\".  He has had uncontrolled hypertension.  His wife does report a recent life stressor where he smoked 15 cigarettes and approximately 1 hour.  We reviewed the procedure, benefits, risks, alternatives and anticipated postoperative course for right transcarotid artery revascularization.  I believe based on his anatomy (high lesion) that he is best served with a transcarotid artery revascularization (stent).  All questions were answered to their satisfaction today in the office.  Written consent was obtained.  Signs and symptoms of stroke were reviewed.  Should he experience the symptoms he needs to seek immediate medical attention.  "

## 2024-06-27 NOTE — TELEPHONE ENCOUNTER
Patients wife called in stating that they got a second opinon at [facility] and he stated that the surgery is not needed at this time. They do not wish to proceed with the procedure.

## 2024-07-03 DIAGNOSIS — I65.29 CAROTID ARTERY STENOSIS: ICD-10-CM

## 2024-07-03 RX ORDER — CLOPIDOGREL BISULFATE 75 MG/1
75 TABLET ORAL DAILY
Qty: 30 TABLET | Refills: 0 | Status: SHIPPED | OUTPATIENT
Start: 2024-07-03

## 2024-07-03 NOTE — TELEPHONE ENCOUNTER
Patient wife called, states patient was seen in ER for  Carotid artery, prescribed clopidogrel (PLAVIX) 75 mg tablet [[id number]]. Upon chart review/medication, confirmed patient pharmacy [facility] [address] [place], [place] [address] [phone] States patient is running out of the medication. Please advise Patient at [phone], if any further questions.

## 2024-07-12 DIAGNOSIS — I10 ESSENTIAL HYPERTENSION: ICD-10-CM

## 2024-07-12 RX ORDER — METOPROLOL SUCCINATE 25 MG/1
25 TABLET, EXTENDED RELEASE ORAL DAILY
Qty: 100 TABLET | Refills: 1 | Status: SHIPPED | OUTPATIENT
Start: 2024-07-12

## 2024-07-23 DIAGNOSIS — I10 ESSENTIAL HYPERTENSION: ICD-10-CM

## 2024-07-23 RX ORDER — AMLODIPINE BESYLATE 10 MG/1
10 TABLET ORAL DAILY
Qty: 100 TABLET | Refills: 1 | Status: SHIPPED | OUTPATIENT
Start: 2024-07-23

## 2024-07-26 DIAGNOSIS — K29.00 ACUTE SUPERFICIAL GASTRITIS WITHOUT HEMORRHAGE: ICD-10-CM

## 2024-07-26 RX ORDER — PANTOPRAZOLE SODIUM 40 MG/1
40 TABLET, DELAYED RELEASE ORAL DAILY
Qty: 100 TABLET | Refills: 1 | Status: SHIPPED | OUTPATIENT
Start: 2024-07-26

## 2024-07-29 ENCOUNTER — OFFICE VISIT (OUTPATIENT)
Dept: FAMILY MEDICINE CLINIC | Facility: CLINIC | Age: 74
End: 2024-07-29
Payer: COMMERCIAL

## 2024-07-29 VITALS
BODY MASS INDEX: 26.63 KG/M2 | SYSTOLIC BLOOD PRESSURE: 128 MMHG | HEIGHT: 72 IN | OXYGEN SATURATION: 98 % | DIASTOLIC BLOOD PRESSURE: 76 MMHG | HEART RATE: 65 BPM | TEMPERATURE: 98.5 F | WEIGHT: 196.6 LBS

## 2024-07-29 DIAGNOSIS — R73.03 PRE-DIABETES: ICD-10-CM

## 2024-07-29 DIAGNOSIS — Z12.5 SCREENING FOR PROSTATE CANCER: ICD-10-CM

## 2024-07-29 DIAGNOSIS — I65.29 CAROTID ARTERY STENOSIS: ICD-10-CM

## 2024-07-29 DIAGNOSIS — I10 PRIMARY HYPERTENSION: ICD-10-CM

## 2024-07-29 DIAGNOSIS — Z00.00 MEDICARE ANNUAL WELLNESS VISIT, SUBSEQUENT: Primary | ICD-10-CM

## 2024-07-29 DIAGNOSIS — Z23 IMMUNIZATION DUE: ICD-10-CM

## 2024-07-29 PROCEDURE — 1170F FXNL STATUS ASSESSED: CPT | Performed by: FAMILY MEDICINE

## 2024-07-29 PROCEDURE — 90677 PCV20 VACCINE IM: CPT

## 2024-07-29 PROCEDURE — 1160F RVW MEDS BY RX/DR IN RCRD: CPT | Performed by: FAMILY MEDICINE

## 2024-07-29 PROCEDURE — 1125F AMNT PAIN NOTED PAIN PRSNT: CPT | Performed by: FAMILY MEDICINE

## 2024-07-29 PROCEDURE — 1159F MED LIST DOCD IN RCRD: CPT | Performed by: FAMILY MEDICINE

## 2024-07-29 PROCEDURE — G0439 PPPS, SUBSEQ VISIT: HCPCS | Performed by: FAMILY MEDICINE

## 2024-07-29 PROCEDURE — G0009 ADMIN PNEUMOCOCCAL VACCINE: HCPCS

## 2024-07-29 PROCEDURE — 3288F FALL RISK ASSESSMENT DOCD: CPT | Performed by: FAMILY MEDICINE

## 2024-07-29 PROCEDURE — 3074F SYST BP LT 130 MM HG: CPT | Performed by: FAMILY MEDICINE

## 2024-07-29 PROCEDURE — 3078F DIAST BP <80 MM HG: CPT | Performed by: FAMILY MEDICINE

## 2024-07-29 PROCEDURE — 3725F SCREEN DEPRESSION PERFORMED: CPT | Performed by: FAMILY MEDICINE

## 2024-07-29 NOTE — PROGRESS NOTES
Ambulatory Visit  Name: Jaciel Van      : 1950      MRN: [id number]  Encounter Provider: Fan Pope DO  Encounter Date: 2024   Encounter department: [facility]    Assessment & Plan   1. Medicare annual wellness visit, subsequent  2. Primary hypertension  -     Comprehensive metabolic panel; Future  -     CBC and differential; Future  -     Hemoglobin A1C; Future  -     Lipid panel; Future  -     TSH, 3rd generation with Free T4 reflex; Future  3. Pre-diabetes  -     Comprehensive metabolic panel; Future  -     CBC and differential; Future  -     Hemoglobin A1C; Future  -     Lipid panel; Future  -     TSH, 3rd generation with Free T4 reflex; Future  4. Screening for prostate cancer  -     PSA, Total Screen; Future       Preventive health issues were discussed with patient, and age appropriate screening tests were ordered as noted in patient's After Visit Summary. Personalized health advice and appropriate referrals for health education or preventive services given if needed, as noted in patient's After Visit Summary.    History of Present Illness     HPI   Patient Care Team:  Alfie Pope DO as PCP - General  MD Stephenie Encarnacion PA-ROWENA Turcios MD as Endoscopist    Review of Systems  Medical History Reviewed by provider this encounter:  Tobacco  Allergies  Meds  Problems  Med Hx  Surg Hx  Fam Hx       Annual Wellness Visit Questionnaire   Jaciel is here for his Subsequent Wellness visit.     Health Risk Assessment:   Patient rates overall health as good. Patient feels that their physical health rating is same. Patient is satisfied with their life. Eyesight was rated as same. Hearing was rated as same. Patient feels that their emotional and mental health rating is same. Patients states they are never, rarely angry. Patient states they are never, rarely unusually tired/fatigued. Pain experienced in the last 7 days has been none. Patient states that he  has experienced no weight loss or gain in last 6 months.     Depression Screening:   PHQ-2 Score: 0      Fall Risk Screening:   In the past year, patient has experienced: no history of falling in past year      Home Safety:  Patient does not have trouble with stairs inside or outside of their home. Patient has working smoke alarms and has working carbon monoxide detector. Home safety hazards include: none.     Nutrition:   Current diet is Diabetic and No Added Salt.     Medications:   Patient is currently taking over-the-counter supplements. OTC medications include: see medication list. Patient is able to manage medications.     Activities of Daily Living (ADLs)/Instrumental Activities of Daily Living (IADLs):   Walk and transfer into and out of bed and chair?: Yes  Dress and groom yourself?: Yes    Bathe or shower yourself?: Yes    Feed yourself? Yes  Do your laundry/housekeeping?: Yes  Manage your money, pay your bills and track your expenses?: Yes  Make your own meals?: Yes    Do your own shopping?: Yes    Previous Hospitalizations:   Any hospitalizations or ED visits within the last 12 months?: Yes    How many hospitalizations have you had in the last year?: 1-2    Advance Care Planning:   Living will: No    Durable POA for healthcare: No    Advanced directive: No      Cognitive Screening:   Provider or family/friend/caregiver concerned regarding cognition?: No    PREVENTIVE SCREENINGS      Cardiovascular Screening:    General: Screening Not Indicated, History Lipid Disorder, Risks and Benefits Discussed and Screening Current      Diabetes Screening:     General: Screening Not Indicated, History Diabetes, Risks and Benefits Discussed and Screening Current      Colorectal Cancer Screening:     General: Screening Current      Prostate Cancer Screening:    General: Risks and Benefits Discussed and Screening Current      Osteoporosis Screening:    General: Risks and Benefits Discussed and Screening Current       Abdominal Aortic Aneurysm (AAA) Screening:    Risk factors include: age between 65-76 yo and tobacco use        General: Risks and Benefits Discussed and Screening Not Indicated      Lung Cancer Screening:     General: Screening Not Indicated, Risks and Benefits Discussed and Screening Current      Hepatitis C Screening:    General: Screening Current and Risks and Benefits Discussed    Other Counseling Topics:   Regular weightbearing exercise.     Social Determinants of Health     Financial Resource Strain: Low Risk  (7/18/2023)    Overall Financial Resource Strain (CARDIA)     Difficulty of Paying Living Expenses: Not hard at all   Food Insecurity: No Food Insecurity (7/29/2024)    Hunger Vital Sign     Worried About Running Out of Food in the Last Year: Never true     Ran Out of Food in the Last Year: Never true   Transportation Needs: No Transportation Needs (7/29/2024)    PRAPARE - Transportation     Lack of Transportation (Medical): No     Lack of Transportation (Non-Medical): No   Housing Stability: Low Risk  (7/29/2024)    Housing Stability Vital Sign     Unable to Pay for Housing in the Last Year: No     Number of Times Moved in the Last Year: 0     Homeless in the Last Year: No   Utilities: Not At Risk (7/29/2024)    [facility] Utilities     Threatened with loss of utilities: No     No results found.    Objective     /76 (BP Location: Left arm, Patient Position: Sitting, Cuff Size: Standard)   Pulse 65   Temp 98.5 °F (36.9 °C) (Temporal)   Ht 6' (1.829 m)   Wt 89.2 kg (196 lb 9.6 oz)   SpO2 98%   BMI 26.66 kg/m²     Physical Exam

## 2024-07-29 NOTE — PATIENT INSTRUCTIONS
Medicare Preventive Visit Patient Instructions  Thank you for completing your Welcome to Medicare Visit or Medicare Annual Wellness Visit today. Your next wellness visit will be due in one year (7/30/2025).  The screening/preventive services that you may require over the next 5-10 years are detailed below. Some tests may not apply to you based off risk factors and/or age. Screening tests ordered at today's visit but not completed yet may show as past due. Also, please note that scanned in results may not display below.  Preventive Screenings:  Service Recommendations Previous Testing/Comments   Colorectal Cancer Screening  Colonoscopy    Fecal Occult Blood Test (FOBT)/Fecal Immunochemical Test (FIT)  Fecal DNA/Cologuard Test  Flexible Sigmoidoscopy Age: 45-75 years old   Colonoscopy: every 10 years (May be performed more frequently if at higher risk)  OR  FOBT/FIT: every 1 year  OR  Cologuard: every 3 years  OR  Sigmoidoscopy: every 5 years  Screening may be recommended earlier than age 45 if at higher risk for colorectal cancer. Also, an individualized decision between you and your healthcare provider will decide whether screening between the ages of 76-85 would be appropriate. Colonoscopy: 06/02/2022  FOBT/FIT: Not on file  Cologuard: Not on file  Sigmoidoscopy: Not on file    Screening Current     Prostate Cancer Screening Individualized decision between patient and health care provider in men between ages of 55-69   Medicare will cover every 12 months beginning on the day after your 50th birthday PSA: 2.4 ng/mL           Hepatitis C Screening Once for adults born between 1945 and 1965  More frequently in patients at high risk for Hepatitis C Hep C Antibody: 02/06/2019    Screening Current   Diabetes Screening 1-2 times per year if you're at risk for diabetes or have pre-diabetes Fasting glucose: 128 mg/dL (5/13/2024)  A1C: 6.7 % (5/13/2024)  Screening Not Indicated  History Diabetes   Cholesterol Screening Once  every 5 years if you don't have a lipid disorder. May order more often based on risk factors. Lipid panel: 06/07/2024  Screening Not Indicated  History Lipid Disorder      Other Preventive Screenings Covered by Medicare:  Abdominal Aortic Aneurysm (AAA) Screening: covered once if your at risk. You're considered to be at risk if you have a family history of AAA or a male between the age of 65-75 who smoking at least 100 cigarettes in your lifetime.  Lung Cancer Screening: covers low dose CT scan once per year if you meet all of the following conditions: (1) Age 55-77; (2) No signs or symptoms of lung cancer; (3) Current smoker or have quit smoking within the last 15 years; (4) You have a tobacco smoking history of at least 20 pack years (packs per day x number of years you smoked); (5) You get a written order from a healthcare provider.  Glaucoma Screening: covered annually if you're considered high risk: (1) You have diabetes OR (2) Family history of glaucoma OR (3)  aged 50 and older OR (4)  American aged 65 and older  Osteoporosis Screening: covered every 2 years if you meet one of the following conditions: (1) Have a vertebral abnormality; (2) On glucocorticoid therapy for more than 3 months; (3) Have primary hyperparathyroidism; (4) On osteoporosis medications and need to assess response to drug therapy.  HIV Screening: covered annually if you're between the age of 15-65. Also covered annually if you are younger than 15 and older than 65 with risk factors for HIV infection. For pregnant patients, it is covered up to 3 times per pregnancy.    Immunizations:  Immunization Recommendations   Influenza Vaccine Annual influenza vaccination during flu season is recommended for all persons aged >= 6 months who do not have contraindications   Pneumococcal Vaccine   * Pneumococcal conjugate vaccine = PCV13 (Prevnar 13), PCV15 (Vaxneuvance), PCV20 (Prevnar 20)  * Pneumococcal polysaccharide vaccine  = PPSV23 (Pneumovax) Adults 19-65 yo with certain risk factors or if 65+ yo  If never received any pneumonia vaccine: recommend Prevnar 20 (PCV20)  Give PCV20 if previously received 1 dose of PCV13 or PPSV23   Hepatitis B Vaccine 3 dose series if at intermediate or high risk (ex: diabetes, end stage renal disease, liver disease)   Respiratory syncytial virus (RSV) Vaccine - COVERED BY MEDICARE PART D  * RSVPreF3 (Arexvy) CDC recommends that adults 60 years of age and older may receive a single dose of RSV vaccine using shared clinical decision-making (SCDM)   Tetanus (Td) Vaccine - COST NOT COVERED BY MEDICARE PART B Following completion of primary series, a booster dose should be given every 10 years to maintain immunity against tetanus. Td may also be given as tetanus wound prophylaxis.   Tdap Vaccine - COST NOT COVERED BY MEDICARE PART B Recommended at least once for all adults. For pregnant patients, recommended with each pregnancy.   Shingles Vaccine (Shingrix) - COST NOT COVERED BY MEDICARE PART B  2 shot series recommended in those 19 years and older who have or will have weakened immune systems or those 50 years and older     Health Maintenance Due:      Topic Date Due   • Colorectal Cancer Screening  06/01/2027   • Hepatitis C Screening  Completed     Immunizations Due:      Topic Date Due   • IPV Vaccine (2 of 3 - Adult catch-up series) 02/03/2005   • COVID-19 Vaccine (5 - 2023-24 season) 09/01/2023   • Influenza Vaccine (1) 09/01/2024     Advance Directives   What are advance directives?  Advance directives are legal documents that state your wishes and plans for medical care. These plans are made ahead of time in case you lose your ability to make decisions for yourself. Advance directives can apply to any medical decision, such as the treatments you want, and if you want to donate organs.   What are the types of advance directives?  There are many types of advance directives, and each state has rules  about how to use them. You may choose a combination of any of the following:  Living will:  This is a written record of the treatment you want. You can also choose which treatments you do not want, which to limit, and which to stop at a certain time. This includes surgery, medicine, IV fluid, and tube feedings.   Durable power of  for healthcare (DPAHC):  This is a written record that states who you want to make healthcare choices for you when you are unable to make them for yourself. This person, called a proxy, is usually a family member or a friend. You may choose more than 1 proxy.  Do not resuscitate (DNR) order:  A DNR order is used in case your heart stops beating or you stop breathing. It is a request not to have certain forms of treatment, such as CPR. A DNR order may be included in other types of advance directives.  Medical directive:  This covers the care that you want if you are in a coma, near death, or unable to make decisions for yourself. You can list the treatments you want for each condition. Treatment may include pain medicine, surgery, blood transfusions, dialysis, IV or tube feedings, and a ventilator (breathing machine).  Values history:  This document has questions about your views, beliefs, and how you feel and think about life. This information can help others choose the care that you would choose.  Why are advance directives important?  An advance directive helps you control your care. Although spoken wishes may be used, it is better to have your wishes written down. Spoken wishes can be misunderstood, or not followed. Treatments may be given even if you do not want them. An advance directive may make it easier for your family to make difficult choices about your care.   Weight Management   Why it is important to manage your weight:  Being overweight increases your risk of health conditions such as heart disease, high blood pressure, type 2 diabetes, and certain types of cancer. It  can also increase your risk for osteoarthritis, sleep apnea, and other respiratory problems. Aim for a slow, steady weight loss. Even a small amount of weight loss can lower your risk of health problems.  How to lose weight safely:  A safe and healthy way to lose weight is to eat fewer calories and get regular exercise. You can lose up about 1 pound a week by decreasing the number of calories you eat by 500 calories each day.   Healthy meal plan for weight management:  A healthy meal plan includes a variety of foods, contains fewer calories, and helps you stay healthy. A healthy meal plan includes the following:  Eat whole-grain foods more often.  A healthy meal plan should contain fiber. Fiber is the part of grains, fruits, and vegetables that is not broken down by your body. Whole-grain foods are healthy and provide extra fiber in your diet. Some examples of whole-grain foods are whole-wheat breads and pastas, oatmeal, brown rice, and bulgur.  Eat a variety of vegetables every day.  Include dark, leafy greens such as spinach, kale, khushi greens, and mustard greens. Eat yellow and orange vegetables such as carrots, sweet potatoes, and winter squash.   Eat a variety of fruits every day.  Choose fresh or canned fruit (canned in its own juice or light syrup) instead of juice. Fruit juice has very little or no fiber.  Eat low-fat dairy foods.  Drink fat-free (skim) milk or 1% milk. Eat fat-free yogurt and low-fat cottage cheese. Try low-fat cheeses such as mozzarella and other reduced-fat cheeses.  Choose meat and other protein foods that are low in fat.  Choose beans or other legumes such as split peas or lentils. Choose fish, skinless poultry (chicken or turkey), or lean cuts of red meat (beef or pork). Before you cook meat or poultry, cut off any visible fat.   Use less fat and oil.  Try baking foods instead of frying them. Add less fat, such as margarine, sour cream, regular salad dressing and mayonnaise to  foods. Eat fewer high-fat foods. Some examples of high-fat foods include french fries, doughnuts, ice cream, and cakes.  Eat fewer sweets.  Limit foods and drinks that are high in sugar. This includes candy, cookies, regular soda, and sweetened drinks.  Exercise:  Exercise at least 30 minutes per day on most days of the week. Some examples of exercise include walking, biking, dancing, and swimming. You can also fit in more physical activity by taking the stairs instead of the elevator or parking farther away from stores. Ask your healthcare provider about the best exercise plan for you.      © Copyright [employer] 2018 Information is for End User's use only and may not be sold, redistributed or otherwise used for commercial purposes. All illustrations and images included in CareNotes® are the copyrighted property of [employer] or [employer]    Medicare Preventive Visit Patient Instructions  Thank you for completing your Welcome to Medicare Visit or Medicare Annual Wellness Visit today. Your next wellness visit will be due in one year (7/30/2025).  The screening/preventive services that you may require over the next 5-10 years are detailed below. Some tests may not apply to you based off risk factors and/or age. Screening tests ordered at today's visit but not completed yet may show as past due. Also, please note that scanned in results may not display below.  Preventive Screenings:  Service Recommendations Previous Testing/Comments   Colorectal Cancer Screening  Colonoscopy    Fecal Occult Blood Test (FOBT)/Fecal Immunochemical Test (FIT)  Fecal DNA/Cologuard Test  Flexible Sigmoidoscopy Age: 45-75 years old   Colonoscopy: every 10 years (May be performed more frequently if at higher risk)  OR  FOBT/FIT: every 1 year  OR  Cologuard: every 3 years  OR  Sigmoidoscopy: every 5 years  Screening may be recommended earlier than age 45 if at higher risk for colorectal cancer. Also, an individualized  decision between you and your healthcare provider will decide whether screening between the ages of 76-85 would be appropriate. Colonoscopy: 06/02/2022  FOBT/FIT: Not on file  Cologuard: Not on file  Sigmoidoscopy: Not on file    Screening Current     Prostate Cancer Screening Individualized decision between patient and health care provider in men between ages of 55-69   Medicare will cover every 12 months beginning on the day after your 50th birthday PSA: 2.4 ng/mL           Hepatitis C Screening Once for adults born between 1945 and 1965  More frequently in patients at high risk for Hepatitis C Hep C Antibody: 02/06/2019    Screening Current   Diabetes Screening 1-2 times per year if you're at risk for diabetes or have pre-diabetes Fasting glucose: 128 mg/dL (5/13/2024)  A1C: 6.7 % (5/13/2024)  Screening Not Indicated  History Diabetes   Cholesterol Screening Once every 5 years if you don't have a lipid disorder. May order more often based on risk factors. Lipid panel: 06/07/2024  Screening Not Indicated  History Lipid Disorder      Other Preventive Screenings Covered by Medicare:  Abdominal Aortic Aneurysm (AAA) Screening: covered once if your at risk. You're considered to be at risk if you have a family history of AAA or a male between the age of 65-75 who smoking at least 100 cigarettes in your lifetime.  Lung Cancer Screening: covers low dose CT scan once per year if you meet all of the following conditions: (1) Age 55-77; (2) No signs or symptoms of lung cancer; (3) Current smoker or have quit smoking within the last 15 years; (4) You have a tobacco smoking history of at least 20 pack years (packs per day x number of years you smoked); (5) You get a written order from a healthcare provider.  Glaucoma Screening: covered annually if you're considered high risk: (1) You have diabetes OR (2) Family history of glaucoma OR (3)  aged 50 and older OR (4)  American aged 65 and  older  Osteoporosis Screening: covered every 2 years if you meet one of the following conditions: (1) Have a vertebral abnormality; (2) On glucocorticoid therapy for more than 3 months; (3) Have primary hyperparathyroidism; (4) On osteoporosis medications and need to assess response to drug therapy.  HIV Screening: covered annually if you're between the age of 15-65. Also covered annually if you are younger than 15 and older than 65 with risk factors for HIV infection. For pregnant patients, it is covered up to 3 times per pregnancy.    Immunizations:  Immunization Recommendations   Influenza Vaccine Annual influenza vaccination during flu season is recommended for all persons aged >= 6 months who do not have contraindications   Pneumococcal Vaccine   * Pneumococcal conjugate vaccine = PCV13 (Prevnar 13), PCV15 (Vaxneuvance), PCV20 (Prevnar 20)  * Pneumococcal polysaccharide vaccine = PPSV23 (Pneumovax) Adults 19-63 yo with certain risk factors or if 65+ yo  If never received any pneumonia vaccine: recommend Prevnar 20 (PCV20)  Give PCV20 if previously received 1 dose of PCV13 or PPSV23   Hepatitis B Vaccine 3 dose series if at intermediate or high risk (ex: diabetes, end stage renal disease, liver disease)   Respiratory syncytial virus (RSV) Vaccine - COVERED BY MEDICARE PART D  * RSVPreF3 (Arexvy) CDC recommends that adults 60 years of age and older may receive a single dose of RSV vaccine using shared clinical decision-making (SCDM)   Tetanus (Td) Vaccine - COST NOT COVERED BY MEDICARE PART B Following completion of primary series, a booster dose should be given every 10 years to maintain immunity against tetanus. Td may also be given as tetanus wound prophylaxis.   Tdap Vaccine - COST NOT COVERED BY MEDICARE PART B Recommended at least once for all adults. For pregnant patients, recommended with each pregnancy.   Shingles Vaccine (Shingrix) - COST NOT COVERED BY MEDICARE PART B  2 shot series recommended in  those 19 years and older who have or will have weakened immune systems or those 50 years and older     Health Maintenance Due:      Topic Date Due   • Colorectal Cancer Screening  06/01/2027   • Hepatitis C Screening  Completed     Immunizations Due:      Topic Date Due   • IPV Vaccine (2 of 3 - Adult catch-up series) 02/03/2005   • COVID-19 Vaccine (5 - 2023-24 season) 09/01/2023   • Influenza Vaccine (1) 09/01/2024     Advance Directives   What are advance directives?  Advance directives are legal documents that state your wishes and plans for medical care. These plans are made ahead of time in case you lose your ability to make decisions for yourself. Advance directives can apply to any medical decision, such as the treatments you want, and if you want to donate organs.   What are the types of advance directives?  There are many types of advance directives, and each state has rules about how to use them. You may choose a combination of any of the following:  Living will:  This is a written record of the treatment you want. You can also choose which treatments you do not want, which to limit, and which to stop at a certain time. This includes surgery, medicine, IV fluid, and tube feedings.   Durable power of  for healthcare (DPAHC):  This is a written record that states who you want to make healthcare choices for you when you are unable to make them for yourself. This person, called a proxy, is usually a family member or a friend. You may choose more than 1 proxy.  Do not resuscitate (DNR) order:  A DNR order is used in case your heart stops beating or you stop breathing. It is a request not to have certain forms of treatment, such as CPR. A DNR order may be included in other types of advance directives.  Medical directive:  This covers the care that you want if you are in a coma, near death, or unable to make decisions for yourself. You can list the treatments you want for each condition. Treatment may  include pain medicine, surgery, blood transfusions, dialysis, IV or tube feedings, and a ventilator (breathing machine).  Values history:  This document has questions about your views, beliefs, and how you feel and think about life. This information can help others choose the care that you would choose.  Why are advance directives important?  An advance directive helps you control your care. Although spoken wishes may be used, it is better to have your wishes written down. Spoken wishes can be misunderstood, or not followed. Treatments may be given even if you do not want them. An advance directive may make it easier for your family to make difficult choices about your care.   Weight Management   Why it is important to manage your weight:  Being overweight increases your risk of health conditions such as heart disease, high blood pressure, type 2 diabetes, and certain types of cancer. It can also increase your risk for osteoarthritis, sleep apnea, and other respiratory problems. Aim for a slow, steady weight loss. Even a small amount of weight loss can lower your risk of health problems.  How to lose weight safely:  A safe and healthy way to lose weight is to eat fewer calories and get regular exercise. You can lose up about 1 pound a week by decreasing the number of calories you eat by 500 calories each day.   Healthy meal plan for weight management:  A healthy meal plan includes a variety of foods, contains fewer calories, and helps you stay healthy. A healthy meal plan includes the following:  Eat whole-grain foods more often.  A healthy meal plan should contain fiber. Fiber is the part of grains, fruits, and vegetables that is not broken down by your body. Whole-grain foods are healthy and provide extra fiber in your diet. Some examples of whole-grain foods are whole-wheat breads and pastas, oatmeal, brown rice, and bulgur.  Eat a variety of vegetables every day.  Include dark, leafy greens such as spinach,  kale, khushi greens, and mustard greens. Eat yellow and orange vegetables such as carrots, sweet potatoes, and winter squash.   Eat a variety of fruits every day.  Choose fresh or canned fruit (canned in its own juice or light syrup) instead of juice. Fruit juice has very little or no fiber.  Eat low-fat dairy foods.  Drink fat-free (skim) milk or 1% milk. Eat fat-free yogurt and low-fat cottage cheese. Try low-fat cheeses such as mozzarella and other reduced-fat cheeses.  Choose meat and other protein foods that are low in fat.  Choose beans or other legumes such as split peas or lentils. Choose fish, skinless poultry (chicken or turkey), or lean cuts of red meat (beef or pork). Before you cook meat or poultry, cut off any visible fat.   Use less fat and oil.  Try baking foods instead of frying them. Add less fat, such as margarine, sour cream, regular salad dressing and mayonnaise to foods. Eat fewer high-fat foods. Some examples of high-fat foods include french fries, doughnuts, ice cream, and cakes.  Eat fewer sweets.  Limit foods and drinks that are high in sugar. This includes candy, cookies, regular soda, and sweetened drinks.  Exercise:  Exercise at least 30 minutes per day on most days of the week. Some examples of exercise include walking, biking, dancing, and swimming. You can also fit in more physical activity by taking the stairs instead of the elevator or parking farther away from stores. Ask your healthcare provider about the best exercise plan for you.      © Copyright [employer] 2018 Information is for End User's use only and may not be sold, redistributed or otherwise used for commercial purposes. All illustrations and images included in CareNotes® are the copyrighted property of [employer] or [employer]

## 2024-07-30 RX ORDER — CLOPIDOGREL BISULFATE 75 MG/1
75 TABLET ORAL DAILY
Qty: 100 TABLET | Refills: 1 | Status: SHIPPED | OUTPATIENT
Start: 2024-07-30

## 2024-08-14 DIAGNOSIS — I71.21 ASCENDING AORTIC ANEURYSM (HCC): ICD-10-CM

## 2024-08-14 DIAGNOSIS — I10 ESSENTIAL HYPERTENSION: ICD-10-CM

## 2024-08-15 RX ORDER — HYDROCHLOROTHIAZIDE 12.5 MG/1
12.5 TABLET ORAL DAILY
Qty: 90 TABLET | Refills: 1 | Status: SHIPPED | OUTPATIENT
Start: 2024-08-15

## 2024-08-21 PROBLEM — I25.10 CORONARY ARTERY DISEASE INVOLVING NATIVE CORONARY ARTERY OF NATIVE HEART WITHOUT ANGINA PECTORIS: Status: ACTIVE | Noted: 2023-04-20

## 2024-08-21 NOTE — PROGRESS NOTES
Cardiology Follow Up    [facility]  [address] [place] [address]  PHONE: [phone]  FAX: [phone]    Jaciel Van  1950  [id number]    Assessment/Plan:  CAD  Chest pain free. Continue ASA 81 mg QD    Hypertension  Controlled on home HCTZ 12.5 mg daily, Amlodipine 10 mg daily & Toprol-XL 25 mg daily     Tobacco abuse  His smoking cessation is impressive!    Hyperlipidemia  Compliant with Crestor 10 mg QD     PAD - Carotid artery stenosis  [facility] wanted to intervene on the R carotid artery stenosis noted on CTA head & neck however patient and wife obtained a second opinion from [facility] Dr. Muhammad and prefer to monitor on serial imaging.    RTO in 6 months with Dr. Josse echeverria (cardiology fellow) or Dr. Irizarry.    Interval History:   Jaciel Van is a 74 y.o. adult with past medical history as below who presents to the office for follow-up after hospitalization from 6/6 - 6/8/2024 at [facility] for dizziness.  Treated for vertigo.    Since his discharge to home patient reports he has been feeling well.    Patient denies chest pain, chest pressure, chest discomfort or burning, shortness of breath, dyspnea on exertion, palpitations, skipped heartbeats, lightheadedness, dizziness, presyncope or syncope.  Patient denies abdominal distention or peripheral edema.    Patient has been taking his medications without access issues, missed doses or side effects.    Review of Systems   Constitutional:        5 lb weight gain this summer per wife   Cardiovascular:  Negative for chest pain, dyspnea on exertion, irregular heartbeat, leg swelling, near-syncope, orthopnea, palpitations, paroxysmal nocturnal dyspnea and syncope.   Respiratory:  Negative for cough and shortness of breath.    Musculoskeletal:  Negative for neck pain.   Neurological:  Negative for disturbances in coordination, dizziness, headaches and light-headedness.  "    Past Medical History:   Diagnosis Date    Abnormal CAT scan     Allergic     Anxiety     Bilateral inguinal hernia 1990    recurrent    Colitis     Diabetes mellitus (HCC)     Gastritis 12/11/2012    GERD (gastroesophageal reflux disease)     Hypertension     Impaired fasting glucose 07/09/2019    Nausea & vomiting     Right knee meniscal tear 12/20/2017    Sleep apnea     uses cpap      Past Surgical History:   Procedure Laterality Date    CHOLECYSTECTOMY      COLONOSCOPY  2017    ABOUT 2-3 YEARS AGO    COLONOSCOPY  06/02/2022    EXPLORATORY LAPAROTOMY  08/16/2014    done for repair of peritoneal \"RENT\"?    HEMORRHOID SURGERY      INGUINAL HERNIA REPAIR Bilateral 08/14/2014    NE ESOPHAGOGASTRODUODENOSCOPY TRANSORAL DIAGNOSTIC N/A 02/23/2017    Procedure: EGD AND COLONOSCOPY;  Surgeon: Harrison Turcios MD;  Location: [facility];  Service: Gastroenterology    US GUIDED THYROID BIOPSY  03/15/2021      Family History   Problem Relation Age of Onset    Breast cancer Mother     Heart disease Father     Heart disease Brother     Heart disease Family       Current Outpatient Medications:     amLODIPine (NORVASC) 10 mg tablet, Take 1 tablet by mouth once daily, Disp: 100 tablet, Rfl: 1    aspirin (ECOTRIN LOW STRENGTH) 81 mg EC tablet, Take 81 mg by mouth daily, Disp: , Rfl:     Blood Glucose Monitoring Suppl (ONE TOUCH ULTRA 2) w/Device KIT, CHECK BLOOD SUGARS DAILY, Disp: , Rfl:     brimonidine tartrate 0.2 % ophthalmic solution, Administer 1 drop to both eyes 3 (three) times a day, Disp: , Rfl:     cetirizine (ZyrTEC) 10 MG chewable tablet, Chew 10 mg daily, Disp: , Rfl:     cholecalciferol (VITAMIN D3) 400 units tablet, Take 400 Units by mouth daily, Disp: , Rfl:     clopidogrel (PLAVIX) 75 mg tablet, Take 1 tablet by mouth once daily, Disp: 100 tablet, Rfl: 1    hydroCHLOROthiazide 12.5 mg tablet, Take 1 tablet by mouth once daily, Disp: 90 tablet, Rfl: 1    Lancets (OneTouch Delica Plus [id number]) MISC, daily Check " blood sugar, Disp: , Rfl:     metFORMIN (GLUCOPHAGE) 500 mg tablet, TAKE 1 TABLET BY MOUTH TWICE DAILY WITH MEALS, Disp: 180 tablet, Rfl: 0    metoprolol succinate (TOPROL-XL) 25 mg 24 hr tablet, Take 1 tablet by mouth once daily, Disp: 100 tablet, Rfl: 1    OneTouch Ultra test strip, USE STRIP TO CHECK GLUCOSE ONCE DAILY, Disp: , Rfl:     pantoprazole (PROTONIX) 40 mg tablet, Take 1 tablet by mouth once daily, Disp: 100 tablet, Rfl: 1    potassium chloride (Klor-Con M20) 20 mEq tablet, Take 2 tablets (40 mEq total) by mouth 2 (two) times a day For 1 week, then resume your prior home dose of 20meq once a day, Disp: 90 tablet, Rfl: 0    rosuvastatin (CRESTOR) 10 MG tablet, Take 1 tablet (10 mg total) by mouth daily, Disp: 30 tablet, Rfl: 6     Allergies   Allergen Reactions    Ace Inhibitors Angioedema     Physical Exam:  Vitals:    08/28/24 0809   BP: 136/84   Pulse: 69   SpO2: 97%     Vitals:    08/28/24 0809   Weight: 89.8 kg (198 lb)     Height: 6' (182.9 cm)   Body mass index is 26.85 kg/m².    Physical Exam  Vitals and nursing note reviewed.   Constitutional:       General: He is not in acute distress.     Appearance: He is not ill-appearing.   HENT:      Head: Normocephalic and atraumatic.      Nose: Nose normal.      Mouth/Throat:      Mouth: Mucous membranes are moist.   Eyes:      Conjunctiva/sclera: Conjunctivae normal.   Neck:      Vascular: No carotid bruit.   Cardiovascular:      Rate and Rhythm: Regular rhythm.      Pulses:           Radial pulses are 2+ on the right side and 2+ on the left side.        Posterior tibial pulses are 2+ on the right side and 2+ on the left side.      Heart sounds: S1 normal and S2 normal. No murmur heard.  Pulmonary:      Effort: Pulmonary effort is normal.   Abdominal:      General: Abdomen is flat.   Musculoskeletal:         General: No swelling.   Skin:     General: Skin is warm and dry.   Neurological:      Mental Status: He is alert and oriented to person, place, and  time.   Psychiatric:         Behavior: Behavior normal.     Data:  ECHO: 6/7/2024 increased wall thickness of the LV.  LVEF 55%.  Wall motion normal. G1DD, mild MR.  Mildly dilated aortic root (3.8 cm) mildly dilated ascending aorta (4.2 cm).    Virginia Baez PA-C  [facility]

## 2024-08-28 ENCOUNTER — OFFICE VISIT (OUTPATIENT)
Dept: CARDIOLOGY CLINIC | Facility: CLINIC | Age: 74
End: 2024-08-28
Payer: COMMERCIAL

## 2024-08-28 VITALS
HEART RATE: 69 BPM | WEIGHT: 198 LBS | BODY MASS INDEX: 26.82 KG/M2 | SYSTOLIC BLOOD PRESSURE: 136 MMHG | DIASTOLIC BLOOD PRESSURE: 84 MMHG | OXYGEN SATURATION: 97 % | HEIGHT: 72 IN

## 2024-08-28 DIAGNOSIS — Z72.0 TOBACCO ABUSE: ICD-10-CM

## 2024-08-28 DIAGNOSIS — I10 HYPERTENSION: Primary | ICD-10-CM

## 2024-08-28 DIAGNOSIS — E78.5 HYPERLIPIDEMIA: ICD-10-CM

## 2024-08-28 PROCEDURE — G2211 COMPLEX E/M VISIT ADD ON: HCPCS | Performed by: PHYSICIAN ASSISTANT

## 2024-08-28 PROCEDURE — 99214 OFFICE O/P EST MOD 30 MIN: CPT | Performed by: PHYSICIAN ASSISTANT

## 2024-09-18 ENCOUNTER — OFFICE VISIT (OUTPATIENT)
Dept: FAMILY MEDICINE CLINIC | Facility: CLINIC | Age: 74
End: 2024-09-18
Payer: COMMERCIAL

## 2024-09-18 VITALS
SYSTOLIC BLOOD PRESSURE: 150 MMHG | OXYGEN SATURATION: 97 % | WEIGHT: 195.2 LBS | HEIGHT: 72 IN | HEART RATE: 92 BPM | TEMPERATURE: 98.8 F | BODY MASS INDEX: 26.44 KG/M2 | DIASTOLIC BLOOD PRESSURE: 88 MMHG

## 2024-09-18 DIAGNOSIS — J06.9 ACUTE URI: Primary | ICD-10-CM

## 2024-09-18 DIAGNOSIS — K59.01 SLOW TRANSIT CONSTIPATION: ICD-10-CM

## 2024-09-18 PROCEDURE — G2211 COMPLEX E/M VISIT ADD ON: HCPCS | Performed by: FAMILY MEDICINE

## 2024-09-18 PROCEDURE — 99214 OFFICE O/P EST MOD 30 MIN: CPT | Performed by: FAMILY MEDICINE

## 2024-09-18 RX ORDER — AZITHROMYCIN 250 MG/1
TABLET, FILM COATED ORAL
Qty: 6 TABLET | Refills: 0 | Status: SHIPPED | OUTPATIENT
Start: 2024-09-18 | End: 2024-09-22

## 2024-09-18 NOTE — PROGRESS NOTES
Assessment/Plan: Patient use stool softener as well as MiraLAX as directed for constipation.  Patient use Z-Kinsg as directed.  Patient may use Nasonex as directed.  Patient will stay well-hydrated.       Diagnoses and all orders for this visit:    Acute URI  -     azithromycin (ZITHROMAX) 250 mg tablet; Take 2 tablets today then 1 tablet daily x 4 days    Slow transit constipation            Subjective:        Patient ID: Jaciel Van is a 74 y.o. adult.      Patient is here with chills, cough, body aches, sore throat since Monday.  Patient with increased constipation and bloating which has been going on for months.  Patient using MiraLAX.  Patient did have some bodyaches and chills.  Patient did use Tylenol.  No hematochezia    GI Problem  The primary symptoms include arthralgias. Primary symptoms do not include fever.   The illness is also significant for constipation.   Sore Throat   Associated symptoms include congestion and coughing.   Generalized Body Aches  Associated symptoms include congestion, a sore throat, coughing and constipation. Pertinent negatives include no fever.         The following portions of the patient's history were reviewed and updated as appropriate: allergies, current medications, past family history, past medical history, past social history, past surgical history and problem list.      Review of Systems   Constitutional: Negative.  Negative for fever.   HENT:  Positive for congestion, postnasal drip and sore throat.    Eyes: Negative.    Respiratory:  Positive for cough.    Cardiovascular: Negative.    Gastrointestinal:  Positive for constipation.   Endocrine: Negative.    Genitourinary: Negative.    Musculoskeletal:  Positive for arthralgias.   Skin: Negative.    Allergic/Immunologic: Negative.    Neurological: Negative.    Hematological: Negative.    Psychiatric/Behavioral: Negative.             Objective:               /88 (BP Location: Right arm, Patient Position: Sitting,  Cuff Size: Standard)   Pulse 92   Temp 98.8 °F (37.1 °C) (Temporal)   Ht 6' (1.829 m)   Wt 88.5 kg (195 lb 3.2 oz)   SpO2 97%   BMI 26.47 kg/m²          Physical Exam  Vitals and nursing note reviewed.   Constitutional:       General: He is not in acute distress.     Appearance: Normal appearance. He is not ill-appearing, toxic-appearing or diaphoretic.   HENT:      Head: Normocephalic and atraumatic.      Right Ear: Tympanic membrane, ear canal and external ear normal. There is no impacted cerumen.      Left Ear: Tympanic membrane, ear canal and external ear normal. There is no impacted cerumen.      Nose: Nose normal. No congestion or rhinorrhea.      Mouth/Throat:      Mouth: Mucous membranes are moist.      Pharynx: Oropharyngeal exudate present. No posterior oropharyngeal erythema.   Eyes:      General: No scleral icterus.        Right eye: No discharge.         Left eye: No discharge.      Extraocular Movements: Extraocular movements intact.      Conjunctiva/sclera: Conjunctivae normal.      Pupils: Pupils are equal, round, and reactive to light.   Neck:      Vascular: No carotid bruit.   Cardiovascular:      Rate and Rhythm: Normal rate and regular rhythm.      Pulses: Normal pulses.      Heart sounds: Normal heart sounds. No murmur heard.     No friction rub. No gallop.   Pulmonary:      Effort: Pulmonary effort is normal. No respiratory distress.      Breath sounds: Normal breath sounds. No stridor. No wheezing, rhonchi or rales.   Chest:      Chest wall: No tenderness.   Abdominal:      General: Abdomen is flat. Bowel sounds are normal. There is no distension.      Palpations: Abdomen is soft.      Tenderness: There is no abdominal tenderness. There is no guarding or rebound.   Musculoskeletal:         General: No swelling, tenderness, deformity or signs of injury. Normal range of motion.      Cervical back: Normal range of motion and neck supple. No rigidity. No muscular tenderness.      Right lower  leg: No edema.      Left lower leg: No edema.   Lymphadenopathy:      Cervical: No cervical adenopathy.   Skin:     General: Skin is warm and dry.      Capillary Refill: Capillary refill takes less than 2 seconds.      Coloration: Skin is not jaundiced.      Findings: No bruising, erythema, lesion or rash.   Neurological:      General: No focal deficit present.      Mental Status: He is alert and oriented to person, place, and time.      Cranial Nerves: No cranial nerve deficit.      Sensory: No sensory deficit.      Motor: No weakness.      Coordination: Coordination normal.      Gait: Gait normal.   Psychiatric:         Mood and Affect: Mood normal.         Behavior: Behavior normal.         Thought Content: Thought content normal.         Judgment: Judgment normal.

## 2024-10-18 PROBLEM — J06.9 ACUTE URI: Status: RESOLVED | Noted: 2024-09-18 | Resolved: 2024-10-18

## 2024-10-22 ENCOUNTER — OFFICE VISIT (OUTPATIENT)
Dept: SLEEP CENTER | Facility: CLINIC | Age: 74
End: 2024-10-22
Payer: COMMERCIAL

## 2024-10-22 ENCOUNTER — TELEPHONE (OUTPATIENT)
Dept: SLEEP CENTER | Facility: CLINIC | Age: 74
End: 2024-10-22

## 2024-10-22 VITALS
HEIGHT: 72 IN | DIASTOLIC BLOOD PRESSURE: 88 MMHG | WEIGHT: 197.2 LBS | SYSTOLIC BLOOD PRESSURE: 124 MMHG | BODY MASS INDEX: 26.71 KG/M2

## 2024-10-22 DIAGNOSIS — I10 PRIMARY HYPERTENSION: ICD-10-CM

## 2024-10-22 DIAGNOSIS — E66.3 OVERWEIGHT (BMI 25.0-29.9): ICD-10-CM

## 2024-10-22 DIAGNOSIS — G47.33 OSA (OBSTRUCTIVE SLEEP APNEA): Primary | ICD-10-CM

## 2024-10-22 PROCEDURE — G2211 COMPLEX E/M VISIT ADD ON: HCPCS | Performed by: INTERNAL MEDICINE

## 2024-10-22 PROCEDURE — 99214 OFFICE O/P EST MOD 30 MIN: CPT | Performed by: INTERNAL MEDICINE

## 2024-10-22 RX ORDER — DORZOLAMIDE HCL 20 MG/ML
SOLUTION/ DROPS OPHTHALMIC
COMMUNITY
Start: 2024-09-10

## 2024-10-22 NOTE — TELEPHONE ENCOUNTER
Pt came by [facility] for a mask fitting. I provided him with the airTouch F20 size large.

## 2024-10-22 NOTE — PROGRESS NOTES
Progress Note - Sleep Medicine  Jaciel Van 74 y.o. adult MRN: [id number]       Impression & Plan:       1. Moderate BRAVO  Sleep studies:  AHI 16.3, REM AHI 49.1    Compliance Data:   [employer] fixed pressure of 9  More than 4 hours 96.7%  Average use of 6 hours 57 minutes  Large leak 1 minute  Residual AHI 1.8      Patient is doing well  I discussed recent recall of his machine, he does not remember if he registered for the recall  I discussed the risks and benefits of continuing with CPAP therapy on current machine  His machine is from , I will order a new machine in     Plan   Ordered CPAP supplies  Ordered mask fitting  Follow up in 1 year    2.  Hypertension  Blood pressure 124/88  Currently on amlodipine 10 mg daily, hydrochlorothiazide 12.5 mg daily, metoprolol succinate 25 mg daily  I explained that optimal treatment of obstructive sleep apnea can improve blood pressure control    3.  Overweight  Counseled patient on lifestyle modifications including diet and exercise  BMI 26.75            ______________________________________________________________________    HPI:    Jaciel Vna is a pleasant 73-year-old male with past medical history of type 2 diabetes, hypertension who presents for follow-up of moderate obstructive sleep apnea.  He is on CPAP with a fixed pressure of 9 cm H2O.  He states he is doing well.  He notes improvement in all symptoms.   He has excellent compliance.  Patient states he is doing well overall.  He has frequent nighttime awakenings to urinate.  He presents with his wife who does note a rash around the area of the mask.        Review of Systems:  Review of Systems      Social history updates:  Social History     Tobacco Use   Smoking Status Former    Current packs/day: 0.00    Average packs/day: 0.3 packs/day for 40.0 years (10.0 ttl pk-yrs)    Types: Cigarettes    Start date: 1979    Quit date: 2019    Years since quittin.9    Passive exposure: Current    Smokeless Tobacco Never     Social History     Socioeconomic History    Marital status: /Civil Union     Spouse name: Not on file    Number of children: Not on file    Years of education: Not on file    Highest education level: Not on file   Occupational History    Occupation: retired   Tobacco Use    Smoking status: Former     Current packs/day: 0.00     Average packs/day: 0.3 packs/day for 40.0 years (10.0 ttl pk-yrs)     Types: Cigarettes     Start date: 1979     Quit date: 2019     Years since quittin.9     Passive exposure: Current    Smokeless tobacco: Never   Vaping Use    Vaping status: Never Used   Substance and Sexual Activity    Alcohol use: Yes     Comment: special occ.    Drug use: Never    Sexual activity: Not Currently     Partners: Female   Other Topics Concern    Not on file   Social History Narrative    Not on file     Social Determinants of Health     Financial Resource Strain: Low Risk  (2023)    Overall Financial Resource Strain (CARDIA)     Difficulty of Paying Living Expenses: Not hard at all   Food Insecurity: No Food Insecurity (2024)    Nursing - Inadequate Food Risk Classification     Worried About Running Out of Food in the Last Year: Never true     Ran Out of Food in the Last Year: Never true     Ran Out of Food in the Last Year: Not on file   Transportation Needs: No Transportation Needs (2024)    PRAPARE - Transportation     Lack of Transportation (Medical): No     Lack of Transportation (Non-Medical): No   Physical Activity: Not on file   Stress: Not on file   Social Connections: Not on file   Intimate Partner Violence: Not on file   Housing Stability: Low Risk  (2024)    Housing Stability Vital Sign     Unable to Pay for Housing in the Last Year: No     Number of Times Moved in the Last Year: 0     Homeless in the Last Year: No       PhysicalExamination:  Vitals:   /88   Ht 6' (1.829 m)   Wt 89.4 kg (197 lb 3.2 oz)   BMI 26.75 kg/m²      Physical Exam  General:  Awake alert and oriented x 3, conversant without conversational dyspnea, NAD, normal affect  HEENT:  PERRL, Sclera noninjected, nonicteric OU, Nares patent,  no craniofacial abnormalities, Mucous membranes, moist, no oral lesions, normal dentition  NECK: Trachea midline, no accessory muscle use, no stridor, no cervical or supraclavicular adenopathy, JVP not elevated  CARDIAC: Reg, single s1/S2, no m/r/g  PULM: CTA bilaterally no wheezing, rhonchi or rales  EXT: No cyanosis, no clubbing, no edema, normal capillary refill  NEURO: no focal neurologic deficits, AAOx3, moving all extremities appropriately     Diagnostic Data:  Labs:  I personally reviewed the most recent laboratory data pertinent to today's visit  Admission on 06/06/2024, Discharged on 06/08/2024   Component Date Value    Ventricular Rate 06/06/2024 69     Atrial Rate 06/06/2024 69     FL Interval 06/06/2024 176     QRSD Interval 06/06/2024 90     QT Interval 06/06/2024 386     QTC Interval 06/06/2024 413     P Axis 06/06/2024 44     QRS Axis 06/06/2024 25     T Wave [place] 06/06/2024 -3     POC Glucose 06/06/2024 156 (H)     WBC 06/06/2024 7.32     RBC 06/06/2024 5.06     Hemoglobin 06/06/2024 14.6     Hematocrit 06/06/2024 43.6     MCV 06/06/2024 86     MCH 06/06/2024 28.9     MCHC 06/06/2024 33.5     RDW 06/06/2024 14.0     MPV 06/06/2024 10.9     Platelets 06/06/2024 171     nRBC 06/06/2024 0     Segmented % 06/06/2024 70     Immature Grans % 06/06/2024 0     Lymphocytes % 06/06/2024 21     Monocytes % 06/06/2024 5     Eosinophils Relative 06/06/2024 3     Basophils Relative 06/06/2024 1     Absolute Neutrophils 06/06/2024 5.08     Absolute Immature Grans 06/06/2024 0.03     Absolute Lymphocytes 06/06/2024 1.57     Absolute Monocytes 06/06/2024 0.38     Eosinophils Absolute 06/06/2024 0.20     Basophils Absolute 06/06/2024 0.06     Protime 06/06/2024 13.8     INR 06/06/2024 1.03     PTT 06/06/2024 38 (H)     Sodium  06/06/2024 138     Potassium 06/06/2024 3.2 (L)     Chloride 06/06/2024 102     CO2 06/06/2024 28     ANION GAP 06/06/2024 8     BUN 06/06/2024 15     Creatinine 06/06/2024 1.13     Glucose 06/06/2024 134     Calcium 06/06/2024 9.5     AST 06/06/2024 12 (L)     ALT 06/06/2024 11     Alkaline Phosphatase 06/06/2024 57     Total Protein 06/06/2024 6.8     Albumin 06/06/2024 4.4     Total Bilirubin 06/06/2024 0.77     eGFR 06/06/2024 64     Magnesium 06/06/2024 2.0     hs TnI 0hr 06/06/2024 11     BNP 06/06/2024 29     hs TnI 2hr 06/06/2024 13     Delta 2hr hsTnI 06/06/2024 2     Ventricular Rate 06/06/2024 64     Atrial Rate 06/06/2024 64     VA Interval 06/06/2024 186     QRSD Interval 06/06/2024 90     QT Interval 06/06/2024 410     QTC Interval 06/06/2024 422     P Axis 06/06/2024 39     QRS [place] 06/06/2024 16     T Wave Axis 06/06/2024 2     Triscuspid Valve Regurgi* 06/07/2024 11.0     Sinus of Valsalva, 2D 06/07/2024 3.7     RAA A4C 06/07/2024 10.2     LA Volume Index (BP) 06/07/2024 12.6     MV Peak A [place] 06/07/2024 0.86     MV stenosis pressure 1/2* 06/07/2024 85     MV Peak E [place] 06/07/2024 61     E wave deceleration time 06/07/2024 292     E/A ratio 06/07/2024 0.71     MV valve area p 1/2 meth* 06/07/2024 2.59     TR Peak [place] 06/07/2024 1.7     RVID d 06/07/2024 2.8     A4C EF 06/07/2024 57     Tricuspid valve peak reg* 06/07/2024 1.67     Left ventricular stroke * 06/07/2024 58.00     IVSd 06/07/2024 1.30     Tricuspid annular plane * 06/07/2024 2.20     Ao root 06/07/2024 3.80     Ao STJ 06/07/2024 3.30     LVPWd 06/07/2024 1.30     LA size 06/07/2024 4.2     Asc Ao 06/07/2024 4.2     STJ 06/07/2024 3.3     LA volume (BP) 06/07/2024 26     FS 06/07/2024 30     LVIDS 06/07/2024 3.20     IVS 06/07/2024 1.3     LVIDd 06/07/2024 4.60     LA length (A2C) 06/07/2024 3.90     LEFT VENTRICLE SYSTOLIC * 06/07/2024 41     LV DIASTOLIC VOLUME (MOD* 06/07/2024 98     Left Atrium Area-systoli* 06/07/2024 12.5     Left  Atrium Area-systoli* 06/07/2024 10.3     MV E' Tissue Velocity La* 06/07/2024 6     MV E' Tissue Velocity Se* 06/07/2024 7     LVSV, 2D 06/07/2024 58     BSA 06/07/2024 2.07     LV EF 06/07/2024 55     Cholesterol 06/07/2024 109     Triglycerides 06/07/2024 142     HDL, Direct 06/07/2024 33 (L)     LDL Calculated 06/07/2024 48     POC Glucose 06/07/2024 146 (H)     WBC 06/07/2024 4.81     RBC 06/07/2024 4.99     Hemoglobin 06/07/2024 14.2     Hematocrit 06/07/2024 42.4     MCV 06/07/2024 85     MCH 06/07/2024 28.5     MCHC 06/07/2024 33.5     RDW 06/07/2024 14.0     MPV 06/07/2024 10.6     Platelets 06/07/2024 159     nRBC 06/07/2024 0     Segmented % 06/07/2024 56     Immature Grans % 06/07/2024 0     Lymphocytes % 06/07/2024 31     Monocytes % 06/07/2024 8     Eosinophils Relative 06/07/2024 4     Basophils Relative 06/07/2024 1     Absolute Neutrophils 06/07/2024 2.74     Absolute Immature Grans 06/07/2024 0.01     Absolute Lymphocytes 06/07/2024 1.47     Absolute Monocytes 06/07/2024 0.36     Eosinophils Absolute 06/07/2024 0.17     Basophils Absolute 06/07/2024 0.06     Sodium 06/07/2024 141     Potassium 06/07/2024 3.2 (L)     Chloride 06/07/2024 109 (H)     CO2 06/07/2024 27     ANION GAP 06/07/2024 5     BUN 06/07/2024 12     Creatinine 06/07/2024 0.92     Glucose 06/07/2024 116     Calcium 06/07/2024 9.0     eGFR 06/07/2024 82     POC Glucose 06/07/2024 188 (H)     POC Glucose 06/07/2024 119     POC Glucose 06/07/2024 175 (H)     Sodium 06/08/2024 141     Potassium 06/08/2024 3.0 (L)     Chloride 06/08/2024 107     CO2 06/08/2024 26     ANION GAP 06/08/2024 8     BUN 06/08/2024 13     Creatinine 06/08/2024 1.07     Glucose 06/08/2024 108     Calcium 06/08/2024 9.2     eGFR 06/08/2024 68     WBC 06/08/2024 6.09     RBC 06/08/2024 4.84     Hemoglobin 06/08/2024 13.8     Hematocrit 06/08/2024 41.6     MCV 06/08/2024 86     MCH 06/08/2024 28.5     MCHC 06/08/2024 33.2     RDW 06/08/2024 13.9     MPV 06/08/2024  10.6     Platelets 06/08/2024 156     nRBC 06/08/2024 0     Segmented % 06/08/2024 56     Immature Grans % 06/08/2024 0     Lymphocytes % 06/08/2024 32     Monocytes % 06/08/2024 7     Eosinophils Relative 06/08/2024 4     Basophils Relative 06/08/2024 1     Absolute Neutrophils 06/08/2024 3.39     Absolute Immature Grans 06/08/2024 0.02     Absolute Lymphocytes 06/08/2024 1.93     Absolute Monocytes 06/08/2024 0.44     Eosinophils Absolute 06/08/2024 0.24     Basophils Absolute 06/08/2024 0.07     POC Glucose 06/08/2024 131     POC Glucose 06/08/2024 163 (H)     Supplier Name 06/08/2024 [employer]     Supplier Phone Number 06/08/2024 [phone]     Order Status 06/08/2024 Delivery Successful     Delivery Request Date 06/08/2024 06/08/2024     Date Delivered  06/08/2024 06/08/2024     Item Description 06/08/2024 Wheeled Walker, Adult    Appointment on 05/13/2024   Component Date Value    Sodium 05/13/2024 141     Potassium 05/13/2024 4.2     Chloride 05/13/2024 103     CO2 05/13/2024 32     ANION GAP 05/13/2024 6     BUN 05/13/2024 16     Creatinine 05/13/2024 1.02     Glucose, Fasting 05/13/2024 128 (H)     Calcium 05/13/2024 9.8     AST 05/13/2024 11 (L)     ALT 05/13/2024 11     Alkaline Phosphatase 05/13/2024 64     Total Protein 05/13/2024 7.0     Albumin 05/13/2024 4.3     Total Bilirubin 05/13/2024 0.61     eGFR 05/13/2024 72     WBC 05/13/2024 7.24     RBC 05/13/2024 5.42     Hemoglobin 05/13/2024 15.1     Hematocrit 05/13/2024 46.9     MCV 05/13/2024 87     MCH 05/13/2024 27.9     MCHC 05/13/2024 32.2     RDW 05/13/2024 13.4     MPV 05/13/2024 9.4     Platelets 05/13/2024 275     nRBC 05/13/2024 0     Segmented % 05/13/2024 64     Immature Grans % 05/13/2024 0     Lymphocytes % 05/13/2024 24     Monocytes % 05/13/2024 7     Eosinophils Relative 05/13/2024 4     Basophils Relative 05/13/2024 1     Absolute Neutrophils 05/13/2024 4.61     Absolute Immature Grans 05/13/2024 0.02      "Absolute Lymphocytes 05/13/2024 1.73     Absolute Monocytes 05/13/2024 0.48     Eosinophils Absolute 05/13/2024 0.32     Basophils Absolute 05/13/2024 0.08     Creatinine, Ur 05/13/2024 30.0     Albumin,U,Random 05/13/2024 7.2     Albumin Creat Ratio 05/13/2024 24     Hemoglobin A1C 05/13/2024 6.7 (H)     EAG 05/13/2024 146     Cholesterol 05/13/2024 117     Triglycerides 05/13/2024 151 (H)     HDL, Direct 05/13/2024 37 (L)     LDL Calculated 05/13/2024 50     Non-HDL-Chol (CHOL-HDL) 05/13/2024 80     TSH 3RD GENERATON 05/13/2024 1.104        I have personally reviewed pertinent lab results.  Lab Results   Component Value Date    WBC 6.09 06/08/2024    HGB 13.8 06/08/2024    HCT 41.6 06/08/2024    MCV 86 06/08/2024     06/08/2024     Lab Results   Component Value Date    GLUCOSE 114 05/27/2015    CALCIUM 9.2 06/08/2024     05/27/2015    K 3.0 (L) 06/08/2024    CO2 26 06/08/2024     06/08/2024    BUN 13 06/08/2024    CREATININE 1.07 06/08/2024     No results found for: \"IGE\"  Lab Results   Component Value Date    ALT 11 06/06/2024    AST 12 (L) 06/06/2024    ALKPHOS 57 06/06/2024    BILITOT 0.5 05/27/2015     No results found for: \"IRON\", \"TIBC\", \"FERRITIN\"  Lab Results   Component Value Date    QOSOLOIP92 332 05/27/2015     No results found for: \"FOLATE\"      Arterial Blood Gas result:  CLARITA Chavarria MD  [facility] Medicine    "

## 2024-10-23 ENCOUNTER — TELEPHONE (OUTPATIENT)
Dept: SLEEP CENTER | Facility: CLINIC | Age: 74
End: 2024-10-23

## 2024-10-26 LAB

## 2024-10-31 DIAGNOSIS — E78.2 MIXED HYPERLIPIDEMIA: ICD-10-CM

## 2024-10-31 DIAGNOSIS — I25.10 CORONARY ARTERY DISEASE INVOLVING NATIVE CORONARY ARTERY OF NATIVE HEART WITHOUT ANGINA PECTORIS: ICD-10-CM

## 2024-10-31 RX ORDER — ROSUVASTATIN CALCIUM 10 MG/1
10 TABLET, COATED ORAL DAILY
Qty: 90 TABLET | Refills: 0 | Status: SHIPPED | OUTPATIENT
Start: 2024-10-31

## 2024-10-31 RX ORDER — ROSUVASTATIN CALCIUM 10 MG/1
10 TABLET, COATED ORAL DAILY
Qty: 30 TABLET | Refills: 5 | Status: SHIPPED | OUTPATIENT
Start: 2024-10-31 | End: 2024-10-31

## 2024-11-21 DIAGNOSIS — E87.6 HYPOKALEMIA: ICD-10-CM

## 2024-11-21 RX ORDER — POTASSIUM CHLORIDE 1500 MG/1
40 TABLET, EXTENDED RELEASE ORAL 2 TIMES DAILY
Qty: 90 TABLET | Refills: 0 | Status: SHIPPED | OUTPATIENT
Start: 2024-11-21

## 2024-12-24 DIAGNOSIS — E08.43 DIABETES MELLITUS DUE TO UNDERLYING CONDITION WITH DIABETIC AUTONOMIC NEUROPATHY, WITHOUT LONG-TERM CURRENT USE OF INSULIN (HCC): ICD-10-CM

## 2025-01-15 ENCOUNTER — RESULTS FOLLOW-UP (OUTPATIENT)
Age: 75
End: 2025-01-15

## 2025-01-15 ENCOUNTER — APPOINTMENT (OUTPATIENT)
Dept: LAB | Facility: HOSPITAL | Age: 75
End: 2025-01-15
Payer: COMMERCIAL

## 2025-01-15 DIAGNOSIS — R73.03 PRE-DIABETES: ICD-10-CM

## 2025-01-15 DIAGNOSIS — Z12.5 SCREENING FOR PROSTATE CANCER: ICD-10-CM

## 2025-01-15 DIAGNOSIS — I10 PRIMARY HYPERTENSION: ICD-10-CM

## 2025-01-15 LAB
ALBUMIN SERPL BCG-MCNC: 4.5 G/DL (ref 3.5–5)
ALP SERPL-CCNC: 57 U/L (ref 34–104)
ALT SERPL W P-5'-P-CCNC: 17 U/L (ref 7–52)
ANION GAP SERPL CALCULATED.3IONS-SCNC: 7 MMOL/L (ref 4–13)
AST SERPL W P-5'-P-CCNC: 20 U/L (ref 13–39)
BASOPHILS # BLD AUTO: 0.05 THOUSANDS/ΜL (ref 0–0.1)
BASOPHILS NFR BLD AUTO: 1 % (ref 0–1)
BILIRUB SERPL-MCNC: 1 MG/DL (ref 0.2–1)
BUN SERPL-MCNC: 18 MG/DL (ref 5–25)
CALCIUM SERPL-MCNC: 9.8 MG/DL (ref 8.4–10.2)
CHLORIDE SERPL-SCNC: 99 MMOL/L (ref 96–108)
CHOLEST SERPL-MCNC: 121 MG/DL (ref ?–200)
CO2 SERPL-SCNC: 33 MMOL/L (ref 21–32)
CREAT SERPL-MCNC: 1.37 MG/DL (ref 0.6–1.3)
EOSINOPHIL # BLD AUTO: 0.34 THOUSAND/ΜL (ref 0–0.61)
EOSINOPHIL NFR BLD AUTO: 6 % (ref 0–6)
ERYTHROCYTE [DISTWIDTH] IN BLOOD BY AUTOMATED COUNT: 13.3 % (ref 11.6–15.1)
EST. AVERAGE GLUCOSE BLD GHB EST-MCNC: 134 MG/DL
GFR SERPL CREATININE-BSD FRML MDRD: 50 ML/MIN/1.73SQ M
GLUCOSE P FAST SERPL-MCNC: 129 MG/DL (ref 65–99)
HBA1C MFR BLD: 6.3 %
HCT VFR BLD AUTO: 46.7 % (ref 36.5–49.3)
HDLC SERPL-MCNC: 33 MG/DL
HGB BLD-MCNC: 15.2 G/DL (ref 12–17)
IMM GRANULOCYTES # BLD AUTO: 0.02 THOUSAND/UL (ref 0–0.2)
IMM GRANULOCYTES NFR BLD AUTO: 0 % (ref 0–2)
LDLC SERPL CALC-MCNC: 49 MG/DL (ref 0–100)
LYMPHOCYTES # BLD AUTO: 1.1 THOUSANDS/ΜL (ref 0.6–4.47)
LYMPHOCYTES NFR BLD AUTO: 19 % (ref 14–44)
MCH RBC QN AUTO: 27.7 PG (ref 26.8–34.3)
MCHC RBC AUTO-ENTMCNC: 32.5 G/DL (ref 31.4–37.4)
MCV RBC AUTO: 85 FL (ref 82–98)
MONOCYTES # BLD AUTO: 0.4 THOUSAND/ΜL (ref 0.17–1.22)
MONOCYTES NFR BLD AUTO: 7 % (ref 4–12)
NEUTROPHILS # BLD AUTO: 3.8 THOUSANDS/ΜL (ref 1.85–7.62)
NEUTS SEG NFR BLD AUTO: 67 % (ref 43–75)
NONHDLC SERPL-MCNC: 88 MG/DL
NRBC BLD AUTO-RTO: 0 /100 WBCS
PLATELET # BLD AUTO: 179 THOUSANDS/UL (ref 149–390)
PMV BLD AUTO: 10.6 FL (ref 8.9–12.7)
POTASSIUM SERPL-SCNC: 3.9 MMOL/L (ref 3.5–5.3)
PROT SERPL-MCNC: 7 G/DL (ref 6.4–8.4)
PSA SERPL-MCNC: 2.8 NG/ML (ref 0–4)
RBC # BLD AUTO: 5.49 MILLION/UL (ref 3.88–5.62)
SODIUM SERPL-SCNC: 139 MMOL/L (ref 135–147)
TRIGL SERPL-MCNC: 194 MG/DL (ref ?–150)
TSH SERPL DL<=0.05 MIU/L-ACNC: 1.32 UIU/ML (ref 0.45–4.5)
WBC # BLD AUTO: 5.71 THOUSAND/UL (ref 4.31–10.16)

## 2025-01-15 PROCEDURE — 36415 COLL VENOUS BLD VENIPUNCTURE: CPT

## 2025-01-15 PROCEDURE — G0103 PSA SCREENING: HCPCS

## 2025-01-15 PROCEDURE — 80061 LIPID PANEL: CPT

## 2025-01-15 PROCEDURE — 84443 ASSAY THYROID STIM HORMONE: CPT

## 2025-01-15 PROCEDURE — 85025 COMPLETE CBC W/AUTO DIFF WBC: CPT

## 2025-01-15 PROCEDURE — 80053 COMPREHEN METABOLIC PANEL: CPT

## 2025-01-15 PROCEDURE — 83036 HEMOGLOBIN GLYCOSYLATED A1C: CPT

## 2025-01-20 DIAGNOSIS — I10 ESSENTIAL HYPERTENSION: ICD-10-CM

## 2025-01-21 RX ORDER — METOPROLOL SUCCINATE 25 MG/1
25 TABLET, EXTENDED RELEASE ORAL DAILY
Qty: 100 TABLET | Refills: 1 | Status: SHIPPED | OUTPATIENT
Start: 2025-01-21

## 2025-01-23 ENCOUNTER — RA CDI HCC (OUTPATIENT)
Dept: OTHER | Facility: HOSPITAL | Age: 75
End: 2025-01-23

## 2025-01-23 NOTE — PROGRESS NOTES
HCC coding opportunities          Chart Reviewed number of suggestions sent to Provider: 3   E08.22, N18.31  E08.43    Please review and document HCC diagnoses, using M.E.A.T. criteria, as risk scores reset with the New Year.    Patients Insurance     Medicare Insurance: [employer]

## 2025-01-25 DIAGNOSIS — I25.10 CORONARY ARTERY DISEASE INVOLVING NATIVE CORONARY ARTERY OF NATIVE HEART WITHOUT ANGINA PECTORIS: ICD-10-CM

## 2025-01-25 DIAGNOSIS — E78.2 MIXED HYPERLIPIDEMIA: ICD-10-CM

## 2025-01-25 RX ORDER — ROSUVASTATIN CALCIUM 10 MG/1
10 TABLET, COATED ORAL DAILY
Qty: 90 TABLET | Refills: 1 | Status: SHIPPED | OUTPATIENT
Start: 2025-01-25

## 2025-01-29 ENCOUNTER — OFFICE VISIT (OUTPATIENT)
Age: 75
End: 2025-01-29
Payer: COMMERCIAL

## 2025-01-29 VITALS
HEART RATE: 75 BPM | WEIGHT: 199.8 LBS | HEIGHT: 72 IN | SYSTOLIC BLOOD PRESSURE: 138 MMHG | OXYGEN SATURATION: 99 % | BODY MASS INDEX: 27.06 KG/M2 | TEMPERATURE: 97.5 F | DIASTOLIC BLOOD PRESSURE: 84 MMHG

## 2025-01-29 DIAGNOSIS — I25.10 CORONARY ARTERY DISEASE INVOLVING NATIVE CORONARY ARTERY OF NATIVE HEART WITHOUT ANGINA PECTORIS: Primary | ICD-10-CM

## 2025-01-29 DIAGNOSIS — J43.1 PANLOBULAR EMPHYSEMA (HCC): ICD-10-CM

## 2025-01-29 DIAGNOSIS — E78.2 MIXED HYPERLIPIDEMIA: ICD-10-CM

## 2025-01-29 DIAGNOSIS — N18.30 STAGE 3 CHRONIC KIDNEY DISEASE, UNSPECIFIED WHETHER STAGE 3A OR 3B CKD (HCC): ICD-10-CM

## 2025-01-29 DIAGNOSIS — R73.9 HYPERGLYCEMIA: ICD-10-CM

## 2025-01-29 DIAGNOSIS — I77.810 THORACIC AORTIC ECTASIA (HCC): ICD-10-CM

## 2025-01-29 DIAGNOSIS — K21.00 GASTROESOPHAGEAL REFLUX DISEASE WITH ESOPHAGITIS WITHOUT HEMORRHAGE: ICD-10-CM

## 2025-01-29 DIAGNOSIS — K59.01 SLOW TRANSIT CONSTIPATION: ICD-10-CM

## 2025-01-29 DIAGNOSIS — I10 PRIMARY HYPERTENSION: ICD-10-CM

## 2025-01-29 DIAGNOSIS — F33.0 MILD EPISODE OF RECURRENT MAJOR DEPRESSIVE DISORDER (HCC): ICD-10-CM

## 2025-01-29 DIAGNOSIS — E08.22 DIABETES MELLITUS DUE TO UNDERLYING CONDITION WITH DIABETIC CHRONIC KIDNEY DISEASE, UNSPECIFIED CKD STAGE, UNSPECIFIED WHETHER LONG TERM INSULIN USE (HCC): ICD-10-CM

## 2025-01-29 PROBLEM — S06.9X0A: Status: RESOLVED | Noted: 2023-01-17 | Resolved: 2025-01-29

## 2025-01-29 PROCEDURE — G2211 COMPLEX E/M VISIT ADD ON: HCPCS | Performed by: FAMILY MEDICINE

## 2025-01-29 PROCEDURE — 99214 OFFICE O/P EST MOD 30 MIN: CPT | Performed by: FAMILY MEDICINE

## 2025-01-29 NOTE — PROGRESS NOTES
Assessment/Plan:       Diagnoses and all orders for this visit:    CAD  Comments:  Stable continue with current treatment.    Primary hypertension  Comments:  Stable continue with current treatment.    Slow transit constipation  Comments:  Changed to Linzess  Orders:  -     linaCLOtide 145 MCG CAPS; Take 1 capsule (145 mcg total) by mouth daily at least 30 minutes prior to the first meal of the day    Gastroesophageal reflux disease with esophagitis without hemorrhage  Comments:  Continue with current treatment.    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    Thoracic aortic ectasia (HCC)  Comments:  Stable.    Stage 3 chronic kidney disease, unspecified whether stage 3a or 3b CKD (HCC)  Comments:  Recheck labs at follow-up.    Mild episode of recurrent major depressive disorder (HCC)  Comments:  Start Zoloft.  Orders:  -     sertraline (ZOLOFT) 50 mg tablet; Take 1 tablet (50 mg total) by mouth daily            Subjective:        Patient ID: Jaciel Van is a 75 y.o. male.      Patient is here to follow-up on CAD, hypertension, diabetes, hyperlipidemia as well as constipation.  Patient with still ongoing constipation.  Patient using MiraLAX without significant improvement.  Colonoscopy done in 2022 which was reviewed at this time.  No other significant medication being used for constipation other than stool softeners.  No difficulty with urination.  Patient has had some irritability and mildly depressed mood.    Constipation          The following portions of the patient's history were reviewed and updated as appropriate: allergies, current medications, past family history, past medical history, past social history, past surgical history and problem list.      Review  of Systems   Constitutional: Negative.    HENT: Negative.     Eyes: Negative.    Respiratory: Negative.     Cardiovascular: Negative.    Gastrointestinal:  Positive for constipation.   Endocrine: Negative.    Genitourinary: Negative.    Musculoskeletal: Negative.    Skin: Negative.    Allergic/Immunologic: Negative.    Neurological: Negative.    Hematological: Negative.    Psychiatric/Behavioral:  Positive for dysphoric mood.            Objective:        Depression Screening and Follow-up Plan: Patient was screened for depression during today's encounter. They screened negative with a PHQ-2 score of 1.            /84 (BP Location: Right arm, Patient Position: Standing, Cuff Size: Large)   Pulse 75   Temp 97.5 °F (36.4 °C) (Temporal)   Ht 6' (1.829 m)   Wt 90.6 kg (199 lb 12.8 oz)   SpO2 99%   BMI 27.10 kg/m²          Physical Exam  Vitals and nursing note reviewed.   Constitutional:       General: He is not in acute distress.     Appearance: Normal appearance. He is not ill-appearing, toxic-appearing or diaphoretic.   HENT:      Head: Normocephalic and atraumatic.      Right Ear: Tympanic membrane, ear canal and external ear normal. There is no impacted cerumen.      Left Ear: Tympanic membrane, ear canal and external ear normal. There is no impacted cerumen.      Nose: Nose normal. No congestion or rhinorrhea.      Mouth/Throat:      Mouth: Mucous membranes are moist.      Pharynx: No oropharyngeal exudate or posterior oropharyngeal erythema.   Eyes:      General: No scleral icterus.        Right eye: No discharge.         Left eye: No discharge.   Neck:      Vascular: No carotid bruit.   Cardiovascular:      Rate and Rhythm: Normal rate and regular rhythm.      Pulses: Normal pulses.      Heart sounds: Normal heart sounds. No murmur heard.     No friction rub. No gallop.   Pulmonary:      Effort: Pulmonary effort is normal. No respiratory distress.      Breath sounds: Normal breath sounds. No stridor.  No wheezing, rhonchi or rales.   Chest:      Chest wall: No tenderness.   Abdominal:      General: Abdomen is flat. Bowel sounds are normal. There is no distension.      Palpations: Abdomen is soft.      Tenderness: There is no abdominal tenderness. There is no guarding or rebound.   Musculoskeletal:         General: No swelling, tenderness, deformity or signs of injury. Normal range of motion.      Cervical back: Normal range of motion and neck supple. No rigidity. No muscular tenderness.      Right lower leg: No edema.      Left lower leg: No edema.   Lymphadenopathy:      Cervical: No cervical adenopathy.   Skin:     General: Skin is warm and dry.      Capillary Refill: Capillary refill takes less than 2 seconds.      Coloration: Skin is not jaundiced.      Findings: No bruising, erythema, lesion or rash.   Neurological:      General: No focal deficit present.      Mental Status: He is alert and oriented to person, place, and time.      Cranial Nerves: No cranial nerve deficit.      Sensory: No sensory deficit.      Motor: No weakness.      Coordination: Coordination normal.      Gait: Gait normal.   Psychiatric:         Behavior: Behavior normal.         Thought Content: Thought content normal.         Judgment: Judgment normal.      Comments: Mildly depressed          Statement Selected

## 2025-01-30 DIAGNOSIS — K59.01 SLOW TRANSIT CONSTIPATION: ICD-10-CM

## 2025-01-31 RX ORDER — LUBIPROSTONE 24 UG/1
24 CAPSULE, GELATIN COATED ORAL 2 TIMES DAILY
Qty: 60 CAPSULE | Refills: 5 | Status: SHIPPED | OUTPATIENT
Start: 2025-01-31

## 2025-02-01 DIAGNOSIS — I10 ESSENTIAL HYPERTENSION: ICD-10-CM

## 2025-02-01 DIAGNOSIS — I71.21 ASCENDING AORTIC ANEURYSM (HCC): ICD-10-CM

## 2025-02-03 RX ORDER — HYDROCHLOROTHIAZIDE 12.5 MG/1
12.5 TABLET ORAL DAILY
Qty: 90 TABLET | Refills: 1 | Status: SHIPPED | OUTPATIENT
Start: 2025-02-03

## 2025-02-12 NOTE — PROGRESS NOTES
General Cardiology - Outpatient Progress Note   Jaciel Van 75 y.o. male   MRN: [id number]  @ Encounter: [id number]      History:     Jaciel Van is a 73 y.o. year old male with PMH of CAD with basal inferior infarct on nuclear and TTE, carotid artery stenosis (>75% on the R), HTN, DM2, ascending aortic aneurysm, HLD, sleep apnea on CPAP, active smoker, who is presenting for follow up.      since last visit the patient went to the ED with dizziness.  Work up showed high grade (>75%) R carotid artery stenosis that was not felt to be the cause of dizziness.  He has been seeing vascular surgery and started on plavix + aspirin.      This event did help him quit smoking.  He has been cigarette free since June.  He is also exercising and walking 2 miles about 4 times per week.      He denies any chest pain, shortness of breath, leg pain, recent dizziness.     BP at home around 140/80 when he checks.     Objective:  Review of Systems  Review of system was conducted and was negative except for as stated in the interval history    Physical Exam:  Vitals: There were no vitals taken for this visit., There is no height or weight on file to calculate BMI.    GEN: Jaciel Van appears well, alert and oriented x 3, pleasant and cooperative   HEENT:  Normocephalic, atraumatic, anicteric, moist mucous membranes  NECK:  no JVD or carotid bruits   HEART: regular rate, regular rhythm, normal S1 and S2, no murmurs, clicks, gallops or rubs   LUNGS: Clear to auscultation bilaterally; no wheezes, rales, or rhonchi; respiration nonlabored   ABDOMEN:  Normoactive bowel sounds, soft, no tenderness, no distention  EXTREMITIES: radial pulses are palpable; no edema  NEURO: no gross focal findings  SKIN:  Dry, intact, warm to touch    Home Medications:   Current Outpatient Medications:     Amitiza 24 MCG capsule, TAKE 1 CAPSULE BY MOUTH TWICE DAILY, Disp: 60 capsule, Rfl: 5    amLODIPine (NORVASC) 10 mg tablet, Take 1 tablet by mouth  once daily, Disp: 100 tablet, Rfl: 1    aspirin (ECOTRIN LOW STRENGTH) 81 mg EC tablet, Take 81 mg by mouth daily, Disp: , Rfl:     Blood Glucose Monitoring Suppl (ONE TOUCH ULTRA 2) w/Device KIT, CHECK BLOOD SUGARS DAILY, Disp: , Rfl:     brimonidine tartrate 0.2 % ophthalmic solution, Administer 1 drop to both eyes 3 (three) times a day (Patient not taking: Reported on 1/29/2025), Disp: , Rfl:     cetirizine (ZyrTEC) 10 MG chewable tablet, Chew 10 mg daily, Disp: , Rfl:     cholecalciferol (VITAMIN D3) 400 units tablet, Take 400 Units by mouth daily (Patient not taking: Reported on 1/29/2025), Disp: , Rfl:     clopidogrel (PLAVIX) 75 mg tablet, Take 1 tablet by mouth once daily, Disp: 100 tablet, Rfl: 1    dorzolamide (TRUSOPT) 2 % ophthalmic solution, INSTILL 1 DROP TWICE DAILY INTO EACH EYE (Patient not taking: Reported on 1/29/2025), Disp: , Rfl:     hydroCHLOROthiazide 12.5 mg tablet, Take 1 tablet by mouth once daily, Disp: 90 tablet, Rfl: 1    Lancets (OneTouch Delica Plus [id number]) MISC, daily Check blood sugar, Disp: , Rfl:     metFORMIN (GLUCOPHAGE) 500 mg tablet, TAKE 1 TABLET BY MOUTH TWICE DAILY WITH MEALS, Disp: 180 tablet, Rfl: 1    metoprolol succinate (TOPROL-XL) 25 mg 24 hr tablet, Take 1 tablet by mouth once daily, Disp: 100 tablet, Rfl: 1    OneTouch Ultra test strip, USE STRIP TO CHECK GLUCOSE ONCE DAILY, Disp: , Rfl:     pantoprazole (PROTONIX) 40 mg tablet, Take 1 tablet by mouth once daily, Disp: 100 tablet, Rfl: 1    potassium chloride (Klor-Con M20) 20 mEq tablet, Take 2 tablets (40 mEq total) by mouth 2 (two) times a day For 1 week, then resume your prior home dose of 20meq once a day, Disp: 90 tablet, Rfl: 0    rosuvastatin (CRESTOR) 10 MG tablet, Take 1 tablet by mouth once daily, Disp: 90 tablet, Rfl: 1    sertraline (ZOLOFT) 50 mg tablet, Take 1 tablet (50 mg total) by mouth daily, Disp: 30 tablet, Rfl: 5    Assessment & Plan   Jaciel Van is a 73 y.o. year old male with PMH of  CAD with basal inferior infarct on nuclear and TTE, carotid artery stenosis (>75% on the R), HTN, DM2, ascending aortic aneurysm, HLD, sleep apnea on CPAP, active smoker, who is presenting for follow up.      #CAD with basal inferior infarct based on nuclear imaging  Patient has a history of coronary artery calcifications on CT and is a longtime smoker (now quit).  nuclear stress test ordered for intermittent chest pain in 2023 was inferior basal infarct. LVEF 55% on echo. patient symptoms improved with antianginal medications. Discussed the options with this patient and at this time we will medically manage CAD.  If patient has worsening symptoms refractory to medical management will proceed with left heart catheterization.  -Increase rosuvastatin to 40mg daily  -Continue aspirin 81 mg. (Patient is also on plavix 75mg daily for carotid artery stenosis)  -carvedilol 6.25mg BID and amlodpine 10mg daily for angina.     #carotid artery stenosis  High grade asymptoamtic R carotid stenosis found incidentally.  Was offered intervention but got a second opinion and prefers to medical management.  Now on dapt.  Will increase statin as above. Following with vascular surgery.     #HTN  BP slightly above goal.   -continue HCTZ 12.5mg,amlodipine 10mg. potassium 40 meq daily  -transition metoprolol 25mg daily to carvedilol 6.25mg BID.     #ascending aortic aneurysm  4.1cm which was unchanged in 2021. Follows with Dr. Zavala. Pt said next imaging in 3 years.      #DM2  a1c 6.3%. on metformin.      #HLD:  , , HDL 37, LDL 63. given he is at very high risk will target LDL <55, increase rosuvastatin to 40mg.     #former smoker  Quit smoking in June. Has been completely smoke free.      Case discussed and reviewed with Dr. Collazo who agrees with my assessment and plan.      Josse Hall MD  Cardiology Fellow   PGY-6

## 2025-02-13 ENCOUNTER — OFFICE VISIT (OUTPATIENT)
Dept: CARDIOLOGY CLINIC | Facility: CLINIC | Age: 75
End: 2025-02-13
Payer: COMMERCIAL

## 2025-02-13 VITALS
HEART RATE: 77 BPM | WEIGHT: 202 LBS | DIASTOLIC BLOOD PRESSURE: 88 MMHG | SYSTOLIC BLOOD PRESSURE: 150 MMHG | BODY MASS INDEX: 27.36 KG/M2 | HEIGHT: 72 IN | OXYGEN SATURATION: 96 %

## 2025-02-13 DIAGNOSIS — I65.21 STENOSIS OF RIGHT CAROTID ARTERY: ICD-10-CM

## 2025-02-13 DIAGNOSIS — E87.6 HYPOKALEMIA: ICD-10-CM

## 2025-02-13 DIAGNOSIS — I25.118 CORONARY ARTERY DISEASE OF NATIVE ARTERY OF NATIVE HEART WITH STABLE ANGINA PECTORIS (HCC): Primary | ICD-10-CM

## 2025-02-13 DIAGNOSIS — I10 PRIMARY HYPERTENSION: ICD-10-CM

## 2025-02-13 PROCEDURE — 99214 OFFICE O/P EST MOD 30 MIN: CPT

## 2025-02-13 RX ORDER — POTASSIUM CHLORIDE 1500 MG/1
TABLET, EXTENDED RELEASE ORAL
Qty: 90 TABLET | Refills: 0 | Status: SHIPPED | OUTPATIENT
Start: 2025-02-13

## 2025-02-13 RX ORDER — ROSUVASTATIN CALCIUM 40 MG/1
40 TABLET, COATED ORAL DAILY
Qty: 90 TABLET | Refills: 3 | Status: SHIPPED | OUTPATIENT
Start: 2025-02-13

## 2025-02-13 RX ORDER — CARVEDILOL 6.25 MG/1
6.25 TABLET ORAL 2 TIMES DAILY WITH MEALS
Qty: 180 TABLET | Refills: 3 | Status: SHIPPED | OUTPATIENT
Start: 2025-02-13

## 2025-02-21 DIAGNOSIS — I65.29 CAROTID ARTERY STENOSIS: ICD-10-CM

## 2025-02-21 RX ORDER — CLOPIDOGREL BISULFATE 75 MG/1
75 TABLET ORAL DAILY
Qty: 90 TABLET | Refills: 1 | Status: SHIPPED | OUTPATIENT
Start: 2025-02-21

## 2025-02-24 ENCOUNTER — TELEPHONE (OUTPATIENT)
Age: 75
End: 2025-02-24

## 2025-02-24 NOTE — TELEPHONE ENCOUNTER
Caller: Sruthi Van    Doctor: Dr. Hall    Reason for call: Patient's wife called in stating since he stopped taking the metoprolol and started the carvedilol (COREG)  2 weeks ago his blood pressure is running a little high. It was 148/83 this morning. She just wants to make sure that is okay or what the next step is.     Call back#: [phone]

## 2025-02-25 ENCOUNTER — TELEPHONE (OUTPATIENT)
Dept: NON INVASIVE DIAGNOSTICS | Facility: HOSPITAL | Age: 75
End: 2025-02-25

## 2025-02-25 DIAGNOSIS — I25.118 CORONARY ARTERY DISEASE OF NATIVE ARTERY OF NATIVE HEART WITH STABLE ANGINA PECTORIS (HCC): ICD-10-CM

## 2025-02-25 RX ORDER — CARVEDILOL 12.5 MG/1
12.5 TABLET ORAL 2 TIMES DAILY WITH MEALS
Qty: 180 TABLET | Refills: 2 | Status: SHIPPED | OUTPATIENT
Start: 2025-02-25

## 2025-02-25 NOTE — TELEPHONE ENCOUNTER
Returned phone call to discuss high discuss blood pressure. BP has been high.  SBPS 140s-150s. HR 70s-80s.  Recently transitioned metoprolol to carvedilol 6.25mg BID.    Plan  Increase carvedilol to 12.5mg BID.  New prescription sent to pharmacy.     Josse Hall MD

## 2025-03-11 DIAGNOSIS — K29.00 ACUTE SUPERFICIAL GASTRITIS WITHOUT HEMORRHAGE: ICD-10-CM

## 2025-03-12 RX ORDER — PANTOPRAZOLE SODIUM 40 MG/1
40 TABLET, DELAYED RELEASE ORAL DAILY
Qty: 100 TABLET | Refills: 1 | Status: SHIPPED | OUTPATIENT
Start: 2025-03-12

## 2025-03-19 DIAGNOSIS — I10 ESSENTIAL HYPERTENSION: ICD-10-CM

## 2025-03-20 RX ORDER — AMLODIPINE BESYLATE 10 MG/1
10 TABLET ORAL DAILY
Qty: 100 TABLET | Refills: 1 | Status: SHIPPED | OUTPATIENT
Start: 2025-03-20

## 2025-03-25 ENCOUNTER — TELEPHONE (OUTPATIENT)
Dept: CARDIOLOGY CLINIC | Facility: CLINIC | Age: 75
End: 2025-03-25

## 2025-03-25 NOTE — TELEPHONE ENCOUNTER
CALLED PT LEFT VM DR HEADLEY IS NOT IN OFFICE 8/14 MOVED BECKY TO 8/22 @ 11AM TOLD PT TO CALL ONLY IF HE IS UNABLE TO MAKE THIS NEW DATE AND TIME

## 2025-04-02 ENCOUNTER — TELEPHONE (OUTPATIENT)
Dept: CARDIOLOGY CLINIC | Facility: CLINIC | Age: 75
End: 2025-04-02

## 2025-04-02 DIAGNOSIS — E08.22 DIABETES MELLITUS DUE TO UNDERLYING CONDITION WITH DIABETIC CHRONIC KIDNEY DISEASE, UNSPECIFIED CKD STAGE, UNSPECIFIED WHETHER LONG TERM INSULIN USE (HCC): Primary | ICD-10-CM

## 2025-04-03 RX ORDER — BLOOD SUGAR DIAGNOSTIC
STRIP MISCELLANEOUS
Qty: 50 EACH | Refills: 0 | Status: SHIPPED | OUTPATIENT
Start: 2025-04-03

## 2025-04-22 ENCOUNTER — OFFICE VISIT (OUTPATIENT)
Dept: SLEEP CENTER | Facility: CLINIC | Age: 75
End: 2025-04-22
Payer: COMMERCIAL

## 2025-04-22 VITALS
BODY MASS INDEX: 27.63 KG/M2 | WEIGHT: 204 LBS | DIASTOLIC BLOOD PRESSURE: 68 MMHG | HEART RATE: 69 BPM | SYSTOLIC BLOOD PRESSURE: 130 MMHG | HEIGHT: 72 IN | OXYGEN SATURATION: 97 %

## 2025-04-22 DIAGNOSIS — G47.33 OSA (OBSTRUCTIVE SLEEP APNEA): Primary | ICD-10-CM

## 2025-04-22 DIAGNOSIS — I10 PRIMARY HYPERTENSION: ICD-10-CM

## 2025-04-22 PROCEDURE — 99214 OFFICE O/P EST MOD 30 MIN: CPT | Performed by: INTERNAL MEDICINE

## 2025-04-22 PROCEDURE — G2211 COMPLEX E/M VISIT ADD ON: HCPCS | Performed by: INTERNAL MEDICINE

## 2025-04-22 NOTE — PROGRESS NOTES
Name: Jaciel Van      : 1950      MRN: [id number]  Encounter Provider: Manjit Chavarria MD  Encounter Date: 2025   Encounter department: [facility] [place]    :  Assessment & Plan  BRAVO (obstructive sleep apnea)  Moderate BRAVO  Sleep studies:  AHI 16.3, REM AHI 49.1    Compliance Data:   DreamStation fixed pressure of 9  More than 4 hours 96.7%  Average use of 6 hours 36 minutes  Large leak 23 seconds  Residual AHI 16      Patient is doing well  I discussed recent recall of his machine, he does not remember if he registered for the recall  I discussed the risks and benefits of continuing with CPAP therapy on current machine  His machine is from , I will order a new machine in     Plan   Ordered CPAP supplies  Follow up in 1 year  I will order new machine at next office visit  He will bring in his machine as the water reservoir has been falling out of the machine at times  Orders:    PAP DME Resupply/Reorder    Primary hypertension  Hypertension  Blood pressure 130/68  Currently on amlodipine 10 mg daily, hydrochlorothiazide 12.5 mg daily, metoprolol succinate 25 mg daily  I explained that optimal treatment of obstructive sleep apnea can improve blood pressure control         History of Present Illness       Jaciel Van is a pleasant 75-year-old male with past medical history of type 2 diabetes, hypertension who presents for follow-up of moderate obstructive sleep apnea.      He is on CPAP with a fixed pressure of 9 cm H2O.  He states he is doing well.  He notes improvement in all symptoms.   He has excellent compliance.  Patient states he is doing well overall.  He has frequent nighttime awakenings to urinate.      He has noticed that the reservoir goes out of his machine at times.    He will bring in the machine for me to check.                 Sitting and reading: Moderate chance of dozing  Watching TV: Slight chance of dozing  Sitting, inactive in a public place (e.g. a  "theatre or a meeting): Would never doze  As a passenger in a car for an hour without a break: Would never doze  Lying down to rest in the afternoon when circumstances permit: High chance of dozing  Sitting and talking to someone: Would never doze  Sitting quietly after a lunch without alcohol: Would never doze  In a car, while stopped for a few minutes in traffic: Would never doze  Total score: 6       Review of Systems   Constitutional: Negative.    HENT: Negative.     Eyes: Negative.    Respiratory: Negative.     Cardiovascular: Negative.    Gastrointestinal: Negative.    Endocrine: Negative.    Genitourinary: Negative.    Musculoskeletal: Negative.    Skin: Negative.    Allergic/Immunologic: Negative.    Neurological: Negative.    Hematological: Negative.    Psychiatric/Behavioral: Negative.       Pertinent positives/negatives included in HPI and also as noted:       Pertinent Medical History           Medical History Reviewed by provider this encounter:     .  Past Medical History   Past Medical History:   Diagnosis Date    Abnormal CAT scan     Allergic     Anxiety     Bilateral inguinal hernia 1990    recurrent    Colitis     Diabetes mellitus (HCC)     Gastritis 12/11/2012    GERD (gastroesophageal reflux disease)     Hypertension     Impaired fasting glucose 07/09/2019    Nausea & vomiting     Right knee meniscal tear 12/20/2017    Sleep apnea     uses cpap     Past Surgical History:   Procedure Laterality Date    CHOLECYSTECTOMY      COLONOSCOPY  2017    ABOUT 2-3 YEARS AGO    COLONOSCOPY  06/02/2022    EXPLORATORY LAPAROTOMY  08/16/2014    done for repair of peritoneal \"RENT\"?    HEMORRHOID SURGERY      INGUINAL HERNIA REPAIR Bilateral 08/14/2014    HI ESOPHAGOGASTRODUODENOSCOPY TRANSORAL DIAGNOSTIC N/A 02/23/2017    Procedure: EGD AND COLONOSCOPY;  Surgeon: Harrison Turcios MD;  Location: [facility];  Service: Gastroenterology    US GUIDED THYROID BIOPSY  03/15/2021     Family History   Problem Relation Age of " Onset    Breast cancer Mother     Heart disease Father     Heart disease Brother     Heart disease Family       reports that he quit smoking about 5 years ago. His smoking use included cigarettes. He started smoking about 45 years ago. He has a 10 pack-year smoking history. He has been exposed to tobacco smoke. He has never used smokeless tobacco. He reports current alcohol use. He reports that he does not use drugs.  Current Outpatient Medications   Medication Instructions    Amitiza 24 mcg, Oral, 2 times daily    amLODIPine (NORVASC) 10 mg, Oral, Daily    aspirin (ECOTRIN LOW STRENGTH) 81 mg, Daily    Blood Glucose Monitoring Suppl (ONE TOUCH ULTRA 2) w/Device KIT CHECK BLOOD SUGARS DAILY    carvedilol (COREG) 12.5 mg, Oral, 2 times daily with meals    cetirizine (ZYRTEC) 10 mg, Daily    cholecalciferol (VITAMIN D3) 400 Units, Daily    clopidogrel (PLAVIX) 75 mg, Oral, Daily    hydroCHLOROthiazide 12.5 mg, Oral, Daily    Lancets (OneTouch Delica Plus [id number]) MISC Daily    metFORMIN (GLUCOPHAGE) 500 mg, Oral, 2 times daily with meals    OneTouch Ultra Test test strip USE  STRIP TO CHECK GLUCOSE ONCE DAILY    pantoprazole (PROTONIX) 40 mg, Oral, Daily    potassium chloride (Klor-Con M20) 20 mEq tablet 1 pill daily    rosuvastatin (CRESTOR) 40 mg, Oral, Daily    sertraline (ZOLOFT) 50 mg, Oral, Daily     Allergies   Allergen Reactions    Ace Inhibitors Angioedema      Current Outpatient Medications on File Prior to Visit   Medication Sig Dispense Refill    Amitiza 24 MCG capsule TAKE 1 CAPSULE BY MOUTH TWICE DAILY 60 capsule 5    amLODIPine (NORVASC) 10 mg tablet Take 1 tablet by mouth once daily 100 tablet 1    aspirin (ECOTRIN LOW STRENGTH) 81 mg EC tablet Take 81 mg by mouth daily      Blood Glucose Monitoring Suppl (ONE TOUCH ULTRA 2) w/Device KIT CHECK BLOOD SUGARS DAILY      carvedilol (COREG) 12.5 mg tablet Take 1 tablet (12.5 mg total) by mouth 2 (two) times a day with meals 180 tablet 2    cetirizine  (ZyrTEC) 10 MG chewable tablet Chew 10 mg daily      cholecalciferol (VITAMIN D3) 400 units tablet Take 400 Units by mouth daily      clopidogrel (PLAVIX) 75 mg tablet Take 1 tablet by mouth once daily 90 tablet 1    hydroCHLOROthiazide 12.5 mg tablet Take 1 tablet by mouth once daily 90 tablet 1    Lancets (OneTouch Delica Plus [id number]) MISC daily Check blood sugar      metFORMIN (GLUCOPHAGE) 500 mg tablet TAKE 1 TABLET BY MOUTH TWICE DAILY WITH MEALS 180 tablet 1    OneTouch Ultra Test test strip USE  STRIP TO CHECK GLUCOSE ONCE DAILY 50 each 0    pantoprazole (PROTONIX) 40 mg tablet Take 1 tablet by mouth once daily 100 tablet 1    potassium chloride (Klor-Con M20) 20 mEq tablet 1 pill daily 90 tablet 0    rosuvastatin (CRESTOR) 40 MG tablet Take 1 tablet (40 mg total) by mouth daily 90 tablet 3    sertraline (ZOLOFT) 50 mg tablet Take 1 tablet (50 mg total) by mouth daily 30 tablet 5     No current facility-administered medications on file prior to visit.      Social History     Tobacco Use    Smoking status: Former     Current packs/day: 0.00     Average packs/day: 0.3 packs/day for 40.0 years (10.0 ttl pk-yrs)     Types: Cigarettes     Start date: 1979     Quit date: 2019     Years since quittin.3     Passive exposure: Current    Smokeless tobacco: Never   Vaping Use    Vaping status: Never Used   Substance and Sexual Activity    Alcohol use: Yes     Comment: special occ.    Drug use: Never    Sexual activity: Not Currently     Partners: Female     Objective   /68 (BP Location: Right arm, Patient Position: Sitting, Cuff Size: Standard)   Pulse 69   Ht 6' (1.829 m)   Wt 92.5 kg (204 lb)   SpO2 97%   BMI 27.67 kg/m²        Physical Exam  Vitals reviewed.   HENT:      Head: Normocephalic and atraumatic.      Nose: Nose normal.      Mouth/Throat:      Mouth: Mucous membranes are moist.   Eyes:      Extraocular Movements: Extraocular movements intact.      Pupils: Pupils are equal, round, and  "reactive to light.   Cardiovascular:      Rate and Rhythm: Normal rate and regular rhythm.      Pulses: Normal pulses.   Pulmonary:      Effort: Pulmonary effort is normal.      Breath sounds: Normal breath sounds.   Abdominal:      General: Abdomen is flat. Bowel sounds are normal.      Palpations: Abdomen is soft.   Musculoskeletal:         General: Normal range of motion.      Cervical back: Normal range of motion.   Skin:     General: Skin is warm.   Neurological:      Mental Status: He is alert. Mental status is at baseline.   Psychiatric:         Mood and Affect: Mood normal.       Visit Vitals  /68 (BP Location: Right arm, Patient Position: Sitting, Cuff Size: Standard)   Pulse 69   Ht 6' (1.829 m)   Wt 92.5 kg (204 lb)   SpO2 97%   BMI 27.67 kg/m²   Smoking Status Former   BSA 2.15 m²           Data  Lab Results   Component Value Date    HGB 15.2 01/15/2025    HCT 46.7 01/15/2025    MCV 85 01/15/2025      Lab Results   Component Value Date    GLUCOSE 114 05/27/2015    CALCIUM 9.8 01/15/2025     05/27/2015    K 3.9 01/15/2025    CO2 33 (H) 01/15/2025    CL 99 01/15/2025    BUN 18 01/15/2025    CREATININE 1.37 (H) 01/15/2025     No results found for: \"IRON\", \"TIBC\", \"FERRITIN\"  Lab Results   Component Value Date    AST 20 01/15/2025    ALT 17 01/15/2025             "

## 2025-04-22 NOTE — ASSESSMENT & PLAN NOTE
Moderate BRAVO  Sleep studies:  AHI 16.3, REM AHI 49.1    Compliance Data:   DreamStation fixed pressure of 9  More than 4 hours 96.7%  Average use of 6 hours 36 minutes  Large leak 23 seconds  Residual AHI 16      Patient is doing well  I discussed recent recall of his machine, he does not remember if he registered for the recall  I discussed the risks and benefits of continuing with CPAP therapy on current machine  His machine is from 2020, I will order a new machine in 2025    Plan   Ordered CPAP supplies  Follow up in 1 year  I will order new machine at next office visit  He will bring in his machine as the water reservoir has been falling out of the machine at times  Orders:    PAP DME Resupply/Reorder

## 2025-04-22 NOTE — ASSESSMENT & PLAN NOTE
Hypertension  Blood pressure 130/68  Currently on amlodipine 10 mg daily, hydrochlorothiazide 12.5 mg daily, metoprolol succinate 25 mg daily  I explained that optimal treatment of obstructive sleep apnea can improve blood pressure control

## 2025-04-23 ENCOUNTER — TELEPHONE (OUTPATIENT)
Dept: SLEEP CENTER | Facility: CLINIC | Age: 75
End: 2025-04-23

## 2025-04-24 LAB

## 2025-04-29 ENCOUNTER — OFFICE VISIT (OUTPATIENT)
Age: 75
End: 2025-04-29
Payer: COMMERCIAL

## 2025-04-29 VITALS
DIASTOLIC BLOOD PRESSURE: 70 MMHG | WEIGHT: 204.4 LBS | OXYGEN SATURATION: 98 % | TEMPERATURE: 98.7 F | SYSTOLIC BLOOD PRESSURE: 130 MMHG | HEIGHT: 72 IN | BODY MASS INDEX: 27.68 KG/M2 | HEART RATE: 70 BPM

## 2025-04-29 DIAGNOSIS — I77.810 THORACIC AORTIC ECTASIA (HCC): ICD-10-CM

## 2025-04-29 DIAGNOSIS — J43.1 PANLOBULAR EMPHYSEMA (HCC): ICD-10-CM

## 2025-04-29 DIAGNOSIS — K21.00 GASTROESOPHAGEAL REFLUX DISEASE WITH ESOPHAGITIS WITHOUT HEMORRHAGE: ICD-10-CM

## 2025-04-29 DIAGNOSIS — I25.10 CORONARY ARTERY DISEASE INVOLVING NATIVE CORONARY ARTERY OF NATIVE HEART WITHOUT ANGINA PECTORIS: ICD-10-CM

## 2025-04-29 DIAGNOSIS — K59.01 SLOW TRANSIT CONSTIPATION: ICD-10-CM

## 2025-04-29 DIAGNOSIS — E78.2 MIXED HYPERLIPIDEMIA: ICD-10-CM

## 2025-04-29 DIAGNOSIS — E08.22 DIABETES MELLITUS DUE TO UNDERLYING CONDITION WITH DIABETIC CHRONIC KIDNEY DISEASE, UNSPECIFIED CKD STAGE, UNSPECIFIED WHETHER LONG TERM INSULIN USE (HCC): Primary | ICD-10-CM

## 2025-04-29 DIAGNOSIS — E79.0 HYPERURICEMIA: ICD-10-CM

## 2025-04-29 LAB — SL AMB POCT HEMOGLOBIN AIC: 6.2 (ref ?–6.5)

## 2025-04-29 PROCEDURE — 99214 OFFICE O/P EST MOD 30 MIN: CPT | Performed by: FAMILY MEDICINE

## 2025-04-29 PROCEDURE — 83036 HEMOGLOBIN GLYCOSYLATED A1C: CPT | Performed by: FAMILY MEDICINE

## 2025-04-29 PROCEDURE — G2211 COMPLEX E/M VISIT ADD ON: HCPCS | Performed by: FAMILY MEDICINE

## 2025-04-29 NOTE — ASSESSMENT & PLAN NOTE
Stable continue with current treatment.  Orders:    CBC and differential; Future    Comprehensive metabolic panel; Future    TSH, 3rd generation with Free T4 reflex; Future    Magnesium; Future

## 2025-04-29 NOTE — ASSESSMENT & PLAN NOTE
Name: Jaciel Van      : 1950      MRN: [id number]  Encounter Provider: Fan Pope DO  Encounter Date: 2025   Encounter department: 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, unspecified CKD stage, unspecified whether long term insulin use (HCC)    Lab Results   Component Value Date    HGBA1C 6.2 2025       Orders:    POCT hemoglobin A1c    CBC and differential; Future    Comprehensive metabolic panel; Future    TSH, 3rd generation with Free T4 reflex; Future    Magnesium; Future    Thoracic aortic ectasia (HCC)         CAD    Orders:    CBC and differential; Future    Comprehensive metabolic panel; Future    TSH, 3rd generation with Free T4 reflex; Future    Magnesium; Future    Panlobular emphysema (HCC)    Orders:    CBC and differential; Future    Comprehensive metabolic panel; Future    TSH, 3rd generation with Free T4 reflex; Future    Magnesium; Future    Gastroesophageal reflux disease with esophagitis without hemorrhage    Orders:    CBC and differential; Future    Comprehensive metabolic panel; Future    TSH, 3rd generation with Free T4 reflex; Future    Magnesium; Future    Slow transit constipation    Orders:    CBC and differential; Future    Comprehensive metabolic panel; Future    TSH, 3rd generation with Free T4 reflex; Future    Magnesium; Future    Hyperuricemia  Recheck uric acid level.    Orders:    Uric acid; Future    CBC and differential; Future    Comprehensive metabolic panel; Future    TSH, 3rd generation with Free T4 reflex; Future    Magnesium; Future    Mixed hyperlipidemia    Orders:    CBC and differential; Future    Comprehensive metabolic panel; Future    TSH, 3rd generation with Free T4 reflex; Future    Magnesium; Future           History of Present Illness   HPI  Review of Systems    Objective   /70 (BP Location: Left arm, Patient Position: Sitting,  Cuff Size: Large)   Pulse 70   Temp 98.7 °F (37.1 °C) (Temporal)   Ht 6' (1.829 m)   Wt 92.7 kg (204 lb 6.4 oz)   SpO2 98%   BMI 27.72 kg/m²      Physical Exam

## 2025-04-29 NOTE — ASSESSMENT & PLAN NOTE
Continue with current treatment.  Orders:    CBC and differential; Future    Comprehensive metabolic panel; Future    TSH, 3rd generation with Free T4 reflex; Future    Magnesium; Future

## 2025-04-29 NOTE — ASSESSMENT & PLAN NOTE
Stable at this time.  Continue with pantoprazole.  Refills will be given when needed.  Orders:    CBC and differential; Future    Comprehensive metabolic panel; Future    TSH, 3rd generation with Free T4 reflex; Future    Magnesium; Future

## 2025-04-29 NOTE — ASSESSMENT & PLAN NOTE
Lab Results   Component Value Date    HGBA1C 6.2 04/29/2025     Stable continue with current treatment.  Refills will be given when needed.  Check laboratory studies.  Orders:    POCT hemoglobin A1c    CBC and differential; Future    Comprehensive metabolic panel; Future    TSH, 3rd generation with Free T4 reflex; Future    Magnesium; Future

## 2025-04-29 NOTE — PROGRESS NOTES
Name: Jaciel Van      : 1950      MRN: [id number]  Encounter Provider: Fan Pope DO  Encounter Date: 2025   Encounter department: 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  Refills will be given when needed.  Check laboratory studies.  Orders:    POCT hemoglobin A1c    CBC and differential; Future    Comprehensive metabolic panel; Future    TSH, 3rd generation with Free T4 reflex; Future    Magnesium; Future    Thoracic aortic ectasia (HCC)  Stable at this time follow-up with thoracic surgeon appropriately.       CAD  Stable continue with current treatment.  Refills will be given when needed.  Check labs  Orders:    CBC and differential; Future    Comprehensive metabolic panel; Future    TSH, 3rd generation with Free T4 reflex; Future    Magnesium; Future    Panlobular emphysema (HCC)  Stable continue with current treatment.  Orders:    CBC and differential; Future    Comprehensive metabolic panel; Future    TSH, 3rd generation with Free T4 reflex; Future    Magnesium; Future    Gastroesophageal reflux disease with esophagitis without hemorrhage  Stable at this time.  Continue with pantoprazole.  Refills will be given when needed.  Orders:    CBC and differential; Future    Comprehensive metabolic panel; Future    TSH, 3rd generation with Free T4 reflex; Future    Magnesium; Future    Slow transit constipation  Name: Jaciel Van      : 1950      MRN: [id number]  Encounter Provider: Fan Pope DO  Encounter Date: 2025   Encounter department: 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, unspecified CKD stage,  unspecified whether long term insulin use (HCC)    Lab Results   Component Value Date    HGBA1C 6.2 04/29/2025       Orders:    POCT hemoglobin A1c    CBC and differential; Future    Comprehensive metabolic panel; Future    TSH, 3rd generation with Free T4 reflex; Future    Magnesium; Future    Thoracic aortic ectasia (HCC)         CAD    Orders:    CBC and differential; Future    Comprehensive metabolic panel; Future    TSH, 3rd generation with Free T4 reflex; Future    Magnesium; Future    Panlobular emphysema (HCC)    Orders:    CBC and differential; Future    Comprehensive metabolic panel; Future    TSH, 3rd generation with Free T4 reflex; Future    Magnesium; Future    Gastroesophageal reflux disease with esophagitis without hemorrhage    Orders:    CBC and differential; Future    Comprehensive metabolic panel; Future    TSH, 3rd generation with Free T4 reflex; Future    Magnesium; Future    Slow transit constipation    Orders:    CBC and differential; Future    Comprehensive metabolic panel; Future    TSH, 3rd generation with Free T4 reflex; Future    Magnesium; Future    Hyperuricemia  Recheck uric acid level.    Orders:    Uric acid; Future    CBC and differential; Future    Comprehensive metabolic panel; Future    TSH, 3rd generation with Free T4 reflex; Future    Magnesium; Future    Mixed hyperlipidemia    Orders:    CBC and differential; Future    Comprehensive metabolic panel; Future    TSH, 3rd generation with Free T4 reflex; Future    Magnesium; Future           History of Present Illness   HPI  Review of Systems    Objective   /70 (BP Location: Left arm, Patient Position: Sitting, Cuff Size: Large)   Pulse 70   Temp 98.7 °F (37.1 °C) (Temporal)   Ht 6' (1.829 m)   Wt 92.7 kg (204 lb 6.4 oz)   SpO2 98%   BMI 27.72 kg/m²      Physical Exam      Hyperuricemia  Recheck uric acid level.    Orders:    Uric acid; Future    CBC and differential; Future    Comprehensive metabolic panel; Future     TSH, 3rd generation with Free T4 reflex; Future    Magnesium; Future    Mixed hyperlipidemia  Continue with current treatment.  Orders:    CBC and differential; Future    Comprehensive metabolic panel; Future    TSH, 3rd generation with Free T4 reflex; Future    Magnesium; Future           History of Present Illness   Patient is here for follow-up on hypertension hyperlipidemia diabetes.  Patient with some swelling of the feet as well as the wrists and hand.  Patient has had elevated uric acid levels in the past.  Patient with some constipation which persist.  Patient with constipation with Amitiza 1 pill daily but diarrhea with 2 pills daily.  No chest pain.  Breathing unchanged.    Diabetes    Constipation      Review of Systems   Constitutional: Negative.    HENT: Negative.     Eyes: Negative.    Respiratory: Negative.     Cardiovascular:  Positive for leg swelling.   Gastrointestinal:  Positive for constipation.   Endocrine: Negative.    Genitourinary: Negative.    Musculoskeletal:  Positive for arthralgias, gait problem and joint swelling.   Skin: Negative.    Allergic/Immunologic: Negative.    Hematological: Negative.    Psychiatric/Behavioral: Negative.         Objective   /70 (BP Location: Left arm, Patient Position: Sitting, Cuff Size: Large)   Pulse 70   Temp 98.7 °F (37.1 °C) (Temporal)   Ht 6' (1.829 m)   Wt 92.7 kg (204 lb 6.4 oz)   SpO2 98%   BMI 27.72 kg/m²      Physical Exam  Vitals and nursing note reviewed.   Constitutional:       General: He is not in acute distress.     Appearance: Normal appearance. He is well-developed. He is not ill-appearing, toxic-appearing or diaphoretic.   HENT:      Head: Normocephalic and atraumatic.      Right Ear: Tympanic membrane, ear canal and external ear normal.      Left Ear: Tympanic membrane, ear canal and external ear normal.      Nose: Nose normal.      Mouth/Throat:      Pharynx: No oropharyngeal exudate.   Eyes:      General:         Right eye:  No discharge.         Left eye: No discharge.      Conjunctiva/sclera: Conjunctivae normal.      Pupils: Pupils are equal, round, and reactive to light.   Neck:      Thyroid: No thyromegaly.      Vascular: No carotid bruit.      Trachea: No tracheal deviation.   Cardiovascular:      Rate and Rhythm: Normal rate and regular rhythm.      Heart sounds: Normal heart sounds. No murmur heard.     No gallop.   Pulmonary:      Effort: Pulmonary effort is normal. No respiratory distress.      Breath sounds: Normal breath sounds. No stridor. No wheezing or rales.   Chest:      Chest wall: No tenderness.   Musculoskeletal:         General: Tenderness and deformity present.      Cervical back: Normal range of motion and neck supple.      Right lower leg: Edema present.      Left lower leg: Edema present.   Lymphadenopathy:      Cervical: No cervical adenopathy.   Skin:     General: Skin is warm and dry.      Coloration: Skin is not pale.      Findings: No erythema or rash.   Neurological:      Mental Status: He is alert and oriented to person, place, and time. Mental status is at baseline.      Cranial Nerves: No cranial nerve deficit.      Motor: No abnormal muscle tone.      Coordination: Coordination normal.      Deep Tendon Reflexes: Reflexes normal.   Psychiatric:         Mood and Affect: Mood normal.         Behavior: Behavior normal.         Thought Content: Thought content normal.         Judgment: Judgment normal.

## 2025-04-29 NOTE — ASSESSMENT & PLAN NOTE
Stable continue with current treatment.  Refills will be given when needed.  Check labs  Orders:    CBC and differential; Future    Comprehensive metabolic panel; Future    TSH, 3rd generation with Free T4 reflex; Future    Magnesium; Future

## 2025-04-30 ENCOUNTER — APPOINTMENT (OUTPATIENT)
Age: 75
End: 2025-04-30
Payer: COMMERCIAL

## 2025-04-30 DIAGNOSIS — I25.10 CORONARY ARTERY DISEASE INVOLVING NATIVE CORONARY ARTERY OF NATIVE HEART WITHOUT ANGINA PECTORIS: ICD-10-CM

## 2025-04-30 DIAGNOSIS — E79.0 HYPERURICEMIA: ICD-10-CM

## 2025-04-30 DIAGNOSIS — J43.1 PANLOBULAR EMPHYSEMA (HCC): ICD-10-CM

## 2025-04-30 DIAGNOSIS — E78.2 MIXED HYPERLIPIDEMIA: ICD-10-CM

## 2025-04-30 DIAGNOSIS — K21.00 GASTROESOPHAGEAL REFLUX DISEASE WITH ESOPHAGITIS WITHOUT HEMORRHAGE: ICD-10-CM

## 2025-04-30 DIAGNOSIS — K59.01 SLOW TRANSIT CONSTIPATION: ICD-10-CM

## 2025-04-30 DIAGNOSIS — E08.22 DIABETES MELLITUS DUE TO UNDERLYING CONDITION WITH DIABETIC CHRONIC KIDNEY DISEASE, UNSPECIFIED CKD STAGE, UNSPECIFIED WHETHER LONG TERM INSULIN USE (HCC): ICD-10-CM

## 2025-04-30 LAB
ALBUMIN SERPL BCG-MCNC: 4.4 G/DL (ref 3.5–5)
ALP SERPL-CCNC: 64 U/L (ref 34–104)
ALT SERPL W P-5'-P-CCNC: 15 U/L (ref 7–52)
ANION GAP SERPL CALCULATED.3IONS-SCNC: 10 MMOL/L (ref 4–13)
AST SERPL W P-5'-P-CCNC: 14 U/L (ref 13–39)
BASOPHILS # BLD AUTO: 0.06 THOUSANDS/ÂΜL (ref 0–0.1)
BASOPHILS NFR BLD AUTO: 1 % (ref 0–1)
BILIRUB SERPL-MCNC: 0.74 MG/DL (ref 0.2–1)
BUN SERPL-MCNC: 21 MG/DL (ref 5–25)
CALCIUM SERPL-MCNC: 9.5 MG/DL (ref 8.4–10.2)
CHLORIDE SERPL-SCNC: 103 MMOL/L (ref 96–108)
CO2 SERPL-SCNC: 30 MMOL/L (ref 21–32)
CREAT SERPL-MCNC: 1.59 MG/DL (ref 0.6–1.3)
EOSINOPHIL # BLD AUTO: 0.31 THOUSAND/ÂΜL (ref 0–0.61)
EOSINOPHIL NFR BLD AUTO: 7 % (ref 0–6)
ERYTHROCYTE [DISTWIDTH] IN BLOOD BY AUTOMATED COUNT: 13.3 % (ref 11.6–15.1)
GFR SERPL CREATININE-BSD FRML MDRD: 42 ML/MIN/1.73SQ M
GLUCOSE P FAST SERPL-MCNC: 135 MG/DL (ref 65–99)
HCT VFR BLD AUTO: 43.2 % (ref 36.5–49.3)
HGB BLD-MCNC: 13.9 G/DL (ref 12–17)
IMM GRANULOCYTES # BLD AUTO: 0.01 THOUSAND/UL (ref 0–0.2)
IMM GRANULOCYTES NFR BLD AUTO: 0 % (ref 0–2)
LYMPHOCYTES # BLD AUTO: 1.08 THOUSANDS/ÂΜL (ref 0.6–4.47)
LYMPHOCYTES NFR BLD AUTO: 24 % (ref 14–44)
MAGNESIUM SERPL-MCNC: 2.3 MG/DL (ref 1.9–2.7)
MCH RBC QN AUTO: 27.8 PG (ref 26.8–34.3)
MCHC RBC AUTO-ENTMCNC: 32.2 G/DL (ref 31.4–37.4)
MCV RBC AUTO: 86 FL (ref 82–98)
MONOCYTES # BLD AUTO: 0.43 THOUSAND/ÂΜL (ref 0.17–1.22)
MONOCYTES NFR BLD AUTO: 10 % (ref 4–12)
NEUTROPHILS # BLD AUTO: 2.55 THOUSANDS/ÂΜL (ref 1.85–7.62)
NEUTS SEG NFR BLD AUTO: 58 % (ref 43–75)
NRBC BLD AUTO-RTO: 0 /100 WBCS
PLATELET # BLD AUTO: 158 THOUSANDS/UL (ref 149–390)
PMV BLD AUTO: 10.8 FL (ref 8.9–12.7)
POTASSIUM SERPL-SCNC: 3.6 MMOL/L (ref 3.5–5.3)
PROT SERPL-MCNC: 6.4 G/DL (ref 6.4–8.4)
RBC # BLD AUTO: 5 MILLION/UL (ref 3.88–5.62)
SODIUM SERPL-SCNC: 143 MMOL/L (ref 135–147)
TSH SERPL DL<=0.05 MIU/L-ACNC: 1.28 UIU/ML (ref 0.45–4.5)
URATE SERPL-MCNC: 6.7 MG/DL (ref 3.5–8.5)
WBC # BLD AUTO: 4.44 THOUSAND/UL (ref 4.31–10.16)

## 2025-04-30 PROCEDURE — 83735 ASSAY OF MAGNESIUM: CPT

## 2025-04-30 PROCEDURE — 84550 ASSAY OF BLOOD/URIC ACID: CPT

## 2025-04-30 PROCEDURE — 80053 COMPREHEN METABOLIC PANEL: CPT

## 2025-04-30 PROCEDURE — 85025 COMPLETE CBC W/AUTO DIFF WBC: CPT

## 2025-04-30 PROCEDURE — 84443 ASSAY THYROID STIM HORMONE: CPT

## 2025-04-30 PROCEDURE — 36415 COLL VENOUS BLD VENIPUNCTURE: CPT

## 2025-05-01 ENCOUNTER — RESULTS FOLLOW-UP (OUTPATIENT)
Age: 75
End: 2025-05-01

## 2025-05-01 DIAGNOSIS — I65.21 STENOSIS OF RIGHT CAROTID ARTERY: ICD-10-CM

## 2025-05-01 DIAGNOSIS — E08.22 DIABETES MELLITUS DUE TO UNDERLYING CONDITION WITH DIABETIC CHRONIC KIDNEY DISEASE, UNSPECIFIED CKD STAGE, UNSPECIFIED WHETHER LONG TERM INSULIN USE (HCC): ICD-10-CM

## 2025-05-01 DIAGNOSIS — E04.1 THYROID NODULE: ICD-10-CM

## 2025-05-01 DIAGNOSIS — M10.9 GOUT, UNSPECIFIED CAUSE, UNSPECIFIED CHRONICITY, UNSPECIFIED SITE: Primary | ICD-10-CM

## 2025-05-01 DIAGNOSIS — I10 PRIMARY HYPERTENSION: ICD-10-CM

## 2025-05-01 NOTE — RESULT ENCOUNTER NOTE
L/m for patient to contact office for test results. Please find out what pharmacy he would like RX sent to.

## 2025-05-05 RX ORDER — ALLOPURINOL 100 MG/1
100 TABLET ORAL DAILY
Qty: 90 TABLET | Refills: 1 | Status: SHIPPED | OUTPATIENT
Start: 2025-05-05

## 2025-05-05 NOTE — TELEPHONE ENCOUNTER
Patients wifeSruthi returned call re: results.  Is listed in  communication consent.     A.  Relayed results to (patient/patient representative as listed on communication consent form) as per provider message. Patient/Patient Representative expressed understanding and had the following question(s):     Patients wife will  medication from the pharmacy.  allopurinol (ZYLOPRIM) 100 mg tablet     Is Primary Care Provider (GT) placing the lab order now for a 3 mos recheck, or does she need to call in 3 months to request lab orders?   Orders not visible at this time.   Please advise.  Call back# [phone]

## 2025-05-08 DIAGNOSIS — E87.6 HYPOKALEMIA: ICD-10-CM

## 2025-05-09 RX ORDER — POTASSIUM CHLORIDE 1500 MG/1
TABLET, EXTENDED RELEASE ORAL DAILY
Qty: 90 TABLET | Refills: 1 | Status: SHIPPED | OUTPATIENT
Start: 2025-05-09

## 2025-05-12 DIAGNOSIS — E08.43 DIABETES MELLITUS DUE TO UNDERLYING CONDITION WITH DIABETIC AUTONOMIC NEUROPATHY, WITHOUT LONG-TERM CURRENT USE OF INSULIN (HCC): ICD-10-CM

## 2025-05-20 DIAGNOSIS — E08.22 DIABETES MELLITUS DUE TO UNDERLYING CONDITION WITH DIABETIC CHRONIC KIDNEY DISEASE, UNSPECIFIED CKD STAGE, UNSPECIFIED WHETHER LONG TERM INSULIN USE (HCC): ICD-10-CM

## 2025-05-21 RX ORDER — BLOOD SUGAR DIAGNOSTIC
STRIP MISCELLANEOUS
Qty: 50 EACH | Refills: 5 | Status: SHIPPED | OUTPATIENT
Start: 2025-05-21

## 2025-07-01 NOTE — TELEPHONE ENCOUNTER
Patients wife called in stating that her  was seen in January 2020 and she would like refills of his medications called in just incase we get quarantined to our homes  Please review and sign 
Prescription approved
No

## 2025-07-21 ENCOUNTER — OFFICE VISIT (OUTPATIENT)
Dept: CARDIOLOGY CLINIC | Facility: CLINIC | Age: 75
End: 2025-07-21
Payer: COMMERCIAL

## 2025-07-21 VITALS
BODY MASS INDEX: 27.15 KG/M2 | WEIGHT: 200.2 LBS | HEART RATE: 70 BPM | DIASTOLIC BLOOD PRESSURE: 70 MMHG | SYSTOLIC BLOOD PRESSURE: 126 MMHG

## 2025-07-21 DIAGNOSIS — J43.1 PANLOBULAR EMPHYSEMA (HCC): ICD-10-CM

## 2025-07-21 DIAGNOSIS — I71.21 ANEURYSM OF ASCENDING AORTA WITHOUT RUPTURE (HCC): ICD-10-CM

## 2025-07-21 DIAGNOSIS — E78.2 MIXED HYPERLIPIDEMIA: ICD-10-CM

## 2025-07-21 DIAGNOSIS — I10 PRIMARY HYPERTENSION: Primary | ICD-10-CM

## 2025-07-21 DIAGNOSIS — E08.22 DIABETES MELLITUS DUE TO UNDERLYING CONDITION WITH DIABETIC CHRONIC KIDNEY DISEASE, UNSPECIFIED CKD STAGE, UNSPECIFIED WHETHER LONG TERM INSULIN USE (HCC): ICD-10-CM

## 2025-07-21 DIAGNOSIS — Z87.891 PERSONAL HISTORY OF NICOTINE DEPENDENCE: ICD-10-CM

## 2025-07-21 DIAGNOSIS — I77.810 THORACIC AORTIC ECTASIA (HCC): ICD-10-CM

## 2025-07-21 DIAGNOSIS — Z72.0 TOBACCO ABUSE: ICD-10-CM

## 2025-07-21 PROCEDURE — 99214 OFFICE O/P EST MOD 30 MIN: CPT

## 2025-07-21 PROCEDURE — 93000 ELECTROCARDIOGRAM COMPLETE: CPT

## 2025-07-21 NOTE — PROGRESS NOTES
Cardiology   MD Shahid Chase MD, FACC  Jair Peace DO, [facility] FACP, FASNC Ather Mansoor, MD Rujul Patel, DO, [facility]  Ta Collazo DO, [facility] FASNC  -------------------------------------------------------------------  [facility]  [address] [place] [address]  Phone: [phone]  Fax: [phone]  07/21/25  Jaciel Van  YOB: 1950   MRN: [id number]      Referring Physician: No referring provider defined for this encounter.     HPI: Jaciel Van is a 75 y.o. 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  He states he is doing well, since he started carvedilol his blood pressure has been under much better control.  Notes no angina symptoms at this time.  ECG is normal today    Review of Systems   Constitutional:  Negative for chills and fever.   HENT:  Negative for facial swelling and sore throat.    Eyes:  Negative for visual disturbance.   Respiratory:  Negative for cough, chest tightness, shortness of breath and wheezing.    Cardiovascular:  Negative for chest pain, palpitations and leg swelling.   Gastrointestinal:  Negative for abdominal pain, blood in stool, constipation, diarrhea, nausea and vomiting.   Endocrine: Negative for cold intolerance and heat intolerance.   Genitourinary:  Negative for decreased urine volume, difficulty urinating, dysuria and hematuria.   Musculoskeletal:  Negative for arthralgias, back pain and myalgias.   Skin:  Negative for rash.   Neurological:  Negative for dizziness, syncope, weakness and numbness.   Psychiatric/Behavioral:  Negative for agitation, behavioral problems and confusion. The patient is not nervous/anxious.        OBJECTIVE  Vitals:    07/21/25 1600   BP: 126/70   Pulse: 70       Physical  Exam  Constitutional: awake, alert and oriented, in no acute distress, no obvious deformities  Head: Normocephalic, without obvious abnormality, atraumatic  Eyes: conjunctivae clear and moist. Sclera anicteric.  No xanthelasmas. Pupils equal bilaterally.  Extraocular motions are full.  Ear nose mouth and throat: ears are symmetrical bilaterally, hearing appears to be equal bilaterally, no nasal discharge or epistaxis, oropharynx is clear with moist mucous membranes  Neck:  Trachea is midline, neck is supple, no thyromegaly or significant lymphadenopathy, there is full range of motion.  Lungs: clear to auscultation bilaterally, no wheezes, no rales, no rhonchi, no accessory muscle use, breathing is nonlabored  Heart: Regular rhythm with a Normal heart rate, S1, S2 normal, No Murmur, no click, rub or gallop, No lower extremity edema  Abdomen: soft, non-tender; bowel sounds normal; no masses,  no organomegaly  Psychiatric:  Patient is oriented to time, place, person, mood/affect is negative for depression, anxiety, agitation, appears to have appropriate insight  Skin: Skin is warm, dry, intact. No obvious rashes or lesions on exposed extremities.  Nail beds are pink with no cyanosis or clubbing.    EKG:  Results for orders placed or performed in visit on 07/21/25   POCT ECG    Impression    Normal Sinus Rhythm, Normal EKG.          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  Does note some fatigue, this could be due to the carvedilol, however he is not willing to want to change his medications right now seeing as his blood pressure is under such good control  Continue with aspirin Plavix rosuvastatin for  CAD and PAD with carotid artery stenosis which has been stable as well  No angina symptoms, continue aspirin Plavix statin as above  Okay for follow-up 1 year    Ta Collazo DO, [facility]  --------------------------------------------------------------------------------  TREADMILL STRESS  No results found for this or any previous visit.     ----------------------------------------------------------------------------------------------  NUCLEAR STRESS TEST: Results for orders placed during the hospital encounter of 06/01/23    NM myocardial perfusion spect (stress and/or rest)    Interpretation Summary    Resting ECG: ECG is abnormal. The ECG shows normal sinus rhythm. Resting ECG shows no ST-segment deviation. There are nonspecific T changes in lead 3. The ECG shows rare premature atrial contractions.    A Goldy protocol stress test was performed. In addition to exercise, the patient was administered regadenoson. The patient exercised for 3 min and 55 sec and had a maximal HR of 104 bpm (70 % of MPHR) and 6.8 METS. Pt was changed to pharmacologic study due to leg fatigue, blunted heart rate responce and fatigue. The patient experienced chest pressure during stress test.    Stress ECG: No ST deviation is noted. Arrhythmias during stress: occasional PACs, rare PVCs. Arrhythmias during recovery: rare PVCs. The ECG was not diagnostic due to pharmacological (vasodilator) stress.    Perfusion: There is a left ventricular perfusion defect that is medium in size with moderate reduction in uptake present in the basal to mid inferior location(s) that is predominantly fixed. There may be a small degree of diaphragmatic attenuation.    Stress Function: Left ventricular function post-stress is normal. Stress ejection fraction is 49 %. There is a defect in the mid to apical inferior location(s). The defect has mildly reduced function.    Stress Combined Conclusion: Left ventricular perfusion is abnormal. Findings are  suggestive of a possible basal inferior infarction. No ischemia identified.    Impression:  Patient was switched from exercise to pharmacologic stress test due to fatigue and leg pain.  Findings consistent with medium size infarction in basal-mid inferior walls.  No evidence of inducible ischemia.    No results found for this or any previous visit.      --------------------------------------------------------------------------------  CATH:  No results found for this or any previous visit.    --------------------------------------------------------------------------------  ECHO:   Results for orders placed during the hospital encounter of 21    Echo complete [place] [address]  [phone]    Transthoracic Echocardiogram  2D, M-mode, Doppler, and Color Doppler    Study date:  2021    Patient: CARLENE RENNER  MR number: [id number]  Account number: [id number]  : 1950  Age: 71 years  Gender: Male  Status: Outpatient  Location: [facility]  Height: 72 in  Weight: 218.5 lb  BP: 154/ 82 mmHg    Indications: ascending aortic aneurysm.    Diagnoses: I71.2 - Thoracic aortic aneurysm, without rupture    Sonographer:  Liu Corrales RDCS  Primary Physician:  Alfie Pope DO  Referring Physician:  MELVIN Haas  Group:  [facility]  Interpreting Physician:  Curtis Maier MD    IMPRESSIONS:  Technical quality: Good  Cardiac rhythm: Sinus    1. Mildly increased left ventricular wall thickness, normal left ventricular systolic function, grade 1 diastolic dysfunction, EF around 55%.  2. Normal right ventricular size and systolic function.  3. Mild right atrial cavity enlargement.  4. Aortic valve sclerosis, no aortic valve stenosis or regurgitation.  5. Mild mitral annular calcification and leaflet sclerosis, mild , 1+ mitral valve regurgitation.  6. Mild, 2+ tricuspid valve  regurgitation, trace pulmonic valve regurgitation.  7. No obvious pulmonary hypertension.  8. No pericardial effusion.  9. Mildly ascending thoracic aortic aneurysm ascending aorta measuring up to 4.1 cm.    No previous echocardiogram is available for comparison.    SUMMARY    LEFT VENTRICLE:  Normal left ventricular cavity size, mildly increased wall thickness, normal left ventricular systolic function, there is no obvious regional wall motion abnormality. Ejection fraction is estimated as around 55%. Early grade 1 diastolic  dysfunction, impaired LV relaxation. Estimated left ventricular filling pressure is normal.    RIGHT VENTRICLE:  Normal right ventricular size and systolic function. Normal estimated right ventricular systolic pressure, 33 mmHg.    LEFT ATRIUM:  Normal left atrial cavity size. Intact interatrial septum.    RIGHT ATRIUM:  Mild right atrial cavity enlargement.    MITRAL VALVE:  Mild mitral annular calcification and leaflet sclerosis, adequate leaflet mobility. Mild mitral valve regurgitation.    AORTIC VALVE:  Tricuspid aortic valve with mild sclerosis, adequate cuspal separation. No aortic valve stenosis or regurgitation.    TRICUSPID VALVE:  Mild, 2+ eccentric mitral valve regurgitation.    PULMONIC VALVE:  Trace pulmonic valve regurgitation.    AORTA:  Mildly dilated aortic root and proximal ascending aorta measuring up to 4.1 cm. Mild thoracic aortic aneurysm. Mild fibrocalcific changes noted in aortic root.    IVC, HEPATIC VEINS:  Inferior vena cava is normal in size and demonstrates appropriate respiratory phasic changes in diameter.    PERICARDIUM:  No pericardial effusion.    SUMMARY MEASUREMENTS  CW measurements:  Unspecified Anatomy:   TR Vmax was 2.4 m/s.  TR maxPG was 23.3 mmHg.  MM measurements:  Unspecified Anatomy:   TAPSE was 2.3 cm.  PW measurements:  Unspecified Anatomy:   E' Avg was 0.1 m/s.  E' Lat was 0.1 m/s.  E' Sept was 0.1 m/s.  E/E' Avg was 7.8 .  E/E' Lat was 7.9 .   E/E' Sept was 7.8 .  MV A Darrell was 0.9 m/s.  MV Dec [place] was 2 m/s2.  MV DecT was 292.7 ms.  MV E Darrell was 0.6  m/s.  MV E/A Ratio was 0.7 .  MV PHT was 84.9 ms.  MVA By PHT was 2.6 cm2.  TV E' lat was 0.1 m/s.    HISTORY: PRIOR HISTORY: obstructive sleep apnea, hypertension, GERD, COPD, current smoker, diabetes mellitus type 2.    PROCEDURE: The study was performed in the [facility] Echo 3. This was a routine study. The transthoracic approach was used. The study included complete 2D imaging, M-mode, complete spectral Doppler, and color Doppler. The heart rate was 70 bpm, at  the start of the study. Image quality was adequate.    LEFT VENTRICLE: Normal left ventricular cavity size, mildly increased wall thickness, normal left ventricular systolic function, there is no obvious regional wall motion abnormality. Ejection fraction is estimated as around 55%. Early  grade 1 diastolic dysfunction, impaired LV relaxation. Estimated left ventricular filling pressure is normal.    RIGHT VENTRICLE: Normal right ventricular size and systolic function. Normal estimated right ventricular systolic pressure, 33 mmHg.    LEFT ATRIUM: Normal left atrial cavity size. Intact interatrial septum.    RIGHT ATRIUM: Mild right atrial cavity enlargement.    MITRAL VALVE: Mild mitral annular calcification and leaflet sclerosis, adequate leaflet mobility. Mild mitral valve regurgitation.    AORTIC VALVE: Tricuspid aortic valve with mild sclerosis, adequate cuspal separation. No aortic valve stenosis or regurgitation.    TRICUSPID VALVE: Mild, 2+ eccentric mitral valve regurgitation.    PULMONIC VALVE: Trace pulmonic valve regurgitation.    PERICARDIUM: No pericardial effusion.    AORTA: Mildly dilated aortic root and proximal ascending aorta measuring up to 4.1 cm. Mild thoracic aortic aneurysm. Mild fibrocalcific changes noted in aortic root.    SYSTEMIC VEINS: IVC: Inferior vena cava is normal in size and demonstrates appropriate respiratory phasic  changes in diameter.    SYSTEM MEASUREMENT TABLES    2D  Ao Diam: 3.7 cm  LA Diam: 3.5 cm  LAAs A2C: 24.5 cm2  LAAs A4C: 22.8 cm2  LAESV A-L A2C: 71.8 ml  LAESV A-L A4C: 75.8 ml  LAESV Index (A-L): 36.9 ml/m2  LAESV MOD A2C: 68.9 ml  LAESV MOD A4C: 71.5 ml  LAESV(A-L): 81.5 ml  LAESV(MOD BP): 77.2 ml  LAESVInd MOD BP: 34.9 ml/m2  LALs A2C: 7.1 cm  LALs A4C: 5.8 cm  LVEDV MOD A4C: 95 ml  LVEF MOD A4C: 52.9 %  LVESV MOD A4C: 44.8 ml  LVLd A4C: 8.1 cm  LVLs A4C: 6.8 cm  RA Major: 4.6 cm  RA Minor: 4.6 cm  RAAs A4C: 25.9 cm2  RAESV A-L: 95.5 ml  RAESV MOD: 86 ml  RALs: 6 cm  RVIDd: 3.5 cm  SV MOD A4C: 50.3 ml    CW  TR Vmax: 2.4 m/s  TR maxP.3 mmHg    MM  TAPSE: 2.3 cm    PW  E' Av.1 m/s  E' Lat: 0.1 m/s  E' Sept: 0.1 m/s  E/E' Av.8  E/E' Lat: 7.9  E/E' Sept: 7.8  MV A Darrell: 0.9 m/s  MV Dec [place]: 2 m/s2  MV DecT: 292.7 ms  MV E Darrell: 0.6 m/s  MV E/A Ratio: 0.7  MV PHT: 84.9 ms  MVA By PHT: 2.6 cm2  TV E' lat: 0.1 m/s    In[employer]oci[employer] Commission Accredited Echocardiography Laboratory    Prepared and electronically signed by    Curtis Maier MD  Signed 2021 16:39:38    No results found for this or any previous visit.    --------------------------------------------------------------------------------  HOLTER  No results found for this or any previous visit.    No results found for this or any previous visit.    --------------------------------------------------------------------------------  CAROTIDS  No results found for this or any previous visit.     --------------------------------------------------------------------------------  Diagnoses and all orders for this visit:    Primary hypertension  -     POCT ECG    Aneurysm of ascending aorta without rupture (HCC)    Thoracic aortic ectasia (HCC)  -     CT Lung Screening Program; Future    Panlobular emphysema (HCC)  -     CT Lung Screening Program; Future    Diabetes mellitus due to underlying condition with diabetic chronic kidney disease,  unspecified CKD stage, unspecified whether long term insulin use (HCC)    Mixed hyperlipidemia    Tobacco abuse  -     CT Lung Screening Program; Future    Personal history of nicotine dependence  -     CT Lung Screening Program; Future       ======================================================    Past Medical History[1]  Past Surgical History[2]      Medications  Current Medications[3]     Allergies[4]    Social History     Socioeconomic History    Marital status: /Civil Union     Spouse name: Not on file    Number of children: Not on file    Years of education: Not on file    Highest education level: Not on file   Occupational History    Occupation: retired   Tobacco Use    Smoking status: Former     Current packs/day: 0.00     Average packs/day: 0.3 packs/day for 40.0 years (10.0 ttl pk-yrs)     Types: Cigarettes     Start date: 1979     Quit date: 2019     Years since quittin.6     Passive exposure: Current    Smokeless tobacco: Never   Vaping Use    Vaping status: Never Used   Substance and Sexual Activity    Alcohol use: Yes     Comment: special occ.    Drug use: Never    Sexual activity: Not Currently     Partners: Female   Other Topics Concern    Not on file   Social History Narrative    Not on file     Social Drivers of Health     Financial Resource Strain: Low Risk  (2023)    Overall Financial Resource Strain (CARDIA)     Difficulty of Paying Living Expenses: Not hard at all   Food Insecurity: No Food Insecurity (2024)    Nursing - Inadequate Food Risk Classification     Worried About Running Out of Food in the Last Year: Never true     Ran Out of Food in the Last Year: Never true     Ran Out of Food in the Last Year: Not on file   Transportation Needs: No Transportation Needs (2024)    PRAPARE - Transportation     Lack of Transportation (Medical): No     Lack of Transportation (Non-Medical): No   Physical Activity: Not on file   Stress: Not on file   Social Connections:  "Not on file   Intimate Partner Violence: Not on file   Housing Stability: Low Risk  (7/29/2024)    Housing Stability Vital Sign     Unable to Pay for Housing in the Last Year: No     Number of Times Moved in the Last Year: 0     Homeless in the Last Year: No        Family History[5]    Lab Results   Component Value Date    WBC 4.44 04/30/2025    HGB 13.9 04/30/2025    HCT 43.2 04/30/2025    MCV 86 04/30/2025     04/30/2025      Lab Results   Component Value Date    SODIUM 143 04/30/2025    K 3.6 04/30/2025     04/30/2025    CO2 30 04/30/2025    BUN 21 04/30/2025    CREATININE 1.59 (H) 04/30/2025    GLUC 108 06/08/2024    CALCIUM 9.5 04/30/2025      Lab Results   Component Value Date    HGBA1C 6.2 04/29/2025      Lab Results   Component Value Date    CHOL 181 05/27/2015    CHOL 197 02/06/2014     Lab Results   Component Value Date    HDL 33 (L) 01/15/2025    HDL 33 (L) 06/07/2024    HDL 37 (L) 05/13/2024     Lab Results   Component Value Date    LDLCALC 49 01/15/2025    LDLCALC 48 06/07/2024    LDLCALC 50 05/13/2024     Lab Results   Component Value Date    TRIG 194 (H) 01/15/2025    TRIG 142 06/07/2024    TRIG 151 (H) 05/13/2024     No results found for: \"CHOLHDL\"   Lab Results   Component Value Date    INR 1.03 06/06/2024    INR 1.04 08/17/2014    INR 0.93 08/16/2014    PROTIME 13.8 06/06/2024    PROTIME 13.8 08/17/2014    PROTIME 12.7 08/16/2014          Patient Active Problem List    Diagnosis Date Noted    Hyperuricemia 04/29/2025    Diabetes mellitus due to underlying condition with diabetic chronic kidney disease, unspecified CKD stage, unspecified whether long term insulin use (HCC) 01/29/2025    Carotid artery stenosis 06/07/2024    Ascending aortic aneurysm (HCC) 06/07/2024    Vertigo 06/06/2024    Cellulitis of face 01/30/2024    Primary insomnia 01/30/2024    BRAVO (obstructive sleep apnea) 10/10/2023    CAD 04/20/2023    Dry skin 10/17/2022    Slow transit constipation 10/17/2022    Stage 3 " "chronic kidney disease, unspecified whether stage 3a or 3b CKD (HCC) 12/02/2021    Hypokalemia 07/07/2021    Right wrist pain 05/21/2021    Thyroid nodule 02/09/2021    Pre-diabetes 10/13/2020    Hyperglycemia 09/11/2020    Chronic obstructive pulmonary disease (HCC) 09/11/2020    Thoracic aortic ectasia (HCC) 01/21/2020    Angioedema, Likely ACE inhibitor induced 12/21/2019    GERD (gastroesophageal reflux disease) 12/21/2019    Tobacco abuse 03/09/2016    Obstructive sleep apnea treated with continuous positive airway pressure (CPAP) 05/20/2015    Hypertension 12/11/2012    Hyperlipidemia 12/11/2012       Portions of the record may have been created with voice recognition software. Occasional wrong word or \"sound a like\" substitutions may have occurred due to the inherent limitations of voice recognition software. Read the chart carefully and recognize, using context, where substitutions have occurred.    Ta Collazo DO, [facility]  7/21/2025 4:38 PM               [1]   Past Medical History:  Diagnosis Date    Abnormal CAT scan     Allergic     Anxiety     Bilateral inguinal hernia 1990    recurrent    Colitis     Diabetes mellitus (HCC)     Gastritis 12/11/2012    GERD (gastroesophageal reflux disease)     Hypertension     Impaired fasting glucose 07/09/2019    Nausea & vomiting     Right knee meniscal tear 12/20/2017    Sleep apnea     uses cpap   [2]   Past Surgical History:  Procedure Laterality Date    CHOLECYSTECTOMY      COLONOSCOPY  2017    ABOUT 2-3 YEARS AGO    COLONOSCOPY  06/02/2022    EXPLORATORY LAPAROTOMY  08/16/2014    done for repair of peritoneal \"RENT\"?    HEMORRHOID SURGERY      INGUINAL HERNIA REPAIR Bilateral 08/14/2014    MA ESOPHAGOGASTRODUODENOSCOPY TRANSORAL DIAGNOSTIC N/A 02/23/2017    Procedure: EGD AND COLONOSCOPY;  Surgeon: Harrison Turcios MD;  Location: [facility];  Service: Gastroenterology    US GUIDED THYROID BIOPSY  03/15/2021   [3]   Current Outpatient Medications   Medication Sig " Dispense Refill    allopurinol (ZYLOPRIM) 100 mg tablet Take 1 tablet (100 mg total) by mouth daily 90 tablet 1    amLODIPine (NORVASC) 10 mg tablet Take 1 tablet by mouth once daily 100 tablet 1    aspirin (ECOTRIN LOW STRENGTH) 81 mg EC tablet Take 81 mg by mouth in the morning.      Blood Glucose Monitoring Suppl (ONE TOUCH ULTRA 2) w/Device KIT       carvedilol (COREG) 12.5 mg tablet Take 1 tablet (12.5 mg total) by mouth 2 (two) times a day with meals 180 tablet 2    cetirizine (ZyrTEC) 10 MG chewable tablet Chew 10 mg in the morning.      cholecalciferol (VITAMIN D3) 400 units tablet Take 400 Units by mouth in the morning.      clopidogrel (PLAVIX) 75 mg tablet Take 1 tablet by mouth once daily 90 tablet 1    hydroCHLOROthiazide 12.5 mg tablet Take 1 tablet by mouth once daily 90 tablet 1    Lancets (OneTouch Delica Plus [id number]) MISC in the morning. Check blood sugar.      metFORMIN (GLUCOPHAGE) 500 mg tablet TAKE 1 TABLET BY MOUTH TWICE DAILY WITH MEALS 180 tablet 1    OneTouch Ultra Test test strip USE  STRIP TO CHECK GLUCOSE ONCE DAILY 50 each 5    pantoprazole (PROTONIX) 40 mg tablet Take 1 tablet by mouth once daily 100 tablet 1    potassium chloride (Klor-Con M20) 20 mEq tablet Take 1 tablet by mouth once daily 90 tablet 1    rosuvastatin (CRESTOR) 40 MG tablet Take 1 tablet (40 mg total) by mouth daily 90 tablet 3    Amitiza 24 MCG capsule TAKE 1 CAPSULE BY MOUTH TWICE DAILY (Patient not taking: Reported on 7/21/2025) 60 capsule 5    sertraline (ZOLOFT) 50 mg tablet Take 1 tablet (50 mg total) by mouth daily (Patient not taking: Reported on 4/29/2025) 30 tablet 5     No current facility-administered medications for this visit.   [4]   Allergies  Allergen Reactions    Ace Inhibitors Angioedema   [5]   Family History  Problem Relation Name Age of Onset    Breast cancer Mother      Heart disease Father      Heart disease Brother      Heart disease Family

## 2025-07-28 DIAGNOSIS — I10 ESSENTIAL HYPERTENSION: ICD-10-CM

## 2025-07-28 DIAGNOSIS — I71.21 ASCENDING AORTIC ANEURYSM (HCC): ICD-10-CM

## 2025-07-30 RX ORDER — HYDROCHLOROTHIAZIDE 12.5 MG/1
12.5 TABLET ORAL DAILY
Qty: 90 TABLET | Refills: 0 | Status: SHIPPED | OUTPATIENT
Start: 2025-07-30

## 2025-08-02 ENCOUNTER — HOSPITAL ENCOUNTER (OUTPATIENT)
Dept: CT IMAGING | Facility: HOSPITAL | Age: 75
Discharge: HOME | End: 2025-08-02
Payer: COMMERCIAL

## 2025-08-02 DIAGNOSIS — J43.1 PANLOBULAR EMPHYSEMA (HCC): ICD-10-CM

## 2025-08-02 DIAGNOSIS — Z87.891 PERSONAL HISTORY OF NICOTINE DEPENDENCE: ICD-10-CM

## 2025-08-02 DIAGNOSIS — I77.810 THORACIC AORTIC ECTASIA (HCC): ICD-10-CM

## 2025-08-02 DIAGNOSIS — Z72.0 TOBACCO ABUSE: ICD-10-CM

## 2025-08-02 PROCEDURE — 71271 CT THORAX LUNG CANCER SCR C-: CPT

## 2025-08-14 ENCOUNTER — TELEPHONE (OUTPATIENT)
Age: 75
End: 2025-08-14